# Patient Record
Sex: MALE | Race: BLACK OR AFRICAN AMERICAN | NOT HISPANIC OR LATINO | Employment: OTHER | URBAN - METROPOLITAN AREA
[De-identification: names, ages, dates, MRNs, and addresses within clinical notes are randomized per-mention and may not be internally consistent; named-entity substitution may affect disease eponyms.]

---

## 2021-08-09 ENCOUNTER — TELEPHONE (OUTPATIENT)
Dept: TRANSPLANT | Facility: CLINIC | Age: 36
End: 2021-08-09

## 2021-08-10 DIAGNOSIS — Z76.82 ORGAN TRANSPLANT CANDIDATE: Primary | ICD-10-CM

## 2021-08-16 ENCOUNTER — TELEPHONE (OUTPATIENT)
Dept: TRANSPLANT | Facility: CLINIC | Age: 36
End: 2021-08-16

## 2021-09-13 ENCOUNTER — TELEPHONE (OUTPATIENT)
Dept: TRANSPLANT | Facility: CLINIC | Age: 36
End: 2021-09-13

## 2021-09-15 ENCOUNTER — TELEPHONE (OUTPATIENT)
Dept: TRANSPLANT | Facility: CLINIC | Age: 36
End: 2021-09-15

## 2021-09-23 ENCOUNTER — TELEPHONE (OUTPATIENT)
Dept: TRANSPLANT | Facility: CLINIC | Age: 36
End: 2021-09-23

## 2021-10-08 NOTE — PROGRESS NOTES
Spoke to patient to complete his history for his upcoming appointment his mother will come with him to is appointment he is aware that he have to bring a small breakfast/snack to eat after blood is drawn he will not need a

## 2021-10-13 ENCOUNTER — HOSPITAL ENCOUNTER (OUTPATIENT)
Dept: RADIOLOGY | Facility: HOSPITAL | Age: 36
Discharge: HOME | End: 2021-10-13
Attending: NURSE PRACTITIONER
Payer: MEDICARE

## 2021-10-13 ENCOUNTER — TELEPHONE (OUTPATIENT)
Dept: TRANSPLANT | Facility: CLINIC | Age: 36
End: 2021-10-13

## 2021-10-13 ENCOUNTER — OFFICE VISIT (OUTPATIENT)
Dept: TRANSPLANT | Facility: CLINIC | Age: 36
End: 2021-10-13
Payer: MEDICARE

## 2021-10-13 ENCOUNTER — HOSPITAL ENCOUNTER (OUTPATIENT)
Dept: RADIOLOGY | Facility: HOSPITAL | Age: 36
Discharge: HOME | End: 2021-10-13
Attending: NURSE PRACTITIONER
Payer: COMMERCIAL

## 2021-10-13 VITALS
OXYGEN SATURATION: 100 % | SYSTOLIC BLOOD PRESSURE: 134 MMHG | DIASTOLIC BLOOD PRESSURE: 96 MMHG | HEIGHT: 72 IN | BODY MASS INDEX: 21.17 KG/M2 | WEIGHT: 156.31 LBS | TEMPERATURE: 97 F | HEART RATE: 59 BPM | RESPIRATION RATE: 16 BRPM

## 2021-10-13 DIAGNOSIS — Z76.82 ORGAN TRANSPLANT CANDIDATE: ICD-10-CM

## 2021-10-13 DIAGNOSIS — Z99.2 ESRD ON HEMODIALYSIS: ICD-10-CM

## 2021-10-13 DIAGNOSIS — E11.22 CONTROLLED TYPE 2 DIABETES MELLITUS WITH CHRONIC KIDNEY DISEASE ON CHRONIC DIALYSIS, WITHOUT LONG-TERM CURRENT USE OF INSULIN: ICD-10-CM

## 2021-10-13 DIAGNOSIS — N18.6 ANEMIA IN ESRD (END-STAGE RENAL DISEASE): ICD-10-CM

## 2021-10-13 DIAGNOSIS — I15.0 RENOVASCULAR HYPERTENSION: ICD-10-CM

## 2021-10-13 DIAGNOSIS — N18.6 ESRD ON HEMODIALYSIS: ICD-10-CM

## 2021-10-13 DIAGNOSIS — Z99.2 CONTROLLED TYPE 2 DIABETES MELLITUS WITH CHRONIC KIDNEY DISEASE ON CHRONIC DIALYSIS, WITHOUT LONG-TERM CURRENT USE OF INSULIN: ICD-10-CM

## 2021-10-13 DIAGNOSIS — Z01.818 PRE-TRANSPLANT EVALUATION FOR KIDNEY TRANSPLANT: Primary | ICD-10-CM

## 2021-10-13 DIAGNOSIS — N18.6 CONTROLLED TYPE 2 DIABETES MELLITUS WITH CHRONIC KIDNEY DISEASE ON CHRONIC DIALYSIS, WITHOUT LONG-TERM CURRENT USE OF INSULIN: ICD-10-CM

## 2021-10-13 DIAGNOSIS — D63.1 ANEMIA IN ESRD (END-STAGE RENAL DISEASE): ICD-10-CM

## 2021-10-13 PROBLEM — E78.5 HYPERLIPIDEMIA: Status: ACTIVE | Noted: 2021-10-13

## 2021-10-13 LAB
CREAT UR-MCNC: 248 MG/DL (ref 23–375)
PROT UR-MCNC: 336 MG/DL (ref 0–15)
PROT/CREAT UR: 1.35 MG/G{CREAT} (ref 0–0.2)

## 2021-10-13 PROCEDURE — 93978 VASCULAR STUDY: CPT | Mod: 26,TXP,, | Performed by: RADIOLOGY

## 2021-10-13 PROCEDURE — 93978 US DOPP ILIACS BILATERAL: ICD-10-PCS | Mod: 26,TXP,, | Performed by: RADIOLOGY

## 2021-10-13 PROCEDURE — 99205 PR OFFICE/OUTPT VISIT, NEW, LEVL V, 60-74 MIN: ICD-10-PCS | Mod: S$GLB,TXP,, | Performed by: NURSE PRACTITIONER

## 2021-10-13 PROCEDURE — 72170 X-RAY EXAM OF PELVIS: CPT | Mod: TC,TXP

## 2021-10-13 PROCEDURE — 99205 OFFICE O/P NEW HI 60 MIN: CPT | Mod: S$GLB,TXP,, | Performed by: NURSE PRACTITIONER

## 2021-10-13 PROCEDURE — 71046 X-RAY EXAM CHEST 2 VIEWS: CPT | Mod: TC,TXP

## 2021-10-13 PROCEDURE — 71046 X-RAY EXAM CHEST 2 VIEWS: CPT | Mod: 26,TXP,, | Performed by: RADIOLOGY

## 2021-10-13 PROCEDURE — 71046 XR CHEST PA AND LATERAL: ICD-10-PCS | Mod: 26,TXP,, | Performed by: RADIOLOGY

## 2021-10-13 PROCEDURE — 76770 US EXAM ABDO BACK WALL COMP: CPT | Mod: TC,TXP

## 2021-10-13 PROCEDURE — 76770 US EXAM ABDO BACK WALL COMP: CPT | Mod: 26,59,TXP, | Performed by: RADIOLOGY

## 2021-10-13 PROCEDURE — 99203 PR OFFICE/OUTPT VISIT, NEW, LEVL III, 30-44 MIN: ICD-10-PCS | Mod: S$GLB,TXP,, | Performed by: TRANSPLANT SURGERY

## 2021-10-13 PROCEDURE — 99203 OFFICE O/P NEW LOW 30 MIN: CPT | Mod: S$GLB,TXP,, | Performed by: TRANSPLANT SURGERY

## 2021-10-13 PROCEDURE — 99999 PR PBB SHADOW E&M-EST. PATIENT-LVL V: ICD-10-PCS | Mod: PBBFAC,TXP,, | Performed by: NURSE PRACTITIONER

## 2021-10-13 PROCEDURE — 76770 US RETROPERITONEAL COMPLETE: ICD-10-PCS | Mod: 26,59,TXP, | Performed by: RADIOLOGY

## 2021-10-13 PROCEDURE — 72170 X-RAY EXAM OF PELVIS: CPT | Mod: 26,TXP,, | Performed by: RADIOLOGY

## 2021-10-13 PROCEDURE — 99999 PR PBB SHADOW E&M-EST. PATIENT-LVL V: CPT | Mod: PBBFAC,TXP,, | Performed by: NURSE PRACTITIONER

## 2021-10-13 PROCEDURE — 93978 VASCULAR STUDY: CPT | Mod: TC,TXP

## 2021-10-13 PROCEDURE — 82570 ASSAY OF URINE CREATININE: CPT | Mod: TXP | Performed by: NURSE PRACTITIONER

## 2021-10-13 PROCEDURE — 72170 XR PELVIS ROUTINE AP: ICD-10-PCS | Mod: 26,TXP,, | Performed by: RADIOLOGY

## 2021-10-13 RX ORDER — SUCROFERRIC OXYHYDROXIDE 500 MG/1
1 TABLET, CHEWABLE ORAL 2 TIMES DAILY
Status: ON HOLD | COMMUNITY
Start: 2021-08-09 | End: 2023-04-11 | Stop reason: HOSPADM

## 2021-10-13 RX ORDER — HYDRALAZINE HYDROCHLORIDE 50 MG/1
50 TABLET, FILM COATED ORAL 3 TIMES DAILY
Status: ON HOLD | COMMUNITY
Start: 2021-10-01 | End: 2023-04-11 | Stop reason: HOSPADM

## 2021-10-13 RX ORDER — MINOXIDIL 2.5 MG/1
2.5 TABLET ORAL 2 TIMES DAILY
Status: ON HOLD | COMMUNITY
Start: 2021-09-22 | End: 2023-04-12 | Stop reason: HOSPADM

## 2021-10-13 RX ORDER — ATORVASTATIN CALCIUM 10 MG/1
10 TABLET, FILM COATED ORAL NIGHTLY
Status: ON HOLD | COMMUNITY
Start: 2021-10-01 | End: 2023-04-11 | Stop reason: SDUPTHER

## 2021-10-13 RX ORDER — AMLODIPINE BESYLATE 5 MG/1
10 TABLET ORAL DAILY
Status: ON HOLD | COMMUNITY
Start: 2021-09-22 | End: 2023-04-11 | Stop reason: HOSPADM

## 2021-10-20 DIAGNOSIS — Z76.82 ORGAN TRANSPLANT CANDIDATE: Primary | ICD-10-CM

## 2021-10-27 PROBLEM — R82.5 POSITIVE URINE DRUG SCREEN: Status: ACTIVE | Noted: 2021-10-27

## 2021-10-27 PROBLEM — F32.A DEPRESSION: Status: ACTIVE | Noted: 2021-10-27

## 2021-12-15 ENCOUNTER — TELEPHONE (OUTPATIENT)
Dept: TRANSPLANT | Facility: CLINIC | Age: 36
End: 2021-12-15
Payer: COMMERCIAL

## 2022-02-15 ENCOUNTER — TELEPHONE (OUTPATIENT)
Dept: TRANSPLANT | Facility: CLINIC | Age: 37
End: 2022-02-15
Payer: COMMERCIAL

## 2022-02-15 NOTE — TELEPHONE ENCOUNTER
Call returned, KAROLINA Govea stepped out. Will give her the message  ----- Message from Cindy Mitchell sent at 2/15/2022  9:38 AM CST -----  Regarding: FW: Patient care    ----- Message -----  From: Ainsley Galindo  Sent: 2/15/2022   9:32 AM CST  To: [facility] Pre-Kidney Transplant Non-Clinical  Subject: Patient care                                     Xuan @ [facility] calling to speak to KAROLINA or MA concerning patient care and schedule appts.    Call: [phone]

## 2022-02-23 ENCOUNTER — TELEPHONE (OUTPATIENT)
Dept: TRANSPLANT | Facility: CLINIC | Age: 37
End: 2022-02-23
Payer: COMMERCIAL

## 2022-02-23 NOTE — TELEPHONE ENCOUNTER
Patient saw Dr. Mayra Hankins [facility] Pain [facility]. Patient stated that he's being seen for arthritis.      ----- Message from Nilda Doshi sent at 2/23/2022 12:54 PM CST -----  Regarding: FW: miss call    ----- Message -----  From: Daryl Camarillo  Sent: 2/23/2022  12:36 PM CST  To: [facility] Pre-Kidney Transplant Clinical  Subject: miss call                                        Pt returning miss call    Call

## 2022-02-24 ENCOUNTER — TELEPHONE (OUTPATIENT)
Dept: TRANSPLANT | Facility: CLINIC | Age: 37
End: 2022-02-24
Payer: COMMERCIAL

## 2022-02-24 NOTE — TELEPHONE ENCOUNTER
KAROLINA Govea will get patient set up with a therapist at home for depression. Will get our SW to discuss plan with her.   ----- Message from Daryl Camarillo sent at 2/24/2022 12:30 PM CST -----  Regarding: call back  Xuan @ [facility] calling to speak with Tiffani  concerning patient care and schedule appts.     Call: [phone]

## 2022-02-25 ENCOUNTER — COMMITTEE REVIEW (OUTPATIENT)
Dept: TRANSPLANT | Facility: CLINIC | Age: 37
End: 2022-02-25
Payer: COMMERCIAL

## 2022-02-25 NOTE — LETTER
February 25, 2022    Andrey Reddy  [address] [place] [address]    Dear Andrey Reddy:  MRN: [id number]    Your transplant evaluation was reviewed at the [facility] Selection Committee meeting on 2/25/2022.  It is with regret I inform you that your workup is incomplete and we are unable to determine your transplant candidacy at this time due to needing clearance, clarification and recommendations regarding your mental health issues and marijuana use from an established healthcare professional.  You will be re-presented to the selection committee once you have established care with a healthcare professional and receive clearance.  You will be notified of the committees decision once we review the new results.    The [facility] Selection Committee carefully considers each patients transplant candidacy using established selection criteria to determine if it is safe to proceed with transplantation for each and every person evaluated.  Although the selection committee believes your workup is incomplete and we are unable to determine your transplant candidacy, you have the right to be evaluated at other transplant centers.  You may request your [facility] records be sent to any center of your choice by contacting our Medical Records Department at [phone].    Attached is a letter from the United Network for Organ Sharing (UNOS).  It describes the services and information offered to patients by UNOS and the Organ Procurement and Transplant Network.    Sincerely,      Monica Mcgrath MD  Medical Director, Kidney & Kidney/Pancreas Transplantation    CC:  Dr. Pipe Martinez          [facility]    Encl: OS Letter             The Organ Procurement and Transplantation Network   Toll-free patient services line: Your resource for organ transplant information     If you have a question regarding your own medical care, you always should call your transplant hospital  first. However, for general organ transplant-related information, you can call the Organ Procurement and Transplantation Network (OPTN) toll-free patient services line at [phone].     Anyone, including potential transplant candidates, candidates, recipients, family members, friends, living donors, and donor family members, can call this number to:     · Talk about organ donation, living donation, the transplant process, the donation process, and transplant policies.   · Get a free patient information kit with helpful booklets, waiting list and transplant information, and a list of all transplant hospitals.   · Ask questions about the OPTN website (https://optn.transplant.hrsa.gov/), the United Network for Organ Sharings (UNOS) website (https://unos.org/), or the UNOS website for living donors and transplant recipients. (https://www.transplantliving.org/).   · Learn how the OPTN can help you.   · Talk about any concerns that you may have with a transplant hospital.     The nations transplant system, the OPTN, is managed under federal contract by the United Network for Organ Sharing (UNOS), which is a non-profit charitable organization. The OPTN helps create and define organ sharing policies that make the best use of donated organs. This process continuously evaluating new advances and discoveries so policies can be adapted to best serve patients waiting for transplants. To do so, the OPTN works closely with transplant professionals, transplant patients, transplant candidates, donor families, living donors, and the public. All transplant programs and organ procurement organizations throughout the country are OPTN members and are obligated to follow the policies the OPTN creates for allocating organs.     The OPTN also is responsible for:   · Providing educational material for patients, the public, and professionals.   · Raising awareness of the need for donated organs and tissue.   · Coordinating organ  procurement, matching, and placement.   · Collecting information about every organ transplant and donation that occurs in the [place].     Remember, you should contact your transplant hospital directly if you have questions or concerns about your own medical care including medical records, work-up progress, and test results.     We are not your transplant hospital, and our staff will not be able to answer questions about your case, so please keep your transplant hospitals phone number handy.   However, while you research your transplant needs and learn as much as you can about transplantation and donation, we welcome your call to our toll-free patient services line at [phone].

## 2022-02-25 NOTE — COMMITTEE REVIEW
Native Organ Dx: Diabetes Mellitus - Type II      Not approved for LRD/CAD transplant due to needing clearance, clarification and recommendations regarding mental health issues and marijuana use from an established healthcare professional (psych/therapist)  Patient is cleared medically.     Attempted to notify the patient of the committee's decision. VM not set up to leave message. In the meantime, the SW is to f/u with the patient about establishing care and clearance from a mental health professional.    Note written by SMITH Coughlin, RN    ===============================================    I was present at the meeting and attest to the decision of the committee

## 2022-02-28 ENCOUNTER — TELEPHONE (OUTPATIENT)
Dept: TRANSPLANT | Facility: CLINIC | Age: 37
End: 2022-02-28
Payer: COMMERCIAL

## 2022-02-28 NOTE — TELEPHONE ENCOUNTER
----- Message from Nilda Doshi sent at 2/25/2022  1:48 PM CST -----  Regarding: FW: return call  This was the patient from  selection. Did you guys call him?   ----- Message -----  From: Gracie Ortiz  Sent: 2/25/2022   1:05 PM CST  To: [facility] Pre-Kidney Transplant Clinical  Subject: FW: return call                                    ----- Message -----  From: Ainsley Galindo  Sent: 2/25/2022  12:58 PM CST  To: [facility] Pre-Liver Transplant Clinical  Subject: return call                                      Patient returning call to staff. Patient was in Dialysis and missed the call. Requesting a call back.    Call: [phone] (Mobile

## 2022-03-18 ENCOUNTER — COMMITTEE REVIEW (OUTPATIENT)
Dept: TRANSPLANT | Facility: CLINIC | Age: 37
End: 2022-03-18
Payer: COMMERCIAL

## 2022-03-18 NOTE — COMMITTEE REVIEW
Correction to determination of committee note 2/25/2022.    Unable to determine for LRD/CAD transplant due to needing clearance, clarification and recommendations regarding mental health issues and marijuana use from an established healthcare professional (psych/therapist)  Patient is cleared medically.

## 2022-03-25 ENCOUNTER — TELEPHONE (OUTPATIENT)
Dept: TRANSPLANT | Facility: CLINIC | Age: 37
End: 2022-03-25
Payer: COMMERCIAL

## 2022-03-25 NOTE — TELEPHONE ENCOUNTER
Spoke w/pt regarding 30 day letter. Pt states that he has his last Psych appt on 4/4. He thinks the SW name is Martin Menezes, he's not sure. I informed pt to fac clearance once his appt is complete. Pt verbalized understanding.  Reviewed fax number with pt.

## 2022-03-25 NOTE — TELEPHONE ENCOUNTER
----- Message from Salomon Bellamy sent at 3/25/2022  8:12 AM CDT -----  Pt calling to speak to staff regarding letter received.  Pt states he is enrolled in a behavior clinic and he has contact information if needed    Call # [phone]

## 2022-04-19 ENCOUNTER — TELEPHONE (OUTPATIENT)
Dept: TRANSPLANT | Facility: CLINIC | Age: 37
End: 2022-04-19
Payer: COMMERCIAL

## 2022-04-19 NOTE — TELEPHONE ENCOUNTER
Patient was seen at [facility] [phone] and cleared. Patient will be represented to committee on 4/29/22     ----- Message from Salomon Bellamy sent at 4/19/2022 10:06 AM CDT -----  Regarding: Coordinator  Pt's dialysis KAROLINA Govea states they are having difficulty getting psych evaluation records released and would like assistance in this matter    Call : [phone]

## 2022-04-29 ENCOUNTER — COMMITTEE REVIEW (OUTPATIENT)
Dept: TRANSPLANT | Facility: CLINIC | Age: 37
End: 2022-04-29
Payer: COMMERCIAL

## 2022-04-29 NOTE — LETTER
April 30, 2022    Pipe Martinez  [address] [address]     Dear Dr. Martinez:    Patient: Andrey Reddy   MR Number: [id number]   YOB: 1985     Your patient, Andrey Reddy, was recently discussed at the [facility] Selection Committee meeting on 2/25/2022. I am happy to inform you that Andrey has been approved for transplantation.  He has met selection criteria for a kidney transplant related to ESRD secondary to primary diagnosis of Diabetes Mellitus - Type II. Your patient will be placed on the cadaveric wait list pending final financial approval from insurance company.     We appreciate your confidence in allowing us to participate in your patients care.  If you have any questions or concerns, please do not hesitate to contact me.    Sincerely,    Monica Mcgrath MD  Medical Director, Kidney & Kidney/Pancreas Transplantation    Tj/Enclosed    Cc:[facility]  [facility]

## 2022-04-29 NOTE — COMMITTEE REVIEW
Native Organ Dx: Diabetes Mellitus - Type II      SELECTION COMMITTEE NOTE    Andrey Reddy was presented at selection committee on 4/29/2022.  Patient met selection criteria for kidney transplant related to ESRD due to   Diabetes Mellitus - Type II.  No absolute contraindications to transplant at this time.  Patient will be placed on the cadaveric wait list pending final financial approval from insurance company.  Patient will return to clinic for routine appointment in 1 year(s). Patient does not meet criteria for High KDPI kidney offer. Patient meets HCV+ kidney offer. Patient does not meet criteria for dual/enbloc, due to age.          Note written by Tiffani Hernández RN    ===============================================    I was present at the meeting and attest to the decision of the committee

## 2022-06-07 ENCOUNTER — TELEPHONE (OUTPATIENT)
Dept: TRANSPLANT | Facility: CLINIC | Age: 37
End: 2022-06-07
Payer: COMMERCIAL

## 2022-06-07 DIAGNOSIS — Z76.82 ORGAN TRANSPLANT CANDIDATE: Primary | ICD-10-CM

## 2022-06-08 ENCOUNTER — TELEPHONE (OUTPATIENT)
Dept: TRANSPLANT | Facility: CLINIC | Age: 37
End: 2022-06-08
Payer: COMMERCIAL

## 2022-06-08 DIAGNOSIS — Z76.82 ORGAN TRANSPLANT CANDIDATE: Primary | ICD-10-CM

## 2022-06-08 NOTE — LETTER
2022  [facility]  [address] [facility] [address]    Dear Andrey Reddy:  MRN: [id number]    Congratulations! On 2022, you were placed on  the waiting list for a  donor transplant.    Your candidacy for kidney transplant is based on the following criteria: ESRD due to Diabetes Mellitus - Type II.    Your transplant coordinator while on the waiting list is Britany Camacho RN. They can be reached at [phone] or [phone] with any questions.      What to do now?    ? Ask your living donors to call and begin testing   Give your donors our phone number, [phone]   Make sure your donors have your name and date of birth when they call   You will get transplanted much faster if you have a living donor    ? Have your blood sent to our Transplant Lab every month   If you are on dialysis - our Transplant Lab will work with your dialysis unit to send your blood every month   If you are not on dialysis   o If you live near an [facility] lab, we will schedule you to have blood drawn every month  o If you do not live near an [facility] lab, you will be sent blood kits in the mail. You will need to take a kit to your local lab or doctor to have your blood drawn every month and mail to the Transplant Lab.     ? Call us with ANY CHANGES   Phone numbers - we must be able to reach you anytime of the day or night when a kidney is available   Address   Insurance coverage   Dialysis unit or kidney doctor   Tim: if you have surgery, stay in the hospital, have to get blood, or have an infection    ? Review your Kidney/Kidney-Pancreas Transplant Guide    This will give you detailed information about what happens when  o you are on the waiting list   o you are called when a kidney is available    The [facility] has a transplant surgeon and physician available 365 days a year, 24 hours a day to coordinate organ acceptance, procurement, surgical placement and  to address urgent patient care issues.  You will be notified in writing of any changes to our Transplant Centers staffing plan that would impact your ability to receive a transplant.    Attached is a letter from the United Network for Organ Sharing (UNOS). It describes the services and information offered to patients by UNOS and the Organ Procurement and Transplant Network. We look forward to working with you while on the waiting list.     Congratulations,    Your Transplant Partner  [facility]   [address] [place] [address]  [phone]    Tj/Valarie  Cc: SALENA BULLARD                                                                                                                The Organ Procurement and Transplantation Network   Toll-free patient services line: Your resource for organ transplant information     If you have a question regarding your own medical care, you always should call your transplant hospital first. However, for general organ transplant-related information, you can call the Organ Procurement and Transplantation Network (OPTN) toll-free patient services line at [phone].     Anyone, including potential transplant candidates, candidates, recipients, family members, friends, living donors, and donor family members, can call this number to:     · Talk about organ donation, living donation, the transplant process, the donation process, and transplant policies.   · Get a free patient information kit with helpful booklets, waiting list and transplant information, and a list of all transplant hospitals.   · Ask questions about the OPTN website (https://optn.transplant.hrsa.gov/), the United Network for Organ Sharings (UNOS) website (https://unos.org/), or the UNOS website for living donors and transplant recipients. (https://www.transplantliving.org/).   · Learn how the OPTN can help you.   · Talk about any concerns that you may have  with a transplant hospital.     The nations transplant system, the OPTN, is managed under federal contract by the United Network for Organ Sharing (UNOS), which is a non-profit charitable organization. The OPTN helps create and define organ sharing policies that make the best use of donated organs. This process continuously evaluating new advances and discoveries so policies can be adapted to best serve patients waiting for transplants. To do so, the OPTN works closely with transplant professionals, transplant patients, transplant candidates, donor families, living donors, and the public. All transplant programs and organ procurement organizations throughout the country are OPTN members and are obligated to follow the policies the OPTN creates for allocating organs.     The OPTN also is responsible for:   · Providing educational material for patients, the public, and professionals.   · Raising awareness of the need for donated organs and tissue.   · Coordinating organ procurement, matching, and placement.   · Collecting information about every organ transplant and donation that occurs in the [place].     Remember, you should contact your transplant hospital directly if you have questions or concerns about your own medical care including medical records, work-up progress, and test results.     We are not your transplant hospital, and our staff will not be able to answer questions about your case, so please keep your transplant hospitals phone number handy.   However, while you research your transplant needs and learn as much as you can about transplantation and donation, we welcome your call to our toll-free patient services line at [phone].

## 2022-06-08 NOTE — TELEPHONE ENCOUNTER
KIDNEY WAIT LISTING NOTE    Date of Financial clearance to list: 22    SSN/[facility]:     Organ: Kidney    Last Name: Barry  First Name: Andrey    : 1985       Gender: male        MRN#: [id number]                                   State of Permanent Residence: [address] [place] [address]    Ethnicity: Not  or /a   Race:      Black or     CLINICAL INFORMATION   Candidate Medical Urgency Status: Active (1)  Number of Previous Kidney Transplants: 0  Number of Previous Solid Organ Transplants: 0  Did you enter number of previous kidney or other solid organ transplants? yes  Is this Candidate a Prior Living Donor: no  (If yes, please generate letter to UNOS with patient's date of donation, recipient SSN, signed by Surgical Director after patient is listed in order to receive priority points).      ABO  ABO Blood Group:   AB POS     ABO Confirmation: (THESE DATES MUST BE PRIOR TO THE LIST DATE AND SUPPORTED BY SEPARATE LAB REPORTS)    Internal Results    Lab Results   Component Value Date    GROUPTRH AB POS 2022    GROUPTRH AB POS 10/13/2021     No results found for: ABO    External Results    ABO Date 1:    ABO Date 2  Are either of these ABO results based on External Labs? no  (If Yes, STOP and go to source document in Media Tab for verification).    VITALS  Height:  6'  Weight:  156 lbs  (Use height from Transplant clinic visits only).  Did you enter height/weight? Yes    HLA    Class I:  Lab Results   Component Value Date    EHOR9VE 2 10/13/2021    QSAZ2NP 23 10/13/2021    HQYY2IN 7 10/13/2021    MNAL3GP 72 10/13/2021    XPTTZ4WT 6 10/13/2021    FKFAM6KM XX 10/13/2021    CJOYB6UE 2 10/13/2021    EPKEZ1QI 7 10/13/2021       Class II:  Lab Results   Component Value Date    DELTRF29ME 17 10/13/2021    URBJQA94RS 11 10/13/2021    XSWMGN337YT 52 10/13/2021    NDSGVZ0246 XX 10/13/2021    OZCST1DN 2 10/13/2021    BFLKU9HC 7 10/13/2021       Tested for HLA  "Antibodies: Yes, no antibodies detected     If result is "Positive" antibodies are detected     If result is "Negative or questionable" no antibodies detected    Lab Results   Component Value Date    CIPRAS Positive 10/13/2021    CIIPRAS Negative 10/13/2021       DIALYSIS INFORMATION  Is patient Pre-Dialysis: No     GFR Information  Report GFR being used as the criteria for placement on the kidney list. If not, leave blank  GFR < or = 20 ml/min? n/a  If Yes, Specify value  ___   ml/min     Initial date GFR became 20 or less:   Is GFR obtained from an Outside lab Result? n/a  (If YES verify with source document scanned into media)    If patient on Dialysis:    Is candidate currently on dialysis for ESRD? Yes  If Yes,  Date Chronic Dialysis Started:   5/10/2021  (verify with source document in Media Tab)   Dialysis Unit Name: [facility]  [address] [facility] [address]                        Physician Name:  Dr. Monica Troy  [facility]#: [id number]    DIABETES INFORMATION  Primary Native Kidney Diagnosis: Diabetes Mellitus - Type II  C-Peptide Value - No results found for: CPEPTIDE  Current Diabetes Status: Type II    FOR NON-KIDNEY DEPARTMENT USE ONLY:  Additional Organs Registered? none    Maximum Acceptable Number of HLA Mismatches  ABDR:     6      (0-6)               AB:               (0-4)  ADR:   _____  (0-4)              BDR: _____ (0-4)  A:        _____  (0-2)              B:      _____ (0-2)          DR: ______ (0-2)    Will Recipient Accept?   Accept HBcAB Positive Organ:            Yes  Accept HBV ELIAZAR Positive Organ:        no  Accept HCV Antibody Positive Organ: yes   Accept HCV ELIAZAR Positive Organ: yes    Dual Kidney and En Bloc Opt In : No  Dual  Local:   No  Dual Import:   No  En Bloc Local:   No  En Bloc Import: No     Accept KDPI > 85: Single: No     Local: No     Import: No  Accept KDPI > 85: Dual: No     Local: No     Import: No      ### NURSE TO VERIFY CONSENT AND MAKE ANY " NECESSARY CHANGES NEEDED IN UNET AT THE TIME OF VERIFICATION ###    Unacceptible Antigens  If yes, list     Lab Results   Component Value Date    PR8JGFA Negative 10/13/2021       ### DO NOT LIST IF ANTIGEN VALUE WEAK ###    ABO, 5183 and all serologies were verified by Roma and myself.

## 2022-06-22 ENCOUNTER — EPISODE CHANGES (OUTPATIENT)
Dept: TRANSPLANT | Facility: CLINIC | Age: 37
End: 2022-06-22

## 2022-06-22 DIAGNOSIS — Z76.82 ORGAN TRANSPLANT CANDIDATE: Primary | ICD-10-CM

## 2022-08-05 ENCOUNTER — TELEPHONE (OUTPATIENT)
Dept: TRANSPLANT | Facility: CLINIC | Age: 37
End: 2022-08-05
Payer: COMMERCIAL

## 2022-08-18 DIAGNOSIS — Z76.82 ORGAN TRANSPLANT CANDIDATE: Primary | ICD-10-CM

## 2022-08-23 ENCOUNTER — TELEPHONE (OUTPATIENT)
Dept: TRANSPLANT | Facility: CLINIC | Age: 37
End: 2022-08-23
Payer: COMMERCIAL

## 2022-09-21 ENCOUNTER — TELEPHONE (OUTPATIENT)
Dept: TRANSPLANT | Facility: CLINIC | Age: 37
End: 2022-09-21
Payer: COMMERCIAL

## 2022-10-10 PROCEDURE — 86832 HLA CLASS I HIGH DEFIN QUAL: CPT | Mod: PO,TXP | Performed by: NURSE PRACTITIONER

## 2022-10-10 PROCEDURE — 86833 HLA CLASS II HIGH DEFIN QUAL: CPT | Mod: PO,TXP | Performed by: NURSE PRACTITIONER

## 2022-10-19 ENCOUNTER — TELEPHONE (OUTPATIENT)
Dept: TRANSPLANT | Facility: CLINIC | Age: 37
End: 2022-10-19
Payer: COMMERCIAL

## 2022-10-19 NOTE — LETTER
Date: 10/19/2022          Referral Process      To: Dialysis Unit  and Charge RN From: [facility] Social Workers and      Kidney Transplant Nurse Coordinators    RE: Andrey Reddy, 1985, [id number]     At [facility], we conduct adherence checks as an important part of transplant care. Initial and listed patient assessments are not complete without adherence information.        Please complete the following information:                 Data from the last 3 months:  (data from last 3 months preferred):    Number of AMAs with dates, time, and reasons: ____________________________________________________    ______________________________________________________________________________________________    ______________________________________________________________________________________________    Number of No-Shows with dates and reasons: ______________________________________________________      ______________________________________________________________________________________________      Any concerns with Caregivers:  YES / NO    If yes, please explain:  ___________________________________________________________________________    ______________________________________________________________________________________________     Any concerns with Transportation:  YES / NO    If yes, please explain:  ___________________________________________________________________________    ______________________________________________________________________________________________    Any Psychiatric and/or Psychosocial concerns:  YES / NO     If yes, please explain: ___________________________________________________________________________    ______________________________________________________________________________________________      PLEASE RETURN TO: FAX: [phone]     Thank you for collaborating with us in the care of this patient.            [address]  ?  [place] [address]  ?  phone [phone]  ?  fax [phone]  ?  [email or web address]  Confidentiality notice: The accompanying facsimile is intended solely for the use of the recipient designated above. Document(s) transmitted herewith may contain information that is confidential and privileged. Delivery, distribution or dissemination of this communication other than to the intended recipient is strictly prohibited. If you have received this facsimile in error, please notify us immediately by telephone.

## 2022-10-21 ENCOUNTER — LAB VISIT (OUTPATIENT)
Dept: LAB | Facility: HOSPITAL | Age: 37
End: 2022-10-21
Payer: COMMERCIAL

## 2022-10-21 DIAGNOSIS — Z76.82 ORGAN TRANSPLANT CANDIDATE: ICD-10-CM

## 2022-10-26 LAB — HPRA INTERPRETATION: NORMAL

## 2022-10-28 ENCOUNTER — HOSPITAL ENCOUNTER (OUTPATIENT)
Dept: RADIOLOGY | Facility: HOSPITAL | Age: 37
Discharge: HOME | End: 2022-10-28
Attending: NURSE PRACTITIONER
Payer: COMMERCIAL

## 2022-10-28 ENCOUNTER — TELEPHONE (OUTPATIENT)
Dept: ADMINISTRATIVE | Facility: HOSPITAL | Age: 37
End: 2022-10-28
Payer: COMMERCIAL

## 2022-10-28 ENCOUNTER — HOSPITAL ENCOUNTER (OUTPATIENT)
Dept: CARDIOLOGY | Facility: HOSPITAL | Age: 37
Discharge: HOME | End: 2022-10-28
Attending: NURSE PRACTITIONER
Payer: MEDICARE

## 2022-10-28 ENCOUNTER — OFFICE VISIT (OUTPATIENT)
Dept: TRANSPLANT | Facility: CLINIC | Age: 37
End: 2022-10-28
Payer: MEDICARE

## 2022-10-28 VITALS
BODY MASS INDEX: 20.99 KG/M2 | SYSTOLIC BLOOD PRESSURE: 149 MMHG | HEART RATE: 79 BPM | HEIGHT: 72 IN | DIASTOLIC BLOOD PRESSURE: 79 MMHG | WEIGHT: 155 LBS | OXYGEN SATURATION: 98 % | RESPIRATION RATE: 18 BRPM | TEMPERATURE: 97 F

## 2022-10-28 VITALS
HEART RATE: 76 BPM | DIASTOLIC BLOOD PRESSURE: 80 MMHG | HEIGHT: 73 IN | SYSTOLIC BLOOD PRESSURE: 130 MMHG | BODY MASS INDEX: 20.54 KG/M2 | WEIGHT: 155 LBS

## 2022-10-28 DIAGNOSIS — Z76.82 PATIENT ON WAITING LIST FOR KIDNEY TRANSPLANT: Primary | ICD-10-CM

## 2022-10-28 DIAGNOSIS — E11.22 CONTROLLED TYPE 2 DIABETES MELLITUS WITH CHRONIC KIDNEY DISEASE ON CHRONIC DIALYSIS, WITHOUT LONG-TERM CURRENT USE OF INSULIN: ICD-10-CM

## 2022-10-28 DIAGNOSIS — N18.6 ESRD ON HEMODIALYSIS: ICD-10-CM

## 2022-10-28 DIAGNOSIS — Z76.82 ORGAN TRANSPLANT CANDIDATE: ICD-10-CM

## 2022-10-28 DIAGNOSIS — Z99.2 CONTROLLED TYPE 2 DIABETES MELLITUS WITH CHRONIC KIDNEY DISEASE ON CHRONIC DIALYSIS, WITHOUT LONG-TERM CURRENT USE OF INSULIN: ICD-10-CM

## 2022-10-28 DIAGNOSIS — I15.0 RENOVASCULAR HYPERTENSION: ICD-10-CM

## 2022-10-28 DIAGNOSIS — Z99.2 ESRD ON HEMODIALYSIS: ICD-10-CM

## 2022-10-28 DIAGNOSIS — N25.81 SECONDARY HYPERPARATHYROIDISM: ICD-10-CM

## 2022-10-28 DIAGNOSIS — N18.6 CONTROLLED TYPE 2 DIABETES MELLITUS WITH CHRONIC KIDNEY DISEASE ON CHRONIC DIALYSIS, WITHOUT LONG-TERM CURRENT USE OF INSULIN: ICD-10-CM

## 2022-10-28 LAB
ASCENDING AORTA: 2.42 CM
AV INDEX (PROSTH): 0.8
AV MEAN GRADIENT: 2 MMHG
AV PEAK GRADIENT: 5 MMHG
AV VALVE AREA: 3.22 CM2
AV VELOCITY RATIO: 0.73
BSA FOR ECHO PROCEDURE: 1.9 M2
CV ECHO LV RWT: 0.28 CM
DOP CALC AO PEAK VEL: 1.17 M/S
DOP CALC AO VTI: 20.84 CM
DOP CALC LVOT AREA: 4 CM2
DOP CALC LVOT DIAMETER: 2.27 CM
DOP CALC LVOT PEAK VEL: 0.85 M/S
DOP CALC LVOT STROKE VOLUME: 67.11 CM3
DOP CALCLVOT PEAK VEL VTI: 16.59 CM
E WAVE DECELERATION TIME: 213.53 MSEC
E/A RATIO: 1.33
E/E' RATIO: 7.73 M/S
ECHO LV POSTERIOR WALL: 0.76 CM (ref 0.6–1.1)
EJECTION FRACTION: 65 %
FRACTIONAL SHORTENING: 26 % (ref 28–44)
INTERVENTRICULAR SEPTUM: 0.8 CM (ref 0.6–1.1)
LA MAJOR: 4.47 CM
LA MINOR: 4.24 CM
LA WIDTH: 4.29 CM
LEFT ATRIUM SIZE: 3.9 CM
LEFT ATRIUM VOLUME INDEX MOD: 24.7 ML/M2
LEFT ATRIUM VOLUME INDEX: 32.1 ML/M2
LEFT ATRIUM VOLUME MOD: 47.73 CM3
LEFT ATRIUM VOLUME: 61.89 CM3
LEFT INTERNAL DIMENSION IN SYSTOLE: 4.05 CM (ref 2.1–4)
LEFT VENTRICLE DIASTOLIC VOLUME INDEX: 82.29 ML/M2
LEFT VENTRICLE DIASTOLIC VOLUME: 158.82 ML
LEFT VENTRICLE MASS INDEX: 80 G/M2
LEFT VENTRICLE SYSTOLIC VOLUME INDEX: 37.3 ML/M2
LEFT VENTRICLE SYSTOLIC VOLUME: 71.97 ML
LEFT VENTRICULAR INTERNAL DIMENSION IN DIASTOLE: 5.5 CM (ref 3.5–6)
LEFT VENTRICULAR MASS: 154.95 G
LV LATERAL E/E' RATIO: 6.54 M/S
LV SEPTAL E/E' RATIO: 9.44 M/S
MV A" WAVE DURATION": 10.28 MSEC
MV PEAK A VEL: 0.64 M/S
MV PEAK E VEL: 0.85 M/S
MV STENOSIS PRESSURE HALF TIME: 61.92 MS
MV VALVE AREA P 1/2 METHOD: 3.55 CM2
PISA TR MAX VEL: 2.4 M/S
PULM VEIN S/D RATIO: 0.85
PV PEAK D VEL: 0.48 M/S
PV PEAK S VEL: 0.41 M/S
RA MAJOR: 4.17 CM
RA PRESSURE: 3 MMHG
RA WIDTH: 4.27 CM
RIGHT VENTRICULAR END-DIASTOLIC DIMENSION: 3.58 CM
RV TISSUE DOPPLER FREE WALL SYSTOLIC VELOCITY 1 (APICAL 4 CHAMBER VIEW): 8.83 CM/S
SINUS: 2.42 CM
STJ: 2.26 CM
TDI LATERAL: 0.13 M/S
TDI SEPTAL: 0.09 M/S
TDI: 0.11 M/S
TR MAX PG: 23 MMHG
TRICUSPID ANNULAR PLANE SYSTOLIC EXCURSION: 2.01 CM
TV REST PULMONARY ARTERY PRESSURE: 26 MMHG

## 2022-10-28 PROCEDURE — 99999 PR PBB SHADOW E&M-EST. PATIENT-LVL IV: ICD-10-PCS | Mod: PBBFAC,TXP,, | Performed by: NURSE PRACTITIONER

## 2022-10-28 PROCEDURE — 71046 XR CHEST PA AND LATERAL: ICD-10-PCS | Mod: 26,TXP,, | Performed by: RADIOLOGY

## 2022-10-28 PROCEDURE — 93306 TTE W/DOPPLER COMPLETE: CPT | Mod: TXP

## 2022-10-28 PROCEDURE — 99999 PR PBB SHADOW E&M-EST. PATIENT-LVL IV: CPT | Mod: PBBFAC,TXP,, | Performed by: NURSE PRACTITIONER

## 2022-10-28 PROCEDURE — 99215 OFFICE O/P EST HI 40 MIN: CPT | Mod: S$PBB,TXP,, | Performed by: NURSE PRACTITIONER

## 2022-10-28 PROCEDURE — 93306 ECHO (CUPID ONLY): ICD-10-PCS | Mod: 26,TXP,, | Performed by: INTERNAL MEDICINE

## 2022-10-28 PROCEDURE — 71046 X-RAY EXAM CHEST 2 VIEWS: CPT | Mod: TC,TXP

## 2022-10-28 PROCEDURE — 93306 TTE W/DOPPLER COMPLETE: CPT | Mod: 26,TXP,, | Performed by: INTERNAL MEDICINE

## 2022-10-28 PROCEDURE — 71046 X-RAY EXAM CHEST 2 VIEWS: CPT | Mod: 26,TXP,, | Performed by: RADIOLOGY

## 2022-10-28 PROCEDURE — 99214 OFFICE O/P EST MOD 30 MIN: CPT | Mod: PBBFAC,25,TXP | Performed by: NURSE PRACTITIONER

## 2022-10-28 PROCEDURE — 99215 PR OFFICE/OUTPT VISIT, EST, LEVL V, 40-54 MIN: ICD-10-PCS | Mod: S$PBB,TXP,, | Performed by: NURSE PRACTITIONER

## 2022-10-28 RX ORDER — GABAPENTIN 300 MG/1
300 CAPSULE ORAL 3 TIMES DAILY
Status: ON HOLD | COMMUNITY
Start: 2022-05-12 | End: 2023-04-13 | Stop reason: HOSPADM

## 2022-10-28 NOTE — PROGRESS NOTES
Kidney Transplant Recipient Reevalulation    Referring Physician: Pipe Martinez  Current Nephrologist: Pipe Martinez  Waitlist Status: active  Dialysis Start Date: 5/10/2021    Subjective:     CC:  Annual reassessment of kidney transplant candidacy.    HPI:  Mr. Reddy is a 37 y.o. year old Black or  male with ESRD secondary to diabetic nephropathy and HTN.  He has been on the wait list for a kidney transplant at [facility] since 5/10/2021. Patient is currently on hemodialysis started on 5/10/2021. Patient is dialyzing on TTS schedule.  Patient reports that he is tolerating dialysis well.. Patient denies any recent hospitalizations or ED visits.    Has been having leg cramps at dialysis. Reports that he was diagnosed with fibromyalgia by local rheumatologist. Was subsequently sent to see pain management who did epidural steroid injections. Remains active, not frail.    CXR 10/28/2022: No acute process seen.  PXR 10/13/2021: favorable for transplant.    Renal US 10/13/2021: Medical renal disease  Iliacs 10/13/2021: favorable for transplant.    Echo 10/28/2022: EF 65% PA 26   Stress 11/15/2021: negative for ischemia.    Current Outpatient Medications   Medication Sig Dispense Refill    amLODIPine (NORVASC) 5 MG tablet Take 10 mg by mouth once daily.      atorvastatin (LIPITOR) 10 MG tablet Take 10 mg by mouth every evening.      gabapentin (NEURONTIN) 300 MG capsule Take 300 mg by mouth 3 (three) times daily.      hydrALAZINE (APRESOLINE) 50 MG tablet Take 50 mg by mouth 3 (three) times daily.      minoxidiL (LONITEN) 2.5 MG tablet Take 2.5 mg by mouth 2 (two) times a day.      VELPHORO 500 mg Chew Take 1 tablet by mouth 2 (two) times daily.      vit B complx C/folic acid/zinc (RENAPLEX ORAL) Take 1 tablet by mouth once daily.       No current facility-administered medications for this visit.       Past Medical History:   Diagnosis Date    Allergy     Anemia     Diabetes mellitus     Disorder of  "kidney and ureter     Hyperlipidemia     Hypertension     Secondary hyperparathyroidism      Review of Systems   Constitutional:  Negative for activity change, appetite change and fever.   HENT:  Negative for congestion, mouth sores and sore throat.    Eyes:  Negative for visual disturbance.   Respiratory:  Negative for cough, chest tightness and shortness of breath.    Cardiovascular:  Negative for chest pain, palpitations and leg swelling.   Gastrointestinal:  Negative for abdominal distention, abdominal pain, constipation, diarrhea and nausea.   Genitourinary:  Negative for difficulty urinating, frequency and hematuria.   Musculoskeletal:  Negative for arthralgias and gait problem.   Skin:  Negative for wound.   Allergic/Immunologic: Negative for environmental allergies, food allergies and immunocompromised state.   Neurological:  Negative for dizziness, weakness and numbness.   Psychiatric/Behavioral:  Negative for sleep disturbance. The patient is not nervous/anxious.      Objective:   body mass index is 21.11 kg/m².  BP (!) 149/79 (BP Location: Left arm, Patient Position: Sitting, BP Method: Medium (Automatic))   Pulse 79   Temp 97.2 °F (36.2 °C) (Temporal)   Resp 18   Ht 5' 11.85" (1.825 m)   Wt 70.3 kg (154 lb 15.7 oz)   SpO2 98%   BMI 21.11 kg/m²     Physical Exam  Vitals and nursing note reviewed.   Constitutional:       Appearance: Normal appearance.   HENT:      Head: Normocephalic.   Cardiovascular:      Rate and Rhythm: Normal rate and regular rhythm.      Heart sounds: Normal heart sounds.   Pulmonary:      Effort: Pulmonary effort is normal.      Breath sounds: Normal breath sounds.   Abdominal:      General: Bowel sounds are normal. There is no distension.      Palpations: Abdomen is soft.      Tenderness: There is no abdominal tenderness.   Musculoskeletal:         General: Normal range of motion.   Skin:     General: Skin is warm and dry.   Neurological:      General: No focal deficit " present.      Mental Status: He is alert.   Psychiatric:         Behavior: Behavior normal.       Labs:  Lab Results   Component Value Date    WBC 4.75 10/13/2021    HGB 12.7 (L) 10/13/2021    HCT 37.6 (L) 10/13/2021     10/13/2021    K 4.6 10/13/2021     10/13/2021    CO2 21 (L) 10/13/2021    BUN 30 (H) 10/13/2021    CREATININE 4.9 (H) 10/13/2021    EGFRNONAA 14.0 (A) 10/13/2021    CALCIUM 10.0 10/13/2021    PHOS 4.0 10/13/2021    ALBUMIN 4.5 10/13/2021    AST 15 10/13/2021    ALT 18 10/13/2021    UTPCR 1.35 (H) 10/13/2021    .3 (H) 10/13/2021     Lab Results   Component Value Date    CPRA 50 08/04/2022    KP0RLDY Negative 08/04/2022    CIIAB DR53 08/04/2022    ABCMT WEAK---DP5, 08/04/2022       Labs were reviewed with the patient.    Pre-transplant Workup:   Reviewed with the patient.    Assessment:     1. Patient on waiting list for kidney transplant    2. ESRD on hemodialysis    3. Renovascular hypertension    4. Controlled type 2 diabetes mellitus with chronic kidney disease on chronic dialysis, without long-term current use of insulin    5. Secondary hyperparathyroidism        Plan:     Transplant Candidacy:   Mr. Reddy is a suitable kidney transplant candidate.  Meets center eligibility for accepting HCV+ donor offer - yes.  Patient educated on HCV+ donors. Andrey is willing  to accept HCV+ donor offer -  yes   Patient is a candidate for KDPI > 85 kidney donor offer - no (age)  He remains in overall stable health, and will remain active on the transplant list.    Patient advised that it is recommended that all transplant candidates, and their close contacts and household members receive Covid vaccination.    Vidhi Joe NP       Follow-up:   In addition to the tests noted in the plan, Mr. Reddy will continue to have reevaluation as per the standing pre-kidney transplant protocol:  Monthly blood for PRA  Annual return to clinic, except HIV positive, > 65 years of age, or pancreas  transplant candidates who will be scheduled to see transplant every 6 months while in pre-transplant phase  Annual re-testing: CXR, EKG, yearly mammograms for women over 40 and PSA for males over 40, cardiology follow-up as recommended by initial cardiology pre-transplant evaluation  Renal ultrasound every 2 years  Baseline colonoscopy after age 50 and repeated as recommended    UNOS Patient Status  Functional Status: 60% - Requires occasional assistance but is able to care for needs  Physical Capacity: No Limitations

## 2022-10-28 NOTE — LETTER
October 31, 2022        Pipe Martinez  [address] [address]  Phone: [phone]  Fax: [phone]             [facility]- Transplant 1st Fl  [address]  [facility] [address]  Phone: [phone]   Patient: Andrey Reddy   MR Number: [id number]   YOB: 1985   Date of Visit: 10/28/2022       Dear Dr. Pipe Martinez    Thank you for referring Andrey Reddy to me for evaluation. Attached you will find relevant portions of my assessment and plan of care.    If you have questions, please do not hesitate to call me. I look forward to following Andrey Reddy along with you.    Sincerely,    Vidhi Joe, CHERELLE    Enclosure    If you would like to receive this communication electronically, please contact [email or web address] or [phone] to request [employer] Link access.    [employer] Link is a tool which provides read-only access to select patient information with whom you have a relationship. Its easy to use and provides real time access to review your patients record including encounter summaries, notes, results, and demographic information.    If you feel you have received this communication in error or would no longer like to receive these types of communications, please e-mail [email or web address]

## 2022-10-31 NOTE — PROGRESS NOTES
"Transplant Psychosocial Evaluation Update:  Last psychosocial evaluation for transplant was completed on 10/13/2021 KASI Albarran    Pt presents today in company of pt's mother, Rene Reddy. Pt and pt's mother present as alert, oriented to person, place, time, purpose of visit. Pt and pt's mother deny concerns about going through with transplant and state motivation and sense of preparedness for transplantation, caregiver role, psychosocial risk factors, medical risk factors, financial aspects, and lifetime commitments. All concerns and education points as per transplant psychosocial evaluation process addressed (also refer to psychosocial dated 10/13/2021). Pt actively participated in today's interview, with pt's mother, Rene Reddy, participating as caregiver. Pt asking questions appropriately and denies changes to previous psychosocial evaluation for transplantation which we reviewed line by line with pt and, per pt choice, with pts caregiver today.    Primary Caregivers and Transportation for Transplant:  1. Rene Reddy, mother, DAVID Hebert; DOES drive; ([phone])  2. Tobiyoko Llanes, 69 yo father, DAVID Hebert; DOES drive; ([phone])      Both pt and caregiver/s plan to stay locally at [facility] - information reviewed in depth. Caregivers plan to stay at hospital as appropriate for transplant and post-transplant process.    Pts Vocation: Pt not working and reports being on disability. Pt previously worked at [employer] as . Pt reports disability began 05/2021 due to ESRD.     DME: Yes, BPC and glucometer.     ADLS:  Pt reports significant cramping after dialysis and not feeling well "most days". Pt reports pt's mother assist with preparing meals, bathing, shopping, and housekeeping. Pt reports he is able to perform all ADLs independently on his "good days". Pt does drive and reports driving himself to dialysis treatments.     Household and Dependents: pt " "resides alone and has no dependents at this time.    Income: Pt states ability to afford all monthly expenses and costs including for medications now and if transplanted based on income and on savings and assets.    Dialysis Adherence: Ayanna contacted pt's DU and spoke with ANT , Xuan, who stated pt is "very compliant" with dialysis treatments and has missed 1 day in September and made it up same week. ANT  reports no concerns with caregivers, transportation, or mental health.     Pt and pt's mother deny any additional concerns, stating preparedness and motivation to proceed with organ transplant.     Payer/Plan Subscr  Sex Relation Sub. Ins. ID Effective Group Num   1. BLUE CROSS BL* [place] 1985 Male Self [id number] 12/1/15 [id number]                                   [address] [address]   2. MEDICARE - [place]* [place] 1985 Male Self [id number]                                    [address]       Suitability for Transplant:   Pt remains low risk for transplant at this time based on psychosocial risk factors and strengths.    Previous SW recommened pt see psychiatry and follow any recommendations. Pt met with William Douglass MD at [facility] on 2022. William Douglass MD reports pt "good candidate for transplant from a mental health standpoint...NO mental health treatment warranted."    Pt reports not currently seeing mental health professional. Pt reports having a difficult time adjusting to dialysis initially and now accepting of medical situation. Pt reports not needing mental health treatment currently and having strong supports in parents. SW encouraged pt to follow-up with [facility] or  transplant team if mental health status changes and pt agrees. Pt denies suicidal or homicidal ideation at this time.     Pt josefina well overall with health needs and life in general, has stable supports, adequate " insurance, financial stability, transportation and caregiver plans in place, and is using no substances unless prescribed.     Rebekah Meyer LMSW

## 2022-11-03 PROCEDURE — 99001 SPECIMEN HANDLING PT-LAB: CPT | Mod: PO,TXP | Performed by: NURSE PRACTITIONER

## 2022-11-07 ENCOUNTER — LAB VISIT (OUTPATIENT)
Dept: LAB | Facility: HOSPITAL | Age: 37
End: 2022-11-07
Payer: COMMERCIAL

## 2022-11-07 DIAGNOSIS — Z76.82 ORGAN TRANSPLANT CANDIDATE: ICD-10-CM

## 2022-11-08 NOTE — PROGRESS NOTES
Patient's chart reviewed today. Patient due for follow-up in October 2023. Appointments to be scheduled per protocol. HLA sample current, in lab, and being received on a regular basis.

## 2022-11-11 LAB
HLA FREEZE AND HOLD INTERPRETATION: NORMAL
HLAFH COLLECTION DATE: NORMAL
HPRA INTERPRETATION: NORMAL

## 2022-12-01 LAB
CLASS I ANTIBODIES - LUMINEX: NEGATIVE
CLASS II ANTIBODIES - LUMINEX: NORMAL
CLASS II ANTIBODY COMMENTS - LUMINEX: NORMAL
CPRA %: 50
SERUM COLLECTION DT - LUMINEX CLASS I: NORMAL
SERUM COLLECTION DT - LUMINEX CLASS II: NORMAL
SPCL1 TESTING DATE: NORMAL
SPCL2 TESTING DATE: NORMAL
SPCLU TESTING DATE: NORMAL

## 2022-12-08 PROCEDURE — 86833 HLA CLASS II HIGH DEFIN QUAL: CPT | Mod: PO,TXP | Performed by: NURSE PRACTITIONER

## 2022-12-08 PROCEDURE — 86832 HLA CLASS I HIGH DEFIN QUAL: CPT | Mod: PO,TXP | Performed by: NURSE PRACTITIONER

## 2022-12-21 ENCOUNTER — LAB VISIT (OUTPATIENT)
Dept: LAB | Facility: HOSPITAL | Age: 37
End: 2022-12-21
Payer: COMMERCIAL

## 2022-12-21 DIAGNOSIS — Z76.82 ORGAN TRANSPLANT CANDIDATE: ICD-10-CM

## 2023-01-05 PROCEDURE — 99001 SPECIMEN HANDLING PT-LAB: CPT | Mod: PO,TXP | Performed by: NURSE PRACTITIONER

## 2023-01-10 ENCOUNTER — LAB VISIT (OUTPATIENT)
Dept: LAB | Facility: HOSPITAL | Age: 38
End: 2023-01-10
Payer: COMMERCIAL

## 2023-01-10 DIAGNOSIS — Z76.82 ORGAN TRANSPLANT CANDIDATE: ICD-10-CM

## 2023-01-17 LAB — HPRA INTERPRETATION: NORMAL

## 2023-02-01 PROCEDURE — 86832 HLA CLASS I HIGH DEFIN QUAL: CPT | Mod: PO,TXP | Performed by: NURSE PRACTITIONER

## 2023-02-01 PROCEDURE — 86833 HLA CLASS II HIGH DEFIN QUAL: CPT | Mod: PO,TXP | Performed by: NURSE PRACTITIONER

## 2023-02-06 ENCOUNTER — LAB VISIT (OUTPATIENT)
Dept: LAB | Facility: HOSPITAL | Age: 38
End: 2023-02-06
Payer: COMMERCIAL

## 2023-02-06 DIAGNOSIS — Z76.82 ORGAN TRANSPLANT CANDIDATE: ICD-10-CM

## 2023-03-02 PROCEDURE — 99001 SPECIMEN HANDLING PT-LAB: CPT | Mod: PO,TXP | Performed by: NURSE PRACTITIONER

## 2023-03-06 ENCOUNTER — LAB VISIT (OUTPATIENT)
Dept: LAB | Facility: HOSPITAL | Age: 38
End: 2023-03-06
Payer: COMMERCIAL

## 2023-03-06 DIAGNOSIS — Z76.82 ORGAN TRANSPLANT CANDIDATE: ICD-10-CM

## 2023-03-18 LAB
CLASS I ANTIBODIES - LUMINEX: NEGATIVE
CLASS II ANTIBODIES - LUMINEX: NORMAL
CLASS II ANTIBODY COMMENTS - LUMINEX: NORMAL
CPRA %: 50
HPRA INTERPRETATION: NORMAL
SERUM COLLECTION DT - LUMINEX CLASS I: NORMAL
SERUM COLLECTION DT - LUMINEX CLASS II: NORMAL
SPCL1 TESTING DATE: NORMAL
SPCL2 TESTING DATE: NORMAL
SPCLU TESTING DATE: NORMAL

## 2023-03-30 DIAGNOSIS — Z76.82 ORGAN TRANSPLANT CANDIDATE: Primary | ICD-10-CM

## 2023-04-07 ENCOUNTER — DOCUMENTATION ONLY (OUTPATIENT)
Dept: TRANSPLANT | Facility: CLINIC | Age: 38
End: 2023-04-07
Payer: MEDICARE

## 2023-04-07 ENCOUNTER — TELEPHONE (OUTPATIENT)
Dept: TRANSPLANT | Facility: HOSPITAL | Age: 38
End: 2023-04-07
Payer: MEDICARE

## 2023-04-08 ENCOUNTER — TELEPHONE (OUTPATIENT)
Dept: TRANSPLANT | Facility: CLINIC | Age: 38
End: 2023-04-08
Payer: MEDICARE

## 2023-04-08 DIAGNOSIS — Z76.82 AWAITING ORGAN TRANSPLANT STATUS: Primary | ICD-10-CM

## 2023-04-08 NOTE — TELEPHONE ENCOUNTER
ON-CALL NOTE  Delayed entry, time of event 2055  UNOS# [id number]    Per patient request, called at 2055. Patient reports eta [phone]. Planned recipient OR time 0730, updated house supervisor, ROSSANA, OR desk, TSU charge nurse. Patient instructed to be NPO after 2200. Chart reviewed, case completed. Notified WAI Mccarty of completed case.   ------    ON-CALL NOTE    UNOS# [id number]    Per SHELTON Dove, patient to be admitted for kidney transplant on 4/7/23 time tba. Surgeon Dr. Saleh, patient to receive left kidney. Spoke with TSU charge Farnaz, tiffani Capone, CSN [id number]. Spoke with house supervisor WAI Locke, OR ROSSANA Browning. Spoke with patient, he is aware he will need to be admitted around midnight tonight (drive time 4 hours). Will follow up with patient at 2045 per patient request.   ____    ON-CALL NOTE    UNOS# [id number]    Received report from Yasmin Newman RN. Patient is at home on standby in [place] for backup organ offer.

## 2023-04-08 NOTE — PROGRESS NOTES
ON-CALL NOTE    OS# [id number]    Notified by Haja Leslie, , that Andrey Reddy is eligible for kidney offer.  Spoke with patient and identified no acute medical issues with telephone assessment. Protocol script read to patient regarding N/A, standard donor offer. Patient verbalized understanding, all questions answered, patient accepts organ offer. Notified by Haja Leslie that virtual crossmatch is negative.  Current sample of blood is available from date 2/1/23 for crossmatch.  Patient reports no sensitizing event since last blood sample for PRA received. Will notify Bibiana in HLA Lab to perform a retrospective  crossmatch per guideline if admitted.    Patient was asked if they have had a positive COVID-19 test or if they have any signs or symptoms. Informed patient that they will be tested for COVID-19 upon arrival to the hospital, unless have a previous positive result. If tested and result is positive, the transplant will not be able to occur, they will be inactivated on the wait list for 28 days per protocol and required to quarantine.     Patient notified of plan to go to HD tomorrow and continue all other normal activities and states understanding.  Dialysis unit will be notified if admitted.

## 2023-04-08 NOTE — TELEPHONE ENCOUNTER
Txp SW On-Call Note      Txp SW On-Call reviewed patient's chart for psychosocial barriers or concerns as patient may have an organ offer. Pt was deemed suitable and low risk at last clinic visit from a psychosocial standpoint. No concerns/needs identified or indicated at this time. Transplant SW remains available.  If patient is transplanted, transplant  to follow. Transplant social work team remains available.

## 2023-04-09 ENCOUNTER — ANESTHESIA EVENT (OUTPATIENT)
Dept: SURGERY | Facility: HOSPITAL | Age: 38
DRG: 652 | End: 2023-04-09
Payer: COMMERCIAL

## 2023-04-09 ENCOUNTER — ANESTHESIA (OUTPATIENT)
Dept: SURGERY | Facility: HOSPITAL | Age: 38
DRG: 652 | End: 2023-04-09
Payer: COMMERCIAL

## 2023-04-09 ENCOUNTER — HOSPITAL ENCOUNTER (INPATIENT)
Facility: HOSPITAL | Age: 38
LOS: 4 days | Discharge: HOME HEALTH | DRG: 652 | End: 2023-04-13
Attending: TRANSPLANT SURGERY | Admitting: TRANSPLANT SURGERY
Payer: COMMERCIAL

## 2023-04-09 DIAGNOSIS — Z94.0 S/P KIDNEY TRANSPLANT: Primary | ICD-10-CM

## 2023-04-09 DIAGNOSIS — I15.0 RENOVASCULAR HYPERTENSION: ICD-10-CM

## 2023-04-09 DIAGNOSIS — N18.6 CONTROLLED TYPE 2 DIABETES MELLITUS WITH CHRONIC KIDNEY DISEASE ON CHRONIC DIALYSIS, WITHOUT LONG-TERM CURRENT USE OF INSULIN: ICD-10-CM

## 2023-04-09 DIAGNOSIS — Z29.89 PROPHYLACTIC IMMUNOTHERAPY: ICD-10-CM

## 2023-04-09 DIAGNOSIS — E11.22 CONTROLLED TYPE 2 DIABETES MELLITUS WITH CHRONIC KIDNEY DISEASE ON CHRONIC DIALYSIS, WITHOUT LONG-TERM CURRENT USE OF INSULIN: ICD-10-CM

## 2023-04-09 DIAGNOSIS — Z99.2 CONTROLLED TYPE 2 DIABETES MELLITUS WITH CHRONIC KIDNEY DISEASE ON CHRONIC DIALYSIS, WITHOUT LONG-TERM CURRENT USE OF INSULIN: ICD-10-CM

## 2023-04-09 DIAGNOSIS — Z76.82 KIDNEY TRANSPLANT CANDIDATE: ICD-10-CM

## 2023-04-09 DIAGNOSIS — R07.9 CHEST PAIN: ICD-10-CM

## 2023-04-09 DIAGNOSIS — Z91.89 AT RISK FOR OPPORTUNISTIC INFECTIONS: ICD-10-CM

## 2023-04-09 DIAGNOSIS — Z79.60 LONG-TERM USE OF IMMUNOSUPPRESSANT MEDICATION: ICD-10-CM

## 2023-04-09 DIAGNOSIS — R07.89 OTHER CHEST PAIN: ICD-10-CM

## 2023-04-09 DIAGNOSIS — D63.8 ANEMIA OF CHRONIC DISEASE: ICD-10-CM

## 2023-04-09 LAB
25(OH)D3+25(OH)D2 SERPL-MCNC: 36 NG/ML (ref 30–96)
ABO + RH BLD: NORMAL
ALBUMIN SERPL BCP-MCNC: 3.5 G/DL (ref 3.5–5.2)
ALBUMIN SERPL BCP-MCNC: 3.6 G/DL (ref 3.5–5.2)
ALBUMIN SERPL BCP-MCNC: 3.9 G/DL (ref 3.5–5.2)
ALP SERPL-CCNC: 60 U/L (ref 55–135)
ALT SERPL W/O P-5'-P-CCNC: 13 U/L (ref 10–44)
ANION GAP SERPL CALC-SCNC: 10 MMOL/L (ref 8–16)
ANION GAP SERPL CALC-SCNC: 12 MMOL/L (ref 8–16)
ANION GAP SERPL CALC-SCNC: 13 MMOL/L (ref 8–16)
APTT PPP: 27.2 SEC (ref 21–32)
AST SERPL-CCNC: 12 U/L (ref 10–40)
BASOPHILS # BLD AUTO: 0.01 K/UL (ref 0–0.2)
BASOPHILS # BLD AUTO: 0.03 K/UL (ref 0–0.2)
BASOPHILS NFR BLD: 0.1 % (ref 0–1.9)
BASOPHILS NFR BLD: 0.5 % (ref 0–1.9)
BILIRUB SERPL-MCNC: 0.7 MG/DL (ref 0.1–1)
BLD GP AB SCN CELLS X3 SERPL QL: NORMAL
BUN SERPL-MCNC: 25 MG/DL (ref 6–20)
BUN SERPL-MCNC: 27 MG/DL (ref 6–20)
BUN SERPL-MCNC: 30 MG/DL (ref 6–20)
CALCIUM SERPL-MCNC: 8.4 MG/DL (ref 8.7–10.5)
CALCIUM SERPL-MCNC: 8.6 MG/DL (ref 8.7–10.5)
CALCIUM SERPL-MCNC: 9.2 MG/DL (ref 8.7–10.5)
CHLORIDE SERPL-SCNC: 101 MMOL/L (ref 95–110)
CHLORIDE SERPL-SCNC: 101 MMOL/L (ref 95–110)
CHLORIDE SERPL-SCNC: 103 MMOL/L (ref 95–110)
CHOLEST SERPL-MCNC: 169 MG/DL (ref 120–199)
CHOLEST/HDLC SERPL: 2.9 {RATIO} (ref 2–5)
CO2 SERPL-SCNC: 22 MMOL/L (ref 23–29)
CO2 SERPL-SCNC: 23 MMOL/L (ref 23–29)
CO2 SERPL-SCNC: 25 MMOL/L (ref 23–29)
CREAT SERPL-MCNC: 3.7 MG/DL (ref 0.5–1.4)
CREAT SERPL-MCNC: 3.7 MG/DL (ref 0.5–1.4)
CREAT SERPL-MCNC: 3.8 MG/DL (ref 0.5–1.4)
DIFFERENTIAL METHOD: ABNORMAL
DIFFERENTIAL METHOD: ABNORMAL
EOSINOPHIL # BLD AUTO: 0 K/UL (ref 0–0.5)
EOSINOPHIL # BLD AUTO: 0.2 K/UL (ref 0–0.5)
EOSINOPHIL NFR BLD: 0 % (ref 0–8)
EOSINOPHIL NFR BLD: 3.7 % (ref 0–8)
ERYTHROCYTE [DISTWIDTH] IN BLOOD BY AUTOMATED COUNT: 11.4 % (ref 11.5–14.5)
ERYTHROCYTE [DISTWIDTH] IN BLOOD BY AUTOMATED COUNT: 11.5 % (ref 11.5–14.5)
EST. GFR  (NO RACE VARIABLE): 19.9 ML/MIN/1.73 M^2
EST. GFR  (NO RACE VARIABLE): 20.6 ML/MIN/1.73 M^2
EST. GFR  (NO RACE VARIABLE): 20.6 ML/MIN/1.73 M^2
ESTIMATED AVG GLUCOSE: 82 MG/DL (ref 68–131)
GLUCOSE SERPL-MCNC: 109 MG/DL (ref 70–110)
GLUCOSE SERPL-MCNC: 148 MG/DL (ref 70–110)
GLUCOSE SERPL-MCNC: 197 MG/DL (ref 70–110)
GLUCOSE SERPL-MCNC: 84 MG/DL (ref 70–110)
HBA1C MFR BLD: 4.5 % (ref 4–5.6)
HBV CORE AB SERPL QL IA: NORMAL
HBV CORE IGM SERPL QL IA: NORMAL
HBV SURFACE AG SERPL QL IA: NORMAL
HCO3 UR-SCNC: 25.8 MMOL/L (ref 24–28)
HCT VFR BLD AUTO: 29.8 % (ref 40–54)
HCT VFR BLD AUTO: 31 % (ref 40–54)
HCT VFR BLD AUTO: 33.5 % (ref 40–54)
HCT VFR BLD CALC: 31 %PCV (ref 36–54)
HCV AB SERPL QL IA: NORMAL
HDLC SERPL-MCNC: 58 MG/DL (ref 40–75)
HDLC SERPL: 34.3 % (ref 20–50)
HGB BLD-MCNC: 11.4 G/DL (ref 14–18)
HGB BLD-MCNC: 9.8 G/DL (ref 14–18)
HIV 1+2 AB+HIV1 P24 AG SERPL QL IA: NORMAL
IMM GRANULOCYTES # BLD AUTO: 0.02 K/UL (ref 0–0.04)
IMM GRANULOCYTES # BLD AUTO: 0.07 K/UL (ref 0–0.04)
IMM GRANULOCYTES NFR BLD AUTO: 0.3 % (ref 0–0.5)
IMM GRANULOCYTES NFR BLD AUTO: 0.4 % (ref 0–0.5)
INR PPP: 1.1 (ref 0.8–1.2)
LDH SERPL L TO P-CCNC: 147 U/L (ref 110–260)
LDLC SERPL CALC-MCNC: 104.4 MG/DL (ref 63–159)
LYMPHOCYTES # BLD AUTO: 0.1 K/UL (ref 1–4.8)
LYMPHOCYTES # BLD AUTO: 2 K/UL (ref 1–4.8)
LYMPHOCYTES NFR BLD: 0.5 % (ref 18–48)
LYMPHOCYTES NFR BLD: 34.1 % (ref 18–48)
MCH RBC QN AUTO: 31 PG (ref 27–31)
MCH RBC QN AUTO: 32 PG (ref 27–31)
MCHC RBC AUTO-ENTMCNC: 32.9 G/DL (ref 32–36)
MCHC RBC AUTO-ENTMCNC: 34 G/DL (ref 32–36)
MCV RBC AUTO: 94 FL (ref 82–98)
MCV RBC AUTO: 94 FL (ref 82–98)
MONOCYTES # BLD AUTO: 0.9 K/UL (ref 0.3–1)
MONOCYTES # BLD AUTO: 1 K/UL (ref 0.3–1)
MONOCYTES NFR BLD: 15.7 % (ref 4–15)
MONOCYTES NFR BLD: 5.9 % (ref 4–15)
NEUTROPHILS # BLD AUTO: 15.7 K/UL (ref 1.8–7.7)
NEUTROPHILS # BLD AUTO: 2.6 K/UL (ref 1.8–7.7)
NEUTROPHILS NFR BLD: 45.7 % (ref 38–73)
NEUTROPHILS NFR BLD: 93.1 % (ref 38–73)
NONHDLC SERPL-MCNC: 111 MG/DL
NRBC BLD-RTO: 0 /100 WBC
NRBC BLD-RTO: 0 /100 WBC
PCO2 BLDA: 36.4 MMHG (ref 35–45)
PH SMN: 7.46 [PH] (ref 7.35–7.45)
PHOSPHATE SERPL-MCNC: 2.9 MG/DL (ref 2.7–4.5)
PHOSPHATE SERPL-MCNC: 3.7 MG/DL (ref 2.7–4.5)
PHOSPHATE SERPL-MCNC: 4 MG/DL (ref 2.7–4.5)
PLATELET # BLD AUTO: 174 K/UL (ref 150–450)
PLATELET # BLD AUTO: 179 K/UL (ref 150–450)
PMV BLD AUTO: 10 FL (ref 9.2–12.9)
PMV BLD AUTO: 9.8 FL (ref 9.2–12.9)
PO2 BLDA: 206 MMHG (ref 80–100)
POC BE: 2 MMOL/L
POC IONIZED CALCIUM: 1.11 MMOL/L (ref 1.06–1.42)
POC SATURATED O2: 100 % (ref 95–100)
POC TCO2: 27 MMOL/L (ref 23–27)
POCT GLUCOSE: 148 MG/DL (ref 70–110)
POCT GLUCOSE: 172 MG/DL (ref 70–110)
POCT GLUCOSE: 206 MG/DL (ref 70–110)
POCT GLUCOSE: 77 MG/DL (ref 70–110)
POCT GLUCOSE: 88 MG/DL (ref 70–110)
POTASSIUM BLD-SCNC: 4 MMOL/L (ref 3.5–5.1)
POTASSIUM SERPL-SCNC: 3.9 MMOL/L (ref 3.5–5.1)
POTASSIUM SERPL-SCNC: 4 MMOL/L (ref 3.5–5.1)
POTASSIUM SERPL-SCNC: 4.1 MMOL/L (ref 3.5–5.1)
PROT SERPL-MCNC: 6.8 G/DL (ref 6–8.4)
PROTHROMBIN TIME: 11 SEC (ref 9–12.5)
PTH-INTACT SERPL-MCNC: 354.1 PG/ML (ref 9–77)
RBC # BLD AUTO: 3.16 M/UL (ref 4.6–6.2)
RBC # BLD AUTO: 3.56 M/UL (ref 4.6–6.2)
SAMPLE: ABNORMAL
SARS-COV-2 RDRP RESP QL NAA+PROBE: NEGATIVE
SODIUM BLD-SCNC: 137 MMOL/L (ref 136–145)
SODIUM SERPL-SCNC: 134 MMOL/L (ref 136–145)
SODIUM SERPL-SCNC: 136 MMOL/L (ref 136–145)
SODIUM SERPL-SCNC: 140 MMOL/L (ref 136–145)
SPECIMEN OUTDATE: NORMAL
TRIGL SERPL-MCNC: 33 MG/DL (ref 30–150)
URATE SERPL-MCNC: 3 MG/DL (ref 3.4–7)
WBC # BLD AUTO: 16.89 K/UL (ref 3.9–12.7)
WBC # BLD AUTO: 5.72 K/UL (ref 3.9–12.7)

## 2023-04-09 PROCEDURE — 25000003 PHARM REV CODE 250: Mod: NTX | Performed by: NURSE PRACTITIONER

## 2023-04-09 PROCEDURE — 36620 ARTERIAL: ICD-10-PCS | Mod: 59,,, | Performed by: ANESTHESIOLOGY

## 2023-04-09 PROCEDURE — 36000931 HC OR TIME LEV VII EA ADD 15 MIN: Performed by: TRANSPLANT SURGERY

## 2023-04-09 PROCEDURE — 25000003 PHARM REV CODE 250: Performed by: STUDENT IN AN ORGANIZED HEALTH CARE EDUCATION/TRAINING PROGRAM

## 2023-04-09 PROCEDURE — 86704 HEP B CORE ANTIBODY TOTAL: CPT | Performed by: NURSE PRACTITIONER

## 2023-04-09 PROCEDURE — 63600175 PHARM REV CODE 636 W HCPCS: Mod: NTX | Performed by: NURSE PRACTITIONER

## 2023-04-09 PROCEDURE — 25000003 PHARM REV CODE 250: Performed by: TRANSPLANT SURGERY

## 2023-04-09 PROCEDURE — 50360 PR TRANSPLANTATION OF KIDNEY: ICD-10-PCS | Mod: LT,,, | Performed by: TRANSPLANT SURGERY

## 2023-04-09 PROCEDURE — 25000003 PHARM REV CODE 250: Performed by: PHYSICIAN ASSISTANT

## 2023-04-09 PROCEDURE — 93010 ELECTROCARDIOGRAM REPORT: CPT | Mod: NTX,,, | Performed by: INTERNAL MEDICINE

## 2023-04-09 PROCEDURE — 50605 INSERT URETERAL SUPPORT: CPT | Mod: 51,LT,, | Performed by: TRANSPLANT SURGERY

## 2023-04-09 PROCEDURE — 83615 LACTATE (LD) (LDH) ENZYME: CPT | Performed by: NURSE PRACTITIONER

## 2023-04-09 PROCEDURE — 50605 PR URETEROTOMY TO INSERT STENT: ICD-10-PCS | Mod: 51,LT,, | Performed by: TRANSPLANT SURGERY

## 2023-04-09 PROCEDURE — D9220A PRA ANESTHESIA: ICD-10-PCS | Mod: ANES,,, | Performed by: ANESTHESIOLOGY

## 2023-04-09 PROCEDURE — 37000008 HC ANESTHESIA 1ST 15 MINUTES: Performed by: TRANSPLANT SURGERY

## 2023-04-09 PROCEDURE — 81300002 HC KIDNEY TRANSPORT, GROUND 4-5 HOURS

## 2023-04-09 PROCEDURE — 99223 1ST HOSP IP/OBS HIGH 75: CPT | Mod: 57,NTX,, | Performed by: NURSE PRACTITIONER

## 2023-04-09 PROCEDURE — 63600175 PHARM REV CODE 636 W HCPCS: Performed by: TRANSPLANT SURGERY

## 2023-04-09 PROCEDURE — 63600175 PHARM REV CODE 636 W HCPCS: Performed by: STUDENT IN AN ORGANIZED HEALTH CARE EDUCATION/TRAINING PROGRAM

## 2023-04-09 PROCEDURE — 25000003 PHARM REV CODE 250: Performed by: NURSE PRACTITIONER

## 2023-04-09 PROCEDURE — D9220A PRA ANESTHESIA: Mod: CRNA,,, | Performed by: STUDENT IN AN ORGANIZED HEALTH CARE EDUCATION/TRAINING PROGRAM

## 2023-04-09 PROCEDURE — 71000033 HC RECOVERY, INTIAL HOUR: Performed by: TRANSPLANT SURGERY

## 2023-04-09 PROCEDURE — 86706 HEP B SURFACE ANTIBODY: CPT | Performed by: NURSE PRACTITIONER

## 2023-04-09 PROCEDURE — 50360 RNL ALTRNSPLJ W/O RCP NFRCT: CPT | Mod: LT,,, | Performed by: TRANSPLANT SURGERY

## 2023-04-09 PROCEDURE — D9220A PRA ANESTHESIA: ICD-10-PCS | Mod: CRNA,,, | Performed by: STUDENT IN AN ORGANIZED HEALTH CARE EDUCATION/TRAINING PROGRAM

## 2023-04-09 PROCEDURE — 87389 HIV-1 AG W/HIV-1&-2 AB AG IA: CPT | Performed by: NURSE PRACTITIONER

## 2023-04-09 PROCEDURE — 83036 HEMOGLOBIN GLYCOSYLATED A1C: CPT | Performed by: TRANSPLANT SURGERY

## 2023-04-09 PROCEDURE — 27201423 OPTIME MED/SURG SUP & DEVICES STERILE SUPPLY: Performed by: TRANSPLANT SURGERY

## 2023-04-09 PROCEDURE — A4216 STERILE WATER/SALINE, 10 ML: HCPCS | Mod: NTX | Performed by: NURSE PRACTITIONER

## 2023-04-09 PROCEDURE — C1729 CATH, DRAINAGE: HCPCS | Performed by: TRANSPLANT SURGERY

## 2023-04-09 PROCEDURE — 71000015 HC POSTOP RECOV 1ST HR: Performed by: TRANSPLANT SURGERY

## 2023-04-09 PROCEDURE — 37000009 HC ANESTHESIA EA ADD 15 MINS: Performed by: TRANSPLANT SURGERY

## 2023-04-09 PROCEDURE — 50323 PR TRANSPLANT,PREP CADAVER RENAL GRAFT: ICD-10-PCS | Mod: 51,,, | Performed by: TRANSPLANT SURGERY

## 2023-04-09 PROCEDURE — 83970 ASSAY OF PARATHORMONE: CPT | Performed by: NURSE PRACTITIONER

## 2023-04-09 PROCEDURE — D9220A PRA ANESTHESIA: Mod: ANES,,, | Performed by: ANESTHESIOLOGY

## 2023-04-09 PROCEDURE — 84550 ASSAY OF BLOOD/URIC ACID: CPT | Performed by: NURSE PRACTITIONER

## 2023-04-09 PROCEDURE — 86705 HEP B CORE ANTIBODY IGM: CPT | Performed by: NURSE PRACTITIONER

## 2023-04-09 PROCEDURE — 85025 COMPLETE CBC W/AUTO DIFF WBC: CPT | Performed by: NURSE PRACTITIONER

## 2023-04-09 PROCEDURE — 93010 EKG 12-LEAD: ICD-10-PCS | Mod: NTX,,, | Performed by: INTERNAL MEDICINE

## 2023-04-09 PROCEDURE — 87340 HEPATITIS B SURFACE AG IA: CPT | Performed by: NURSE PRACTITIONER

## 2023-04-09 PROCEDURE — 85610 PROTHROMBIN TIME: CPT | Performed by: NURSE PRACTITIONER

## 2023-04-09 PROCEDURE — 82962 GLUCOSE BLOOD TEST: CPT | Performed by: TRANSPLANT SURGERY

## 2023-04-09 PROCEDURE — 63600175 PHARM REV CODE 636 W HCPCS: Performed by: ANESTHESIOLOGY

## 2023-04-09 PROCEDURE — 87522 HEPATITIS C REVRS TRNSCRPJ: CPT | Performed by: NURSE PRACTITIONER

## 2023-04-09 PROCEDURE — 81200001 HC KIDNEY ACQUISITION - CADAVER

## 2023-04-09 PROCEDURE — 80053 COMPREHEN METABOLIC PANEL: CPT | Performed by: NURSE PRACTITIONER

## 2023-04-09 PROCEDURE — 85014 HEMATOCRIT: CPT | Performed by: TRANSPLANT SURGERY

## 2023-04-09 PROCEDURE — S5010 5% DEXTROSE AND 0.45% SALINE: HCPCS | Performed by: TRANSPLANT SURGERY

## 2023-04-09 PROCEDURE — U0002 COVID-19 LAB TEST NON-CDC: HCPCS | Mod: NTX | Performed by: TRANSPLANT SURGERY

## 2023-04-09 PROCEDURE — 93005 ELECTROCARDIOGRAM TRACING: CPT | Mod: NTX

## 2023-04-09 PROCEDURE — 71000016 HC POSTOP RECOV ADDL HR: Performed by: TRANSPLANT SURGERY

## 2023-04-09 PROCEDURE — C2617 STENT, NON-COR, TEM W/O DEL: HCPCS | Performed by: TRANSPLANT SURGERY

## 2023-04-09 PROCEDURE — 80061 LIPID PANEL: CPT | Performed by: NURSE PRACTITIONER

## 2023-04-09 PROCEDURE — 80069 RENAL FUNCTION PANEL: CPT | Performed by: TRANSPLANT SURGERY

## 2023-04-09 PROCEDURE — 85730 THROMBOPLASTIN TIME PARTIAL: CPT | Performed by: NURSE PRACTITIONER

## 2023-04-09 PROCEDURE — 80069 RENAL FUNCTION PANEL: CPT | Mod: 91 | Performed by: NURSE PRACTITIONER

## 2023-04-09 PROCEDURE — 20600001 HC STEP DOWN PRIVATE ROOM

## 2023-04-09 PROCEDURE — 36000930 HC OR TIME LEV VII 1ST 15 MIN: Performed by: TRANSPLANT SURGERY

## 2023-04-09 PROCEDURE — 82306 VITAMIN D 25 HYDROXY: CPT | Performed by: NURSE PRACTITIONER

## 2023-04-09 PROCEDURE — 25000003 PHARM REV CODE 250: Performed by: ANESTHESIOLOGY

## 2023-04-09 PROCEDURE — 86900 BLOOD TYPING SEROLOGIC ABO: CPT | Performed by: NURSE PRACTITIONER

## 2023-04-09 PROCEDURE — 84100 ASSAY OF PHOSPHORUS: CPT | Performed by: NURSE PRACTITIONER

## 2023-04-09 PROCEDURE — 36620 INSERTION CATHETER ARTERY: CPT | Mod: 59,,, | Performed by: ANESTHESIOLOGY

## 2023-04-09 PROCEDURE — 99223 PR INITIAL HOSPITAL CARE,LEVL III: ICD-10-PCS | Mod: 57,NTX,, | Performed by: NURSE PRACTITIONER

## 2023-04-09 PROCEDURE — 85025 COMPLETE CBC W/AUTO DIFF WBC: CPT | Mod: 91 | Performed by: PHYSICIAN ASSISTANT

## 2023-04-09 PROCEDURE — 86803 HEPATITIS C AB TEST: CPT | Performed by: NURSE PRACTITIONER

## 2023-04-09 DEVICE — STENT DOUBLE J 7FRX12CM
Type: IMPLANTABLE DEVICE | Site: URETER | Status: FUNCTIONAL
Removed: 2023-05-01

## 2023-04-09 RX ORDER — HYDRALAZINE HYDROCHLORIDE 20 MG/ML
10 INJECTION INTRAMUSCULAR; INTRAVENOUS EVERY 8 HOURS PRN
Status: DISCONTINUED | OUTPATIENT
Start: 2023-04-09 | End: 2023-04-13 | Stop reason: HOSPADM

## 2023-04-09 RX ORDER — HYDRALAZINE HYDROCHLORIDE 50 MG/1
50 TABLET, FILM COATED ORAL EVERY 8 HOURS
Status: DISCONTINUED | OUTPATIENT
Start: 2023-04-09 | End: 2023-04-13 | Stop reason: HOSPADM

## 2023-04-09 RX ORDER — ACETAMINOPHEN 650 MG/20.3ML
650 LIQUID ORAL ONCE
Status: COMPLETED | OUTPATIENT
Start: 2023-04-09 | End: 2023-04-09

## 2023-04-09 RX ORDER — MUPIROCIN 20 MG/G
OINTMENT TOPICAL
Status: DISCONTINUED | OUTPATIENT
Start: 2023-04-09 | End: 2023-04-09

## 2023-04-09 RX ORDER — TACROLIMUS 1 MG/1
3 CAPSULE ORAL 2 TIMES DAILY
Status: DISCONTINUED | OUTPATIENT
Start: 2023-04-09 | End: 2023-04-11

## 2023-04-09 RX ORDER — ONDANSETRON 2 MG/ML
4 INJECTION INTRAMUSCULAR; INTRAVENOUS DAILY PRN
Status: DISCONTINUED | OUTPATIENT
Start: 2023-04-09 | End: 2023-04-09 | Stop reason: HOSPADM

## 2023-04-09 RX ORDER — VALGANCICLOVIR 450 MG/1
450 TABLET, FILM COATED ORAL EVERY MORNING
Status: DISCONTINUED | OUTPATIENT
Start: 2023-04-19 | End: 2023-04-13 | Stop reason: HOSPADM

## 2023-04-09 RX ORDER — FENTANYL CITRATE 50 UG/ML
25 INJECTION, SOLUTION INTRAMUSCULAR; INTRAVENOUS EVERY 5 MIN PRN
Status: COMPLETED | OUTPATIENT
Start: 2023-04-09 | End: 2023-04-09

## 2023-04-09 RX ORDER — SODIUM CHLORIDE 0.9 % (FLUSH) 0.9 %
10 SYRINGE (ML) INJECTION
Status: DISCONTINUED | OUTPATIENT
Start: 2023-04-09 | End: 2023-04-13 | Stop reason: HOSPADM

## 2023-04-09 RX ORDER — FAMOTIDINE 20 MG/1
20 TABLET, FILM COATED ORAL NIGHTLY
Status: DISCONTINUED | OUTPATIENT
Start: 2023-04-09 | End: 2023-04-13 | Stop reason: HOSPADM

## 2023-04-09 RX ORDER — LABETALOL HCL 20 MG/4 ML
10 SYRINGE (ML) INTRAVENOUS EVERY 4 HOURS PRN
Status: DISCONTINUED | OUTPATIENT
Start: 2023-04-09 | End: 2023-04-13 | Stop reason: HOSPADM

## 2023-04-09 RX ORDER — PREDNISONE 20 MG/1
20 TABLET ORAL DAILY
Status: DISCONTINUED | OUTPATIENT
Start: 2023-04-12 | End: 2023-04-13 | Stop reason: HOSPADM

## 2023-04-09 RX ORDER — ONDANSETRON 2 MG/ML
INJECTION INTRAMUSCULAR; INTRAVENOUS
Status: DISCONTINUED | OUTPATIENT
Start: 2023-04-09 | End: 2023-04-09

## 2023-04-09 RX ORDER — HEPARIN SODIUM 5000 [USP'U]/ML
5000 INJECTION, SOLUTION INTRAVENOUS; SUBCUTANEOUS EVERY 8 HOURS
Status: DISCONTINUED | OUTPATIENT
Start: 2023-04-09 | End: 2023-04-13 | Stop reason: HOSPADM

## 2023-04-09 RX ORDER — SODIUM CHLORIDE 9 MG/ML
INJECTION, SOLUTION INTRAVENOUS CONTINUOUS
Status: DISCONTINUED | OUTPATIENT
Start: 2023-04-09 | End: 2023-04-10

## 2023-04-09 RX ORDER — BISACODYL 5 MG
10 TABLET, DELAYED RELEASE (ENTERIC COATED) ORAL NIGHTLY
Status: DISCONTINUED | OUTPATIENT
Start: 2023-04-09 | End: 2023-04-13 | Stop reason: HOSPADM

## 2023-04-09 RX ORDER — OXYCODONE HYDROCHLORIDE 5 MG/1
5 TABLET ORAL EVERY 6 HOURS PRN
Status: DISCONTINUED | OUTPATIENT
Start: 2023-04-09 | End: 2023-04-13 | Stop reason: HOSPADM

## 2023-04-09 RX ORDER — BUPIVACAINE HYDROCHLORIDE 2.5 MG/ML
INJECTION, SOLUTION EPIDURAL; INFILTRATION; INTRACAUDAL
Status: DISCONTINUED | OUTPATIENT
Start: 2023-04-09 | End: 2023-04-09 | Stop reason: HOSPADM

## 2023-04-09 RX ORDER — HEPARIN SODIUM 5000 [USP'U]/ML
5000 INJECTION, SOLUTION INTRAVENOUS; SUBCUTANEOUS ONCE
Status: COMPLETED | OUTPATIENT
Start: 2023-04-09 | End: 2023-04-09

## 2023-04-09 RX ORDER — GLUCAGON 1 MG
1 KIT INJECTION CONTINUOUS PRN
Status: DISCONTINUED | OUTPATIENT
Start: 2023-04-09 | End: 2023-04-13 | Stop reason: HOSPADM

## 2023-04-09 RX ORDER — AMLODIPINE BESYLATE 5 MG/1
5 TABLET ORAL DAILY
Status: DISCONTINUED | OUTPATIENT
Start: 2023-04-10 | End: 2023-04-12

## 2023-04-09 RX ORDER — DEXMEDETOMIDINE HYDROCHLORIDE 100 UG/ML
INJECTION, SOLUTION INTRAVENOUS
Status: DISCONTINUED | OUTPATIENT
Start: 2023-04-09 | End: 2023-04-09

## 2023-04-09 RX ORDER — METHYLPREDNISOLONE SOD SUCC 125 MG
250 VIAL (EA) INJECTION ONCE
Status: COMPLETED | OUTPATIENT
Start: 2023-04-10 | End: 2023-04-10

## 2023-04-09 RX ORDER — SODIUM CHLORIDE 0.9 % (FLUSH) 0.9 %
10 SYRINGE (ML) INJECTION
Status: DISCONTINUED | OUTPATIENT
Start: 2023-04-09 | End: 2023-04-09

## 2023-04-09 RX ORDER — SULFAMETHOXAZOLE AND TRIMETHOPRIM 400; 80 MG/1; MG/1
1 TABLET ORAL EVERY MORNING
Status: DISCONTINUED | OUTPATIENT
Start: 2023-04-19 | End: 2023-04-13 | Stop reason: HOSPADM

## 2023-04-09 RX ORDER — MUPIROCIN 20 MG/G
1 OINTMENT TOPICAL 2 TIMES DAILY
Status: DISCONTINUED | OUTPATIENT
Start: 2023-04-09 | End: 2023-04-13 | Stop reason: HOSPADM

## 2023-04-09 RX ORDER — TRAMADOL HYDROCHLORIDE 50 MG/1
50 TABLET ORAL EVERY 6 HOURS PRN
Status: DISCONTINUED | OUTPATIENT
Start: 2023-04-09 | End: 2023-04-13 | Stop reason: HOSPADM

## 2023-04-09 RX ORDER — CEFAZOLIN SODIUM 1 G/3ML
INJECTION, POWDER, FOR SOLUTION INTRAMUSCULAR; INTRAVENOUS
Status: DISCONTINUED | OUTPATIENT
Start: 2023-04-09 | End: 2023-04-09

## 2023-04-09 RX ORDER — DIPHENHYDRAMINE HYDROCHLORIDE 50 MG/ML
50 INJECTION INTRAMUSCULAR; INTRAVENOUS ONCE
Status: DISCONTINUED | OUTPATIENT
Start: 2023-04-09 | End: 2023-04-09

## 2023-04-09 RX ORDER — PREGABALIN 75 MG/1
75 CAPSULE ORAL
Status: DISCONTINUED | OUTPATIENT
Start: 2023-04-09 | End: 2023-04-09

## 2023-04-09 RX ORDER — ACETAMINOPHEN 325 MG/1
650 TABLET ORAL
Status: COMPLETED | OUTPATIENT
Start: 2023-04-10 | End: 2023-04-11

## 2023-04-09 RX ORDER — FUROSEMIDE 10 MG/ML
INJECTION INTRAMUSCULAR; INTRAVENOUS
Status: DISCONTINUED | OUTPATIENT
Start: 2023-04-09 | End: 2023-04-09

## 2023-04-09 RX ORDER — MYCOPHENOLATE MOFETIL 250 MG/1
1000 CAPSULE ORAL 2 TIMES DAILY
Status: DISCONTINUED | OUTPATIENT
Start: 2023-04-09 | End: 2023-04-13 | Stop reason: HOSPADM

## 2023-04-09 RX ORDER — DIPHENHYDRAMINE HCL 25 MG
50 CAPSULE ORAL ONCE AS NEEDED
Status: DISCONTINUED | OUTPATIENT
Start: 2023-04-09 | End: 2023-04-13 | Stop reason: HOSPADM

## 2023-04-09 RX ORDER — DIPHENHYDRAMINE HCL 25 MG
25 CAPSULE ORAL
Status: COMPLETED | OUTPATIENT
Start: 2023-04-10 | End: 2023-04-11

## 2023-04-09 RX ORDER — METHYLENE BLUE 5 MG/ML
INJECTION INTRAVENOUS
Status: DISCONTINUED | OUTPATIENT
Start: 2023-04-09 | End: 2023-04-09 | Stop reason: HOSPADM

## 2023-04-09 RX ORDER — DIPHENHYDRAMINE HYDROCHLORIDE 50 MG/ML
INJECTION INTRAMUSCULAR; INTRAVENOUS
Status: DISCONTINUED | OUTPATIENT
Start: 2023-04-09 | End: 2023-04-09

## 2023-04-09 RX ORDER — MANNITOL 250 MG/ML
INJECTION, SOLUTION INTRAVENOUS
Status: DISCONTINUED | OUTPATIENT
Start: 2023-04-09 | End: 2023-04-09

## 2023-04-09 RX ORDER — DEXTROSE 40 %
30 GEL (GRAM) ORAL
Status: DISCONTINUED | OUTPATIENT
Start: 2023-04-09 | End: 2023-04-10

## 2023-04-09 RX ORDER — FENTANYL CITRATE 50 UG/ML
INJECTION, SOLUTION INTRAMUSCULAR; INTRAVENOUS
Status: DISCONTINUED | OUTPATIENT
Start: 2023-04-09 | End: 2023-04-09

## 2023-04-09 RX ORDER — EPINEPHRINE 1 MG/ML
1 INJECTION, SOLUTION, CONCENTRATE INTRAVENOUS ONCE AS NEEDED
Status: DISCONTINUED | OUTPATIENT
Start: 2023-04-09 | End: 2023-04-13 | Stop reason: HOSPADM

## 2023-04-09 RX ORDER — DEXTROSE 40 %
15 GEL (GRAM) ORAL
Status: DISCONTINUED | OUTPATIENT
Start: 2023-04-09 | End: 2023-04-10

## 2023-04-09 RX ORDER — ROCURONIUM BROMIDE 10 MG/ML
INJECTION, SOLUTION INTRAVENOUS
Status: DISCONTINUED | OUTPATIENT
Start: 2023-04-09 | End: 2023-04-09

## 2023-04-09 RX ORDER — ACETAMINOPHEN 325 MG/1
650 TABLET ORAL EVERY 8 HOURS
Status: DISPENSED | OUTPATIENT
Start: 2023-04-09 | End: 2023-04-11

## 2023-04-09 RX ORDER — LIDOCAINE HYDROCHLORIDE 20 MG/ML
INJECTION INTRAVENOUS
Status: DISCONTINUED | OUTPATIENT
Start: 2023-04-09 | End: 2023-04-09

## 2023-04-09 RX ORDER — HYDROMORPHONE HYDROCHLORIDE 2 MG/ML
INJECTION, SOLUTION INTRAMUSCULAR; INTRAVENOUS; SUBCUTANEOUS
Status: DISCONTINUED | OUTPATIENT
Start: 2023-04-09 | End: 2023-04-09

## 2023-04-09 RX ORDER — PROPOFOL 10 MG/ML
VIAL (ML) INTRAVENOUS
Status: DISCONTINUED | OUTPATIENT
Start: 2023-04-09 | End: 2023-04-09

## 2023-04-09 RX ORDER — KETAMINE HCL IN 0.9 % NACL 50 MG/5 ML
SYRINGE (ML) INTRAVENOUS
Status: DISCONTINUED | OUTPATIENT
Start: 2023-04-09 | End: 2023-04-09

## 2023-04-09 RX ORDER — CEFAZOLIN SODIUM 1 G/3ML
INJECTION, POWDER, FOR SOLUTION INTRAMUSCULAR; INTRAVENOUS
Status: DISCONTINUED | OUTPATIENT
Start: 2023-04-09 | End: 2023-04-09 | Stop reason: HOSPADM

## 2023-04-09 RX ORDER — PHENYLEPHRINE HYDROCHLORIDE 10 MG/ML
INJECTION INTRAVENOUS
Status: DISCONTINUED | OUTPATIENT
Start: 2023-04-09 | End: 2023-04-09

## 2023-04-09 RX ORDER — MIDAZOLAM HYDROCHLORIDE 1 MG/ML
INJECTION INTRAMUSCULAR; INTRAVENOUS
Status: DISCONTINUED | OUTPATIENT
Start: 2023-04-09 | End: 2023-04-09

## 2023-04-09 RX ORDER — HEPARIN SODIUM 10000 [USP'U]/ML
INJECTION, SOLUTION INTRAVENOUS; SUBCUTANEOUS
Status: DISCONTINUED | OUTPATIENT
Start: 2023-04-09 | End: 2023-04-09 | Stop reason: HOSPADM

## 2023-04-09 RX ORDER — DEXTROSE MONOHYDRATE AND SODIUM CHLORIDE 5; .45 G/100ML; G/100ML
INJECTION, SOLUTION INTRAVENOUS CONTINUOUS
Status: DISCONTINUED | OUTPATIENT
Start: 2023-04-09 | End: 2023-04-10

## 2023-04-09 RX ORDER — METHYLPREDNISOLONE SOD SUCC 125 MG
125 VIAL (EA) INJECTION ONCE
Status: COMPLETED | OUTPATIENT
Start: 2023-04-11 | End: 2023-04-11

## 2023-04-09 RX ORDER — ONDANSETRON 2 MG/ML
4 INJECTION INTRAMUSCULAR; INTRAVENOUS EVERY 6 HOURS PRN
Status: DISCONTINUED | OUTPATIENT
Start: 2023-04-09 | End: 2023-04-13 | Stop reason: HOSPADM

## 2023-04-09 RX ORDER — DOCUSATE SODIUM 100 MG/1
100 CAPSULE, LIQUID FILLED ORAL 3 TIMES DAILY
Status: DISCONTINUED | OUTPATIENT
Start: 2023-04-09 | End: 2023-04-13 | Stop reason: HOSPADM

## 2023-04-09 RX ADMIN — HEPARIN SODIUM 5000 UNITS: 5000 INJECTION INTRAVENOUS; SUBCUTANEOUS at 08:04

## 2023-04-09 RX ADMIN — HYDROMORPHONE HYDROCHLORIDE 0.5 MG: 2 INJECTION INTRAMUSCULAR; INTRAVENOUS; SUBCUTANEOUS at 11:04

## 2023-04-09 RX ADMIN — MYCOPHENOLATE MOFETIL 1000 MG: 250 CAPSULE ORAL at 09:04

## 2023-04-09 RX ADMIN — ROCURONIUM BROMIDE 10 MG: 10 INJECTION INTRAVENOUS at 10:04

## 2023-04-09 RX ADMIN — ROCURONIUM BROMIDE 10 MG: 10 INJECTION INTRAVENOUS at 09:04

## 2023-04-09 RX ADMIN — DEXMEDETOMIDINE HYDROCHLORIDE 8 MCG: 100 INJECTION, SOLUTION INTRAVENOUS at 11:04

## 2023-04-09 RX ADMIN — DEXMEDETOMIDINE HYDROCHLORIDE 8 MCG: 100 INJECTION, SOLUTION INTRAVENOUS at 08:04

## 2023-04-09 RX ADMIN — SODIUM CHLORIDE 10 ML: 9 INJECTION, SOLUTION INTRAMUSCULAR; INTRAVENOUS; SUBCUTANEOUS at 02:04

## 2023-04-09 RX ADMIN — METHYLPREDNISOLONE SODIUM SUCCINATE 500 MG: 500 INJECTION INTRAMUSCULAR; INTRAVENOUS at 08:04

## 2023-04-09 RX ADMIN — SODIUM CHLORIDE: 9 INJECTION, SOLUTION INTRAVENOUS at 09:04

## 2023-04-09 RX ADMIN — FUROSEMIDE 100 MG: 10 INJECTION, SOLUTION INTRAMUSCULAR; INTRAVENOUS at 09:04

## 2023-04-09 RX ADMIN — SODIUM CHLORIDE: 9 INJECTION, SOLUTION INTRAVENOUS at 12:04

## 2023-04-09 RX ADMIN — OXYCODONE HYDROCHLORIDE 5 MG: 5 TABLET ORAL at 06:04

## 2023-04-09 RX ADMIN — ANTI-THYMOCYTE GLOBULIN (RABBIT) 100 MG: 5 INJECTION, POWDER, LYOPHILIZED, FOR SOLUTION INTRAVENOUS at 09:04

## 2023-04-09 RX ADMIN — PROPOFOL 80 MG: 10 INJECTION, EMULSION INTRAVENOUS at 07:04

## 2023-04-09 RX ADMIN — ACETAMINOPHEN 650 MG: 160 SOLUTION ORAL at 07:04

## 2023-04-09 RX ADMIN — SODIUM CHLORIDE 0.4 MCG/KG/MIN: 9 INJECTION, SOLUTION INTRAVENOUS at 08:04

## 2023-04-09 RX ADMIN — DOCUSATE SODIUM 100 MG: 100 CAPSULE, LIQUID FILLED ORAL at 08:04

## 2023-04-09 RX ADMIN — BISACODYL 10 MG: 5 TABLET, COATED ORAL at 08:04

## 2023-04-09 RX ADMIN — LIDOCAINE HYDROCHLORIDE 100 MG: 20 INJECTION INTRAVENOUS at 07:04

## 2023-04-09 RX ADMIN — MANNITOL 25 G: 12.5 INJECTION, SOLUTION INTRAVENOUS at 09:04

## 2023-04-09 RX ADMIN — ONDANSETRON 4 MG: 2 INJECTION INTRAMUSCULAR; INTRAVENOUS at 11:04

## 2023-04-09 RX ADMIN — ROCURONIUM BROMIDE 10 MG: 10 INJECTION INTRAVENOUS at 08:04

## 2023-04-09 RX ADMIN — Medication 10 MG: at 09:04

## 2023-04-09 RX ADMIN — ROCURONIUM BROMIDE 50 MG: 10 INJECTION INTRAVENOUS at 07:04

## 2023-04-09 RX ADMIN — Medication 10 MG: at 10:04

## 2023-04-09 RX ADMIN — TRAMADOL HYDROCHLORIDE 50 MG: 50 TABLET, COATED ORAL at 03:04

## 2023-04-09 RX ADMIN — ACETAMINOPHEN 650 MG: 325 TABLET ORAL at 09:04

## 2023-04-09 RX ADMIN — Medication 20 MG: at 08:04

## 2023-04-09 RX ADMIN — FAMOTIDINE 20 MG: 20 TABLET ORAL at 08:04

## 2023-04-09 RX ADMIN — FENTANYL CITRATE 25 MCG: 50 INJECTION INTRAMUSCULAR; INTRAVENOUS at 12:04

## 2023-04-09 RX ADMIN — CEFAZOLIN 2 G: 2 INJECTION, POWDER, FOR SOLUTION INTRAMUSCULAR; INTRAVENOUS at 02:04

## 2023-04-09 RX ADMIN — OXYCODONE HYDROCHLORIDE 5 MG: 5 TABLET ORAL at 01:04

## 2023-04-09 RX ADMIN — PHENYLEPHRINE HYDROCHLORIDE 100 MCG: 10 INJECTION INTRAVENOUS at 08:04

## 2023-04-09 RX ADMIN — HEPARIN SODIUM 5000 UNITS: 5000 INJECTION INTRAVENOUS; SUBCUTANEOUS at 02:04

## 2023-04-09 RX ADMIN — DIPHENHYDRAMINE HYDROCHLORIDE 50 MG: 50 INJECTION, SOLUTION INTRAMUSCULAR; INTRAVENOUS at 08:04

## 2023-04-09 RX ADMIN — MIDAZOLAM HYDROCHLORIDE 2 MG: 1 INJECTION, SOLUTION INTRAMUSCULAR; INTRAVENOUS at 07:04

## 2023-04-09 RX ADMIN — CEFAZOLIN 2 G: 330 INJECTION, POWDER, FOR SOLUTION INTRAMUSCULAR; INTRAVENOUS at 08:04

## 2023-04-09 RX ADMIN — CEFAZOLIN 2 G: 2 INJECTION, POWDER, FOR SOLUTION INTRAMUSCULAR; INTRAVENOUS at 09:04

## 2023-04-09 RX ADMIN — ACETAMINOPHEN 650 MG: 325 TABLET ORAL at 02:04

## 2023-04-09 RX ADMIN — MUPIROCIN 1 G: 20 OINTMENT TOPICAL at 09:04

## 2023-04-09 RX ADMIN — MUPIROCIN 1 G: 20 OINTMENT TOPICAL at 02:04

## 2023-04-09 RX ADMIN — SODIUM CHLORIDE, SODIUM GLUCONATE, SODIUM ACETATE, POTASSIUM CHLORIDE, MAGNESIUM CHLORIDE, SODIUM PHOSPHATE, DIBASIC, AND POTASSIUM PHOSPHATE: .53; .5; .37; .037; .03; .012; .00082 INJECTION, SOLUTION INTRAVENOUS at 07:04

## 2023-04-09 RX ADMIN — TACROLIMUS 3 MG: 1 CAPSULE ORAL at 06:04

## 2023-04-09 RX ADMIN — FENTANYL CITRATE 100 MCG: 50 INJECTION, SOLUTION INTRAMUSCULAR; INTRAVENOUS at 07:04

## 2023-04-09 RX ADMIN — DEXTROSE AND SODIUM CHLORIDE: 5; 450 INJECTION, SOLUTION INTRAVENOUS at 12:04

## 2023-04-09 RX ADMIN — SODIUM CHLORIDE 1000 ML: 9 INJECTION, SOLUTION INTRAVENOUS at 06:04

## 2023-04-09 RX ADMIN — Medication 10 MG: at 11:04

## 2023-04-09 RX ADMIN — FENTANYL CITRATE 25 MCG: 50 INJECTION INTRAMUSCULAR; INTRAVENOUS at 01:04

## 2023-04-09 RX ADMIN — HYDRALAZINE HYDROCHLORIDE 50 MG: 50 TABLET ORAL at 01:04

## 2023-04-09 RX ADMIN — GLYCOPYRROLATE 0.2 MG: 0.2 INJECTION, SOLUTION INTRAMUSCULAR; INTRAVENOUS at 08:04

## 2023-04-09 RX ADMIN — DEXMEDETOMIDINE HYDROCHLORIDE 8 MCG: 100 INJECTION, SOLUTION INTRAVENOUS at 10:04

## 2023-04-09 RX ADMIN — SUGAMMADEX 200 MG: 100 INJECTION, SOLUTION INTRAVENOUS at 11:04

## 2023-04-09 NOTE — PLAN OF CARE
Pre-operative Discussion Note  Kidney Transplant Surgery    Andrey Reddy is a 37 y.o. male with ESRD, requiring chronic dialysis admitted for kidney transplant.  I discussed the planned procedure in detail, including expected hospital course and outcomes, benefits, risks, and potential complications.  Complications discussed included death, graft failure, bleeding, infection, vascular thrombosis, and rejection.  I discussed the risks of anesthesia, as well as the potential need for re-operation.  The possibility of other complications not specifically mentioned was also discussed.  Also, I discussed the need for lifelong immunosuppression and the possibility of serious complications from immunosuppressive drugs.    The discussion included the risks that the patient will incur if he elects to not have the proposed procedure.    Relevant donor-specific risk factors were disclosed and discussed with the patient, including:   DCD: I discussed the use of organs recovered by donation after cardiac death (DCD), including an increased chance of delayed graft function, with similar long-term outcomes to non-DCD organs.  The patient is willing to consider such grafts.    Specific PHS donor risk criteria for the organ donor include:  None    HCV: NA    The patient was SARS-CoV-2 /COVID-19 tested with negative results.    COVID-19: I discussed the possibility of COVID-19 transmission with the patient. Although ELIAZAR testing is available for the virus using a technique which in theory should be very accurate, there is no data yet regarding the likelihood of a  false-negative test leading to virus transmission. Based on accuracy of testing for other viruses, it is expected that this risk is extremely small.     I also discussed that transplant immunosuppression will increase susceptibility to COVID-19 and other viruses, and that although we use stringent precautions to protect patients from infection, it is possible for a  transplant recipient to contract this infection. If COVID-19 infection should occur, it would be a serious matter with a significant risk of death.    All questions were answered.  The patient and available family members voice understanding and agree to proceed with the transplant.    UNOS Patient Status  Note on scores:  ICU = 10 = total assistance  TSU = 20-30 = partial assistance  Outpatient admitted for transplant requiring medical care in last year = 40-50 = partial assistance  Scores 60 or higher indicate no assistance, meaning no need for medical care in last year. This would be very unusual for a transplant candidate.    UNOS Patient Status  Functional Status: 50% - Requires considerable assistance and frequent medical care  Physical Capacity: No Limitations

## 2023-04-09 NOTE — NURSING TRANSFER
Nursing Transfer Note      4/9/2023     Reason patient is being transferred: recovery care complete    Transfer To: [id number]    Transfer via bed    Transfer with iv pump , o2 2l nc    Transported by pt escort    Medicines sent: d5 1/2 ns @ 50ml/hr. Ns @ 300ml/hr    Any special needs or follow-up needed: routine    Chart send with patient: Yes    Notified: father    Patient reassessed at: 04/09/23 1337 (date, time)

## 2023-04-09 NOTE — ANESTHESIA PROCEDURE NOTES
Intubation    Date/Time: 4/9/2023 8:01 AM  Performed by: Landy Saenz CRNA  Authorized by: Andree Johnson MD     Intubation:     Induction:  Intravenous    Intubated:  Postinduction    Mask Ventilation:  Easy mask    Attempts:  1    Attempted By:  CRNA    Method of Intubation:  Direct    Blade:  [place] 2    Laryngeal View Grade: Grade IIA - cords partially seen      Difficult Airway Encountered?: No      Complications:  None    Airway Device:  Oral endotracheal tube    Airway Device Size:  7.5    Style/Cuff Inflation:  Cuffed (inflated to minimal occlusive pressure)    Tube secured:  23    Secured at:  The lips    Placement Verified By:  Capnometry    Complicating Factors:  None    Findings Post-Intubation:  BS equal bilateral and atraumatic/condition of teeth unchanged

## 2023-04-09 NOTE — ANESTHESIA PROCEDURE NOTES
Arterial    Diagnosis: ESRD    Patient location during procedure: done in OR    Staffing  Authorizing Provider: Andree Johnson MD  Performing Provider: Andree Johnson MD    Anesthesiologist was present at the time of the procedure.    Preanesthetic Checklist  Completed: patient identified, IV checked, site marked, risks and benefits discussed, surgical consent, monitors and equipment checked, pre-op evaluation, timeout performed and anesthesia consent givenArterial  Skin Prep: chlorhexidine gluconate  Local Infiltration: none  Orientation: left  Location: radial    Catheter Size: 20 G  Catheter placement by Anatomical landmarks. Heme positive aspiration all ports. Insertion Attempts: 2  Assessment  Dressing: secured with tape and tegaderm  Patient: Tolerated well

## 2023-04-09 NOTE — PROGRESS NOTES
Report given to OR nurse.  Patient TEDs and SCDs applied.  Consents checked.  Patient reports no dentures or prosthesis to be removed.  Patient off floor via bed with Rik from transport.  Family contact obtained and family to go to waiting area.

## 2023-04-09 NOTE — H&P
"[facility] - Transplant Stepdown  Kidney Transplant  H&P      Subjective:     Chief Complaint/Reason for Admission: Kidney Transplant Candidate     History of Present Illness:  Mr. Reddy is a 37-year-old male with ESRD secondary to diabetic nephropathy and HTN.  He has been on the wait list for a kidney transplant at [facility] since 5/10/2021. Patient is currently on hemodialysis started on 5/10/2021. Patient is dialyzing on TTS schedule, LAST HD completed on Saturday 4/8/23.  Patient reports that he is tolerating dialysis well. He reports in usual state of health- Patient denies any recent hospitalizations or ED visits. The primary surgeon will be Dr. Saleh, Thymo induction.  Patient has been NPO since 10pm.  All pre-op labs and imaging have been ordered and will be reviewed prior to surgery. Consents to be signed prior to transplant.     Dialysis History: Mr. Balderas with ESRD, requiring chronic dialysis who is on hemodialysis started on 5/21. Patient is dialyzing on TTS schedule.  Patient reports that he is tolerating dialysis well.  Date of Last Dialysis: 4/8/23    Native urine output per day:  " about cupful a day"    Previous Transplant: no    PTA Medications   Medication Sig    amLODIPine (NORVASC) 5 MG tablet Take 10 mg by mouth once daily.    atorvastatin (LIPITOR) 10 MG tablet Take 10 mg by mouth every evening.    gabapentin (NEURONTIN) 300 MG capsule Take 300 mg by mouth 3 (three) times daily.    hydrALAZINE (APRESOLINE) 50 MG tablet Take 50 mg by mouth 3 (three) times daily.    minoxidiL (LONITEN) 2.5 MG tablet Take 2.5 mg by mouth 2 (two) times a day.    VELPHORO 500 mg Chew Take 1 tablet by mouth 2 (two) times daily.    vit B complx C/folic acid/zinc (RENAPLEX ORAL) Take 1 tablet by mouth once daily.       Review of patient's allergies indicates:   Allergen Reactions    Shrimp        Past Medical History:   Diagnosis Date    Allergy     Anemia     Diabetes mellitus     Disorder of kidney and ureter     " "Hyperlipidemia     Hypertension     Secondary hyperparathyroidism      Past Surgical History:   Procedure Laterality Date    INSERTION OF DIALYSIS CATHETER       Family History       Problem Relation (Age of Onset)    Cancer Mother    Diabetes Mother, Father    Hypertension Mother    Stroke Brother          Tobacco Use    Smoking status: Never    Smokeless tobacco: Never   Substance and Sexual Activity    Alcohol use: Yes    Drug use: Yes     Types: Marijuana    Sexual activity: Yes        Review of Systems   Constitutional:  Negative for activity change and appetite change.   Eyes:  Negative for visual disturbance.   Respiratory:  Negative for cough and shortness of breath.    Cardiovascular:  Negative for chest pain, palpitations and leg swelling.   Gastrointestinal:  Negative for abdominal distention, abdominal pain, constipation, diarrhea, nausea and vomiting.   Genitourinary:  Negative for difficulty urinating.   Musculoskeletal:  Negative for arthralgias.   Skin:  Negative for wound.   Allergic/Immunologic: Negative for immunocompromised state.   Neurological:  Negative for dizziness.   Hematological:  Negative for adenopathy. Does not bruise/bleed easily.   Psychiatric/Behavioral:  Negative for agitation.    Objective:     Vital Signs (Most Recent):  Temp: 98.2 °F (36.8 °C) (04/09/23 0500)  Pulse: 62 (04/09/23 0500)  Resp: 16 (04/09/23 0500)  BP: 133/78 (04/09/23 0500)  SpO2: 100 % (04/09/23 0500)  Height: 5' 11" (180.3 cm)  Weight: 68.6 kg (151 lb 5.5 oz)  Body mass index is 21.11 kg/m².     Physical Exam  Vitals and nursing note reviewed.   Constitutional:       Appearance: He is well-developed.   HENT:      Head: Normocephalic.   Eyes:      Conjunctiva/sclera: Conjunctivae normal.   Cardiovascular:      Rate and Rhythm: Normal rate and regular rhythm.      Heart sounds: No murmur heard.  Pulmonary:      Effort: Pulmonary effort is normal.      Breath sounds: Normal breath sounds.   Abdominal:      General: " Bowel sounds are normal. There is no distension.      Palpations: Abdomen is soft.      Tenderness: There is no abdominal tenderness.   Musculoskeletal:         General: Normal range of motion.      Cervical back: Normal range of motion.   Skin:     General: Skin is warm and dry.      Capillary Refill: Capillary refill takes less than 2 seconds.          Neurological:      Mental Status: He is alert and oriented to person, place, and time.   Psychiatric:         Behavior: Behavior normal.         Thought Content: Thought content normal.         Judgment: Judgment normal.       Laboratory  CBC:   Recent Labs   Lab 04/09/23 0320   WBC 5.72   RBC 3.16*   HGB 9.8*   HCT 29.8*      MCV 94   MCH 31.0   MCHC 32.9     BMP:   Recent Labs   Lab 04/09/23 0320   GLU 84      K 3.9      CO2 25   BUN 25*   CREATININE 3.7*   CALCIUM 9.2     CMP:   Recent Labs   Lab 04/09/23 0320   GLU 84   CALCIUM 9.2   ALBUMIN 3.9   PROT 6.8      K 3.9   CO2 25      BUN 25*   CREATININE 3.7*   ALKPHOS 60   ALT 13   AST 12     Labs within the past 24 hours have been reviewed.    Diagnostic Results:  Chest X-Ray: No results found for this or any previous visit.    Patient was SARS-CoV-2 /COVID-19 tested with negative results.     Assessment/Plan:     Cardiac/Vascular  Renovascular hypertension          The patient presents for kidney transplant.  There are no apparent contraindications to proceeding with the planned transplant.  The patient understands that the transplant could potentially be cancelled pending detailed assessment of the donor organ.  He will receive Thymoglobulin induction.  A complete discussion of the transplant procedure, including risks, complications, and alternatives, as well as any donor-specific risk factors requiring specific disclosure, will be carried out by the responsible staff surgeon prior to the procedure.     Discharge Planning:  Not Candidate for discharge at this time     Sara  VIANCA Gomez  Kidney Transplant  [facility] - Transplant Stepdown

## 2023-04-09 NOTE — PROGRESS NOTES
Patient arrived to floor from PACU.  Patient AAOx4 and slightly drowsy.  Patient placed on bedside monitor.  Patient VSS.  Mother, friedn, and brother at bedside.  Patient assisted with position change onto L side with pillow support.  Patient reports pain 7/10 and Ultram given.  Oneal to gravity with clear yellow urine draining and CAUTI bundle being followed.  Call bell within reach and bed placed in low position.  Patient denies nausea.  CHERELLE Covington at bedside.  Will continue to monitor patient.

## 2023-04-09 NOTE — ANESTHESIA PREPROCEDURE EVALUATION
[facility]  Anesthesia Pre-Operative Evaluation         Patient Name: Andrey Reddy  YOB: 1985  MRN: [id number]    SUBJECTIVE:     Pre-operative evaluation for Procedure(s) (LRB):  TRANSPLANT, KIDNEY (N/A)     04/09/2023    Andrey Reddy is a 37 y.o. male w/ a significant PMHx of ESRD secondary to diabetic nephropathy and HTN.    Patient now presents for the above procedure(s).    TTE: 10/28/22   The left ventricle is normal in size with normal systolic function.   The estimated ejection fraction is 65%.   Normal left ventricular diastolic function.   Normal right ventricular size with normal right ventricular systolic function.   The estimated PA systolic pressure is 26 mmHg.   Normal central venous pressure (3 mmHg).    LDA:        Peripheral IV - Single Lumen 04/09/23 0155 22 G Left Forearm (Active)   Site Assessment Clean;Dry;Intact;No redness;No swelling 04/09/23 0300   Extremity Assessment Distal to IV No warmth;No swelling;No redness;No abnormal discoloration 04/09/23 0300   Line Status Flushed;Saline locked 04/09/23 0300   Dressing Status Clean;Dry;Intact 04/09/23 0300   Dressing Intervention Integrity maintained 04/09/23 0300   Dressing Change Due 04/13/23 04/09/23 0157   Site Change Due 04/13/23 04/09/23 0300   Reason Not Rotated Not due 04/09/23 0300   Number of days: 0            Hemodialysis AV Fistula Right upper arm (Active)   Site Assessment Clean;Dry;Intact;No redness;No swelling 04/09/23 0300   Patency Present;Thrill;Bruit 04/09/23 0300   Site Condition No complications 04/09/23 0300   Dressing Open to air (None) 04/09/23 0300   Number of days:        Prev airway: None documented.    Drips: None documented.      Patient Active Problem List   Diagnosis    Pre-transplant evaluation for kidney transplant    ESRD on hemodialysis    Controlled type 2 diabetes mellitus with chronic kidney disease on chronic dialysis, without long-term current use of insulin     Renovascular hypertension    Hyperlipidemia    Anemia in ESRD (end-stage renal disease)    Depression    Positive urine drug screen       Review of patient's allergies indicates:   Allergen Reactions    Shrimp        Current Inpatient Medications:   acetaminophen  650 mg Per NG tube Once    antithymocyte globulin (rabbit), hydrocortisone 20mg, with optional heparin 1000 units in NS 500ml (FOR PERIPHERAL LINE ADMINISTRATION ONLY)  1.5 mg/kg (Adjusted) Intravenous Once    diphenhydrAMINE  50 mg Intravenous Once    methylPREDNISolone sodium succinate injection  500 mg Intravenous Once       No current facility-administered medications on file prior to encounter.     Current Outpatient Medications on File Prior to Encounter   Medication Sig Dispense Refill    amLODIPine (NORVASC) 5 MG tablet Take 10 mg by mouth once daily.      atorvastatin (LIPITOR) 10 MG tablet Take 10 mg by mouth every evening.      gabapentin (NEURONTIN) 300 MG capsule Take 300 mg by mouth 3 (three) times daily.      hydrALAZINE (APRESOLINE) 50 MG tablet Take 50 mg by mouth 3 (three) times daily.      minoxidiL (LONITEN) 2.5 MG tablet Take 2.5 mg by mouth 2 (two) times a day.      VELPHORO 500 mg Chew Take 1 tablet by mouth 2 (two) times daily.      vit B complx C/folic acid/zinc (RENAPLEX ORAL) Take 1 tablet by mouth once daily.         Past Surgical History:   Procedure Laterality Date    INSERTION OF DIALYSIS CATHETER         OBJECTIVE:     Vital Signs Range (Last 24H):  Temp:  [36.9 °C (98.4 °F)]   Pulse:  [66]   Resp:  [18]   BP: (127)/(72)   SpO2:  [98 %]       Significant Labs:  Lab Results   Component Value Date    WBC 5.72 04/09/2023    HGB 9.8 (L) 04/09/2023    HCT 29.8 (L) 04/09/2023     04/09/2023    CHOL 169 04/09/2023    TRIG 33 04/09/2023    HDL 58 04/09/2023    ALT 13 04/09/2023    AST 12 04/09/2023     04/09/2023    K 3.9 04/09/2023     04/09/2023    CREATININE 3.7 (H) 04/09/2023    BUN 25 (H)  04/09/2023    CO2 25 04/09/2023    INR 1.1 04/09/2023    HGBA1C 4.5 04/09/2023       Diagnostic Studies: No relevant studies.    EKG:   Results for orders placed or performed in visit on 12/29/21   IN OFFICE EKG 12-LEAD (to [place])    Collection Time: 12/29/21  2:05 PM    Narrative    Test Reason : Z76.82,    Vent. Rate : 059 BPM     Atrial Rate : 059 BPM     P-R Int : 130 ms          QRS Dur : 092 ms      QT Int : 420 ms       P-R-T Axes : 058 052 058 degrees     QTc Int : 415 ms    Sinus bradycardia  Minimal voltage criteria for LVH, may be normal variant ( Sokolow-Deleon )  Borderline Abnormal ECG  When compared with ECG of 15-NOV-2021 11:34,  T wave inversion no longer evident in Inferior leads  Confirmed by Lina Felder MD, Yohana GA ([id number]) on 1/4/2022 8:11:31 AM    Referred By: AMELIA YAO           Confirmed By:Yohana Felder MD       ASSESSMENT/PLAN:                                                                                                                  04/09/2023  Andrey Reddy is a 37 y.o., male.      Pre-op Assessment    I have reviewed the Patient Summary Reports.     I have reviewed the Nursing Notes. I have reviewed the NPO Status.   I have reviewed the Medications.     Review of Systems  Anesthesia Hx:  No problems with previous Anesthesia  History of prior surgery of interest to airway management or planning: Denies Family Hx of Anesthesia complications.   Denies Personal Hx of Anesthesia complications.   Hematology/Oncology:     Oncology Normal     Cardiovascular:   Hypertension  Denies Angina. ECG has been reviewed.    Pulmonary:   Denies Shortness of breath.  Denies Recent URI.    Renal/:   Chronic Renal Disease, ESRD, Dialysis    Hepatic/GI:   Denies GERD. Denies Liver Disease.    Neurological:   Denies CVA. Denies Seizures.    Endocrine:   Diabetes    Psych:   depression          Physical Exam  General: Well nourished, Cooperative, Alert and Oriented    Airway:  Mallampati: III    Mouth Opening: Normal  TM Distance: Normal  Tongue: Normal  Neck ROM: Normal ROM    Dental:  Intact        Anesthesia Plan  Type of Anesthesia, risks & benefits discussed:    Anesthesia Type: Gen ETT  Intra-op Monitoring Plan: Standard ASA Monitors and Art Line  Post Op Pain Control Plan: multimodal analgesia and IV/PO Opioids PRN  Induction:  IV  Airway Plan: Direct, Post-Induction  Informed Consent: Informed consent signed with the Patient and all parties understand the risks and agree with anesthesia plan.  All questions answered. Patient consented to blood products? Yes  ASA Score: 3  Day of Surgery Review of History & Physical: H&P Update referred to the surgeon/provider.    Ready For Surgery From Anesthesia Perspective.     .

## 2023-04-09 NOTE — TRANSFER OF CARE
"Anesthesia Transfer of Care Note    Patient: Andrey Reddy    Procedure(s) Performed: Procedure(s) (LRB):  TRANSPLANT, KIDNEY (N/A)    Patient location: PACU    Anesthesia Type: general    Transport from OR: Transported from OR on 6-10 L/min O2 by face mask with adequate spontaneous ventilation    Post pain: adequate analgesia    Post assessment: no apparent anesthetic complications and tolerated procedure well    Post vital signs: stable    Level of consciousness: sedated    Nausea/Vomiting: no nausea/vomiting    Complications: none    Transfer of care protocol was followed      Last vitals:   Visit Vitals  /78 (BP Location: Left arm, Patient Position: Lying)   Pulse 62   Temp 36.8 °C (98.2 °F) (Oral)   Resp 16   Ht 5' 11" (1.803 m)   Wt 68.6 kg (151 lb 5.5 oz)   SpO2 100%   BMI 21.11 kg/m²     "

## 2023-04-09 NOTE — SUBJECTIVE & OBJECTIVE
"  Subjective:     Chief Complaint/Reason for Admission: Kidney Transplant Candidate     History of Present Illness:  Mr. Reddy is a 37-year-old male with ESRD secondary to diabetic nephropathy and HTN.  He has been on the wait list for a kidney transplant at [facility] since 5/10/2021. Patient is currently on hemodialysis started on 5/10/2021. Patient is dialyzing on TTS schedule, HD Saturday 4/7/23.  Patient reports that he is tolerating dialysis well. He reports in usual state of health- Patient denies any recent hospitalizations or ED visits. The primary surgeon will be Maximus Sanchez.  Patient has been NPO since 10pm.  All pre-op labs and imaging have been ordered and will be reviewed prior to surgery. Consents to be signed prior to transplant.     Dialysis History: Mr. Balderas with ESRD, requiring chronic dialysis who is on hemodialysis started on 5/21. Patient is dialyzing on TTS schedule.  Patient reports that he is tolerating dialysis well.  Date of Last Dialysis: 4/8/23    Native urine output per day:  " about cupful a day"    Previous Transplant: no    PTA Medications   Medication Sig    amLODIPine (NORVASC) 5 MG tablet Take 10 mg by mouth once daily.    atorvastatin (LIPITOR) 10 MG tablet Take 10 mg by mouth every evening.    gabapentin (NEURONTIN) 300 MG capsule Take 300 mg by mouth 3 (three) times daily.    hydrALAZINE (APRESOLINE) 50 MG tablet Take 50 mg by mouth 3 (three) times daily.    minoxidiL (LONITEN) 2.5 MG tablet Take 2.5 mg by mouth 2 (two) times a day.    VELPHORO 500 mg Chew Take 1 tablet by mouth 2 (two) times daily.    vit B complx C/folic acid/zinc (RENAPLEX ORAL) Take 1 tablet by mouth once daily.       Review of patient's allergies indicates:   Allergen Reactions    Shrimp        Past Medical History:   Diagnosis Date    Allergy     Anemia     Diabetes mellitus     Disorder of kidney and ureter     Hyperlipidemia     Hypertension     Secondary hyperparathyroidism      Past " "Surgical History:   Procedure Laterality Date    INSERTION OF DIALYSIS CATHETER       Family History       Problem Relation (Age of Onset)    Cancer Mother    Diabetes Mother, Father    Hypertension Mother    Stroke Brother          Tobacco Use    Smoking status: Never    Smokeless tobacco: Never   Substance and Sexual Activity    Alcohol use: Yes    Drug use: Yes     Types: Marijuana    Sexual activity: Yes        Review of Systems   Constitutional:  Negative for activity change and appetite change.   Eyes:  Negative for visual disturbance.   Respiratory:  Negative for cough and shortness of breath.    Cardiovascular:  Negative for chest pain, palpitations and leg swelling.   Gastrointestinal:  Negative for abdominal distention, abdominal pain, constipation, diarrhea, nausea and vomiting.   Genitourinary:  Negative for difficulty urinating.   Musculoskeletal:  Negative for arthralgias.   Skin:  Negative for wound.   Allergic/Immunologic: Negative for immunocompromised state.   Neurological:  Negative for dizziness.   Hematological:  Negative for adenopathy. Does not bruise/bleed easily.   Psychiatric/Behavioral:  Negative for agitation.    Objective:     Vital Signs (Most Recent):  Temp: 98.2 °F (36.8 °C) (04/09/23 0500)  Pulse: 62 (04/09/23 0500)  Resp: 16 (04/09/23 0500)  BP: 133/78 (04/09/23 0500)  SpO2: 100 % (04/09/23 0500)  Height: 5' 11" (180.3 cm)  Weight: 68.6 kg (151 lb 5.5 oz)  Body mass index is 21.11 kg/m².     Physical Exam  Vitals and nursing note reviewed.   Constitutional:       Appearance: He is well-developed.   HENT:      Head: Normocephalic.   Eyes:      Conjunctiva/sclera: Conjunctivae normal.   Cardiovascular:      Rate and Rhythm: Normal rate and regular rhythm.      Heart sounds: No murmur heard.  Pulmonary:      Effort: Pulmonary effort is normal.      Breath sounds: Normal breath sounds.   Abdominal:      General: Bowel sounds are normal. There is no distension.      Palpations: Abdomen " is soft.      Tenderness: There is no abdominal tenderness.   Musculoskeletal:         General: Normal range of motion.      Cervical back: Normal range of motion.   Skin:     General: Skin is warm and dry.      Capillary Refill: Capillary refill takes less than 2 seconds.          Neurological:      Mental Status: He is alert and oriented to person, place, and time.   Psychiatric:         Behavior: Behavior normal.         Thought Content: Thought content normal.         Judgment: Judgment normal.       Laboratory  CBC:   Recent Labs   Lab 04/09/23 0320   WBC 5.72   RBC 3.16*   HGB 9.8*   HCT 29.8*      MCV 94   MCH 31.0   MCHC 32.9     BMP:   Recent Labs   Lab 04/09/23  0320   GLU 84      K 3.9      CO2 25   BUN 25*   CREATININE 3.7*   CALCIUM 9.2     CMP:   Recent Labs   Lab 04/09/23 0320   GLU 84   CALCIUM 9.2   ALBUMIN 3.9   PROT 6.8      K 3.9   CO2 25      BUN 25*   CREATININE 3.7*   ALKPHOS 60   ALT 13   AST 12     Labs within the past 24 hours have been reviewed.    Diagnostic Results:  Chest X-Ray: No results found for this or any previous visit.    Patient was SARS-CoV-2 /COVID-19 tested with negative results.

## 2023-04-09 NOTE — PLAN OF CARE
Patient remaining free from injury and sleeping between care this pm.  Patient independently weight shifting in bed.  Using wedge and pillows to support patient at this time.  Patient reports pain 7/10 when moving in the bed and mildly relieved with pain medication.  BG < 150 today.  Oneal to gravity with clear yellow urine noted.  No s/s of infection noted.  O2 decreased to 1L and patient sats 100%.  IS to bedside.  Patient mother remains at bedside.  Patient denies nausea.  Dermabond to incision and CDI.  Repeating renal panel.  JOSE ELIAS Aceves RN set up blue card.  BP WNL at this time and patient on bedside monitor with tele NSR.

## 2023-04-09 NOTE — OP NOTE
Operative Report    Date of Procedure: 4/9/2023  Date of Transplant (UNOS): 4/9/23    Surgeons:  Surgeon(s) and Role:      * Julito Saleh MD - Primary     * Lucia Dias MD - Resident - Assisting    First Assistant Attestation:  The indicated resident served as first assistant for this procedure.    Pre-operative Diagnosis: ESRD, requiring chronic dialysis secondary to Diabetes Mellitus - Type II  Post-operative Diagnosis: Same    Procedure(s) Performed:   Back Table Preparation of Left Kidney    Donation after Circulatory Death  Kidney transplant    Anesthesia: General endotracheal    Preamble  Indications and Patient Counseling: The patient is a 37 y.o. year-old male with end-stage kidney disease secondary to Diabetes Mellitus - Type II who has been evaluated for a kidney transplant.  The procedure was thoroughly discussed with the patient, including potential risks, complications, and alternatives.  Specific complications mentioned included death, graft non-function, bleeding, infection, and rejection, as well as the possibility of other complications not specifically mentioned.    Donor Risk Factors: Prior to the operation, the patient was advised of any donor-specific risk factors requiring specific disclosure.  Factors in this case included donation after cardiac death.      Specific PHS donor risk criteria for the organ donor include:  None    All questions were answered, the patient voiced appropriate understanding, and he agreed to proceed with the planned procedure.    ABO Confirmation: Immediately following arrival of the donor organ and prior to implantation, a formal ABO confirmation was done according to hospital and UNOS policies.  I confirmed the UNOS ID number ([id number]) of the donor organ and the donor and recipient ABO types, directly verifying these data by comparison with the UNOS Match Run report ([id number]).  This confirmation was personally done by an attending surgeon and circulating  nurse, and is officially documented elsewhere.    Time-Out: A complete time out was carried out prior to incision, with confirmation of patient identity, correct procedure, correct operative site, appropriate antibiotic prophylaxis, review of any known allergies, and presence of all needed equipment.    Procedure in Detail  Prior to starting the operation, the left kidney  was prepared on the back table. Arterial anatomy was single. Venous anatomy was single. Ureteral anatomy was single. Back table vascular reconstruction was not required .  Unneeded fat was removed from the kidney, the vessels were cleaned of adherent tissue and tested for leaks, and the kidney was maintained at ice temperature in organ preservation solution until it was brought to the operative field.     The patient was brought into the operating room and placed in a supine position on the OR table.  After the induction of general endotracheal anesthesia, lines were placed by the anesthesiologist.  The urinary bladder was catheterized and irrigated with antibiotic solution.  There was no tension on the axillae and all pressure points were padded.  Sequential compression boots were used as were Berenice Huggers.  The abdomen was prepped and draped in the usual sterile fashion.  Skin was incised over the right with a knife and deepened with electrocautery.  The peritoneum and its contents were swept medially, exposing the right external iliac artery and the right external iliac vein.  The Bookwalter retractor was used to provide exposure.  Overlying lymphatics were ligated or cauterized and the vessels were dissected free for a length compatible with anastomosis.  The kidney was brought to the OR table at 4/9/2023  9:19 AM.  Venous control was obtained with a vascular clamp.  A venotomy was made, the vein irrigated, and an end renal to right external iliac vein anastomosis was created with 5-0 polypropylene.  Arterial control was obtained with a  vascular clamp.  Arteriotomy was made, the artery irrigated, and an aortic patch to right external iliac artery anastomosis was created with 6-0 polypropylene.  The kidney was unclamped and reperfused at 4/9/2023  9:45 AM.  Reperfusion quality was good. Intraoperative urine production was not observed.  After hemostasis was obtained, a Lich uretero-neocystostomy was created.  The bladder was filled and identified, opened, and the anastomosis created using 6-0 PDS.  The bladder muscle was closed over the distal ureter to create an antireflux tunnel.  A ureteral stent was used.  With the kidney well perfused and sitting appropriately without tension on the anastomoses, viscera were replaced in their usual position.  The wound was closed after a final check for hemostasis.  Overall, the graft quality was assessed to be good. At the end of the case the needle, sponge and instrument counts were all correct.  Sterile dressings were applied and the patient was brought to the recovery room/ICU in stable condition.    Estimated Blood Loss: 150 mL  Fluids Administered:    Drains: None  Specimens: none    Findings:    Organ Transplanted: Left Kidney    Arterial Anatomy: single  Number of Arteries: 1  Configuration of Multiple Arteries: not applicable  Venous Anatomy: single  Number of Veins: 1  Ureteral Anatomy: single  Number of Ureters: 1  Reperfusion Quality: good  Overall Graft Quality: good  Intraoperative Urine Production: no  Oneal: not to be removed before 2 days.  Ureteral Stent: Yes    Ischemic Times:   Anastomosis (warm ischemia) time: 26 minutes   Cold ischemia time: 1,264 minutes  Total ischemia time: 1290 minutes    Donor Data:  UNOS ID: [id number]   UNOS Match Run: [id number]   Donor Type: Donation after Circulatory Death    Donor CMV Status: Positive   Donor HBcAB: Negative   Donor HCV Status: Negative

## 2023-04-09 NOTE — ANESTHESIA POSTPROCEDURE EVALUATION
Anesthesia Post Evaluation    Patient: Andrey Reddy    Procedure(s) Performed: Procedure(s) (LRB):  TRANSPLANT, KIDNEY (N/A)    Final Anesthesia Type: general      Patient location during evaluation: PACU  Patient participation: Yes- Able to Participate  Level of consciousness: awake and alert  Post-procedure vital signs: reviewed and stable  Pain management: adequate  Airway patency: patent    PONV status at discharge: No PONV  Anesthetic complications: no      Cardiovascular status: hemodynamically stable  Respiratory status: spontaneous ventilation  Follow-up not needed.          Vitals Value Taken Time   /58 04/09/23 1136   Temp 98.0 04/09/23 1146   Pulse 69 04/09/23 1146   Resp 19 04/09/23 1146   SpO2 100 % 04/09/23 1146   Vitals shown include unvalidated device data.      No case tracking events are documented in the log.      Pain/Kendrick Score: No data recorded

## 2023-04-09 NOTE — PROGRESS NOTES
"TRANSPLANT NOTE:      ORGAN: LEFT KIDNEY    Disease Etiology: Diabetes Mellitus - Type II  Donor Type: Donation after Circulatory Death     CDC High Risk: No    Donor CMV Status: POS  Donor HBcAB: Negative    Donor HCV Status: Negative    Peak cPRA % (within 1 year of transplant): 50      Andrey Reddy is a 37 y.o. male s/p  Donation after Circulatory Death    kidney transplant on 4/9/2023 (Kidney) for Diabetes Mellitus - Type II.   This patient will receive 3 doses of Thymoglobulin for induction.  This patients maintenance immunosuppression will include a steroid taper per protocol to 5mg daily, Prograf, and Cellcept maintenance.  Opportunistic infection prophylaxis will include Valcyte for 6 months (CMV D + , R - ) and Bactrim for 6 months.  Patient is to begin self medications upon transfer to the [facility], and I plan to meet with this patient and his/her support person prior to discharge to review the medication section of the Kidney Transplant Education Manual.  I have reviewed the pre-op medications and have restarted those, as appropriate.     Rosie Chow ([place])  PGY2 Solid Organ Transplant Pharmacy Resident    "

## 2023-04-09 NOTE — HPI
Mr. Reddy is a 37-year-old male with ESRD secondary to diabetic nephropathy and HTN.  He has been on the wait list for a kidney transplant at [facility] since 5/10/2021. Patient is currently on hemodialysis started on 5/10/2021. Patient is dialyzing on TTS schedule, HD Saturday 4/7/23.  Patient reports that he is tolerating dialysis well. He reports in usual state of health- Patient denies any recent hospitalizations or ED visits. The primary surgeon will be Dr. Saleh, Thymo induction.  Patient has been NPO since 10pm.  All pre-op labs and imaging have been ordered and will be reviewed prior to surgery. Consents to be signed prior to transplant.

## 2023-04-10 PROBLEM — Z29.89 PROPHYLACTIC IMMUNOTHERAPY: Status: ACTIVE | Noted: 2023-04-10

## 2023-04-10 PROBLEM — Z76.82 KIDNEY TRANSPLANT CANDIDATE: Status: RESOLVED | Noted: 2023-04-09 | Resolved: 2023-04-10

## 2023-04-10 PROBLEM — D63.8 ANEMIA OF CHRONIC DISEASE: Status: ACTIVE | Noted: 2023-04-10

## 2023-04-10 PROBLEM — Z79.60 LONG-TERM USE OF IMMUNOSUPPRESSANT MEDICATION: Status: ACTIVE | Noted: 2023-04-10

## 2023-04-10 PROBLEM — Z91.89 AT RISK FOR OPPORTUNISTIC INFECTIONS: Status: ACTIVE | Noted: 2023-04-10

## 2023-04-10 LAB
ALBUMIN SERPL BCP-MCNC: 3.2 G/DL (ref 3.5–5.2)
ANION GAP SERPL CALC-SCNC: 10 MMOL/L (ref 8–16)
BASOPHILS # BLD AUTO: 0.02 K/UL (ref 0–0.2)
BASOPHILS NFR BLD: 0.1 % (ref 0–1.9)
BUN SERPL-MCNC: 36 MG/DL (ref 6–20)
CALCIUM SERPL-MCNC: 8.6 MG/DL (ref 8.7–10.5)
CHLORIDE SERPL-SCNC: 103 MMOL/L (ref 95–110)
CO2 SERPL-SCNC: 21 MMOL/L (ref 23–29)
CREAT SERPL-MCNC: 3.4 MG/DL (ref 0.5–1.4)
DIFFERENTIAL METHOD: ABNORMAL
EOSINOPHIL # BLD AUTO: 0 K/UL (ref 0–0.5)
EOSINOPHIL NFR BLD: 0.1 % (ref 0–8)
ERYTHROCYTE [DISTWIDTH] IN BLOOD BY AUTOMATED COUNT: 11.8 % (ref 11.5–14.5)
EST. GFR  (NO RACE VARIABLE): 22.7 ML/MIN/1.73 M^2
GLUCOSE SERPL-MCNC: 149 MG/DL (ref 70–110)
HCT VFR BLD AUTO: 30.4 % (ref 40–54)
HCV RNA SERPL QL NAA+PROBE: NOT DETECTED
HCV RNA SPEC NAA+PROBE-ACNC: <12 IU/ML
HGB BLD-MCNC: 10.3 G/DL (ref 14–18)
IMM GRANULOCYTES # BLD AUTO: 0.09 K/UL (ref 0–0.04)
IMM GRANULOCYTES NFR BLD AUTO: 0.5 % (ref 0–0.5)
LYMPHOCYTES # BLD AUTO: 0.1 K/UL (ref 1–4.8)
LYMPHOCYTES NFR BLD: 0.8 % (ref 18–48)
MAGNESIUM SERPL-MCNC: 1.8 MG/DL (ref 1.6–2.6)
MCH RBC QN AUTO: 32.1 PG (ref 27–31)
MCHC RBC AUTO-ENTMCNC: 33.9 G/DL (ref 32–36)
MCV RBC AUTO: 95 FL (ref 82–98)
MONOCYTES # BLD AUTO: 1.3 K/UL (ref 0.3–1)
MONOCYTES NFR BLD: 7.9 % (ref 4–15)
NEUTROPHILS # BLD AUTO: 15.4 K/UL (ref 1.8–7.7)
NEUTROPHILS NFR BLD: 90.6 % (ref 38–73)
NRBC BLD-RTO: 0 /100 WBC
PHOSPHATE SERPL-MCNC: 4.5 MG/DL (ref 2.7–4.5)
PHOSPHATE SERPL-MCNC: 4.5 MG/DL (ref 2.7–4.5)
PLATELET # BLD AUTO: 151 K/UL (ref 150–450)
PMV BLD AUTO: 10.7 FL (ref 9.2–12.9)
POCT GLUCOSE: 194 MG/DL (ref 70–110)
POCT GLUCOSE: 252 MG/DL (ref 70–110)
POTASSIUM SERPL-SCNC: 4.2 MMOL/L (ref 3.5–5.1)
RBC # BLD AUTO: 3.21 M/UL (ref 4.6–6.2)
SODIUM SERPL-SCNC: 134 MMOL/L (ref 136–145)
TACROLIMUS BLD-MCNC: <2 NG/ML (ref 5–15)
WBC # BLD AUTO: 16.99 K/UL (ref 3.9–12.7)

## 2023-04-10 PROCEDURE — 99233 SBSQ HOSP IP/OBS HIGH 50: CPT | Mod: 24,,, | Performed by: CLINICAL NURSE SPECIALIST

## 2023-04-10 PROCEDURE — 25000003 PHARM REV CODE 250: Performed by: TRANSPLANT SURGERY

## 2023-04-10 PROCEDURE — 83735 ASSAY OF MAGNESIUM: CPT | Performed by: TRANSPLANT SURGERY

## 2023-04-10 PROCEDURE — 99222 PR INITIAL HOSPITAL CARE,LEVL II: ICD-10-PCS | Mod: ,,, | Performed by: PHYSICIAN ASSISTANT

## 2023-04-10 PROCEDURE — 63600175 PHARM REV CODE 636 W HCPCS: Performed by: TRANSPLANT SURGERY

## 2023-04-10 PROCEDURE — 63600175 PHARM REV CODE 636 W HCPCS: Performed by: PHYSICIAN ASSISTANT

## 2023-04-10 PROCEDURE — 25000003 PHARM REV CODE 250: Performed by: NURSE PRACTITIONER

## 2023-04-10 PROCEDURE — 25000003 PHARM REV CODE 250: Performed by: CLINICAL NURSE SPECIALIST

## 2023-04-10 PROCEDURE — 99233 PR SUBSEQUENT HOSPITAL CARE,LEVL III: ICD-10-PCS | Mod: 24,,, | Performed by: CLINICAL NURSE SPECIALIST

## 2023-04-10 PROCEDURE — 85025 COMPLETE CBC W/AUTO DIFF WBC: CPT | Performed by: TRANSPLANT SURGERY

## 2023-04-10 PROCEDURE — 80069 RENAL FUNCTION PANEL: CPT | Performed by: TRANSPLANT SURGERY

## 2023-04-10 PROCEDURE — 99222 1ST HOSP IP/OBS MODERATE 55: CPT | Mod: ,,, | Performed by: PHYSICIAN ASSISTANT

## 2023-04-10 PROCEDURE — 80197 ASSAY OF TACROLIMUS: CPT | Performed by: TRANSPLANT SURGERY

## 2023-04-10 PROCEDURE — S5010 5% DEXTROSE AND 0.45% SALINE: HCPCS | Performed by: TRANSPLANT SURGERY

## 2023-04-10 PROCEDURE — 63600175 PHARM REV CODE 636 W HCPCS: Performed by: CLINICAL NURSE SPECIALIST

## 2023-04-10 PROCEDURE — 20600001 HC STEP DOWN PRIVATE ROOM

## 2023-04-10 PROCEDURE — 86644 CMV ANTIBODY: CPT | Performed by: NURSE PRACTITIONER

## 2023-04-10 PROCEDURE — 36415 COLL VENOUS BLD VENIPUNCTURE: CPT | Performed by: NURSE PRACTITIONER

## 2023-04-10 RX ORDER — TACROLIMUS 1 MG/1
6 CAPSULE ORAL EVERY 12 HOURS
Qty: 360 CAPSULE | Refills: 11 | Status: SHIPPED | OUTPATIENT
Start: 2023-04-10 | End: 2023-04-12 | Stop reason: SDUPTHER

## 2023-04-10 RX ORDER — PREDNISONE 5 MG/1
TABLET ORAL
Qty: 70 TABLET | Refills: 11 | Status: SHIPPED | OUTPATIENT
Start: 2023-04-10 | End: 2023-05-30

## 2023-04-10 RX ORDER — DEXTROSE 40 %
15 GEL (GRAM) ORAL
Status: DISCONTINUED | OUTPATIENT
Start: 2023-04-10 | End: 2023-04-13 | Stop reason: HOSPADM

## 2023-04-10 RX ORDER — SODIUM CHLORIDE, SODIUM LACTATE, POTASSIUM CHLORIDE, CALCIUM CHLORIDE 600; 310; 30; 20 MG/100ML; MG/100ML; MG/100ML; MG/100ML
INJECTION, SOLUTION INTRAVENOUS CONTINUOUS
Status: DISCONTINUED | OUTPATIENT
Start: 2023-04-10 | End: 2023-04-11

## 2023-04-10 RX ORDER — MYCOPHENOLATE MOFETIL 250 MG/1
1000 CAPSULE ORAL 2 TIMES DAILY
Qty: 240 CAPSULE | Refills: 11 | Status: SHIPPED | OUTPATIENT
Start: 2023-04-10 | End: 2023-05-15 | Stop reason: SDUPTHER

## 2023-04-10 RX ORDER — VALGANCICLOVIR 450 MG/1
900 TABLET, FILM COATED ORAL EVERY MORNING
Qty: 60 TABLET | Refills: 5 | Status: SHIPPED | OUTPATIENT
Start: 2023-04-09 | End: 2023-05-15 | Stop reason: SDUPTHER

## 2023-04-10 RX ORDER — FAMOTIDINE 20 MG/1
20 TABLET, FILM COATED ORAL NIGHTLY
Qty: 30 TABLET | Refills: 0 | Status: SHIPPED | OUTPATIENT
Start: 2023-04-10 | End: 2023-05-30 | Stop reason: ALTCHOICE

## 2023-04-10 RX ORDER — INSULIN ASPART 100 [IU]/ML
0-5 INJECTION, SOLUTION INTRAVENOUS; SUBCUTANEOUS
Status: DISCONTINUED | OUTPATIENT
Start: 2023-04-10 | End: 2023-04-13 | Stop reason: HOSPADM

## 2023-04-10 RX ORDER — SULFAMETHOXAZOLE AND TRIMETHOPRIM 400; 80 MG/1; MG/1
1 TABLET ORAL EVERY MORNING
Qty: 30 TABLET | Refills: 5 | Status: SHIPPED | OUTPATIENT
Start: 2023-04-09 | End: 2023-05-15 | Stop reason: SDUPTHER

## 2023-04-10 RX ORDER — DEXTROSE 40 %
30 GEL (GRAM) ORAL
Status: DISCONTINUED | OUTPATIENT
Start: 2023-04-10 | End: 2023-04-13 | Stop reason: HOSPADM

## 2023-04-10 RX ORDER — GLUCAGON 1 MG
1 KIT INJECTION
Status: DISCONTINUED | OUTPATIENT
Start: 2023-04-10 | End: 2023-04-13 | Stop reason: HOSPADM

## 2023-04-10 RX ADMIN — ONDANSETRON 4 MG: 2 INJECTION INTRAMUSCULAR; INTRAVENOUS at 01:04

## 2023-04-10 RX ADMIN — MUPIROCIN 1 G: 20 OINTMENT TOPICAL at 08:04

## 2023-04-10 RX ADMIN — OXYCODONE HYDROCHLORIDE 5 MG: 5 TABLET ORAL at 12:04

## 2023-04-10 RX ADMIN — ANTI-THYMOCYTE GLOBULIN (RABBIT) 100 MG: 5 INJECTION, POWDER, LYOPHILIZED, FOR SOLUTION INTRAVENOUS at 02:04

## 2023-04-10 RX ADMIN — MUPIROCIN 1 G: 20 OINTMENT TOPICAL at 09:04

## 2023-04-10 RX ADMIN — ACETAMINOPHEN 650 MG: 325 TABLET ORAL at 08:04

## 2023-04-10 RX ADMIN — DEXTROSE AND SODIUM CHLORIDE: 5; 450 INJECTION, SOLUTION INTRAVENOUS at 06:04

## 2023-04-10 RX ADMIN — AMLODIPINE BESYLATE 5 MG: 5 TABLET ORAL at 08:04

## 2023-04-10 RX ADMIN — MYCOPHENOLATE MOFETIL 1000 MG: 250 CAPSULE ORAL at 08:04

## 2023-04-10 RX ADMIN — FAMOTIDINE 20 MG: 20 TABLET ORAL at 08:04

## 2023-04-10 RX ADMIN — PROMETHAZINE HYDROCHLORIDE 12.5 MG: 25 INJECTION INTRAMUSCULAR; INTRAVENOUS at 10:04

## 2023-04-10 RX ADMIN — ONDANSETRON 4 MG: 2 INJECTION INTRAMUSCULAR; INTRAVENOUS at 08:04

## 2023-04-10 RX ADMIN — DOCUSATE SODIUM 100 MG: 100 CAPSULE, LIQUID FILLED ORAL at 08:04

## 2023-04-10 RX ADMIN — HEPARIN SODIUM 5000 UNITS: 5000 INJECTION INTRAVENOUS; SUBCUTANEOUS at 09:04

## 2023-04-10 RX ADMIN — METHYLPREDNISOLONE SODIUM SUCCINATE 250 MG: 125 INJECTION, POWDER, FOR SOLUTION INTRAMUSCULAR; INTRAVENOUS at 12:04

## 2023-04-10 RX ADMIN — SODIUM CHLORIDE, POTASSIUM CHLORIDE, SODIUM LACTATE AND CALCIUM CHLORIDE: 600; 310; 30; 20 INJECTION, SOLUTION INTRAVENOUS at 07:04

## 2023-04-10 RX ADMIN — HEPARIN SODIUM 5000 UNITS: 5000 INJECTION INTRAVENOUS; SUBCUTANEOUS at 06:04

## 2023-04-10 RX ADMIN — DIPHENHYDRAMINE HYDROCHLORIDE 25 MG: 25 CAPSULE ORAL at 01:04

## 2023-04-10 RX ADMIN — TACROLIMUS 3 MG: 1 CAPSULE ORAL at 08:04

## 2023-04-10 RX ADMIN — THERA TABS 1 TABLET: TAB at 08:04

## 2023-04-10 RX ADMIN — ACETAMINOPHEN 650 MG: 325 TABLET ORAL at 01:04

## 2023-04-10 RX ADMIN — TACROLIMUS 3 MG: 1 CAPSULE ORAL at 05:04

## 2023-04-10 RX ADMIN — HEPARIN SODIUM 5000 UNITS: 5000 INJECTION INTRAVENOUS; SUBCUTANEOUS at 01:04

## 2023-04-10 RX ADMIN — INSULIN ASPART 3 UNITS: 100 INJECTION, SOLUTION INTRAVENOUS; SUBCUTANEOUS at 05:04

## 2023-04-10 RX ADMIN — HYDRALAZINE HYDROCHLORIDE 50 MG: 50 TABLET ORAL at 01:04

## 2023-04-10 RX ADMIN — HYDRALAZINE HYDROCHLORIDE 50 MG: 50 TABLET ORAL at 09:04

## 2023-04-10 RX ADMIN — BISACODYL 10 MG: 5 TABLET, COATED ORAL at 08:04

## 2023-04-10 RX ADMIN — SODIUM CHLORIDE, POTASSIUM CHLORIDE, SODIUM LACTATE AND CALCIUM CHLORIDE: 600; 310; 30; 20 INJECTION, SOLUTION INTRAVENOUS at 10:04

## 2023-04-10 NOTE — PLAN OF CARE
Recommendations    1. Continue Renal diet     2. If PO intake <50%, add ONS Novasource renal BID      3. RD to monitor and follow    Goals: Meet % EEN, EPN by RD f/u  Nutrition Goal Status: new  Communication of RD Recs:  (POC)

## 2023-04-10 NOTE — PROGRESS NOTES
"[facility] - Transplant Stepdown  Adult Nutrition  Progress Note    SUMMARY       Recommendations    1. Continue Renal diet     2. If PO intake <50%, add ONS Novasource renal BID      3. RD to monitor and follow    Goals: Meet % EEN, EPN by RD f/u  Nutrition Goal Status: new  Communication of RD Recs:  (POC)    Assessment and Plan    Nutrition Problem  Increased nutrient needs (protein, energy)    Related to (etiology):   Increased physiological demands    Signs and Symptoms (as evidenced by):   S/p DCD Kidney transplant (4/9)     Interventions/Recommendations (treatment strategy):  Collaboration with other providers  Nutrition education    Nutrition Diagnosis Status:   New      Reason for Assessment    Reason For Assessment: consult  Diagnosis:  (s/p kidney transplant)  Relevant Medical History: T2DM, ESRD, HLD  Interdisciplinary Rounds: did not attend    General Information Comments:   S/p DCD Kidney transplant (4/9). Pt was asleep during RD visits. Spoke with family at bedside. Reports pt with good appetite PTA. Tolerated some juice since sx. Per wt hx,  lb. NFPE not warranted due to pt being asleep. Educated family on food safety and food-drug interaction. Family verbalized understanding.    Nutrition Discharge Planning: Post transplant nutrition education provided on 4/10. Food safety/drug interactions emphasized. General healthy/low salt diet recommended. Education material left at bedside. No other needs identified.    Nutrition Risk Screen    Nutrition Risk Screen: no indicators present    Nutrition/Diet History    Spiritual, Cultural Beliefs, Bahai Practices, Values that Affect Care: no    Anthropometrics    Temp: 99.2 °F (37.3 °C)  Height Method: Stated  Height: 5' 11" (180.3 cm)  Height (inches): 71 in  Weight Method: Standard Scale  Weight: 67.9 kg (149 lb 11.1 oz)  Weight (lb): 149.69 lb  Ideal Body Weight (IBW), Male: 172 lb  % Ideal Body Weight, Male (lb): 87.99 %  BMI (Calculated): " 20.9     Lab/Procedures/Meds    Pertinent Labs Reviewed: reviewed  Pertinent Labs Comments: glucose 149, Na 134, BUN 36, Cr 3.4, albumin 3.2, eGFR 22.7  Pertinent Medications Reviewed: reviewed  Pertinent Medications Comments: prednisone, methylprednisolone, famotidine, heparin, lactated ringers    Estimated/Assessed Needs    Weight Used For Calorie Calculations: 67.6 kg (149 lb)  Energy Calorie Requirements (kcal): 2022 kcal  Energy Need Method: [place]-St Jeor (PAL 1.25)  Protein Requirements: 67g (1g/kg)  Weight Used For Protein Calculations: 67.6 kg (149 lb)  Fluid Requirements (mL): 1ml/kcal or per MD  Estimated Fluid Requirement Method: RDA Method  RDA Method (mL): 2022     Nutrition Prescription Ordered    Current Diet Order: Renal    Evaluation of Received Nutrient/Fluid Intake    I/O: + 3.5 L since admit  Energy Calories Required: not meeting needs  Protein Required: not meeting needs  Comments: LBM 4/8    Nutrition Risk    Level of Risk/Frequency of Follow-up: high     Monitor and Evaluation    Food and Nutrient Intake: energy intake, food and beverage intake  Food and Nutrient Adminstration: diet order  Knowledge/Beliefs/Attitudes: food and nutrition knowledge/skill  Physical Activity and Function: nutrition-related ADLs and IADLs  Anthropometric Measurements: height/length, weight, weight change, body mass index  Biochemical Data, Medical Tests and Procedures: electrolyte and renal panel, gastrointestinal profile, glucose/endocrine profile, inflammatory profile, lipid profile  Nutrition-Focused Physical Findings: overall appearance     Nutrition Follow-Up    RD Follow-up?: Yes

## 2023-04-10 NOTE — CONSULTS
[facility] - Transplant Stepdown  Endocrinology  Diabetes Consult Note    Consult Requested by: Julito Saleh MD   Reason for admit: S/P kidney transplant    HISTORY OF PRESENT ILLNESS:  Reason for Consult: Management of T2DM, Hyperglycemia     Surgical Procedure and Date: S/p kidney transplant on 4/9/2023    Diabetes diagnosis year: over 5 years ago    Home Diabetes Medications:  Not on medications    How often checking glucose at home?  Not taking    Diabetes Complications include:     Diabetic nephropathy      Complicating diabetes co morbidities:   ESRD and Glucocorticoid use       HPI:   Patient is a 37 y.o. male with ESRD secondary to diabetic nephropathy and HTN who presented for Kidney transplant.  He has been on the wait list for a kidney transplant at [facility] since 5/10/2021. Patient is currently on hemodialysis started on 5/10/2021. Patient is dialyzing on TTS schedule, HD Saturday 4/7/23.  Patient reports that he is tolerating dialysis well. He reports in usual state of health- Patient denies any recent hospitalizations or ED visits. Pt s/p kidney transplant on 4/10/2023.        Interval HPI:   No acute events overnight. Patient in room 84610/09009 A. Blood glucose stable. BG at goal on current insulin regimen (SSI ). Steroid use- Methylprednisolone  .   1 Day Post-Op  Renal function- Abnormal -    Vasopressors-  None     Diet renal     Eating:   <25%  Nausea: No  Hypoglycemia and intervention: No  Fever: No  TPN and/or TF: No    PMH, PSH, FH, SH updated and reviewed     ROS:    Review of Systems   Constitutional:  Negative for unexpected weight change.   Eyes:  Negative for visual disturbance.   Respiratory:  Negative for cough.    Cardiovascular:  Negative for chest pain.   Gastrointestinal:  Positive for nausea. Negative for vomiting.   Endocrine: Negative for polydipsia and polyuria.   Musculoskeletal:  Negative for back pain.   Skin:  Negative for rash.   Allergic/Immunologic: Positive for  immunocompromised state.   Neurological:  Negative for syncope.   Psychiatric/Behavioral:  Negative for agitation and dysphoric mood.      Current Medications and/or Treatments Impacting Glycemic Control  Immunotherapy:    Immunosuppressants           Stop Route Frequency     antithymocyte globulin (rabbit) 100 mg, hydrocortisone sodium succinate (SOLU-CORTEF) 20 mg in sodium chloride 0.9% 500 mL (FOR PERIPHERAL LINE ADMINISTRATION ONLY)         04/12 0859 IV Daily     tacrolimus capsule 3 mg         -- Oral 2 times daily     mycophenolate capsule 1,000 mg         -- Oral 2 times daily          Steroids:   Hormones (From admission, onward)      Start     Stop Route Frequency Ordered    04/12/23 0900  predniSONE tablet 20 mg  (IP TXP KIDNEY POST-OP THYMO WITH PERIPHERAL PRECHECKED)         -- Oral Daily 04/09/23 1137    04/11/23 0800  methylPREDNISolone sodium succinate injection 125 mg  (IP TXP KIDNEY POST-OP THYMO WITH PERIPHERAL PRECHECKED)         -- IV Once 04/09/23 1137    04/10/23 0900  antithymocyte globulin (rabbit) 100 mg, hydrocortisone sodium succinate (SOLU-CORTEF) 20 mg in sodium chloride 0.9% 500 mL (FOR PERIPHERAL LINE ADMINISTRATION ONLY)  (IP TXP KIDNEY POST-OP THYMO WITH PERIPHERAL PRECHECKED)         04/12 0859 IV Daily 04/09/23 1137    04/09/23 1206  hydrocortisone sodium succinate injection 100 mg  (IP TXP KIDNEY POST-OP THYMO WITH PERIPHERAL PRECHECKED)         09/05 0306 IV Once as needed 04/09/23 1137          Pressors:    Autonomic Drugs (From admission, onward)      Start     Stop Route Frequency Ordered    04/09/23 1206  EPINEPHrine (PF) injection 1 mg  (IP TXP KIDNEY POST-OP THYMO WITH PERIPHERAL PRECHECKED)         09/05 0306 SubQ Once as needed 04/09/23 1137          Hyperglycemia/Diabetes Medications:   Antihyperglycemics (From admission, onward)      None             PHYSICAL EXAMINATION:  Vitals:    04/10/23 1612   BP:    Pulse:    Resp:    Temp: 99 °F (37.2 °C)     Body mass index  is 20.88 kg/m².    Physical Exam  Constitutional:       General: He is not in acute distress.     Appearance: Normal appearance. He is not ill-appearing.   HENT:      Head: Normocephalic and atraumatic.      Right Ear: External ear normal.      Left Ear: External ear normal.      Nose: Nose normal.   Cardiovascular:      Rate and Rhythm: Normal rate and regular rhythm.      Heart sounds: No murmur heard.  Pulmonary:      Effort: Pulmonary effort is normal. No respiratory distress.   Abdominal:      General: Bowel sounds are normal. There is no distension.      Tenderness: There is abdominal tenderness (surgical site).   Musculoskeletal:         General: No swelling.      Right lower leg: No edema.      Left lower leg: No edema.   Skin:     Findings: No erythema.   Neurological:      General: No focal deficit present.      Mental Status: He is alert and oriented to person, place, and time.   Psychiatric:         Mood and Affect: Mood normal.         Behavior: Behavior normal.         Labs Reviewed and Include   Recent Labs   Lab 04/10/23  0730   *   CALCIUM 8.6*   ALBUMIN 3.2*   *   K 4.2   CO2 21*      BUN 36*   CREATININE 3.4*     Lab Results   Component Value Date    WBC 16.99 (H) 04/10/2023    HGB 10.3 (L) 04/10/2023    HCT 30.4 (L) 04/10/2023    MCV 95 04/10/2023     04/10/2023     No results for input(s): TSH, FREET4 in the last 168 hours.  Lab Results   Component Value Date    HGBA1C 4.5 04/09/2023       Nutritional status:   Body mass index is 20.88 kg/m².  Lab Results   Component Value Date    ALBUMIN 3.2 (L) 04/10/2023    ALBUMIN 3.6 04/09/2023    ALBUMIN 3.5 04/09/2023     No results found for: PREALBUMIN    Estimated Creatinine Clearance: 28.3 mL/min (A) (based on SCr of 3.4 mg/dL (H)).    Accu-Checks  Recent Labs     04/09/23  0205 04/09/23  0509 04/09/23  1128 04/09/23  1852 04/09/23  2217   POCTGLUCOSE 77 88 148* 206* 172*        ASSESSMENT and PLAN    Renal/  * S/P kidney  transplant    Managed by primary team  Optimize bg control    Endocrine  Controlled type 2 diabetes mellitus with chronic kidney disease on chronic dialysis, without long-term current use of insulin  BG goal: 140-180   T2DM well controlled not on meds.  S/p kidney transplant.  On steroids.  Bg mostly at goal for now.  Will continue to monitor on sliding scale.    - -Start Novolog correction dose with ISF 50 starting at 200    - POCT Glucose before meals and at bedtime  - Hypoglycemia protocol in place      ** Please notify Endocrine for any change and/or advance in diet**  ** Please call Endocrine for any BG related issues **     Discharge Planning:   TBD. Please notify endocrinology prior to discharge.        Palliative Care  Long-term use of immunosuppressant medication  On immunosuppressive therapy per transplant team; may elevate BG readings            Plan discussed with patient, family, and RN at bedside.     Tushar Singh PA-C  Endocrinology  [facility] - Transplant Stepdown

## 2023-04-10 NOTE — SUBJECTIVE & OBJECTIVE
Subjective:   History of Present Illness:  Mr. Reddy is a 37-year-old male with ESRD secondary to diabetic nephropathy and HTN.  He has been on the wait list for a kidney transplant at [facility] since 5/10/2021. Patient is currently on hemodialysis started on 5/10/2021. Patient is dialyzing on TTS schedule, HD Saturday 4/7/23.  Patient reports that he is tolerating dialysis well. He reports in usual state of health- Patient denies any recent hospitalizations or ED visits. The primary surgeon will be Dr. Saleh, Thymo induction.  Patient has been NPO since 10pm.  All pre-op labs and imaging have been ordered and will be reviewed prior to surgery. Consents to be signed prior to transplant.       Hospital Course:  Mr. Reddy is now s/p DCD Ktxp d/t DM on 4/9/23 (Thymo, KDPI 40%, CIT 21 hrs). 2 day Oneal, no drain. PACU labs stable.     Interval note: POD#1 Ktxp. Cr slightly improved this AM. 1L Bolus given overnight for decreasing UOP, improvement in UOP noted after bolus. Pt with one episode emesis this morning. Will advance on pathway but continue maintenance IVF d/t poor PO intake. Continue antiemetics and bowel regimen. KUB without ileus. Encourage ambulation. Oneal to be removed in AM. VSS. Continue to monitor.           Past Medical, Surgical, Family, and Social History:   Unchanged from H&P.    Scheduled Meds:   acetaminophen  650 mg Oral Q8H    acetaminophen  650 mg Oral Q24H    amLODIPine  5 mg Oral Daily    antithymocyte globulin (rabbit), hydrocortisone 20mg, with optional heparin 1000 units in NS 500ml (FOR PERIPHERAL LINE ADMINISTRATION ONLY)  1.5 mg/kg (Adjusted) Intravenous Daily    bisacodyL  10 mg Oral QHS    diphenhydrAMINE  25 mg Oral Q24H    docusate sodium  100 mg Oral TID    famotidine  20 mg Oral QHS    heparin (porcine)  5,000 Units Subcutaneous Q8H    hydrALAZINE  50 mg Oral Q8H    [START ON 4/11/2023] methylPREDNISolone sodium succinate injection  125 mg Intravenous Once    multivitamin  1  tablet Oral Daily    mupirocin  1 g Nasal BID    mycophenolate  1,000 mg Oral BID    [START ON 4/12/2023] predniSONE  20 mg Oral Daily    [START ON 4/19/2023] sulfamethoxazole-trimethoprim 400-80mg  1 tablet Oral Daily AM    tacrolimus  3 mg Oral BID    [START ON 4/19/2023] valGANciclovir  450 mg Oral Daily AM     Continuous Infusions:   glucagon (human recombinant)      lactated ringers 100 mL/hr at 04/10/23 1230     PRN Meds:dextrose 10%, dextrose 10%, dextrose, dextrose, diphenhydrAMINE, EPINEPHrine (PF), glucagon (human recombinant), hydrALAZINE, hydrocortisone sodium succinate, labetalol, ondansetron, oxyCODONE, sodium chloride 0.9%, traMADoL    Intake/Output - Last 3 Shifts         04/08 0700  04/09 0659 04/09 0700  04/10 0659 04/10 0700  04/11 0659    P.O. 0 780     I.V. (mL/kg)  2032.2 (29.9) 474.3 (7)    IV Piggyback  2814.4 49.8    Total Intake(mL/kg) 0 (0) 5626.6 (82.9) 524.1 (7.7)    Urine (mL/kg/hr) 0 2165 (1.3) 695 (1.4)    Emesis/NG output   1    Stool 0 0 0    Total Output 0 2165 696    Net 0 +3461.6 -171.9           Urine Occurrence 0 x      Stool Occurrence 0 x 0 x 0 x             Review of Systems   Constitutional:  Positive for appetite change (decreased). Negative for activity change.   Eyes:  Negative for visual disturbance.   Respiratory:  Negative for cough and shortness of breath.    Cardiovascular:  Negative for chest pain, palpitations and leg swelling.   Gastrointestinal:  Positive for abdominal pain (incisional), nausea and vomiting. Negative for abdominal distention, constipation and diarrhea.   Genitourinary:  Negative for difficulty urinating.   Musculoskeletal:  Negative for arthralgias.   Skin:  Negative for wound.   Allergic/Immunologic: Positive for immunocompromised state.   Neurological:  Negative for dizziness.   Hematological:  Negative for adenopathy. Does not bruise/bleed easily.   Psychiatric/Behavioral:  Negative for agitation.     Objective:     Vital Signs (Most  "Recent):  Temp: 99.2 °F (37.3 °C) (04/10/23 0816)  Pulse: 80 (04/10/23 1417)  Resp: 18 (04/10/23 1207)  BP: 128/82 (04/10/23 1417)  SpO2: 95 % (04/10/23 1207) Vital Signs (24h Range):  Temp:  [98 °F (36.7 °C)-99.2 °F (37.3 °C)] 99.2 °F (37.3 °C)  Pulse:  [67-82] 80  Resp:  [16-24] 18  SpO2:  [95 %-100 %] 95 %  BP: (110-147)/(58-87) 128/82     Weight: 67.9 kg (149 lb 11.1 oz)  Height: 5' 11" (180.3 cm)  Body mass index is 20.88 kg/m².    Physical Exam  Vitals and nursing note reviewed.   Constitutional:       Appearance: He is well-developed.   HENT:      Head: Normocephalic.   Eyes:      Conjunctiva/sclera: Conjunctivae normal.   Cardiovascular:      Rate and Rhythm: Normal rate and regular rhythm.      Heart sounds: No murmur heard.  Pulmonary:      Effort: Pulmonary effort is normal.      Breath sounds: Normal breath sounds.   Abdominal:      General: Bowel sounds are decreased. There is no distension.      Palpations: Abdomen is soft.      Tenderness: There is no abdominal tenderness.      Comments: RLQ Ktxp incision, LARS with dermabond. C/D/I   Musculoskeletal:         General: Normal range of motion.      Cervical back: Normal range of motion.   Skin:     General: Skin is warm and dry.      Capillary Refill: Capillary refill takes less than 2 seconds.          Neurological:      Mental Status: He is alert and oriented to person, place, and time.   Psychiatric:         Behavior: Behavior normal.         Thought Content: Thought content normal.         Judgment: Judgment normal.       Laboratory:  CBC:   Recent Labs   Lab 04/09/23  0320 04/09/23  0857 04/09/23  1203 04/09/23  1853 04/10/23  0730   WBC 5.72  --   --  16.89* 16.99*   RBC 3.16*  --   --  3.56* 3.21*   HGB 9.8*  --   --  11.4* 10.3*   HCT 29.8*   < > 31.0* 33.5* 30.4*     --   --  174 151   MCV 94  --   --  94 95   MCH 31.0  --   --  32.0* 32.1*   MCHC 32.9  --   --  34.0 33.9    < > = values in this interval not displayed.     CMP:   Recent " Labs   Lab 04/09/23  0320 04/09/23  1203 04/09/23  1853 04/10/23  0730   GLU 84 148* 197* 149*   CALCIUM 9.2 8.4* 8.6* 8.6*   ALBUMIN 3.9 3.5 3.6 3.2*   PROT 6.8  --   --   --     134* 136 134*   K 3.9 4.0 4.1 4.2   CO2 25 23 22* 21*    101 101 103   BUN 25* 27* 30* 36*   CREATININE 3.7* 3.7* 3.8* 3.4*   ALKPHOS 60  --   --   --    ALT 13  --   --   --    AST 12  --   --   --        Diagnostic Results:  None

## 2023-04-10 NOTE — PROGRESS NOTES
[facility] - Transplant Stepdown  Kidney Transplant  Progress Note      Reason for Follow-up: Reassessment of Kidney Transplant - 4/9/2023  (#1) recipient and management of immunosuppression.    ORGAN: LEFT KIDNEY    Donor Type: Donation after Circulatory Death     Donor CMV Status: Positive  Donor HBcAB:Negative  Donor HCV Status:Negative  Donor HBV ELIAZAR: Negative  Donor HCV ELIAZAR: Negative      Subjective:   History of Present Illness:  Mr. Reddy is a 37-year-old male with ESRD secondary to diabetic nephropathy and HTN.  He has been on the wait list for a kidney transplant at [facility] since 5/10/2021. Patient is currently on hemodialysis started on 5/10/2021. Patient is dialyzing on TTS schedule, HD Saturday 4/7/23.  Patient reports that he is tolerating dialysis well. He reports in usual state of health- Patient denies any recent hospitalizations or ED visits. The primary surgeon will be Dr. Saleh, Thymo induction.  Patient has been NPO since 10pm.  All pre-op labs and imaging have been ordered and will be reviewed prior to surgery. Consents to be signed prior to transplant.       Hospital Course:  Mr. Reddy is now s/p DCD Ktxp d/t DM on 4/9/23 (Thymo, KDPI 40%, CIT 21 hrs). 2 day Oneal, no drain. PACU labs stable.     Interval note: POD#1 Ktxp. Cr slightly improved this AM. 1L Bolus given overnight for decreasing UOP, improvement in UOP noted after bolus. Pt with one episode emesis this morning. Will advance on pathway but continue maintenance IVF d/t poor PO intake. Continue antiemetics and bowel regimen. KUB without ileus. Encourage ambulation. Oneal to be removed in AM. VSS. Continue to monitor.           Past Medical, Surgical, Family, and Social History:   Unchanged from H&P.    Scheduled Meds:   acetaminophen  650 mg Oral Q8H    acetaminophen  650 mg Oral Q24H    amLODIPine  5 mg Oral Daily    antithymocyte globulin (rabbit), hydrocortisone 20mg, with optional heparin 1000 units in NS 500ml (FOR PERIPHERAL  LINE ADMINISTRATION ONLY)  1.5 mg/kg (Adjusted) Intravenous Daily    bisacodyL  10 mg Oral QHS    diphenhydrAMINE  25 mg Oral Q24H    docusate sodium  100 mg Oral TID    famotidine  20 mg Oral QHS    heparin (porcine)  5,000 Units Subcutaneous Q8H    hydrALAZINE  50 mg Oral Q8H    [START ON 4/11/2023] methylPREDNISolone sodium succinate injection  125 mg Intravenous Once    multivitamin  1 tablet Oral Daily    mupirocin  1 g Nasal BID    mycophenolate  1,000 mg Oral BID    [START ON 4/12/2023] predniSONE  20 mg Oral Daily    [START ON 4/19/2023] sulfamethoxazole-trimethoprim 400-80mg  1 tablet Oral Daily AM    tacrolimus  3 mg Oral BID    [START ON 4/19/2023] valGANciclovir  450 mg Oral Daily AM     Continuous Infusions:   glucagon (human recombinant)      lactated ringers 100 mL/hr at 04/10/23 1230     PRN Meds:dextrose 10%, dextrose 10%, dextrose, dextrose, diphenhydrAMINE, EPINEPHrine (PF), glucagon (human recombinant), hydrALAZINE, hydrocortisone sodium succinate, labetalol, ondansetron, oxyCODONE, sodium chloride 0.9%, traMADoL    Intake/Output - Last 3 Shifts         04/08 0700  04/09 0659 04/09 0700  04/10 0659 04/10 0700  04/11 0659    P.O. 0 780     I.V. (mL/kg)  2032.2 (29.9) 474.3 (7)    IV Piggyback  2814.4 49.8    Total Intake(mL/kg) 0 (0) 5626.6 (82.9) 524.1 (7.7)    Urine (mL/kg/hr) 0 2165 (1.3) 695 (1.4)    Emesis/NG output   1    Stool 0 0 0    Total Output 0 2165 696    Net 0 +3461.6 -171.9           Urine Occurrence 0 x      Stool Occurrence 0 x 0 x 0 x             Review of Systems   Constitutional:  Positive for appetite change (decreased). Negative for activity change.   Eyes:  Negative for visual disturbance.   Respiratory:  Negative for cough and shortness of breath.    Cardiovascular:  Negative for chest pain, palpitations and leg swelling.   Gastrointestinal:  Positive for abdominal pain (incisional), nausea and vomiting. Negative for abdominal distention, constipation and  "diarrhea.   Genitourinary:  Negative for difficulty urinating.   Musculoskeletal:  Negative for arthralgias.   Skin:  Negative for wound.   Allergic/Immunologic: Positive for immunocompromised state.   Neurological:  Negative for dizziness.   Hematological:  Negative for adenopathy. Does not bruise/bleed easily.   Psychiatric/Behavioral:  Negative for agitation.     Objective:     Vital Signs (Most Recent):  Temp: 99.2 °F (37.3 °C) (04/10/23 0816)  Pulse: 80 (04/10/23 1417)  Resp: 18 (04/10/23 1207)  BP: 128/82 (04/10/23 1417)  SpO2: 95 % (04/10/23 1207) Vital Signs (24h Range):  Temp:  [98 °F (36.7 °C)-99.2 °F (37.3 °C)] 99.2 °F (37.3 °C)  Pulse:  [67-82] 80  Resp:  [16-24] 18  SpO2:  [95 %-100 %] 95 %  BP: (110-147)/(58-87) 128/82     Weight: 67.9 kg (149 lb 11.1 oz)  Height: 5' 11" (180.3 cm)  Body mass index is 20.88 kg/m².    Physical Exam  Vitals and nursing note reviewed.   Constitutional:       Appearance: He is well-developed.   HENT:      Head: Normocephalic.   Eyes:      Conjunctiva/sclera: Conjunctivae normal.   Cardiovascular:      Rate and Rhythm: Normal rate and regular rhythm.      Heart sounds: No murmur heard.  Pulmonary:      Effort: Pulmonary effort is normal.      Breath sounds: Normal breath sounds.   Abdominal:      General: Bowel sounds are decreased. There is no distension.      Palpations: Abdomen is soft.      Tenderness: There is no abdominal tenderness.      Comments: RLQ Ktxp incision, LARS with dermabond. C/D/I   Musculoskeletal:         General: Normal range of motion.      Cervical back: Normal range of motion.   Skin:     General: Skin is warm and dry.      Capillary Refill: Capillary refill takes less than 2 seconds.          Neurological:      Mental Status: He is alert and oriented to person, place, and time.   Psychiatric:         Behavior: Behavior normal.         Thought Content: Thought content normal.         Judgment: Judgment normal.       Laboratory:  CBC:   Recent Labs " "  Lab 04/09/23  0320 04/09/23  0857 04/09/23  1203 04/09/23  1853 04/10/23  0730   WBC 5.72  --   --  16.89* 16.99*   RBC 3.16*  --   --  3.56* 3.21*   HGB 9.8*  --   --  11.4* 10.3*   HCT 29.8*   < > 31.0* 33.5* 30.4*     --   --  174 151   MCV 94  --   --  94 95   MCH 31.0  --   --  32.0* 32.1*   MCHC 32.9  --   --  34.0 33.9    < > = values in this interval not displayed.     CMP:   Recent Labs   Lab 04/09/23 0320 04/09/23  1203 04/09/23  1853 04/10/23  0730   GLU 84 148* 197* 149*   CALCIUM 9.2 8.4* 8.6* 8.6*   ALBUMIN 3.9 3.5 3.6 3.2*   PROT 6.8  --   --   --     134* 136 134*   K 3.9 4.0 4.1 4.2   CO2 25 23 22* 21*    101 101 103   BUN 25* 27* 30* 36*   CREATININE 3.7* 3.7* 3.8* 3.4*   ALKPHOS 60  --   --   --    ALT 13  --   --   --    AST 12  --   --   --        Diagnostic Results:  None    Assessment/Plan:     * S/P kidney transplant  - S/p DCD Ktxp 4/9/23 (Thymo, CIT 21 hrs, KDPI 40%)  - 2 day Oneal, no drain      At risk for opportunistic infections  - continue OI prophylaxis per transplant protocol      Prophylactic immunotherapy  - Continue prograf, cellcept, and steroid taper  - Check prograf level daily and adjust for therapeutic dosage. Monitor for toxic side effects      Long-term use of immunosuppressant medication  - see "prophylactic immunotherapy"      Renovascular hypertension  - Continue Norvasc      Controlled type 2 diabetes mellitus with chronic kidney disease on chronic dialysis, without long-term current use of insulin  - endocrine consulted, appreciate assistance          Discharge Planning:  Not suitable for discharge at this time    Rajan Winters, NP  Kidney Transplant  [facility] - Transplant Stepdown  "

## 2023-04-10 NOTE — ASSESSMENT & PLAN NOTE
BG goal: 140-180   T2DM well controlled not on meds.  S/p kidney transplant.  On steroids.  Bg mostly at goal for now.  Will continue to monitor on sliding scale.    - -Start Novolog correction dose with ISF 50 starting at 200    - POCT Glucose before meals and at bedtime  - Hypoglycemia protocol in place      ** Please notify Endocrine for any change and/or advance in diet**  ** Please call Endocrine for any BG related issues **     Discharge Planning:   TBD. Please notify endocrinology prior to discharge.

## 2023-04-10 NOTE — HOSPITAL COURSE
Mr. Reddy is now s/p DCD Ktxp d/t DM on 4/9/23 (Thymo, KDPI 40%, CIT 21 hrs). 2 day Oneal, no drain. PACU labs stable. Given 1L NS bolus POD0 for decreasing UOP, UOP responded well to bolus. Advanced on pathway POD1, maintenance LR infusion at 100 cc/hr kept on POD#1 d/t nausea/vomiting and poor PO intake. KUB POD1 without ileus.     Interval note: POD#3 DCD Ktxp. Cr continues to clear well with good UOP. +BM, +flatus. Ambulated halls yesterday. Pain controlled. Pt remains very withdrawn, not participating in self meds, education etc. Discussed with patient and his caretaker at bedside, stressed importance of patient engaging and participating in post-op education, pulling self meds etc. Pt reported chest pain this afternoon, mid-sternal area. Vitals stable. EKG, troponin, CXR ordered, will f/u. Continue to monitor and educate pt and caretaker.

## 2023-04-10 NOTE — PROGRESS NOTES
Notified EDUARDO Ho of patient's steady decline on urine output over the last 4 from 250cc, to 110cc, to 50cc, to 30cc.  Dr Saleh notified and order received for 1 L NS bolus and to flush cath.  30cc flush performed and all 30cc came back.  No clots noted and urine has been clear yellow since return from surgery.  CBC and renal panel drawn and sent.  Bedside report given to Bertrand Gilmore RN.  Will continue to monitor patient.

## 2023-04-10 NOTE — HPI
Reason for Consult: Management of T2DM, Hyperglycemia     Surgical Procedure and Date: S/p kidney transplant on 4/9/2023    Diabetes diagnosis year: over 5 years ago    Home Diabetes Medications:  Not on medications    How often checking glucose at home?  Not taking    Diabetes Complications include:     Diabetic nephropathy      Complicating diabetes co morbidities:   ESRD and Glucocorticoid use       HPI:   Patient is a 37 y.o. male with ESRD secondary to diabetic nephropathy and HTN who presented for Kidney transplant.  He has been on the wait list for a kidney transplant at [facility] since 5/10/2021. Patient is currently on hemodialysis started on 5/10/2021. Patient is dialyzing on TTS schedule, HD Saturday 4/7/23.  Patient reports that he is tolerating dialysis well. He reports in usual state of health- Patient denies any recent hospitalizations or ED visits. Pt s/p kidney transplant on 4/10/2023.

## 2023-04-10 NOTE — NURSING
Pt AAOx4, VSS, afebrile. Emesis this AM, Zofran & phenergan administered, per order. Resolved after administration. KUB negative. Incision CDI with dermabond. No c/o pain. Cr 3.4 from 3.8. Endo following. LR infusing @ 100 mL/hr. Poor PO intake. Thymo #2 infusing. Oneal to be removed tomorrow 0600. Pt very withdrawn, gets aggravated when this RN wakes him to take meds. U/O 1 L so far. Pt sleeping all day, post phenergan, ok per MRich Anneer to hold off on self med teaching today. Family at bedside, bed in low/locked position, call light/personal belongings w/in reach, non-slip socks/KASSANDRA/SCD's in place, pt up with assist to restroom, pt remains free from falls, WCTM.

## 2023-04-10 NOTE — SUBJECTIVE & OBJECTIVE
Interval HPI:   No acute events overnight. Patient in room 40656/68869 A. Blood glucose stable. BG at goal on current insulin regimen (SSI ). Steroid use- Methylprednisolone  .   1 Day Post-Op  Renal function- Abnormal -    Vasopressors-  None     Diet renal     Eating:   <25%  Nausea: No  Hypoglycemia and intervention: No  Fever: No  TPN and/or TF: No    PMH, PSH, FH, SH updated and reviewed     ROS:    Review of Systems   Constitutional:  Negative for unexpected weight change.   Eyes:  Negative for visual disturbance.   Respiratory:  Negative for cough.    Cardiovascular:  Negative for chest pain.   Gastrointestinal:  Positive for nausea. Negative for vomiting.   Endocrine: Negative for polydipsia and polyuria.   Musculoskeletal:  Negative for back pain.   Skin:  Negative for rash.   Allergic/Immunologic: Positive for immunocompromised state.   Neurological:  Negative for syncope.   Psychiatric/Behavioral:  Negative for agitation and dysphoric mood.      Current Medications and/or Treatments Impacting Glycemic Control  Immunotherapy:    Immunosuppressants           Stop Route Frequency     antithymocyte globulin (rabbit) 100 mg, hydrocortisone sodium succinate (SOLU-CORTEF) 20 mg in sodium chloride 0.9% 500 mL (FOR PERIPHERAL LINE ADMINISTRATION ONLY)         04/12 0859 IV Daily     tacrolimus capsule 3 mg         -- Oral 2 times daily     mycophenolate capsule 1,000 mg         -- Oral 2 times daily          Steroids:   Hormones (From admission, onward)      Start     Stop Route Frequency Ordered    04/12/23 0900  predniSONE tablet 20 mg  (IP TXP KIDNEY POST-OP THYMO WITH PERIPHERAL PRECHECKED)         -- Oral Daily 04/09/23 1137    04/11/23 0800  methylPREDNISolone sodium succinate injection 125 mg  (IP TXP KIDNEY POST-OP THYMO WITH PERIPHERAL PRECHECKED)         -- IV Once 04/09/23 1137    04/10/23 0900  antithymocyte globulin (rabbit) 100 mg, hydrocortisone sodium succinate (SOLU-CORTEF) 20 mg in sodium  chloride 0.9% 500 mL (FOR PERIPHERAL LINE ADMINISTRATION ONLY)  (IP TXP KIDNEY POST-OP THYMO WITH PERIPHERAL PRECHECKED)         04/12 0859 IV Daily 04/09/23 1137    04/09/23 1206  hydrocortisone sodium succinate injection 100 mg  (IP TXP KIDNEY POST-OP THYMO WITH PERIPHERAL PRECHECKED)         09/05 0306 IV Once as needed 04/09/23 1137          Pressors:    Autonomic Drugs (From admission, onward)      Start     Stop Route Frequency Ordered    04/09/23 1206  EPINEPHrine (PF) injection 1 mg  (IP TXP KIDNEY POST-OP THYMO WITH PERIPHERAL PRECHECKED)         09/05 0306 SubQ Once as needed 04/09/23 1137          Hyperglycemia/Diabetes Medications:   Antihyperglycemics (From admission, onward)      None             PHYSICAL EXAMINATION:  Vitals:    04/10/23 1612   BP:    Pulse:    Resp:    Temp: 99 °F (37.2 °C)     Body mass index is 20.88 kg/m².    Physical Exam  Constitutional:       General: He is not in acute distress.     Appearance: Normal appearance. He is not ill-appearing.   HENT:      Head: Normocephalic and atraumatic.      Right Ear: External ear normal.      Left Ear: External ear normal.      Nose: Nose normal.   Cardiovascular:      Rate and Rhythm: Normal rate and regular rhythm.      Heart sounds: No murmur heard.  Pulmonary:      Effort: Pulmonary effort is normal. No respiratory distress.   Abdominal:      General: Bowel sounds are normal. There is no distension.      Tenderness: There is abdominal tenderness (surgical site).   Musculoskeletal:         General: No swelling.      Right lower leg: No edema.      Left lower leg: No edema.   Skin:     Findings: No erythema.   Neurological:      General: No focal deficit present.      Mental Status: He is alert and oriented to person, place, and time.   Psychiatric:         Mood and Affect: Mood normal.         Behavior: Behavior normal.

## 2023-04-11 DIAGNOSIS — Z94.0 KIDNEY REPLACED BY TRANSPLANT: Primary | ICD-10-CM

## 2023-04-11 LAB
ALBUMIN SERPL BCP-MCNC: 3.2 G/DL (ref 3.5–5.2)
ALBUMIN SERPL BCP-MCNC: 3.2 G/DL (ref 3.5–5.2)
ANION GAP SERPL CALC-SCNC: 10 MMOL/L (ref 8–16)
ANION GAP SERPL CALC-SCNC: 10 MMOL/L (ref 8–16)
BASOPHILS # BLD AUTO: 0.01 K/UL (ref 0–0.2)
BASOPHILS # BLD AUTO: 0.01 K/UL (ref 0–0.2)
BASOPHILS NFR BLD: 0.1 % (ref 0–1.9)
BASOPHILS NFR BLD: 0.1 % (ref 0–1.9)
BUN SERPL-MCNC: 36 MG/DL (ref 6–20)
BUN SERPL-MCNC: 36 MG/DL (ref 6–20)
CALCIUM SERPL-MCNC: 9.3 MG/DL (ref 8.7–10.5)
CALCIUM SERPL-MCNC: 9.3 MG/DL (ref 8.7–10.5)
CHLORIDE SERPL-SCNC: 106 MMOL/L (ref 95–110)
CHLORIDE SERPL-SCNC: 106 MMOL/L (ref 95–110)
CMV IGG SERPL QL IA: NORMAL
CO2 SERPL-SCNC: 19 MMOL/L (ref 23–29)
CO2 SERPL-SCNC: 19 MMOL/L (ref 23–29)
CREAT SERPL-MCNC: 2.4 MG/DL (ref 0.5–1.4)
CREAT SERPL-MCNC: 2.4 MG/DL (ref 0.5–1.4)
DIFFERENTIAL METHOD: ABNORMAL
DIFFERENTIAL METHOD: ABNORMAL
EOSINOPHIL # BLD AUTO: 0 K/UL (ref 0–0.5)
EOSINOPHIL # BLD AUTO: 0 K/UL (ref 0–0.5)
EOSINOPHIL NFR BLD: 0 % (ref 0–8)
EOSINOPHIL NFR BLD: 0 % (ref 0–8)
ERYTHROCYTE [DISTWIDTH] IN BLOOD BY AUTOMATED COUNT: 11.9 % (ref 11.5–14.5)
ERYTHROCYTE [DISTWIDTH] IN BLOOD BY AUTOMATED COUNT: 11.9 % (ref 11.5–14.5)
EST. GFR  (NO RACE VARIABLE): 34.6 ML/MIN/1.73 M^2
EST. GFR  (NO RACE VARIABLE): 34.6 ML/MIN/1.73 M^2
GLUCOSE SERPL-MCNC: 142 MG/DL (ref 70–110)
GLUCOSE SERPL-MCNC: 142 MG/DL (ref 70–110)
HCT VFR BLD AUTO: 32.5 % (ref 40–54)
HCT VFR BLD AUTO: 32.5 % (ref 40–54)
HGB BLD-MCNC: 10.4 G/DL (ref 14–18)
HGB BLD-MCNC: 10.4 G/DL (ref 14–18)
IMM GRANULOCYTES # BLD AUTO: 0.07 K/UL (ref 0–0.04)
IMM GRANULOCYTES # BLD AUTO: 0.07 K/UL (ref 0–0.04)
IMM GRANULOCYTES NFR BLD AUTO: 0.6 % (ref 0–0.5)
IMM GRANULOCYTES NFR BLD AUTO: 0.6 % (ref 0–0.5)
LYMPHOCYTES # BLD AUTO: 0.3 K/UL (ref 1–4.8)
LYMPHOCYTES # BLD AUTO: 0.3 K/UL (ref 1–4.8)
LYMPHOCYTES NFR BLD: 2.1 % (ref 18–48)
LYMPHOCYTES NFR BLD: 2.1 % (ref 18–48)
MAGNESIUM SERPL-MCNC: 2 MG/DL (ref 1.6–2.6)
MAGNESIUM SERPL-MCNC: 2 MG/DL (ref 1.6–2.6)
MCH RBC QN AUTO: 30.6 PG (ref 27–31)
MCH RBC QN AUTO: 30.6 PG (ref 27–31)
MCHC RBC AUTO-ENTMCNC: 32 G/DL (ref 32–36)
MCHC RBC AUTO-ENTMCNC: 32 G/DL (ref 32–36)
MCV RBC AUTO: 96 FL (ref 82–98)
MCV RBC AUTO: 96 FL (ref 82–98)
MONOCYTES # BLD AUTO: 1.1 K/UL (ref 0.3–1)
MONOCYTES # BLD AUTO: 1.1 K/UL (ref 0.3–1)
MONOCYTES NFR BLD: 8.3 % (ref 4–15)
MONOCYTES NFR BLD: 8.3 % (ref 4–15)
NEUTROPHILS # BLD AUTO: 11.2 K/UL (ref 1.8–7.7)
NEUTROPHILS # BLD AUTO: 11.2 K/UL (ref 1.8–7.7)
NEUTROPHILS NFR BLD: 88.9 % (ref 38–73)
NEUTROPHILS NFR BLD: 88.9 % (ref 38–73)
NRBC BLD-RTO: 0 /100 WBC
NRBC BLD-RTO: 0 /100 WBC
PHOSPHATE SERPL-MCNC: 4 MG/DL (ref 2.7–4.5)
PLATELET # BLD AUTO: 155 K/UL (ref 150–450)
PLATELET # BLD AUTO: 155 K/UL (ref 150–450)
PMV BLD AUTO: 10.8 FL (ref 9.2–12.9)
PMV BLD AUTO: 10.8 FL (ref 9.2–12.9)
POCT GLUCOSE: 110 MG/DL (ref 70–110)
POCT GLUCOSE: 204 MG/DL (ref 70–110)
POTASSIUM SERPL-SCNC: 4.1 MMOL/L (ref 3.5–5.1)
POTASSIUM SERPL-SCNC: 4.1 MMOL/L (ref 3.5–5.1)
RBC # BLD AUTO: 3.4 M/UL (ref 4.6–6.2)
RBC # BLD AUTO: 3.4 M/UL (ref 4.6–6.2)
SODIUM SERPL-SCNC: 135 MMOL/L (ref 136–145)
SODIUM SERPL-SCNC: 135 MMOL/L (ref 136–145)
TACROLIMUS BLD-MCNC: 3 NG/ML (ref 5–15)
TACROLIMUS BLD-MCNC: 3 NG/ML (ref 5–15)
WBC # BLD AUTO: 12.6 K/UL (ref 3.9–12.7)
WBC # BLD AUTO: 12.6 K/UL (ref 3.9–12.7)

## 2023-04-11 PROCEDURE — 20600001 HC STEP DOWN PRIVATE ROOM

## 2023-04-11 PROCEDURE — 63600175 PHARM REV CODE 636 W HCPCS: Performed by: INTERNAL MEDICINE

## 2023-04-11 PROCEDURE — 83735 ASSAY OF MAGNESIUM: CPT | Performed by: TRANSPLANT SURGERY

## 2023-04-11 PROCEDURE — 63600175 PHARM REV CODE 636 W HCPCS: Performed by: PHYSICIAN ASSISTANT

## 2023-04-11 PROCEDURE — 36415 COLL VENOUS BLD VENIPUNCTURE: CPT | Performed by: TRANSPLANT SURGERY

## 2023-04-11 PROCEDURE — 99233 PR SUBSEQUENT HOSPITAL CARE,LEVL III: ICD-10-PCS | Mod: 24,,, | Performed by: CLINICAL NURSE SPECIALIST

## 2023-04-11 PROCEDURE — 25000003 PHARM REV CODE 250: Performed by: TRANSPLANT SURGERY

## 2023-04-11 PROCEDURE — 85025 COMPLETE CBC W/AUTO DIFF WBC: CPT | Performed by: TRANSPLANT SURGERY

## 2023-04-11 PROCEDURE — 80197 ASSAY OF TACROLIMUS: CPT | Performed by: TRANSPLANT SURGERY

## 2023-04-11 PROCEDURE — 63600175 PHARM REV CODE 636 W HCPCS: Performed by: CLINICAL NURSE SPECIALIST

## 2023-04-11 PROCEDURE — 99233 SBSQ HOSP IP/OBS HIGH 50: CPT | Mod: 24,,, | Performed by: CLINICAL NURSE SPECIALIST

## 2023-04-11 PROCEDURE — 80069 RENAL FUNCTION PANEL: CPT | Performed by: TRANSPLANT SURGERY

## 2023-04-11 PROCEDURE — 25000003 PHARM REV CODE 250: Performed by: NURSE PRACTITIONER

## 2023-04-11 PROCEDURE — 25000003 PHARM REV CODE 250: Performed by: CLINICAL NURSE SPECIALIST

## 2023-04-11 PROCEDURE — 63600175 PHARM REV CODE 636 W HCPCS: Performed by: TRANSPLANT SURGERY

## 2023-04-11 RX ORDER — LANCETS
1 EACH MISCELLANEOUS 3 TIMES DAILY
Qty: 100 EACH | Refills: 4 | Status: ON HOLD | OUTPATIENT
Start: 2023-04-11 | End: 2023-11-17 | Stop reason: HOSPADM

## 2023-04-11 RX ORDER — TACROLIMUS 1 MG/1
5 CAPSULE ORAL 2 TIMES DAILY
Status: DISCONTINUED | OUTPATIENT
Start: 2023-04-11 | End: 2023-04-12

## 2023-04-11 RX ORDER — DEXTROSE 4 G
TABLET,CHEWABLE ORAL
Qty: 1 EACH | Refills: 0 | Status: ON HOLD | OUTPATIENT
Start: 2023-04-11 | End: 2023-11-17 | Stop reason: HOSPADM

## 2023-04-11 RX ORDER — ATORVASTATIN CALCIUM 10 MG/1
10 TABLET, FILM COATED ORAL NIGHTLY
Qty: 30 TABLET | Refills: 5 | Status: SHIPPED | OUTPATIENT
Start: 2023-04-11 | End: 2023-05-15 | Stop reason: SDUPTHER

## 2023-04-11 RX ORDER — SIMETHICONE 80 MG
1 TABLET,CHEWABLE ORAL 3 TIMES DAILY PRN
Status: DISCONTINUED | OUTPATIENT
Start: 2023-04-11 | End: 2023-04-13 | Stop reason: HOSPADM

## 2023-04-11 RX ORDER — HYDRALAZINE HYDROCHLORIDE 50 MG/1
50 TABLET, FILM COATED ORAL EVERY 8 HOURS
Qty: 90 TABLET | Refills: 5 | Status: SHIPPED | OUTPATIENT
Start: 2023-04-11 | End: 2023-05-09 | Stop reason: SDUPTHER

## 2023-04-11 RX ORDER — AMLODIPINE BESYLATE 5 MG/1
5 TABLET ORAL DAILY
Qty: 30 TABLET | Refills: 5 | Status: SHIPPED | OUTPATIENT
Start: 2023-04-12 | End: 2023-04-12 | Stop reason: HOSPADM

## 2023-04-11 RX ORDER — TACROLIMUS 1 MG/1
2 CAPSULE ORAL ONCE
Status: COMPLETED | OUTPATIENT
Start: 2023-04-11 | End: 2023-04-11

## 2023-04-11 RX ORDER — BISACODYL 5 MG
10 TABLET, DELAYED RELEASE (ENTERIC COATED) ORAL DAILY PRN
Refills: 0 | COMMUNITY
Start: 2023-04-11 | End: 2023-05-30

## 2023-04-11 RX ORDER — DOCUSATE SODIUM 100 MG/1
100 CAPSULE, LIQUID FILLED ORAL 3 TIMES DAILY PRN
Refills: 0 | COMMUNITY
Start: 2023-04-11 | End: 2023-05-30

## 2023-04-11 RX ADMIN — SODIUM CHLORIDE, POTASSIUM CHLORIDE, SODIUM LACTATE AND CALCIUM CHLORIDE: 600; 310; 30; 20 INJECTION, SOLUTION INTRAVENOUS at 05:04

## 2023-04-11 RX ADMIN — HEPARIN SODIUM 5000 UNITS: 5000 INJECTION INTRAVENOUS; SUBCUTANEOUS at 05:04

## 2023-04-11 RX ADMIN — ANTI-THYMOCYTE GLOBULIN (RABBIT) 100 MG: 5 INJECTION, POWDER, LYOPHILIZED, FOR SOLUTION INTRAVENOUS at 12:04

## 2023-04-11 RX ADMIN — DIPHENHYDRAMINE HYDROCHLORIDE 25 MG: 25 CAPSULE ORAL at 11:04

## 2023-04-11 RX ADMIN — FAMOTIDINE 20 MG: 20 TABLET ORAL at 08:04

## 2023-04-11 RX ADMIN — MUPIROCIN 1 G: 20 OINTMENT TOPICAL at 08:04

## 2023-04-11 RX ADMIN — HYDRALAZINE HYDROCHLORIDE 50 MG: 50 TABLET ORAL at 05:04

## 2023-04-11 RX ADMIN — DOCUSATE SODIUM 100 MG: 100 CAPSULE, LIQUID FILLED ORAL at 08:04

## 2023-04-11 RX ADMIN — MYCOPHENOLATE MOFETIL 1000 MG: 250 CAPSULE ORAL at 08:04

## 2023-04-11 RX ADMIN — ACETAMINOPHEN 650 MG: 325 TABLET ORAL at 05:04

## 2023-04-11 RX ADMIN — OXYCODONE HYDROCHLORIDE 5 MG: 5 TABLET ORAL at 10:04

## 2023-04-11 RX ADMIN — THERA TABS 1 TABLET: TAB at 08:04

## 2023-04-11 RX ADMIN — ACETAMINOPHEN 650 MG: 325 TABLET ORAL at 11:04

## 2023-04-11 RX ADMIN — TACROLIMUS 5 MG: 1 CAPSULE ORAL at 05:04

## 2023-04-11 RX ADMIN — HEPARIN SODIUM 5000 UNITS: 5000 INJECTION INTRAVENOUS; SUBCUTANEOUS at 02:04

## 2023-04-11 RX ADMIN — DOCUSATE SODIUM 100 MG: 100 CAPSULE, LIQUID FILLED ORAL at 02:04

## 2023-04-11 RX ADMIN — TACROLIMUS 3 MG: 1 CAPSULE ORAL at 08:04

## 2023-04-11 RX ADMIN — HYDRALAZINE HYDROCHLORIDE 50 MG: 50 TABLET ORAL at 10:04

## 2023-04-11 RX ADMIN — INSULIN ASPART 1 UNITS: 100 INJECTION, SOLUTION INTRAVENOUS; SUBCUTANEOUS at 10:04

## 2023-04-11 RX ADMIN — TACROLIMUS 2 MG: 1 CAPSULE ORAL at 11:04

## 2023-04-11 RX ADMIN — AMLODIPINE BESYLATE 5 MG: 5 TABLET ORAL at 08:04

## 2023-04-11 RX ADMIN — SIMETHICONE 80 MG: 80 TABLET, CHEWABLE ORAL at 04:04

## 2023-04-11 RX ADMIN — HYDRALAZINE HYDROCHLORIDE 50 MG: 50 TABLET ORAL at 02:04

## 2023-04-11 RX ADMIN — OXYCODONE HYDROCHLORIDE 5 MG: 5 TABLET ORAL at 08:04

## 2023-04-11 RX ADMIN — BISACODYL 10 MG: 5 TABLET, COATED ORAL at 08:04

## 2023-04-11 RX ADMIN — METHYLPREDNISOLONE SODIUM SUCCINATE 125 MG: 125 INJECTION, POWDER, FOR SOLUTION INTRAMUSCULAR; INTRAVENOUS at 10:04

## 2023-04-11 NOTE — PROGRESS NOTES
Pt nielsen catheter d/c at 0600, tip intact. Removed 10cc to deflate balloon. Removed the adhesive stat lock. Tolerated well. Provided a urinal and educated pt to measure and notify RN of first void. Verbalized understanding.

## 2023-04-11 NOTE — PLAN OF CARE
VSS  NO signs of evident distress  Pain medication admin as per MAR  Ambulated in mazariegos with standby assist.  Non slip socks worn when OOB  Passing flatus.  No nausea report  RLQ incision with dermabond.  Edges approximated.  Dermabond intact. Left FA 20g  Left FA 20g PIV dressing CDI  Left brachial 18 PIV dressing CDI  Right upper arm fistula +/+  Accuchecks ACHS.  CBG WDL  Tele showing NSR  Family at bedside  Taught self meds.  Thymo #3 admin as per MAR.  Bed in lowest locked position.  Call light within reach.  Instructed to call for assistance.  Pt. Expressed understanding.    Educated on plan of care.

## 2023-04-11 NOTE — PROGRESS NOTES
[facility] - Transplant Stepdown  Kidney Transplant  Progress Note      Reason for Follow-up: Reassessment of Kidney Transplant - 4/9/2023  (#1) recipient and management of immunosuppression.    ORGAN: LEFT KIDNEY    Donor Type: Donation after Circulatory Death     Donor CMV Status: Positive  Donor HBcAB:Negative  Donor HCV Status:Negative  Donor HBV ELIAZAR: Negative  Donor HCV ELIAZAR: Negative      Subjective:   History of Present Illness:  Mr. Reddy is a 37-year-old male with ESRD secondary to diabetic nephropathy and HTN.  He has been on the wait list for a kidney transplant at [facility] since 5/10/2021. Patient is currently on hemodialysis started on 5/10/2021. Patient is dialyzing on TTS schedule, HD Saturday 4/7/23.  Patient reports that he is tolerating dialysis well. He reports in usual state of health- Patient denies any recent hospitalizations or ED visits. The primary surgeon will be Dr. Saleh, Thymo induction.  Patient has been NPO since 10pm.  All pre-op labs and imaging have been ordered and will be reviewed prior to surgery. Consents to be signed prior to transplant.       Hospital Course:  Mr. Reddy is now s/p DCD Ktxp d/t DM on 4/9/23 (Thymo, KDPI 40%, CIT 21 hrs). 2 day Oneal, no drain. PACU labs stable. Given 1L NS bolus POD0 for decreasing UOP, UOP responded well to bolus. Advanced on pathway POD1, maintenance LR infusion at 100 cc/hr kept on POD#1 d/t nausea/vomiting and poor PO intake. KUB POD1 without ileus.     Interval note: POD#2 DCD Ktxp. Cr continues to clear well. Oneal removed this AM, voiding without issue. Nausea resolved today, reports passing gas. Encouraging PO intake. Pt in bed all day yesterday, encouraged OOBTC and ambulating today. VSS. Continue to monitor with plans for D/c tomorrow.             Past Medical, Surgical, Family, and Social History:   Unchanged from H&P.    Scheduled Meds:   acetaminophen  650 mg Oral Q8H    amLODIPine  5 mg Oral Daily    antithymocyte globulin  (rabbit), hydrocortisone 20mg, with optional heparin 1000 units in NS 500ml (FOR PERIPHERAL LINE ADMINISTRATION ONLY)  1.5 mg/kg (Adjusted) Intravenous Daily    bisacodyL  10 mg Oral QHS    docusate sodium  100 mg Oral TID    famotidine  20 mg Oral QHS    heparin (porcine)  5,000 Units Subcutaneous Q8H    hydrALAZINE  50 mg Oral Q8H    multivitamin  1 tablet Oral Daily    mupirocin  1 g Nasal BID    mycophenolate  1,000 mg Oral BID    [START ON 4/12/2023] predniSONE  20 mg Oral Daily    [START ON 4/19/2023] sulfamethoxazole-trimethoprim 400-80mg  1 tablet Oral Daily AM    tacrolimus  5 mg Oral BID    [START ON 4/19/2023] valGANciclovir  450 mg Oral Daily AM     Continuous Infusions:   glucagon (human recombinant)       PRN Meds:dextrose 10%, dextrose 10%, dextrose, dextrose, diphenhydrAMINE, EPINEPHrine (PF), glucagon (human recombinant), glucagon (human recombinant), hydrALAZINE, hydrocortisone sodium succinate, insulin aspart U-100, labetalol, ondansetron, oxyCODONE, sodium chloride, sodium chloride 0.9%, traMADoL    Intake/Output - Last 3 Shifts         04/09 0700  04/10 0659 04/10 0700 04/11 0659 04/11 0700  04/12 0659    P.O. 780 620     I.V. (mL/kg) 2032.2 (29.9) 2239.9 (29.7)     IV Piggyback 2814.4 653.5     Total Intake(mL/kg) 5626.6 (82.9) 3513.4 (46.7)     Urine (mL/kg/hr) 2165 (1.3) 2195 (1.2) 300 (0.8)    Emesis/NG output  1     Stool 0 0     Total Output 2165 2196 300    Net +3461.6 +1317.4 -300           Urine Occurrence  0 x     Stool Occurrence 0 x 0 x     Emesis Occurrence  0 x              Review of Systems   Constitutional:  Positive for appetite change. Negative for activity change and fever.   Eyes:  Negative for visual disturbance.   Respiratory:  Negative for cough and shortness of breath.    Cardiovascular:  Negative for chest pain, palpitations and leg swelling.   Gastrointestinal:  Positive for abdominal pain (incisional). Negative for abdominal distention, constipation,  "diarrhea, nausea and vomiting.   Genitourinary:  Negative for difficulty urinating.   Musculoskeletal:  Negative for arthralgias.   Skin:  Negative for wound.   Allergic/Immunologic: Positive for immunocompromised state.   Neurological:  Negative for dizziness and headaches.   Hematological:  Negative for adenopathy. Does not bruise/bleed easily.   Psychiatric/Behavioral:  Negative for agitation.     Objective:     Vital Signs (Most Recent):  Temp: 98.9 °F (37.2 °C) (04/11/23 1100)  Pulse: 86 (04/11/23 1100)  Resp: 18 (04/11/23 1100)  BP: (!) 133/90 (04/11/23 1100)  SpO2: 96 % (04/11/23 1100)   Vital Signs (24h Range):  Temp:  [98.6 °F (37 °C)-99 °F (37.2 °C)] 98.9 °F (37.2 °C)  Pulse:  [75-90] 86  Resp:  [16-21] 18  SpO2:  [95 %-98 %] 96 %  BP: (126-150)/(69-92) 133/90     Weight: 75.3 kg (166 lb 0.1 oz)  Height: 5' 11" (180.3 cm)  Body mass index is 23.15 kg/m².    Physical Exam  Vitals and nursing note reviewed.   Constitutional:       Appearance: He is well-developed.   HENT:      Head: Normocephalic.   Eyes:      Conjunctiva/sclera: Conjunctivae normal.   Cardiovascular:      Rate and Rhythm: Normal rate and regular rhythm.      Heart sounds: No murmur heard.  Pulmonary:      Effort: Pulmonary effort is normal.      Breath sounds: Normal breath sounds.   Abdominal:      General: Bowel sounds are normal. There is no distension.      Palpations: Abdomen is soft.      Tenderness: There is no abdominal tenderness.      Comments: RLQ Ktxp incision, LARS with dermabond. C/D/I   Musculoskeletal:         General: Normal range of motion.      Cervical back: Normal range of motion.   Skin:     General: Skin is warm and dry.      Capillary Refill: Capillary refill takes less than 2 seconds.          Neurological:      Mental Status: He is alert and oriented to person, place, and time.   Psychiatric:         Attention and Perception: Attention normal.         Mood and Affect: Affect is flat.         Behavior: Behavior " "normal.         Thought Content: Thought content normal.         Judgment: Judgment normal.       Laboratory:  CBC:   Recent Labs   Lab 04/09/23  1853 04/10/23  0730 04/11/23  0701   WBC 16.89* 16.99* 12.60  12.60   RBC 3.56* 3.21* 3.40*  3.40*   HGB 11.4* 10.3* 10.4*  10.4*   HCT 33.5* 30.4* 32.5*  32.5*    151 155  155   MCV 94 95 96  96   MCH 32.0* 32.1* 30.6  30.6   MCHC 34.0 33.9 32.0  32.0     CMP:   Recent Labs   Lab 04/09/23  0320 04/09/23  1203 04/09/23  1853 04/10/23  0730 04/11/23  0701   GLU 84   < > 197* 149* 142*  142*   CALCIUM 9.2   < > 8.6* 8.6* 9.3  9.3   ALBUMIN 3.9   < > 3.6 3.2* 3.2*  3.2*   PROT 6.8  --   --   --   --       < > 136 134* 135*  135*   K 3.9   < > 4.1 4.2 4.1  4.1   CO2 25   < > 22* 21* 19*  19*      < > 101 103 106  106   BUN 25*   < > 30* 36* 36*  36*   CREATININE 3.7*   < > 3.8* 3.4* 2.4*  2.4*   ALKPHOS 60  --   --   --   --    ALT 13  --   --   --   --    AST 12  --   --   --   --     < > = values in this interval not displayed.       Diagnostic Results:  None    Assessment/Plan:     * S/P kidney transplant  - S/p DCD Ktxp 4/9/23 (Thymo, CIT 21 hrs, KDPI 40%)  - 2 day Oneal, no drain  - Oneal out POD#2, voiding without issue  - Creatinine clearing  - Bowel and pain regimen ordered        Anemia of chronic disease  - daily CBC  - stable      At risk for opportunistic infections  - continue OI prophylaxis per transplant protocol      Prophylactic immunotherapy  - Continue prograf, cellcept, and steroid taper  - Check prograf level daily and adjust for therapeutic dosage. Monitor for toxic side effects      Long-term use of immunosuppressant medication  - see "prophylactic immunotherapy"      Renovascular hypertension  - Continue Norvasc      Controlled type 2 diabetes mellitus with chronic kidney disease on chronic dialysis, without long-term current use of insulin  - endocrine consulted, appreciate assistance          Discharge " Planning:  Plan for discharge tomorrow if continues to progress well    Rajan Winters, CHERELLE  Kidney Transplant  [facility] - Transplant Stepdown

## 2023-04-11 NOTE — CARE UPDATE
Care Update:     No acute events overnight. Patient on the TSU in room 36611/17121 A. Blood glucose stable. BG at and above goal on current insulin regimen (SSI ). Steroid use- Methylprednisolone  125 mg. 2 Days Post-Op  Renal function- Abnormal - Creatinine 2.4   Vasopressors-  None       Diet renal     POCT Glucose   Date Value Ref Range Status   04/10/2023 194 (H) 70 - 110 mg/dL Final   04/10/2023 252 (H) 70 - 110 mg/dL Final   04/09/2023 172 (H) 70 - 110 mg/dL Final   04/09/2023 206 (H) 70 - 110 mg/dL Final   04/09/2023 148 (H) 70 - 110 mg/dL Final   04/09/2023 88 70 - 110 mg/dL Final   04/09/2023 77 70 - 110 mg/dL Final     Lab Results   Component Value Date    HGBA1C 4.5 04/09/2023       Endocrinology consulted for BG management.   BG goal 140-180    Hospital Medications    BG checks AC/HS           dextrose 40 % gel 15,000 mg 15,000 mg, Oral, As needed (PRN)    dextrose 40 % gel 30,000 mg 30,000 mg, Oral, As needed (PRN)    glucagon (human recombinant) injection 1 mg 1 mg, Intramuscular, Continuous PRN, Turn patient on their side, give IM, and NOTIFY MD IMMEDIATELY.<BR><BR>Feed the patient as soon as patient awakens and is able to swallow.    glucagon (human recombinant) injection 1 mg 1 mg, Intramuscular, As needed (PRN), Turn patient on their side, give IM, and NOTIFY MD IMMEDIATELY.<BR><BR>Feed the patient as soon as patient awakens and is able to swallow.    insulin aspart U-100 pen 0-5 Units 0-5 Units, Subcutaneous, Before meals &amp; nightly PRN, **LOW CORRECTION DOSE**<BR>Blood Glucose<BR>mg/dL                  Pre-meal                2200<BR>151-200                0 unit                      0 unit<BR>201-250                2 units                    1 unit<BR>251-300                3 units                    1 unit<BR>301-350                4 units                    2 units<BR>&gt;350                     5 units                    3 units<BR>Administer subcutaneously if needed at times designated by  monitoring schedule. <BR>DO NOT HOLD correction dose insulin for patients who are  NPO.<BR>&quot;HIGH ALERT MEDICATION&quot; - Administer with meals or TF/TPN.              ** Please notify Endocrine for any change and/or advance in diet**  ** Please call Endocrine for any BG related issues **    Discharge Planning:   TBD. Please notify endocrinology prior to discharge.      Abel Klein DNP, FNP-C  Department of Endocrinology  Inpatient Glycemic Management

## 2023-04-11 NOTE — PROGRESS NOTES
Admit Note     Met with mother and friend Rafiq Hernadez  to assess patients needs due to pt being asleep post-transplant surgery.  Patient is a 37 y.o. single male, admitted for kidney transplant.     Patient admitted from home on 4/9/2023 .  At this time, patient presents as  asleep .  At this time, patients caregiver presents as alert and oriented x 4, well groomed, communicative, cooperative, and asking and answering questions appropriately.  Pt's mother presented with repetitive and disorganized speech pattern making it difficult at times for this SW to understand what mother was saying.  Pt's friend Rafiq presents as a&ox4 with appropriate responses to SW's questions.      Household/Family Systems (as reported by patients caregiver)     Patient resides alone at [address] [place] [address].  Support system includes pt's mother Rene Reddy, father Willie Llanes ([phone]) and pt's friend Rafiq Hernadez ([phone]).  Pt's mother Rene and friend Rafiq DO NOT DRIVE, and will require the assistance of pt's father to travel to [place] from [place] to provide transportation support.  Rene made phone call to Willie during SW's visit, and SW confirmed Willie's ability to present to provide needed caregiver / transportation support to pt starting on Wednesday, 4/12/2023.  Rene reports being unaware that patient and caregivers would be required to stay locally near hospital upon discharge prior to being medically cleared to return home to [place].  Patient does not have dependents that are need of being cared for.     Patients primary caregiver is Keri Reddy, elena mother, phone number [phone].  Confirmed patients contact information is [phone] (home);   Telephone Information:   Mobile [phone]   .    During admission, patient's caregiver plans to stay in patient's room.  Confirmed patient and patients caregivers do have access to reliable  "transportation.    Cognitive Status/Learning     Patients caregiver reports patients reading ability as 12th grade and states patient does not have difficulty with N/A.  Patients caregiver reports patient learns best by multisensory information.   Needed: No.   Highest education level: High School (9-12) or GED    Vocation/Disability (as reported by patients caregiver)    Working for Income: No  If no, reason not working: Disability  Patient is disabled due to ESRD since May 2021.  Prior to disability, patient  was employed as  at [employer].    Adherence     Patients caregiver reports patient has a high level of adherence to patients health care regimen.  Adherence counseling and education provided.  Patient's caregiver verbalizes understanding.    Substance Use    Patients caregiver reports patients substance usage as the following:    Tobacco: none, patient denies any use.  Mother / caregiver unaware.  Pt previously reported quitting all use in 2018.  Alcohol: none, patient denies any use.  Mother / caregiver unaware.  Pt previously reported quitting  use in May 2021 when starting dialysis.  Prior to quitting, pt described his ETOH use by saying "sometimes there was not a limit."  Illicit Drugs/Non-prescribed Medications:  Mother / caregiver unaware .  Pt previously reported regular marijuana use consisting of 1 cigar "blunt" 1-3 times per week.  Pt previously reported smoking marijuana for pain relief, appetite stimulation, and help with anxiety.    Patients caregiver states clear understanding of the potential impact of substance use.  Substance abstinence/cessation counseling, education and resources provided and reviewed.     Services Utilizing/ADLS (as reported by patients caregiver)    Infusion Service: Prior to admission, patient utilizing? no  Home Health: Prior to admission, patient utilizing? no  DME: Prior to admission, yes - BP cuff & " glucometer  Pulmonary/Cardiac Rehab: Prior to admission, no  Dialysis:  Prior to admission, yes - HD in-center  Transplant Specialty Pharmacy:  Prior to admission, no; patient's caregiver provides permission to release necessary information to specialty pharmacy for medications post-transplant. Resources provided and patient is choosing [facility] pharmacy at this time.    Prior to admission, patients caregiver reports patient was independent with ADLS and was driving.  Patients caregiver reports patient is not able to care for self at this time due to compromised medical condition (as documented in medical record) and physical weakness..  Patients caregiver reports patient indicates a willingness to care for self once medically cleared to do so.    Insurance/Medications    Insured by   Payer/Plan Subscr  Sex Relation Sub. Ins. ID Effective Group Num   1. BLUE CROSS BL* [place] 1985 Male Self [id number] [birth year] [id number]                                   [address] [address]   2. HUMANA MANAGE* [place] 1985 Male Self [id number] 22 [id number]                                   [address] [address]      Primary Insurance (for UNOS reporting): Private Insurance - BCBS  Secondary Insurance (for UNOS reporting): Public Insurance - Medicare & Choice - Hum[employer] Medicare    Patients caregiver reports patient is able to obtain and afford medications at this time and at time of discharge.    Living Will/Healthcare Power of     Patients caregiver reports patient does not have a LW and/or HCPA.   provided education regarding LW and HCPA and the completion of forms.    Coping/Mental Health (as reported by patients caregiver)    Patient coping unable to be deteremined at this time.  Patients caregiver is coping adequately with the aid of  family members and friends.     Discharge Planning (as reported by patients caregiver)    At time of discharge, patient plans to discharge to [facility]  apartments under the care of pt's parents / friend.  Patients father will transport patient upon father's arrival on Wednesday, 4/12/2023.  Per rounds today, expected discharge date has not been medically determined at this time. Patients caretaker verbalizes understanding and is involved in treatment planning and discharge process.    SW reviewed [email or web address] apartment check in process and provided pt/caregiver with financial profile to be completed and returned to  to determine daily apt fee prior to check in.    Additional Concerns    Patient's caretaker denies additional needs and/or concerns at this time. Patient is being followed for needs, education, resources, information, emotional support, supportive counseling, and for supportive and skilled discharge plan of care.  providing ongoing psychosocial support, education, resources and d/c planning as needed.  SW remains available.  remains available. Patient's caregiver verbalizes understanding and agreement with information reviewed,  availability and how to access available resources as needed.

## 2023-04-11 NOTE — PROGRESS NOTES
[facility] - Transplant Stepdown  Adult Nutrition  Progress Note    SUMMARY       Recommendations    1. Continue Renal diet      2. If PO intake <50%, add ONS Novasource renal BID       3. RD to monitor and follow    Nutrition Goal Status: progressing towards goal  Communication of RD Recs:  (POC)    Assessment and Plan    Nutrition Problem  Increased nutrient needs (protein, energy)     Related to (etiology):   Increased physiological demands     Signs and Symptoms (as evidenced by):   S/p DCD Kidney transplant (4/9)             Interventions/Recommendations (treatment strategy):  Collaboration with other providers  Nutrition education     Nutrition Diagnosis Status:   New      Reason for Assessment    Reason For Assessment: RD follow-up  Diagnosis:  (s/p kidney transplant)  Relevant Medical History: T2DM, ESRD, HLD  Interdisciplinary Rounds: did not attend    General Information Comments:   4/11: Pt appears lethargic during RD visits. Reports appetite remains low. Denies N/V, chewing/swallowing difficulties. No BM, passing flatus. Reviewed post tx nutrition therapy with pt and family. All questions has been answered.   4/10: S/p DCD Kidney transplant (4/9). Pt was asleep during RD visits. Spoke with family at bedside. Reports pt with good appetite PTA. Tolerated some juice since sx. Per wt hx,  lb. NFPE not warranted due to pt being asleep. Educated family on food safety and food-drug interaction. Family verbalized understanding.    Nutrition Discharge Planning: Post transplant nutrition education provided on 4/10. Food safety/drug interactions emphasized. General healthy/low salt diet recommended. Education material left at bedside. No other needs identified.    Nutrition Risk Screen    Nutrition Risk Screen: no indicators present    Nutrition/Diet History    Spiritual, Cultural Beliefs, Latter-day Practices, Values that Affect Care: no    Anthropometrics    Temp: 98.9 °F (37.2 °C)  Height Method:  "Stated  Height: 5' 11" (180.3 cm)  Height (inches): 71 in  Weight Method: Bed Scale  Weight: 75.3 kg (166 lb 0.1 oz)  Weight (lb): 166.01 lb  Ideal Body Weight (IBW), Male: 172 lb  % Ideal Body Weight, Male (lb): 87.99 %  BMI (Calculated): 23.2     Lab/Procedures/Meds    Pertinent Labs Reviewed: reviewed  Pertinent Labs Comments: glucose 142, Na 135, BUN 36, Cr 2.4, albumin 3.2, eGFR 34.6  Pertinent Medications Reviewed: reviewed  Pertinent Medications Comments: prednisone, tacrolimus, atorvastatin, famotidine, mycophenolate, heparin, bisacodyl, docusate, MV    Estimated/Assessed Needs    Weight Used For Calorie Calculations: 67.6 kg (149 lb)  Energy Calorie Requirements (kcal): 2022 kcal  Energy Need Method: [place]-St Jeor (PAL 1.25)  Protein Requirements: 67g (1g/kg)  Weight Used For Protein Calculations: 67.6 kg (149 lb)  Fluid Requirements (mL): 1ml/kcal or per MD  Estimated Fluid Requirement Method: RDA Method  RDA Method (mL): 2022     Nutrition Prescription Ordered    Current Diet Order: Renal    Evaluation of Received Nutrient/Fluid Intake    I/O: - 3.7 L since 3/28  Energy Calories Required: not meeting needs  Protein Required: not meeting needs  Comments: LBM 4/11  Tolerance: tolerating    Nutrition Risk    Level of Risk/Frequency of Follow-up:  (1x/week)     Monitor and Evaluation    Food and Nutrient Intake: energy intake, food and beverage intake  Food and Nutrient Adminstration: diet order  Knowledge/Beliefs/Attitudes: food and nutrition knowledge/skill  Physical Activity and Function: nutrition-related ADLs and IADLs  Anthropometric Measurements: height/length, weight, weight change, body mass index  Biochemical Data, Medical Tests and Procedures: electrolyte and renal panel, gastrointestinal profile, glucose/endocrine profile, inflammatory profile, lipid profile  Nutrition-Focused Physical Findings: overall appearance     Nutrition Follow-Up    RD Follow-up?: Yes      "

## 2023-04-11 NOTE — SUBJECTIVE & OBJECTIVE
Subjective:   History of Present Illness:  Mr. Reddy is a 37-year-old male with ESRD secondary to diabetic nephropathy and HTN.  He has been on the wait list for a kidney transplant at [facility] since 5/10/2021. Patient is currently on hemodialysis started on 5/10/2021. Patient is dialyzing on TTS schedule, HD Saturday 4/7/23.  Patient reports that he is tolerating dialysis well. He reports in usual state of health- Patient denies any recent hospitalizations or ED visits. The primary surgeon will be Dr. Saleh, Thymo induction.  Patient has been NPO since 10pm.  All pre-op labs and imaging have been ordered and will be reviewed prior to surgery. Consents to be signed prior to transplant.       Hospital Course:  Mr. Reddy is now s/p DCD Ktxp d/t DM on 4/9/23 (Thymo, KDPI 40%, CIT 21 hrs). 2 day Oneal, no drain. PACU labs stable. Given 1L NS bolus POD0 for decreasing UOP, UOP responded well to bolus. Advanced on pathway POD1, maintenance LR infusion at 100 cc/hr kept on POD#1 d/t nausea/vomiting and poor PO intake. KUB POD1 without ileus.     Interval note: POD#2 DCD Ktxp. Cr continues to clear well. Oneal removed this AM, voiding without issue. Nausea resolved today, reports passing gas. Encouraging PO intake. Pt in bed all day yesterday, encouraged OOBTC and ambulating today. VSS. Continue to monitor with plans for D/c tomorrow.             Past Medical, Surgical, Family, and Social History:   Unchanged from H&P.    Scheduled Meds:   acetaminophen  650 mg Oral Q8H    amLODIPine  5 mg Oral Daily    antithymocyte globulin (rabbit), hydrocortisone 20mg, with optional heparin 1000 units in NS 500ml (FOR PERIPHERAL LINE ADMINISTRATION ONLY)  1.5 mg/kg (Adjusted) Intravenous Daily    bisacodyL  10 mg Oral QHS    docusate sodium  100 mg Oral TID    famotidine  20 mg Oral QHS    heparin (porcine)  5,000 Units Subcutaneous Q8H    hydrALAZINE  50 mg Oral Q8H    multivitamin  1 tablet Oral Daily    mupirocin  1 g Nasal BID     mycophenolate  1,000 mg Oral BID    [START ON 4/12/2023] predniSONE  20 mg Oral Daily    [START ON 4/19/2023] sulfamethoxazole-trimethoprim 400-80mg  1 tablet Oral Daily AM    tacrolimus  5 mg Oral BID    [START ON 4/19/2023] valGANciclovir  450 mg Oral Daily AM     Continuous Infusions:   glucagon (human recombinant)       PRN Meds:dextrose 10%, dextrose 10%, dextrose, dextrose, diphenhydrAMINE, EPINEPHrine (PF), glucagon (human recombinant), glucagon (human recombinant), hydrALAZINE, hydrocortisone sodium succinate, insulin aspart U-100, labetalol, ondansetron, oxyCODONE, sodium chloride, sodium chloride 0.9%, traMADoL    Intake/Output - Last 3 Shifts         04/09 0700  04/10 0659 04/10 0700 04/11 0659 04/11 0700 04/12 0659    P.O. 780 620     I.V. (mL/kg) 2032.2 (29.9) 2239.9 (29.7)     IV Piggyback 2814.4 653.5     Total Intake(mL/kg) 5626.6 (82.9) 3513.4 (46.7)     Urine (mL/kg/hr) 2165 (1.3) 2195 (1.2) 300 (0.8)    Emesis/NG output  1     Stool 0 0     Total Output 2165 2196 300    Net +3461.6 +1317.4 -300           Urine Occurrence  0 x     Stool Occurrence 0 x 0 x     Emesis Occurrence  0 x              Review of Systems   Constitutional:  Positive for appetite change. Negative for activity change and fever.   Eyes:  Negative for visual disturbance.   Respiratory:  Negative for cough and shortness of breath.    Cardiovascular:  Negative for chest pain, palpitations and leg swelling.   Gastrointestinal:  Positive for abdominal pain (incisional). Negative for abdominal distention, constipation, diarrhea, nausea and vomiting.   Genitourinary:  Negative for difficulty urinating.   Musculoskeletal:  Negative for arthralgias.   Skin:  Negative for wound.   Allergic/Immunologic: Positive for immunocompromised state.   Neurological:  Negative for dizziness and headaches.   Hematological:  Negative for adenopathy. Does not bruise/bleed easily.   Psychiatric/Behavioral:  Negative for agitation.     Objective:  "    Vital Signs (Most Recent):  Temp: 98.9 °F (37.2 °C) (04/11/23 1100)  Pulse: 86 (04/11/23 1100)  Resp: 18 (04/11/23 1100)  BP: (!) 133/90 (04/11/23 1100)  SpO2: 96 % (04/11/23 1100)   Vital Signs (24h Range):  Temp:  [98.6 °F (37 °C)-99 °F (37.2 °C)] 98.9 °F (37.2 °C)  Pulse:  [75-90] 86  Resp:  [16-21] 18  SpO2:  [95 %-98 %] 96 %  BP: (126-150)/(69-92) 133/90     Weight: 75.3 kg (166 lb 0.1 oz)  Height: 5' 11" (180.3 cm)  Body mass index is 23.15 kg/m².    Physical Exam  Vitals and nursing note reviewed.   Constitutional:       Appearance: He is well-developed.   HENT:      Head: Normocephalic.   Eyes:      Conjunctiva/sclera: Conjunctivae normal.   Cardiovascular:      Rate and Rhythm: Normal rate and regular rhythm.      Heart sounds: No murmur heard.  Pulmonary:      Effort: Pulmonary effort is normal.      Breath sounds: Normal breath sounds.   Abdominal:      General: Bowel sounds are normal. There is no distension.      Palpations: Abdomen is soft.      Tenderness: There is no abdominal tenderness.      Comments: RLQ Ktxp incision, LARS with dermabond. C/D/I   Musculoskeletal:         General: Normal range of motion.      Cervical back: Normal range of motion.   Skin:     General: Skin is warm and dry.      Capillary Refill: Capillary refill takes less than 2 seconds.          Neurological:      Mental Status: He is alert and oriented to person, place, and time.   Psychiatric:         Attention and Perception: Attention normal.         Mood and Affect: Affect is flat.         Behavior: Behavior normal.         Thought Content: Thought content normal.         Judgment: Judgment normal.       Laboratory:  CBC:   Recent Labs   Lab 04/09/23  1853 04/10/23  0730 04/11/23  0701   WBC 16.89* 16.99* 12.60  12.60   RBC 3.56* 3.21* 3.40*  3.40*   HGB 11.4* 10.3* 10.4*  10.4*   HCT 33.5* 30.4* 32.5*  32.5*    151 155  155   MCV 94 95 96  96   MCH 32.0* 32.1* 30.6  30.6   MCHC 34.0 33.9 32.0  32.0 "     CMP:   Recent Labs   Lab 04/09/23  0320 04/09/23  1203 04/09/23  1853 04/10/23  0730 04/11/23  0701   GLU 84   < > 197* 149* 142*  142*   CALCIUM 9.2   < > 8.6* 8.6* 9.3  9.3   ALBUMIN 3.9   < > 3.6 3.2* 3.2*  3.2*   PROT 6.8  --   --   --   --       < > 136 134* 135*  135*   K 3.9   < > 4.1 4.2 4.1  4.1   CO2 25   < > 22* 21* 19*  19*      < > 101 103 106  106   BUN 25*   < > 30* 36* 36*  36*   CREATININE 3.7*   < > 3.8* 3.4* 2.4*  2.4*   ALKPHOS 60  --   --   --   --    ALT 13  --   --   --   --    AST 12  --   --   --   --     < > = values in this interval not displayed.       Diagnostic Results:  None

## 2023-04-11 NOTE — ASSESSMENT & PLAN NOTE
- S/p DCD Ktxp 4/9/23 (Thymo, CIT 21 hrs, KDPI 40%)  - 2 day Oneal, no drain  - Oneal out POD#2, voiding without issue  - Creatinine clearing  - Bowel and pain regimen ordered

## 2023-04-11 NOTE — PLAN OF CARE
Pt remains AAO x 4 with VSS throughout shift  Chief complaint of pain - relieved with scheduled tylenol, pt states nausea improved from Monday afternoon, no complaints since  Pt has LA 20g - LR infusing at 100mL/hr, L brachial 18g, STEVE fistula +/+  Last HD 4/8  Pt received thymo x2  RLQ incision dermabonded  WBC elevated, Cr trending down 3.4, H/H stable,   Tele monitor in place - NSR  CBG ACHS - WDL  Pt has nielsen - to be d/c at 0600  LBM 4/8  Renal diet - poor appetite  Self meds - stocked, ready for education  Pt remains free from falls and injuries, call light in reach, bed in lowest position, nonskid socks on when OOB, side rails up x2  Will continue to monitor

## 2023-04-11 NOTE — PROGRESS NOTES
Patient admitted for Kidney transplant.Transplant coordinator met with the patient on rounds to introduce self and explain the coordinator role. The post-transplant teaching book was given. Transplant Coordinator explained that she will follow the patient while in the hospital and assist with discharge.     ESRD 2/2 DMII  DCD, KDPI 38%, CIT>21hrs  Thymo induction  CMV D+,R-

## 2023-04-12 PROBLEM — R07.89 OTHER CHEST PAIN: Status: ACTIVE | Noted: 2023-04-12

## 2023-04-12 LAB
ALBUMIN SERPL BCP-MCNC: 3 G/DL (ref 3.5–5.2)
ANION GAP SERPL CALC-SCNC: 9 MMOL/L (ref 8–16)
BASOPHILS # BLD AUTO: 0.01 K/UL (ref 0–0.2)
BASOPHILS NFR BLD: 0.2 % (ref 0–1.9)
BUN SERPL-MCNC: 38 MG/DL (ref 6–20)
CALCIUM SERPL-MCNC: 9 MG/DL (ref 8.7–10.5)
CHLORIDE SERPL-SCNC: 107 MMOL/L (ref 95–110)
CO2 SERPL-SCNC: 21 MMOL/L (ref 23–29)
CREAT SERPL-MCNC: 1.9 MG/DL (ref 0.5–1.4)
DIFFERENTIAL METHOD: ABNORMAL
EOSINOPHIL # BLD AUTO: 0 K/UL (ref 0–0.5)
EOSINOPHIL NFR BLD: 0 % (ref 0–8)
ERYTHROCYTE [DISTWIDTH] IN BLOOD BY AUTOMATED COUNT: 11.9 % (ref 11.5–14.5)
EST. GFR  (NO RACE VARIABLE): 45.7 ML/MIN/1.73 M^2
GLUCOSE SERPL-MCNC: 92 MG/DL (ref 70–110)
HBV SURFACE AB SER QL IA: POSITIVE
HBV SURFACE AB SERPL IA-ACNC: 235 MIU/ML
HCT VFR BLD AUTO: 30.4 % (ref 40–54)
HGB BLD-MCNC: 10.1 G/DL (ref 14–18)
IMM GRANULOCYTES # BLD AUTO: 0.02 K/UL (ref 0–0.04)
IMM GRANULOCYTES NFR BLD AUTO: 0.3 % (ref 0–0.5)
LYMPHOCYTES # BLD AUTO: 0.4 K/UL (ref 1–4.8)
LYMPHOCYTES NFR BLD: 6.7 % (ref 18–48)
MAGNESIUM SERPL-MCNC: 2 MG/DL (ref 1.6–2.6)
MCH RBC QN AUTO: 31 PG (ref 27–31)
MCHC RBC AUTO-ENTMCNC: 33.2 G/DL (ref 32–36)
MCV RBC AUTO: 93 FL (ref 82–98)
MONOCYTES # BLD AUTO: 0.7 K/UL (ref 0.3–1)
MONOCYTES NFR BLD: 12.4 % (ref 4–15)
NEUTROPHILS # BLD AUTO: 4.7 K/UL (ref 1.8–7.7)
NEUTROPHILS NFR BLD: 80.4 % (ref 38–73)
NRBC BLD-RTO: 0 /100 WBC
PHOSPHATE SERPL-MCNC: 2.9 MG/DL (ref 2.7–4.5)
PLATELET # BLD AUTO: 131 K/UL (ref 150–450)
PMV BLD AUTO: 10.8 FL (ref 9.2–12.9)
POCT GLUCOSE: 103 MG/DL (ref 70–110)
POCT GLUCOSE: 165 MG/DL (ref 70–110)
POTASSIUM SERPL-SCNC: 4 MMOL/L (ref 3.5–5.1)
RBC # BLD AUTO: 3.26 M/UL (ref 4.6–6.2)
SODIUM SERPL-SCNC: 137 MMOL/L (ref 136–145)
TACROLIMUS BLD-MCNC: 4.5 NG/ML (ref 5–15)
TROPONIN I SERPL DL<=0.01 NG/ML-MCNC: <0.006 NG/ML (ref 0–0.03)
WBC # BLD AUTO: 5.79 K/UL (ref 3.9–12.7)

## 2023-04-12 PROCEDURE — 36415 COLL VENOUS BLD VENIPUNCTURE: CPT | Performed by: TRANSPLANT SURGERY

## 2023-04-12 PROCEDURE — 99232 SBSQ HOSP IP/OBS MODERATE 35: CPT | Mod: ,,, | Performed by: NURSE PRACTITIONER

## 2023-04-12 PROCEDURE — 25000003 PHARM REV CODE 250: Performed by: NURSE PRACTITIONER

## 2023-04-12 PROCEDURE — 84484 ASSAY OF TROPONIN QUANT: CPT | Performed by: CLINICAL NURSE SPECIALIST

## 2023-04-12 PROCEDURE — 86832 HLA CLASS I HIGH DEFIN QUAL: CPT | Mod: PO | Performed by: NURSE PRACTITIONER

## 2023-04-12 PROCEDURE — 80069 RENAL FUNCTION PANEL: CPT | Performed by: TRANSPLANT SURGERY

## 2023-04-12 PROCEDURE — 25000003 PHARM REV CODE 250: Performed by: CLINICAL NURSE SPECIALIST

## 2023-04-12 PROCEDURE — 99233 PR SUBSEQUENT HOSPITAL CARE,LEVL III: ICD-10-PCS | Mod: 24,,, | Performed by: CLINICAL NURSE SPECIALIST

## 2023-04-12 PROCEDURE — 86833 HLA CLASS II HIGH DEFIN QUAL: CPT | Mod: PO | Performed by: NURSE PRACTITIONER

## 2023-04-12 PROCEDURE — 63600175 PHARM REV CODE 636 W HCPCS: Performed by: INTERNAL MEDICINE

## 2023-04-12 PROCEDURE — 63600175 PHARM REV CODE 636 W HCPCS: Performed by: TRANSPLANT SURGERY

## 2023-04-12 PROCEDURE — 83735 ASSAY OF MAGNESIUM: CPT | Performed by: TRANSPLANT SURGERY

## 2023-04-12 PROCEDURE — 25000003 PHARM REV CODE 250: Performed by: TRANSPLANT SURGERY

## 2023-04-12 PROCEDURE — 99233 SBSQ HOSP IP/OBS HIGH 50: CPT | Mod: 24,,, | Performed by: CLINICAL NURSE SPECIALIST

## 2023-04-12 PROCEDURE — 36415 COLL VENOUS BLD VENIPUNCTURE: CPT | Performed by: CLINICAL NURSE SPECIALIST

## 2023-04-12 PROCEDURE — 86977 RBC SERUM PRETX INCUBJ/INHIB: CPT | Mod: PO | Performed by: NURSE PRACTITIONER

## 2023-04-12 PROCEDURE — 93005 ELECTROCARDIOGRAM TRACING: CPT

## 2023-04-12 PROCEDURE — 85025 COMPLETE CBC W/AUTO DIFF WBC: CPT | Performed by: TRANSPLANT SURGERY

## 2023-04-12 PROCEDURE — 93010 ELECTROCARDIOGRAM REPORT: CPT | Mod: ,,, | Performed by: INTERNAL MEDICINE

## 2023-04-12 PROCEDURE — 80197 ASSAY OF TACROLIMUS: CPT | Performed by: TRANSPLANT SURGERY

## 2023-04-12 PROCEDURE — 93010 EKG 12-LEAD: ICD-10-PCS | Mod: ,,, | Performed by: INTERNAL MEDICINE

## 2023-04-12 PROCEDURE — 20600001 HC STEP DOWN PRIVATE ROOM

## 2023-04-12 PROCEDURE — 99232 PR SUBSEQUENT HOSPITAL CARE,LEVL II: ICD-10-PCS | Mod: ,,, | Performed by: NURSE PRACTITIONER

## 2023-04-12 RX ORDER — PEN NEEDLE, DIABETIC 30 GX3/16"
1 NEEDLE, DISPOSABLE MISCELLANEOUS 3 TIMES DAILY
Qty: 100 EACH | Refills: 4 | Status: SHIPPED | OUTPATIENT
Start: 2023-04-12 | End: 2023-08-31

## 2023-04-12 RX ORDER — TACROLIMUS 1 MG/1
7 CAPSULE ORAL 2 TIMES DAILY
Status: DISCONTINUED | OUTPATIENT
Start: 2023-04-12 | End: 2023-04-13

## 2023-04-12 RX ORDER — PEN NEEDLE, DIABETIC 30 GX3/16"
1 NEEDLE, DISPOSABLE MISCELLANEOUS 4 TIMES DAILY
Qty: 100 EACH | Refills: 4 | Status: SHIPPED | OUTPATIENT
Start: 2023-04-12 | End: 2023-04-12 | Stop reason: SDUPTHER

## 2023-04-12 RX ORDER — OXYCODONE HYDROCHLORIDE 5 MG/1
5 TABLET ORAL EVERY 6 HOURS PRN
Qty: 28 TABLET | Refills: 0 | Status: CANCELLED | OUTPATIENT
Start: 2023-04-12

## 2023-04-12 RX ORDER — AMLODIPINE BESYLATE 10 MG/1
10 TABLET ORAL DAILY
Qty: 30 TABLET | Refills: 11 | Status: SHIPPED | OUTPATIENT
Start: 2023-04-13 | End: 2023-05-15 | Stop reason: SDUPTHER

## 2023-04-12 RX ORDER — INSULIN ASPART 100 [IU]/ML
0-5 INJECTION, SOLUTION INTRAVENOUS; SUBCUTANEOUS
Qty: 15 ML | Refills: 0 | Status: SHIPPED | OUTPATIENT
Start: 2023-04-12 | End: 2023-04-14 | Stop reason: ALTCHOICE

## 2023-04-12 RX ORDER — SIMETHICONE 80 MG
80 TABLET,CHEWABLE ORAL 3 TIMES DAILY PRN
Qty: 28 TABLET | Refills: 0 | Status: SHIPPED | OUTPATIENT
Start: 2023-04-12 | End: 2023-05-30

## 2023-04-12 RX ORDER — TACROLIMUS 1 MG/1
7 CAPSULE ORAL EVERY 12 HOURS
Qty: 360 CAPSULE | Refills: 11 | Status: SHIPPED | OUTPATIENT
Start: 2023-04-12 | End: 2023-04-13 | Stop reason: SDUPTHER

## 2023-04-12 RX ORDER — TACROLIMUS 1 MG/1
2 CAPSULE ORAL ONCE
Status: COMPLETED | OUTPATIENT
Start: 2023-04-12 | End: 2023-04-12

## 2023-04-12 RX ORDER — AMLODIPINE BESYLATE 10 MG/1
10 TABLET ORAL DAILY
Status: DISCONTINUED | OUTPATIENT
Start: 2023-04-12 | End: 2023-04-13 | Stop reason: HOSPADM

## 2023-04-12 RX ORDER — OXYCODONE HYDROCHLORIDE 5 MG/1
5 TABLET ORAL EVERY 8 HOURS PRN
Qty: 15 TABLET | Refills: 0 | Status: SHIPPED | OUTPATIENT
Start: 2023-04-12 | End: 2023-05-30 | Stop reason: ALTCHOICE

## 2023-04-12 RX ADMIN — TACROLIMUS 5 MG: 1 CAPSULE ORAL at 08:04

## 2023-04-12 RX ADMIN — MUPIROCIN 1 G: 20 OINTMENT TOPICAL at 09:04

## 2023-04-12 RX ADMIN — TACROLIMUS 7 MG: 1 CAPSULE ORAL at 05:04

## 2023-04-12 RX ADMIN — DOCUSATE SODIUM 100 MG: 100 CAPSULE, LIQUID FILLED ORAL at 09:04

## 2023-04-12 RX ADMIN — OXYCODONE HYDROCHLORIDE 5 MG: 5 TABLET ORAL at 05:04

## 2023-04-12 RX ADMIN — HYDRALAZINE HYDROCHLORIDE 50 MG: 50 TABLET ORAL at 09:04

## 2023-04-12 RX ADMIN — MYCOPHENOLATE MOFETIL 1000 MG: 250 CAPSULE ORAL at 09:04

## 2023-04-12 RX ADMIN — AMLODIPINE BESYLATE 10 MG: 10 TABLET ORAL at 08:04

## 2023-04-12 RX ADMIN — HYDRALAZINE HYDROCHLORIDE 50 MG: 50 TABLET ORAL at 02:04

## 2023-04-12 RX ADMIN — DOCUSATE SODIUM 100 MG: 100 CAPSULE, LIQUID FILLED ORAL at 08:04

## 2023-04-12 RX ADMIN — TACROLIMUS 2 MG: 1 CAPSULE ORAL at 02:04

## 2023-04-12 RX ADMIN — PREDNISONE 20 MG: 20 TABLET ORAL at 08:04

## 2023-04-12 RX ADMIN — DOCUSATE SODIUM 100 MG: 100 CAPSULE, LIQUID FILLED ORAL at 02:04

## 2023-04-12 RX ADMIN — HYDRALAZINE HYDROCHLORIDE 50 MG: 50 TABLET ORAL at 06:04

## 2023-04-12 RX ADMIN — THERA TABS 1 TABLET: TAB at 08:04

## 2023-04-12 RX ADMIN — MYCOPHENOLATE MOFETIL 1000 MG: 250 CAPSULE ORAL at 08:04

## 2023-04-12 RX ADMIN — BISACODYL 10 MG: 5 TABLET, COATED ORAL at 09:04

## 2023-04-12 RX ADMIN — FAMOTIDINE 20 MG: 20 TABLET ORAL at 09:04

## 2023-04-12 NOTE — PROGRESS NOTES
"EDUCATION NOTE:    Met with Andrey Reddy and his caregivers to provide teaching re: immunosuppressant medications.  Reviewed medication section of the Kidney Transplant Education book that was provided.  Emphasized the importance of compliance, role of the blue medication card, concerns for drug interactions, and process of obtaining refills.  Counseled regarding Prograf, Cellcept , prednisone, including directions for use, monitoring, how to handle missed doses, and side effects. Patient did not verbalize to questions asked; responses were head-nodding and eyes-blinking. Caregiver (mom) verbalized understanding and had the opportunity to ask questions.     Rosie Chow ([place])  PGY2 Solid Organ Transplant Pharmacy Resident    "

## 2023-04-12 NOTE — PROGRESS NOTES
[facility] - Transplant Stepdown  Kidney Transplant  Progress Note      Reason for Follow-up: Reassessment of Kidney Transplant - 4/9/2023  (#1) recipient and management of immunosuppression.    ORGAN: LEFT KIDNEY    Donor Type: Donation after Circulatory Death     Donor CMV Status: Positive  Donor HBcAB:Negative  Donor HCV Status:Negative  Donor HBV ELIAZAR: Negative  Donor HCV ELIAZAR: Negative      Subjective:   History of Present Illness:  Mr. Reddy is a 37-year-old male with ESRD secondary to diabetic nephropathy and HTN.  He has been on the wait list for a kidney transplant at [facility] since 5/10/2021. Patient is currently on hemodialysis started on 5/10/2021. Patient is dialyzing on TTS schedule, HD Saturday 4/7/23.  Patient reports that he is tolerating dialysis well. He reports in usual state of health- Patient denies any recent hospitalizations or ED visits. The primary surgeon will be Dr. Saleh, Thymo induction.  Patient has been NPO since 10pm.  All pre-op labs and imaging have been ordered and will be reviewed prior to surgery. Consents to be signed prior to transplant.       Hospital Course:  Mr. Reddy is now s/p DCD Ktxp d/t DM on 4/9/23 (Thymo, KDPI 40%, CIT 21 hrs). 2 day Oneal, no drain. PACU labs stable. Given 1L NS bolus POD0 for decreasing UOP, UOP responded well to bolus. Advanced on pathway POD1, maintenance LR infusion at 100 cc/hr kept on POD#1 d/t nausea/vomiting and poor PO intake. KUB POD1 without ileus.     Interval note: POD#3 DCD Ktxp. Cr continues to clear well with good UOP. +BM, +flatus. Ambulated halls yesterday. Pain controlled. Pt remains very withdrawn, not participating in self meds, education etc. Discussed with patient and his caretaker at bedside, stressed importance of patient engaging and participating in post-op education, pulling self meds etc. Pt reported chest pain this afternoon, mid-sternal area. Vitals stable. EKG, troponin, CXR ordered, will f/u. Continue to monitor and  educate pt and caretaker.           Past Medical, Surgical, Family, and Social History:   Unchanged from H&P.    Scheduled Meds:   amLODIPine  10 mg Oral Daily    bisacodyL  10 mg Oral QHS    docusate sodium  100 mg Oral TID    famotidine  20 mg Oral QHS    heparin (porcine)  5,000 Units Subcutaneous Q8H    hydrALAZINE  50 mg Oral Q8H    multivitamin  1 tablet Oral Daily    mupirocin  1 g Nasal BID    mycophenolate  1,000 mg Oral BID    predniSONE  20 mg Oral Daily    [START ON 4/19/2023] sulfamethoxazole-trimethoprim 400-80mg  1 tablet Oral Daily AM    tacrolimus  2 mg Oral Once    tacrolimus  7 mg Oral BID    [START ON 4/19/2023] valGANciclovir  450 mg Oral Daily AM     Continuous Infusions:   glucagon (human recombinant)       PRN Meds:dextrose 10%, dextrose 10%, dextrose, dextrose, diphenhydrAMINE, EPINEPHrine (PF), glucagon (human recombinant), glucagon (human recombinant), hydrALAZINE, hydrocortisone sodium succinate, insulin aspart U-100, labetalol, ondansetron, oxyCODONE, simethicone, sodium chloride, sodium chloride 0.9%, traMADoL    Intake/Output - Last 3 Shifts         04/10 0700  04/11 0659 04/11 0700  04/12 0659 04/12 0700  04/13 0659    P.O. 620 1080     I.V. (mL/kg) 2239.9 (29.7)      IV Piggyback 653.5 396.4     Total Intake(mL/kg) 3513.4 (46.7) 1476.4 (21)     Urine (mL/kg/hr) 2195 (1.2) 700 (0.4)     Emesis/NG output 1 0     Stool 0 0     Total Output 2196 700     Net +1317.4 +776.4            Urine Occurrence 0 x 3 x     Stool Occurrence 0 x 3 x     Emesis Occurrence 0 x               Review of Systems   Constitutional:  Positive for appetite change (decreased). Negative for activity change and fever.   Eyes:  Negative for visual disturbance.   Respiratory:  Negative for cough and shortness of breath.    Cardiovascular:  Positive for chest pain. Negative for palpitations and leg swelling.   Gastrointestinal:  Positive for abdominal pain (incisional). Negative for abdominal  "distention, constipation, diarrhea, nausea and vomiting.   Genitourinary:  Negative for difficulty urinating.   Musculoskeletal:  Negative for arthralgias.   Skin:  Negative for wound.   Allergic/Immunologic: Positive for immunocompromised state.   Neurological:  Negative for dizziness and headaches.   Hematological:  Negative for adenopathy. Does not bruise/bleed easily.   Psychiatric/Behavioral:  Negative for agitation and confusion.     Objective:     Vital Signs (Most Recent):  Temp: 98.8 °F (37.1 °C) (04/12/23 1123)  Pulse: 85 (04/12/23 1123)  Resp: 16 (04/12/23 1123)  BP: 138/82 (04/12/23 1123)  SpO2: (!) 94 % (04/12/23 1123)   Vital Signs (24h Range):  Temp:  [97.6 °F (36.4 °C)-98.8 °F (37.1 °C)] 98.8 °F (37.1 °C)  Pulse:  [77-92] 85  Resp:  [16-17] 16  SpO2:  [94 %-99 %] 94 %  BP: (138-152)/() 138/82     Weight: 70.3 kg (154 lb 15.7 oz)  Height: 5' 11" (180.3 cm)  Body mass index is 21.62 kg/m².    Physical Exam  Vitals and nursing note reviewed.   Constitutional:       Appearance: He is well-developed.   HENT:      Head: Normocephalic.   Eyes:      Conjunctiva/sclera: Conjunctivae normal.   Cardiovascular:      Rate and Rhythm: Normal rate and regular rhythm.      Heart sounds: No murmur heard.  Pulmonary:      Effort: Pulmonary effort is normal.      Breath sounds: Normal breath sounds.   Abdominal:      General: Bowel sounds are normal. There is no distension.      Palpations: Abdomen is soft.      Tenderness: There is no abdominal tenderness.      Comments: RLQ Ktxp incision, LARS with dermabond. C/D/I   Musculoskeletal:         General: Normal range of motion.      Cervical back: Normal range of motion.   Skin:     General: Skin is warm and dry.      Capillary Refill: Capillary refill takes less than 2 seconds.          Neurological:      Mental Status: He is alert and oriented to person, place, and time.   Psychiatric:         Attention and Perception: Attention normal.         Mood and Affect: " "Affect is flat.         Behavior: Behavior is withdrawn.         Thought Content: Thought content normal.         Judgment: Judgment normal.       Laboratory:  CBC:   Recent Labs   Lab 04/10/23  0730 04/11/23  0701 04/12/23  0637   WBC 16.99* 12.60  12.60 5.79   RBC 3.21* 3.40*  3.40* 3.26*   HGB 10.3* 10.4*  10.4* 10.1*   HCT 30.4* 32.5*  32.5* 30.4*    155  155 131*   MCV 95 96  96 93   MCH 32.1* 30.6  30.6 31.0   MCHC 33.9 32.0  32.0 33.2     CMP:   Recent Labs   Lab 04/09/23  0320 04/09/23  1203 04/10/23  0730 04/11/23  0701 04/12/23  0637   GLU 84   < > 149* 142*  142* 92   CALCIUM 9.2   < > 8.6* 9.3  9.3 9.0   ALBUMIN 3.9   < > 3.2* 3.2*  3.2* 3.0*   PROT 6.8  --   --   --   --       < > 134* 135*  135* 137   K 3.9   < > 4.2 4.1  4.1 4.0   CO2 25   < > 21* 19*  19* 21*      < > 103 106  106 107   BUN 25*   < > 36* 36*  36* 38*   CREATININE 3.7*   < > 3.4* 2.4*  2.4* 1.9*   ALKPHOS 60  --   --   --   --    ALT 13  --   --   --   --    AST 12  --   --   --   --     < > = values in this interval not displayed.       Diagnostic Results:  None    Assessment/Plan:     * S/P kidney transplant  - S/p DCD Ktxp 4/9/23 (Thymo, CIT 21 hrs, KDPI 40%)  - 2 day Oneal, no drain  - Oneal out POD#2, voiding without issue  - Creatinine clearing  - Bowel and pain regimen ordered  - Pt withdrawn, not currently participating in pulling self meds, post-op education. Discussed importance of education with pt and caretaker at length        Anemia of chronic disease  - daily CBC  - stable      At risk for opportunistic infections  - continue OI prophylaxis per transplant protocol      Prophylactic immunotherapy  - Continue prograf, cellcept, and steroid taper  - Check prograf level daily and adjust for therapeutic dosage. Monitor for toxic side effects      Long-term use of immunosuppressant medication  - see "prophylactic immunotherapy"      Renovascular hypertension  - Continue " Norvasc      Controlled type 2 diabetes mellitus with chronic kidney disease on chronic dialysis, without long-term current use of insulin  - endocrine consulted, appreciate assistance          Discharge Planning:  Not suitable for discharge at this time. Continue to promote post-transplant education and prep for discharge tomorrow     Rajan Wniters, CHERELLE  Kidney Transplant  [facility] - Transplant Stepdown

## 2023-04-12 NOTE — ASSESSMENT & PLAN NOTE
Endocrinology consulted for BG management.   BG goal 140-180    - Novolog (Insulin Aspart) prn for BG excursions [facility] SSI (150/50)  - BG checks AC/HS  - Hypoglycemia protocol in place  - If blood glucose greater than 300, please ask patient not to eat food or drink anything other than water until correctional insulin has brought it back below 250    ** Please notify Endocrine for any change and/or advance in diet**  ** Please call Endocrine for any BG related issues **    Discharge Planning:   - Novolog SSI for BG excursions:  150 - 200 + 1 unit  201 - 250 + 2 units  251 - 300 + 3 units  301 - 350 + 4 units   > 350   + 5 units  - Insurance approved diabetes testing supplies to check blood sugar 4 times a day (Before meals and at bedtime) and as needed.  - BD pen needles   - Send BG logs in 4 days    Education:  Discharge Teaching:    Reviewed topics related to DM including: the need for insulin, how insulin works, what makes it a high risk medication, the importance of immediate follow up with either PCP or endocrine provider, importance of and how to check BG, how to record BG on logs, how to administer insulin, appropriate insulin administration sites, importance of rotating injection sites, hyper/hypoglycemia, how and when to treat hypoglycemia, when to hold insulin, how the correction scale works, importance of storing unused insulin in the refrigerator, and when to seek medical attention.  Patient verbalized understanding, answered all questions to patient's satisfaction. Blood sugar logs given to patient.     Hypoglycemia (Low Blood Sugar)  Too little glucose (sugar) in your blood is called hypoglycemia or low blood sugar. Diabetes itself doesnt cause low blood sugar. But some of the treatments for diabetes, such as pills or insulin, may increase your risk for it. Low blood sugar may cause you to lose consciousness or have a seizure. So always treat low blood sugar right away.    Special note: Always carry a  source of fast-acting sugar and a snack in case of hypoglycemia     What You May Notice  If you have low blood sugar, you may have these symptoms:   Shakiness or dizziness   Cold, clammy skin or sweating   Feelings of hunger   Headache   Nervousness   A hard, fast heartbeat   Weakness   Confusion or irritability   Blurred vision  What You Should Do   First, check your blood sugar. If it is too low (out of your target range), eat or drink 15 to 20 grams of fast-acting sugar. This may be 3 to 4 glucose tablets, 4 oz (half a cup) fruit juice or regular (non-diet) soda, 8 oz (one cup ) fat-free milk, or 1 tablespoon of honey. Dont take more than this, or your blood sugar may go too high.   Wait 15 minutes. Then recheck your blood sugar if you can.   If your blood sugar is still too low, repeat the steps above and check your blood sugar again. If your blood sugar still has not returned to your target range, contact your healthcare provider or seek emergency care.   Once your blood sugar returns to target range, eat a snack or meal.  Preventing Low Blood Sugar   Eat your meals and snacks at the same times each day. Dont skip meals!   Ask your healthcare provider if it is safe for you to drink alcohol. Never drink on an empty stomach.   Take your medication at the prescribed times.   Always carry a source of fast-acting sugar and a snack when youre away from home.  Other Things to Do   Carry a medical ID card or wear a medical alert bracelet or necklace. It should say that you have diabetes. It should also say what to do if you pass out or have a seizure.   Make sure your family, friends, and coworkers know the signs of low blood sugar. Tell them what to do if your blood sugar falls very low and you cant treat yourself.   Keep a glucagon emergency kit handy. Be sure your family, friends, and coworkers know how and when to use it. Check it regularly and replace the glucagon before it expires.   Talk  to your healthcare team about other things you can do to prevent low blood sugar.     If you experience hypoglycemia several times, call your doctor.   © 6484-7152 [employer], [address] [place] [address], [place] [address]. All rights reserved. This information is not intended as a substitute for professional medical care. Always follow your healthcare professional's instructions.

## 2023-04-12 NOTE — PLAN OF CARE
VSS  No signs of evident distress  Pt. Complaining of pain but refusing pain medication  Ambulating in room with standby assist.  Non slip socks worn when OOB  Tele showing NSR  Left FA 22g PIV dressing CDI  RLQ incision with dermabond.  Edges approximated.  Dermabond intact.  Mom educated on self meds and asked to pull meds.  Mistakes made in type of medication pulled, amount of pills and time to be administered.  This RN went over all medications again.  Mom expressed understanding.  Will continue to educated and reinforce.  Pt. With very flat affect.  Very little response to questions.  Educated on the importance of learning medications and taking them correctly.  Pt. Exhibits no interested in learning self meds at this time.   Bed in lowest locket position.  Call light within reach.  Instructed to call for assistance.  Pt. Expressed understanding.  Educated on plan of care  Plan for possible DC tomorrow.

## 2023-04-12 NOTE — PLAN OF CARE
Pt remains AAO x 4 with VSS throughout shift  Chief complaint of pain - relieved with PRN oxy  Pt has LA 20g, L brachial 18g, STEVE fistula +/+  Last HD 4/8  Pt received thymo x3  RLQ incision dermabonded  WBC elevated, Cr trending down 2.4, H/H stable   Tele monitor in place - NSR  CBG ACHS - 2200 glucose 204, 1 unit novolog given  Pt nielsen d/c yesterday AM - pt voiding without difficulty, urine clear yellow  LBM 4/11  Renal diet - poor appetite, pt PO intake inadequate, encouraging to drink water  Self meds - stocked, pt educated yesterday and told to pull night meds. Pt and family did not pull any meds.   Prograf found on bedside table in medicine cup. Educated on the importance of this med and encouraged to take. Pt took without difficulty.   Pt affect is very flat, does not actively participate in care. Requires much encouragement to take meds and drink water. Education given.   Pt remains free from falls and injuries, call light in reach, bed in lowest position, nonskid socks on when OOB, side rails up x2  Will continue to monitor

## 2023-04-12 NOTE — PROGRESS NOTES
Transplant Teaching Book given to patient, Andrey Reddy, on 4/10/2023.  During the course of the hospital stay the patient received information regarding kidney transplant. Teaching and instruction were completed.  Areas that were discussed included: how to contact the Transplant Team, the importance of measuring intake of fluids and urine output, and monitoring vital signs such as blood pressure, temperature, and daily weights.  Parameters for which to report abnormal findings were given.  Appointment were provided along with the rational for the importance of lab work and clinic visits.  A written medication list was provided.  The importance of immunosuppressive medications, their common side effects, and treatment to prevent or minimize side effects has been reviewed.  Signs and symptoms of rejection and infection along with various treatments were reviewed.  The need to avoid infection was discussed.  Wound care and special consideration regarding activities of daily living were explained.  Written and verbal teaching of the above information was given.     Discussed with the patient and caregiver the importance of maintaining COVID-19 precautions; wearing a mask, good handwashing, and social distancing.  Also, to report any signs or symptoms (fever, difficulty breathing, loss of taste/smell, etc.), suspected exposure, or COVID testing, immediately to the transplant program.     Education was provided to the patient , his mother and long-time friend. The patient had a very flat affect and did not speak during the teaching. When asked specifics questions the patient would whisper and coordinator had to ask him multiple times to repeat himself. The patients mother was very active in the teaching and asked appropriate questions.The patient was asked questions to assess his understating of the material, but he refused to answer completely and mostly only nodded his head and blinked his eyes.

## 2023-04-12 NOTE — PROGRESS NOTES
"[facility] - Transplant Stepdown  Endocrinology  Progress Note    Admit Date: 2023     Reason for Consult: Management of T2DM, Hyperglycemia     Surgical Procedure and Date: S/p kidney transplant on 2023    Diabetes diagnosis year: over 5 years ago    Home Diabetes Medications:  Not on medications    How often checking glucose at home?  Not taking    Diabetes Complications include:     Diabetic nephropathy      Complicating diabetes co morbidities:   ESRD and Glucocorticoid use       HPI:   Patient is a 37 y.o. male with ESRD secondary to diabetic nephropathy and HTN who presented for Kidney transplant.  He has been on the wait list for a kidney transplant at [facility] since 5/10/2021. Patient is currently on hemodialysis started on 5/10/2021. Patient is dialyzing on TTS schedule, HD 23.  Patient reports that he is tolerating dialysis well. He reports in usual state of health- Patient denies any recent hospitalizations or ED visits. Pt s/p kidney transplant on 4/10/2023.        Interval HPI:   Overnight events: No acute events overnight. Patient in room 81632/64860 A. Blood glucose stable. BG at and above goal on current insulin regimen (SSI ). Steroid use- Prednisone 20 mg. 3 Days Post-Op  Renal function- Abnormal - Creatinine 1.9   Vasopressors-  None       Endocrine will continue to follow and manage insulin orders inpatient.         Diet renal     Eatin%  Nausea: No  Hypoglycemia and intervention: No  Fever: No  TPN and/or TF: No      BP (!) 144/88 (BP Location: Left arm, Patient Position: Lying)   Pulse 89   Temp 98.5 °F (36.9 °C) (Oral)   Resp 16   Ht 5' 11" (1.803 m)   Wt 70.3 kg (154 lb 15.7 oz)   SpO2 99%   BMI 21.62 kg/m²     Labs Reviewed and Include    Recent Labs   Lab 23  0637   GLU 92   CALCIUM 9.0   ALBUMIN 3.0*      K 4.0   CO2 21*      BUN 38*   CREATININE 1.9*     Lab Results   Component Value Date    WBC 5.79 2023    HGB 10.1 (L) 2023    " HCT 30.4 (L) 04/12/2023    MCV 93 04/12/2023     (L) 04/12/2023     No results for input(s): TSH, FREET4 in the last 168 hours.  Lab Results   Component Value Date    HGBA1C 4.5 04/09/2023       Nutritional status:   Body mass index is 21.62 kg/m².  Lab Results   Component Value Date    ALBUMIN 3.0 (L) 04/12/2023    ALBUMIN 3.2 (L) 04/11/2023    ALBUMIN 3.2 (L) 04/11/2023     No results found for: PREALBUMIN    Estimated Creatinine Clearance: 52.4 mL/min (A) (based on SCr of 1.9 mg/dL (H)).    Accu-Checks  Recent Labs     04/09/23  1128 04/09/23  1852 04/09/23  2217 04/10/23  1730 04/10/23  2135 04/11/23  1138 04/11/23  2216   POCTGLUCOSE 148* 206* 172* 252* 194* 110 204*       Current Medications and/or Treatments Impacting Glycemic Control  Immunotherapy:    Immunosuppressants           Stop Route Frequency     tacrolimus capsule 5 mg         -- Oral 2 times daily     mycophenolate capsule 1,000 mg         -- Oral 2 times daily          Steroids:   Hormones (From admission, onward)      Start     Stop Route Frequency Ordered    04/12/23 0900  predniSONE tablet 20 mg  (IP TXP KIDNEY POST-OP THYMO WITH PERIPHERAL PRECHECKED)         -- Oral Daily 04/09/23 1137    04/09/23 1206  hydrocortisone sodium succinate injection 100 mg  (IP TXP KIDNEY POST-OP THYMO WITH PERIPHERAL PRECHECKED)         09/05 0306 IV Once as needed 04/09/23 1137          Pressors:    Autonomic Drugs (From admission, onward)      Start     Stop Route Frequency Ordered    04/09/23 1206  EPINEPHrine (PF) injection 1 mg  (IP TXP KIDNEY POST-OP THYMO WITH PERIPHERAL PRECHECKED)         09/05 0306 SubQ Once as needed 04/09/23 1137          Hyperglycemia/Diabetes Medications:   Antihyperglycemics (From admission, onward)      Start     Stop Route Frequency Ordered    04/10/23 1722  insulin aspart U-100 pen 0-5 Units         -- SubQ Before meals & nightly PRN 04/10/23 1622            ASSESSMENT and PLAN    Cardiac/Vascular  Renovascular  hypertension        Renal/  * S/P kidney transplant    Managed by primary team  Optimize bg control    Endocrine  Controlled type 2 diabetes mellitus with chronic kidney disease on chronic dialysis, without long-term current use of insulin  Endocrinology consulted for BG management.   BG goal 140-180    - Novolog (Insulin Aspart) prn for BG excursions [facility] SSI (150/50)  - BG checks AC/HS  - Hypoglycemia protocol in place  - If blood glucose greater than 300, please ask patient not to eat food or drink anything other than water until correctional insulin has brought it back below 250    ** Please notify Endocrine for any change and/or advance in diet**  ** Please call Endocrine for any BG related issues **    Discharge Planning:   - Novolog SSI for BG excursions:  150 - 200 + 1 unit  201 - 250 + 2 units  251 - 300 + 3 units  301 - 350 + 4 units   > 350   + 5 units  - Insurance approved diabetes testing supplies to check blood sugar 4 times a day (Before meals and at bedtime) and as needed.  - BD pen needles   - Send BG logs in 4 days    Education:  Discharge Teaching:    Reviewed topics related to DM including: the need for insulin, how insulin works, what makes it a high risk medication, the importance of immediate follow up with either PCP or endocrine provider, importance of and how to check BG, how to record BG on logs, how to administer insulin, appropriate insulin administration sites, importance of rotating injection sites, hyper/hypoglycemia, how and when to treat hypoglycemia, when to hold insulin, how the correction scale works, importance of storing unused insulin in the refrigerator, and when to seek medical attention.  Patient verbalized understanding, answered all questions to patient's satisfaction. Blood sugar logs given to patient.     Hypoglycemia (Low Blood Sugar)  Too little glucose (sugar) in your blood is called hypoglycemia or low blood sugar. Diabetes itself doesnt cause low blood sugar. But  some of the treatments for diabetes, such as pills or insulin, may increase your risk for it. Low blood sugar may cause you to lose consciousness or have a seizure. So always treat low blood sugar right away.    Special note: Always carry a source of fast-acting sugar and a snack in case of hypoglycemia     What You May Notice  If you have low blood sugar, you may have these symptoms:   Shakiness or dizziness   Cold, clammy skin or sweating   Feelings of hunger   Headache   Nervousness   A hard, fast heartbeat   Weakness   Confusion or irritability   Blurred vision  What You Should Do   First, check your blood sugar. If it is too low (out of your target range), eat or drink 15 to 20 grams of fast-acting sugar. This may be 3 to 4 glucose tablets, 4 oz (half a cup) fruit juice or regular (non-diet) soda, 8 oz (one cup ) fat-free milk, or 1 tablespoon of honey. Dont take more than this, or your blood sugar may go too high.   Wait 15 minutes. Then recheck your blood sugar if you can.   If your blood sugar is still too low, repeat the steps above and check your blood sugar again. If your blood sugar still has not returned to your target range, contact your healthcare provider or seek emergency care.   Once your blood sugar returns to target range, eat a snack or meal.  Preventing Low Blood Sugar   Eat your meals and snacks at the same times each day. Dont skip meals!   Ask your healthcare provider if it is safe for you to drink alcohol. Never drink on an empty stomach.   Take your medication at the prescribed times.   Always carry a source of fast-acting sugar and a snack when youre away from home.  Other Things to Do   Carry a medical ID card or wear a medical alert bracelet or necklace. It should say that you have diabetes. It should also say what to do if you pass out or have a seizure.   Make sure your family, friends, and coworkers know the signs of low blood sugar. Tell them what to do if your  blood sugar falls very low and you cant treat yourself.   Keep a glucagon emergency kit handy. Be sure your family, friends, and coworkers know how and when to use it. Check it regularly and replace the glucagon before it expires.   Talk to your healthcare team about other things you can do to prevent low blood sugar.     If you experience hypoglycemia several times, call your doctor.   © 0295-8354 [employer], [address] [place] [address], [place] [address]. All rights reserved. This information is not intended as a substitute for professional medical care. Always follow your healthcare professional's instructions.           Palliative Care  Long-term use of immunosuppressant medication  On immunosuppressive therapy per transplant team; may elevate BG readings             Abel Klein DNP, FNP  Endocrinology  [facility] - Transplant Stepdown

## 2023-04-12 NOTE — DISCHARGE SUMMARY
[facility] - Transplant Stepdown  Kidney Transplant  Discharge Summary    Patient Name: Andrey Reddy  MRN: [id number]  Admission Date: 4/9/2023  Hospital Length of Stay: 3 days  Discharge Date and Time:  04/13/2023 1:18 PM  Attending Physician: Juan Markham MD  Discharging Provider: Alisha Stokes PA-C  Primary Care Provider: Letty Reddy NP    HPI:   Mr. Reddy is a 37-year-old male with ESRD secondary to diabetic nephropathy and HTN.  He has been on the wait list for a kidney transplant at [facility] since 5/10/2021. Patient is currently on hemodialysis started on 5/10/2021. Patient is dialyzing on TTS schedule, HD Saturday 4/7/23.  Patient reports that he is tolerating dialysis well. He reports in usual state of health- Patient denies any recent hospitalizations or ED visits. The primary surgeon will be Dr. Saleh, Thymo induction.  Patient has been NPO since 10pm.  All pre-op labs and imaging have been ordered and will be reviewed prior to surgery. Consents to be signed prior to transplant.       Procedure(s) (LRB):  TRANSPLANT, KIDNEY (N/A)     Hospital Course:      Mr. Reddy is now s/p DCD Ktxp d/t DM on 4/9/23 (Thymo, KDPI 40%, CIT 21 hrs). 2 day Oneal, no drain. PACU labs stable. Given 1L NS bolus POD0 for decreasing UOP, UOP responded well to bolus. Advanced on pathway POD1, maintenance LR infusion at 100 cc/hr kept on POD#1 d/t nausea/vomiting and poor PO intake. KUB POD1 without ileus. Nausea resolved and IVF stopped POD2. Oneal removed POD2, pt voiding without difficulty. Creatinine trending down well. Pt ambulated halls POD2, +BM +flatus. Pain well controlled on oral pain regimen. Pt with episode chest pain POD3, EKG, CXR, troponin all WNL. Chest pain resolved. Patient is now stable for discharge. Discharge instructions and education have been discussed with the patient at bedside. Some difficulty teaching family medication administration.  skilled nursing ordered at discharge to assist in checking  med box, and patient to be seen in clinic by a pharmacist tomorrow. All questions have been answered. Transplant coordinator has met with patient and discussed discharge planning. The patient will follow-up with labs and transplant clinic appointment tomorrow, 4/14.            Goals of Care Treatment Preferences:  Code Status: Full Code      Final Active Diagnoses:    Diagnosis Date Noted POA    PRINCIPAL PROBLEM:  S/P kidney transplant [Z94.0] 04/09/2023 Not Applicable    Other chest pain [R07.89] 04/12/2023 Unknown    Long-term use of immunosuppressant medication [Z79.60] 04/10/2023 Not Applicable    Prophylactic immunotherapy [Z29.8] 04/10/2023 Not Applicable    At risk for opportunistic infections [Z91.89] 04/10/2023 No    Anemia of chronic disease [D63.8] 04/10/2023 Yes    Renovascular hypertension [I15.0] 10/13/2021 Yes    Controlled type 2 diabetes mellitus with chronic kidney disease on chronic dialysis, without long-term current use of insulin [E11.22, N18.6, Z99.2] 10/13/2021 Not Applicable      Problems Resolved During this Admission:    Diagnosis Date Noted Date Resolved POA    Kidney transplant candidate [Z76.82] 04/09/2023 04/10/2023 Not Applicable       Treatments: See above.    Consults (From admission, onward)          Status Ordering Provider     Inpatient consult to Endocrinology  Once        Provider:  (Not yet assigned)    Completed [facility]     Inpatient consult to Registered Dietitian/Nutritionist  Once        Provider:  (Not yet assigned)    Completed JOYCE SORENSON     Inpatient consult to Transplant Nephrology (KTM)  Once        Provider:  (Not yet assigned)    Acknowledged JOYCE SORENSON            Pending Diagnostic Studies:       None          Significant Diagnostic Studies: Labs: CMP   Recent Labs   Lab 04/12/23  0637 04/13/23  0654    139   K 4.0 3.8    107   CO2 21* 23   GLU 92 110   BUN 38* 31*   CREATININE 1.9* 1.6*   CALCIUM 9.0 9.2   ALBUMIN 3.0* 3.3*  "  ANIONGAP 9 9   , CBC   Recent Labs   Lab 04/12/23  0637 04/13/23  0654   WBC 5.79 6.10   HGB 10.1* 10.4*   HCT 30.4* 32.1*   * 125*   , and INR   Lab Results   Component Value Date    INR 1.1 04/09/2023    INR 0.9 10/13/2021       Discharged Condition: stable    Disposition: [place] locally    Follow Up: See above.    Patient Instructions:      Type And Screen Preop   Standing Status: Future Standing Exp. Date: 06/07/24     Medications:  Reconciled Home Medications:      Medication List        START taking these medications      ACCU-CHEK GUIDE ME GLUCOSE MTR Misc  Generic drug: blood-glucose meter  Use as instructed to test blood sugar daily     ACCU-CHEK GUIDE TEST STRIPS Strp  Generic drug: blood sugar diagnostic  Test blood sugar 3 (three) times daily.     ACCU-CHEK SOFTCLIX LANCETS Misc  Generic drug: lancets  Test blood sugar 3 (three) times daily.     bisacodyL 5 mg EC tablet  Commonly known as: DULCOLAX  Take 2 tablets (10 mg total) by mouth daily as needed for Constipation.     docusate sodium 100 MG capsule  Commonly known as: COLACE  Take 1 capsule (100 mg total) by mouth 3 (three) times daily as needed for Constipation.     famotidine 20 MG tablet  Commonly known as: PEPCID  Take 1 tablet (20 mg total) by mouth every evening.     insulin aspart U-100 100 unit/mL (3 mL) Inpn pen  Commonly known as: NovoLOG  Inject 0-5 Units into the skin 3 (three) times daily with meals. SSI     k phos di & mono-sod phos mono 250 mg Tab  Commonly known as: K-PHOS-NEUTRAL  Take 1 tablet by mouth 2 (two) times daily.     mycophenolate 250 mg Cap  Commonly known as: CELLCEPT  Take 4 capsules (1,000 mg total) by mouth 2 (two) times daily.     oxyCODONE 5 MG immediate release tablet  Commonly known as: ROXICODONE  Take 1 tablet (5 mg total) by mouth every 8 (eight) hours as needed for Pain.     pen needle, diabetic 32 gauge x 5/32" Ndle  1 each by Misc.(Non-Drug; Combo Route) route 3 (three) times daily.     predniSONE 5 " MG tablet  Commonly known as: DELTASONE  Take 20mg by mouth daily 4/11-4/17;   Take 15mg daily 4/18-4/24;   Take 10mg daily 4/25-5/1;   Take 5mg daily thereafter 5/2/23     simethicone 80 MG chewable tablet  Commonly known as: MYLICON  Take 1 tablet (80 mg total) by mouth 3 (three) times daily as needed for Flatulence.     sulfamethoxazole-trimethoprim 400-80mg 400-80 mg per tablet  Commonly known as: BACTRIM,SEPTRA  Take 1 tablet by mouth every morning. Stop: 10/6/23     tacrolimus 1 MG Cap  Commonly known as: PROGRAF  Take 8 capsules (8 mg total) by mouth every 12 (twelve) hours.     valGANciclovir 450 mg Tab  Commonly known as: VALCYTE  Take 2 tablets (900 mg total) by mouth every morning. Stop: 10/6/23            CHANGE how you take these medications      amLODIPine 10 MG tablet  Commonly known as: NORVASC  Take 1 tablet (10 mg total) by mouth once daily.  What changed: medication strength     hydrALAZINE 50 MG tablet  Commonly known as: APRESOLINE  Take 1 tablet (50 mg total) by mouth every 8 (eight) hours.  What changed: when to take this            CONTINUE taking these medications      atorvastatin 10 MG tablet  Commonly known as: LIPITOR  Take 1 tablet (10 mg total) by mouth every evening.     pregabalin 75 MG capsule  Commonly known as: LYRICA  Take 75 mg by mouth 3 (three) times daily.            STOP taking these medications      gabapentin 300 MG capsule  Commonly known as: NEURONTIN     minoxidiL 2.5 MG tablet  Commonly known as: LONITEN     RENAPLEX ORAL     VELPHORO 500 mg Chew  Generic drug: sucroferric oxyhydroxide            Time spent caring for patient (Greater than 1/2 spent in direct face-to-face contact): > 30 minutes    Alisha Stokes PA-C  Kidney Transplant  [facility] - Transplant Stepdown

## 2023-04-12 NOTE — ASSESSMENT & PLAN NOTE
- S/p DCD Ktxp 4/9/23 (Thymo, CIT 21 hrs, KDPI 40%)  - 2 day Oneal, no drain  - Oneal out POD#2, voiding without issue  - Creatinine clearing  - Bowel and pain regimen ordered  - Pt withdrawn, not currently participating in pulling self meds, post-op education. Discussed importance of education with pt and caretaker at length

## 2023-04-12 NOTE — SUBJECTIVE & OBJECTIVE
Subjective:   History of Present Illness:  Mr. Reddy is a 37-year-old male with ESRD secondary to diabetic nephropathy and HTN.  He has been on the wait list for a kidney transplant at [facility] since 5/10/2021. Patient is currently on hemodialysis started on 5/10/2021. Patient is dialyzing on TTS schedule, HD Saturday 4/7/23.  Patient reports that he is tolerating dialysis well. He reports in usual state of health- Patient denies any recent hospitalizations or ED visits. The primary surgeon will be Dr. Saleh, Thymo induction.  Patient has been NPO since 10pm.  All pre-op labs and imaging have been ordered and will be reviewed prior to surgery. Consents to be signed prior to transplant.       Hospital Course:  Mr. Reddy is now s/p DCD Ktxp d/t DM on 4/9/23 (Thymo, KDPI 40%, CIT 21 hrs). 2 day Oneal, no drain. PACU labs stable. Given 1L NS bolus POD0 for decreasing UOP, UOP responded well to bolus. Advanced on pathway POD1, maintenance LR infusion at 100 cc/hr kept on POD#1 d/t nausea/vomiting and poor PO intake. KUB POD1 without ileus.     Interval note: POD#3 DCD Ktxp. Cr continues to clear well with good UOP. +BM, +flatus. Ambulated halls yesterday. Pain controlled. Pt remains very withdrawn, not participating in self meds, education etc. Discussed with patient and his caretaker at bedside, stressed importance of patient engaging and participating in post-op education, pulling self meds etc. Pt reported chest pain this afternoon, mid-sternal area. Vitals stable. EKG, troponin, CXR ordered, will f/u. Continue to monitor and educate pt and caretaker.           Past Medical, Surgical, Family, and Social History:   Unchanged from H&P.    Scheduled Meds:   amLODIPine  10 mg Oral Daily    bisacodyL  10 mg Oral QHS    docusate sodium  100 mg Oral TID    famotidine  20 mg Oral QHS    heparin (porcine)  5,000 Units Subcutaneous Q8H    hydrALAZINE  50 mg Oral Q8H    multivitamin  1 tablet Oral Daily    mupirocin  1 g  Nasal BID    mycophenolate  1,000 mg Oral BID    predniSONE  20 mg Oral Daily    [START ON 4/19/2023] sulfamethoxazole-trimethoprim 400-80mg  1 tablet Oral Daily AM    tacrolimus  2 mg Oral Once    tacrolimus  7 mg Oral BID    [START ON 4/19/2023] valGANciclovir  450 mg Oral Daily AM     Continuous Infusions:   glucagon (human recombinant)       PRN Meds:dextrose 10%, dextrose 10%, dextrose, dextrose, diphenhydrAMINE, EPINEPHrine (PF), glucagon (human recombinant), glucagon (human recombinant), hydrALAZINE, hydrocortisone sodium succinate, insulin aspart U-100, labetalol, ondansetron, oxyCODONE, simethicone, sodium chloride, sodium chloride 0.9%, traMADoL    Intake/Output - Last 3 Shifts         04/10 0700  04/11 0659 04/11 0700 04/12 0659 04/12 0700 04/13 0659    P.O. 620 1080     I.V. (mL/kg) 2239.9 (29.7)      IV Piggyback 653.5 396.4     Total Intake(mL/kg) 3513.4 (46.7) 1476.4 (21)     Urine (mL/kg/hr) 2195 (1.2) 700 (0.4)     Emesis/NG output 1 0     Stool 0 0     Total Output 2196 700     Net +1317.4 +776.4            Urine Occurrence 0 x 3 x     Stool Occurrence 0 x 3 x     Emesis Occurrence 0 x               Review of Systems   Constitutional:  Positive for appetite change (decreased). Negative for activity change and fever.   Eyes:  Negative for visual disturbance.   Respiratory:  Negative for cough and shortness of breath.    Cardiovascular:  Positive for chest pain. Negative for palpitations and leg swelling.   Gastrointestinal:  Positive for abdominal pain (incisional). Negative for abdominal distention, constipation, diarrhea, nausea and vomiting.   Genitourinary:  Negative for difficulty urinating.   Musculoskeletal:  Negative for arthralgias.   Skin:  Negative for wound.   Allergic/Immunologic: Positive for immunocompromised state.   Neurological:  Negative for dizziness and headaches.   Hematological:  Negative for adenopathy. Does not bruise/bleed easily.   Psychiatric/Behavioral:  Negative for  "agitation and confusion.     Objective:     Vital Signs (Most Recent):  Temp: 98.8 °F (37.1 °C) (04/12/23 1123)  Pulse: 85 (04/12/23 1123)  Resp: 16 (04/12/23 1123)  BP: 138/82 (04/12/23 1123)  SpO2: (!) 94 % (04/12/23 1123)   Vital Signs (24h Range):  Temp:  [97.6 °F (36.4 °C)-98.8 °F (37.1 °C)] 98.8 °F (37.1 °C)  Pulse:  [77-92] 85  Resp:  [16-17] 16  SpO2:  [94 %-99 %] 94 %  BP: (138-152)/() 138/82     Weight: 70.3 kg (154 lb 15.7 oz)  Height: 5' 11" (180.3 cm)  Body mass index is 21.62 kg/m².    Physical Exam  Vitals and nursing note reviewed.   Constitutional:       Appearance: He is well-developed.   HENT:      Head: Normocephalic.   Eyes:      Conjunctiva/sclera: Conjunctivae normal.   Cardiovascular:      Rate and Rhythm: Normal rate and regular rhythm.      Heart sounds: No murmur heard.  Pulmonary:      Effort: Pulmonary effort is normal.      Breath sounds: Normal breath sounds.   Abdominal:      General: Bowel sounds are normal. There is no distension.      Palpations: Abdomen is soft.      Tenderness: There is no abdominal tenderness.      Comments: RLQ Ktxp incision, LARS with dermabond. C/D/I   Musculoskeletal:         General: Normal range of motion.      Cervical back: Normal range of motion.   Skin:     General: Skin is warm and dry.      Capillary Refill: Capillary refill takes less than 2 seconds.          Neurological:      Mental Status: He is alert and oriented to person, place, and time.   Psychiatric:         Attention and Perception: Attention normal.         Mood and Affect: Affect is flat.         Behavior: Behavior is withdrawn.         Thought Content: Thought content normal.         Judgment: Judgment normal.       Laboratory:  CBC:   Recent Labs   Lab 04/10/23  0730 04/11/23  0701 04/12/23  0637   WBC 16.99* 12.60  12.60 5.79   RBC 3.21* 3.40*  3.40* 3.26*   HGB 10.3* 10.4*  10.4* 10.1*   HCT 30.4* 32.5*  32.5* 30.4*    155  155 131*   MCV 95 96  96 93   MCH 32.1* " 30.6  30.6 31.0   MCHC 33.9 32.0  32.0 33.2     CMP:   Recent Labs   Lab 04/09/23  0320 04/09/23  1203 04/10/23  0730 04/11/23  0701 04/12/23  0637   GLU 84   < > 149* 142*  142* 92   CALCIUM 9.2   < > 8.6* 9.3  9.3 9.0   ALBUMIN 3.9   < > 3.2* 3.2*  3.2* 3.0*   PROT 6.8  --   --   --   --       < > 134* 135*  135* 137   K 3.9   < > 4.2 4.1  4.1 4.0   CO2 25   < > 21* 19*  19* 21*      < > 103 106  106 107   BUN 25*   < > 36* 36*  36* 38*   CREATININE 3.7*   < > 3.4* 2.4*  2.4* 1.9*   ALKPHOS 60  --   --   --   --    ALT 13  --   --   --   --    AST 12  --   --   --   --     < > = values in this interval not displayed.       Diagnostic Results:  None

## 2023-04-12 NOTE — SUBJECTIVE & OBJECTIVE
"Interval HPI:   Overnight events: No acute events overnight. Patient in room 53953/39449 A. Blood glucose stable. BG at and above goal on current insulin regimen (SSI ). Steroid use- Prednisone 20 mg. 3 Days Post-Op  Renal function- Abnormal - Creatinine 1.9   Vasopressors-  None       Endocrine will continue to follow and manage insulin orders inpatient.         Diet renal     Eatin%  Nausea: No  Hypoglycemia and intervention: No  Fever: No  TPN and/or TF: No      BP (!) 144/88 (BP Location: Left arm, Patient Position: Lying)   Pulse 89   Temp 98.5 °F (36.9 °C) (Oral)   Resp 16   Ht 5' 11" (1.803 m)   Wt 70.3 kg (154 lb 15.7 oz)   SpO2 99%   BMI 21.62 kg/m²     Labs Reviewed and Include    Recent Labs   Lab 23  0637   GLU 92   CALCIUM 9.0   ALBUMIN 3.0*      K 4.0   CO2 21*      BUN 38*   CREATININE 1.9*     Lab Results   Component Value Date    WBC 5.79 2023    HGB 10.1 (L) 2023    HCT 30.4 (L) 2023    MCV 93 2023     (L) 2023     No results for input(s): TSH, FREET4 in the last 168 hours.  Lab Results   Component Value Date    HGBA1C 4.5 2023       Nutritional status:   Body mass index is 21.62 kg/m².  Lab Results   Component Value Date    ALBUMIN 3.0 (L) 2023    ALBUMIN 3.2 (L) 2023    ALBUMIN 3.2 (L) 2023     No results found for: PREALBUMIN    Estimated Creatinine Clearance: 52.4 mL/min (A) (based on SCr of 1.9 mg/dL (H)).    Accu-Checks  Recent Labs     23  1128 23  1852 23  2217 04/10/23  1730 04/10/23  2135 23  1138 23   POCTGLUCOSE 148* 206* 172* 252* 194* 110 204*       Current Medications and/or Treatments Impacting Glycemic Control  Immunotherapy:    Immunosuppressants           Stop Route Frequency     tacrolimus capsule 5 mg         -- Oral 2 times daily     mycophenolate capsule 1,000 mg         -- Oral 2 times daily          Steroids:   Hormones (From admission, onward) "      Start     Stop Route Frequency Ordered    04/12/23 0900  predniSONE tablet 20 mg  (IP TXP KIDNEY POST-OP THYMO WITH PERIPHERAL PRECHECKED)         -- Oral Daily 04/09/23 1137    04/09/23 1206  hydrocortisone sodium succinate injection 100 mg  (IP TXP KIDNEY POST-OP THYMO WITH PERIPHERAL PRECHECKED)         09/05 0306 IV Once as needed 04/09/23 1137          Pressors:    Autonomic Drugs (From admission, onward)      Start     Stop Route Frequency Ordered    04/09/23 1206  EPINEPHrine (PF) injection 1 mg  (IP TXP KIDNEY POST-OP THYMO WITH PERIPHERAL PRECHECKED)         09/05 0306 SubQ Once as needed 04/09/23 1137          Hyperglycemia/Diabetes Medications:   Antihyperglycemics (From admission, onward)      Start     Stop Route Frequency Ordered    04/10/23 1722  insulin aspart U-100 pen 0-5 Units         -- SubQ Before meals & nightly PRN 04/10/23 1622

## 2023-04-13 VITALS
BODY MASS INDEX: 21.24 KG/M2 | HEIGHT: 71 IN | OXYGEN SATURATION: 97 % | SYSTOLIC BLOOD PRESSURE: 145 MMHG | RESPIRATION RATE: 16 BRPM | DIASTOLIC BLOOD PRESSURE: 79 MMHG | TEMPERATURE: 98 F | WEIGHT: 151.69 LBS | HEART RATE: 89 BPM

## 2023-04-13 PROBLEM — R07.89 OTHER CHEST PAIN: Status: RESOLVED | Noted: 2023-04-12 | Resolved: 2023-04-13

## 2023-04-13 LAB
ABO + RH BLD: NORMAL
ALBUMIN SERPL BCP-MCNC: 3.3 G/DL (ref 3.5–5.2)
ANION GAP SERPL CALC-SCNC: 9 MMOL/L (ref 8–16)
BASOPHILS # BLD AUTO: 0.01 K/UL (ref 0–0.2)
BASOPHILS NFR BLD: 0.2 % (ref 0–1.9)
BLD GP AB SCN CELLS X3 SERPL QL: NORMAL
BUN SERPL-MCNC: 31 MG/DL (ref 6–20)
CALCIUM SERPL-MCNC: 9.2 MG/DL (ref 8.7–10.5)
CHLORIDE SERPL-SCNC: 107 MMOL/L (ref 95–110)
CO2 SERPL-SCNC: 23 MMOL/L (ref 23–29)
CREAT SERPL-MCNC: 1.6 MG/DL (ref 0.5–1.4)
DIFFERENTIAL METHOD: ABNORMAL
EOSINOPHIL # BLD AUTO: 0.1 K/UL (ref 0–0.5)
EOSINOPHIL NFR BLD: 0.8 % (ref 0–8)
ERYTHROCYTE [DISTWIDTH] IN BLOOD BY AUTOMATED COUNT: 11.9 % (ref 11.5–14.5)
EST. GFR  (NO RACE VARIABLE): 56.2 ML/MIN/1.73 M^2
GLUCOSE SERPL-MCNC: 110 MG/DL (ref 70–110)
HCT VFR BLD AUTO: 32.1 % (ref 40–54)
HGB BLD-MCNC: 10.4 G/DL (ref 14–18)
IMM GRANULOCYTES # BLD AUTO: 0.01 K/UL (ref 0–0.04)
IMM GRANULOCYTES NFR BLD AUTO: 0.2 % (ref 0–0.5)
LYMPHOCYTES # BLD AUTO: 0.3 K/UL (ref 1–4.8)
LYMPHOCYTES NFR BLD: 4.9 % (ref 18–48)
MAGNESIUM SERPL-MCNC: 2 MG/DL (ref 1.6–2.6)
MCH RBC QN AUTO: 30.9 PG (ref 27–31)
MCHC RBC AUTO-ENTMCNC: 32.4 G/DL (ref 32–36)
MCV RBC AUTO: 95 FL (ref 82–98)
MONOCYTES # BLD AUTO: 0.8 K/UL (ref 0.3–1)
MONOCYTES NFR BLD: 13.3 % (ref 4–15)
NEUTROPHILS # BLD AUTO: 4.9 K/UL (ref 1.8–7.7)
NEUTROPHILS NFR BLD: 80.6 % (ref 38–73)
NRBC BLD-RTO: 0 /100 WBC
PHOSPHATE SERPL-MCNC: 2.1 MG/DL (ref 2.7–4.5)
PLATELET # BLD AUTO: 125 K/UL (ref 150–450)
PMV BLD AUTO: 10.2 FL (ref 9.2–12.9)
POCT GLUCOSE: 210 MG/DL (ref 70–110)
POTASSIUM SERPL-SCNC: 3.8 MMOL/L (ref 3.5–5.1)
RBC # BLD AUTO: 3.37 M/UL (ref 4.6–6.2)
SODIUM SERPL-SCNC: 139 MMOL/L (ref 136–145)
SPECIMEN OUTDATE: NORMAL
TACROLIMUS BLD-MCNC: 5.3 NG/ML (ref 5–15)
WBC # BLD AUTO: 6.1 K/UL (ref 3.9–12.7)

## 2023-04-13 PROCEDURE — 85025 COMPLETE CBC W/AUTO DIFF WBC: CPT | Performed by: TRANSPLANT SURGERY

## 2023-04-13 PROCEDURE — 80197 ASSAY OF TACROLIMUS: CPT | Performed by: TRANSPLANT SURGERY

## 2023-04-13 PROCEDURE — 25000003 PHARM REV CODE 250: Performed by: PHYSICIAN ASSISTANT

## 2023-04-13 PROCEDURE — 94761 N-INVAS EAR/PLS OXIMETRY MLT: CPT

## 2023-04-13 PROCEDURE — 63600175 PHARM REV CODE 636 W HCPCS: Performed by: INTERNAL MEDICINE

## 2023-04-13 PROCEDURE — 25000003 PHARM REV CODE 250: Performed by: CLINICAL NURSE SPECIALIST

## 2023-04-13 PROCEDURE — 63600175 PHARM REV CODE 636 W HCPCS: Performed by: TRANSPLANT SURGERY

## 2023-04-13 PROCEDURE — 99239 PR HOSPITAL DISCHARGE DAY,>30 MIN: ICD-10-PCS | Mod: 24,,, | Performed by: PHYSICIAN ASSISTANT

## 2023-04-13 PROCEDURE — 80069 RENAL FUNCTION PANEL: CPT | Performed by: TRANSPLANT SURGERY

## 2023-04-13 PROCEDURE — 99239 HOSP IP/OBS DSCHRG MGMT >30: CPT | Mod: 24,,, | Performed by: PHYSICIAN ASSISTANT

## 2023-04-13 PROCEDURE — 25000003 PHARM REV CODE 250: Performed by: NURSE PRACTITIONER

## 2023-04-13 PROCEDURE — 25000003 PHARM REV CODE 250: Performed by: TRANSPLANT SURGERY

## 2023-04-13 PROCEDURE — 36415 COLL VENOUS BLD VENIPUNCTURE: CPT | Performed by: TRANSPLANT SURGERY

## 2023-04-13 PROCEDURE — 83735 ASSAY OF MAGNESIUM: CPT | Performed by: TRANSPLANT SURGERY

## 2023-04-13 PROCEDURE — 86900 BLOOD TYPING SEROLOGIC ABO: CPT | Performed by: TRANSPLANT SURGERY

## 2023-04-13 RX ORDER — TACROLIMUS 1 MG/1
8 CAPSULE ORAL EVERY 12 HOURS
Qty: 360 CAPSULE | Refills: 11 | Status: SHIPPED | OUTPATIENT
Start: 2023-04-13 | End: 2023-04-13 | Stop reason: SDUPTHER

## 2023-04-13 RX ORDER — TACROLIMUS 1 MG/1
8 CAPSULE ORAL 2 TIMES DAILY
Status: DISCONTINUED | OUTPATIENT
Start: 2023-04-13 | End: 2023-04-13 | Stop reason: HOSPADM

## 2023-04-13 RX ORDER — TACROLIMUS 1 MG/1
1 CAPSULE ORAL ONCE
Status: COMPLETED | OUTPATIENT
Start: 2023-04-13 | End: 2023-04-13

## 2023-04-13 RX ORDER — TACROLIMUS 1 MG/1
8 CAPSULE ORAL EVERY 12 HOURS
Qty: 480 CAPSULE | Refills: 11 | Status: SHIPPED | OUTPATIENT
Start: 2023-04-13 | End: 2023-04-20 | Stop reason: DRUGHIGH

## 2023-04-13 RX ORDER — PREGABALIN 75 MG/1
75 CAPSULE ORAL 3 TIMES DAILY
COMMUNITY
End: 2023-05-15 | Stop reason: SDUPTHER

## 2023-04-13 RX ADMIN — HEPARIN SODIUM 5000 UNITS: 5000 INJECTION INTRAVENOUS; SUBCUTANEOUS at 05:04

## 2023-04-13 RX ADMIN — OXYCODONE HYDROCHLORIDE 5 MG: 5 TABLET ORAL at 02:04

## 2023-04-13 RX ADMIN — PREDNISONE 20 MG: 20 TABLET ORAL at 08:04

## 2023-04-13 RX ADMIN — TACROLIMUS 1 MG: 1 CAPSULE ORAL at 12:04

## 2023-04-13 RX ADMIN — AMLODIPINE BESYLATE 10 MG: 10 TABLET ORAL at 08:04

## 2023-04-13 RX ADMIN — HYDRALAZINE HYDROCHLORIDE 50 MG: 50 TABLET ORAL at 01:04

## 2023-04-13 RX ADMIN — MYCOPHENOLATE MOFETIL 1000 MG: 250 CAPSULE ORAL at 08:04

## 2023-04-13 RX ADMIN — TACROLIMUS 7 MG: 1 CAPSULE ORAL at 08:04

## 2023-04-13 RX ADMIN — DIBASIC SODIUM PHOSPHATE, MONOBASIC POTASSIUM PHOSPHATE AND MONOBASIC SODIUM PHOSPHATE 1 TABLET: 852; 155; 130 TABLET ORAL at 08:04

## 2023-04-13 RX ADMIN — MUPIROCIN 1 G: 20 OINTMENT TOPICAL at 08:04

## 2023-04-13 RX ADMIN — DOCUSATE SODIUM 100 MG: 100 CAPSULE, LIQUID FILLED ORAL at 01:04

## 2023-04-13 RX ADMIN — THERA TABS 1 TABLET: TAB at 08:04

## 2023-04-13 RX ADMIN — DOCUSATE SODIUM 100 MG: 100 CAPSULE, LIQUID FILLED ORAL at 08:04

## 2023-04-13 RX ADMIN — HYDRALAZINE HYDROCHLORIDE 50 MG: 50 TABLET ORAL at 05:04

## 2023-04-13 NOTE — CARE UPDATE
Care Update:     No acute events overnight. Patient on the TSU in room 83565/67597 A. Blood glucose stable. BG at goal on current insulin regimen (SSI ). Steroid use- Prednisone 20 mg. 4 Days Post-Op  Renal function- Abnormal - Creatinine 1.6   Vasopressors-  None       Diet Adult Regular (IDDSI Level 7)     POCT Glucose   Date Value Ref Range Status   04/12/2023 165 (H) 70 - 110 mg/dL Final   04/12/2023 103 70 - 110 mg/dL Final   04/11/2023 204 (H) 70 - 110 mg/dL Final   04/11/2023 110 70 - 110 mg/dL Final   04/10/2023 194 (H) 70 - 110 mg/dL Final   04/10/2023 252 (H) 70 - 110 mg/dL Final     Lab Results   Component Value Date    HGBA1C 4.5 04/09/2023       Endocrinology consulted for BG management.   BG goal 140-180    Diabetes Medications               insulin aspart U-100 (NOVOLOG) 100 unit/mL (3 mL) InPn pen Inject 0-5 Units into the skin 3 (three) times daily with meals. SSI    linaGLIPtin (TRADJENTA) 5 mg Tab tablet Take 1 tablet (5 mg total) by mouth once daily.          Hospital Medications    BG checks AC/HS           dextrose 40 % gel 15,000 mg 15,000 mg, Oral, As needed (PRN)    dextrose 40 % gel 30,000 mg 30,000 mg, Oral, As needed (PRN)    glucagon (human recombinant) injection 1 mg 1 mg, Intramuscular, Continuous PRN, Turn patient on their side, give IM, and NOTIFY MD IMMEDIATELY.<BR><BR>Feed the patient as soon as patient awakens and is able to swallow.    glucagon (human recombinant) injection 1 mg 1 mg, Intramuscular, As needed (PRN), Turn patient on their side, give IM, and NOTIFY MD IMMEDIATELY.<BR><BR>Feed the patient as soon as patient awakens and is able to swallow.    insulin aspart U-100 pen 0-5 Units 0-5 Units, Subcutaneous, Before meals &amp; nightly PRN, **LOW CORRECTION DOSE**<BR>Blood Glucose<BR>mg/dL                  Pre-meal                2200<BR>151-200                0 unit                      0 unit<BR>201-250                2 units                    1 unit<BR>251-300                 3 units                    1 unit<BR>301-350                4 units                    2 units<BR>&gt;350                     5 units                    3 units<BR>Administer subcutaneously if needed at times designated by monitoring schedule. <BR>DO NOT HOLD correction dose insulin for patients who are  NPO.<BR>&quot;HIGH ALERT MEDICATION&quot; - Administer with meals or TF/TPN.              ** Please notify Endocrine for any change and/or advance in diet**  ** Please call Endocrine for any BG related issues **    Discharge Planning:   - Likely will not require insulin at discharge.       Abel Klein DNP, FNP-C  Department of Endocrinology  Inpatient Glycemic Management

## 2023-04-13 NOTE — NURSING
Pt met with transplant pharmacist who gave him an updated med card. Meds delivered to bedside. Iv d/edie with cath tip intact. 2 urinals given to pt to record urine. AVS reviewed with patient. He verbalized understanding. Pt to leave when transport arrives.

## 2023-04-13 NOTE — PHYSICIAN QUERY
PT Name: Andrey Reddy  MR #: [id number]     DOCUMENTATION CLARIFICATION     CDS/: Abby Schwartz RN            Contact information: [email or web address]  This form is a permanent document in the medical record.     Query Date: April 13, 2023    By submitting this query, we are merely seeking further clarification of documentation to reflect the severity of illness of your patient. Please utilize your independent clinical judgment when addressing the question(s) below.    The Medical Record contains the following:   Indicators   Supporting Clinical Findings Location in Medical Record   x Hypertension or hypertensive documented Mr. Reddy is a 37-year-old male with ESRD secondary to diabetic nephropathy and HTN    Renovascular hypertension  - Continue Norvasc PN 4/12       Transplant Kidney    PN 4/12       Transplant Kidney     x Acute/Chronic condition(s) Mr. Reddy is a 37-year-old male with ESRD secondary to diabetic nephropathy and HTN.  He has been on the wait list for a kidney transplant at [facility] since 5/10/2021. Patient is currently on hemodialysis started on 5/10/2021. Patient is dialyzing on TTS schedule, HD Saturday 4/7/23.    Mr. Reddy is now s/p DCD Ktxp d/t DM on 4/9/23 (Thymo, KDPI 40%, CIT 21 hrs).    PN 4/12       Transplant Kidney          PN 4/12       Transplant Kidney   x Vital signs Vital Signs (24h Range):  Temp:  [97.6 °F (36.4 °C)-98.8 °F (37.1 °C)] 98.8 °F (37.1 °C)  Pulse:  [77-92] 85  Resp:  [16-17] 16  SpO2:  [94 %-99 %] 94 %  BP: (138-152)/() 138/82 PN 4/12       Transplant Kidney    Lab findings      Radiology findings     x Treatment/Medication - Continue Norvasc PN 4/12       Transplant Kidney    Other       The clinical guidelines noted are only a system guideline. It does not replace the provider's clinical judgment.  Provider, please specify the Hypertension diagnosis that correspond(s) to the above indicators:    [   ] Renovascular Hypertension     [X   ]  Hypertensive (essential Htn) Chronic Kidney Disease with ESRD     [   ] Other  (please specify): __________           Please document in your progress notes daily for the duration of treatment until resolved, and include in your discharge summary.    References:    ELHAM Carl MD, PhD, & ROBERT Sim MD, FACP, FCCP, FCCM, FRSM. (2020, June 25). Evaluation and treatment of hypertensive emergencies in adults (NI Piper MD, ELHAM Doshi MD, & ROBERT Landeros MD, MSc, Eds.). Retrieved October 22, 2020, from https://www.[employer]/contents/evaluation-and-treatment-bx-thoxakjwxmec-siqtttanlbr-in-adults?search=hypertensive%20emergency&source=search_result&selectedTitle=1~150&usage_type=default&display_rank=1    ROBERT Sim MD, FACP, FCCP, FCCM, FR, & ELHAM Carl MD, PhD. (2020, October 14). Management of severe asymptomatic hypertension (hypertensive urgencies) in adults (NI Piper MD, ELHAM Doshi MD, & ROBERT Landeros MD, MSc, Eds.). Retrieved October 22, 2020, from https://www.[employer]/contents/management-of-severe-asymptomatic-hypertension-hypertensive-urgencies-in-adults?search=hypertensive%20urgency&source=search_result&selectedTitle=1~41&usage_type=default&display_rank=1    Form No. 70544

## 2023-04-13 NOTE — PLAN OF CARE
Pt remains AAO x 4 with VSS throughout shift  Pt has LA 20g, L brachial 18g, STEVE fistula +/+  RLQ incision dermabonded  Cr trending down 1.9, H/H stable        Tele monitor in place - NSR  CBG ACHS - WDL  Pt voiding independently - clear yellow urine  LBM 4/12  Regular diet - poor appetite, pt PO intake inadequate, encouraging to drink water  Self meds - pulled meds, minimal mistakes, pt and family education reinforced on self med pull. Pt took all meds without difficulty.   Pt affect is still very flat, does not actively participate in care. Requires much encouragement to take meds and drink water. Education given.   Pt remains free from falls and injuries, call light in reach, bed in lowest position, nonskid socks on when OOB, side rails up x2  Will continue to monitor

## 2023-04-13 NOTE — PROGRESS NOTES
"DISCHARGE NOTE:    Andrey Reddy is a 37 y.o. male s/p LEFT KIDNEY   Donation after Circulatory Death    transplant on 4/9/2023 (Kidney) for ESKD secondary to Diabetes Mellitus - Type II.      Past Medical History:   Diagnosis Date    Allergy     Anemia     Diabetes mellitus     Disorder of kidney and ureter     Hyperlipidemia     Hypertension     Secondary hyperparathyroidism        Hospital Course: uncomplicated post-op course. Needed post-txp care education reinforcement.     Allergies:   Review of patient's allergies indicates:   Allergen Reactions    Shrimp        [facility]    Discharge Medications:     Medication List        START taking these medications      ACCU-CHEK GUIDE ME GLUCOSE MTR Misc  Generic drug: blood-glucose meter  Use as instructed to test blood sugar daily     ACCU-CHEK GUIDE TEST STRIPS Strp  Generic drug: blood sugar diagnostic  Test blood sugar 3 (three) times daily.     ACCU-CHEK SOFTCLIX LANCETS Misc  Generic drug: lancets  Test blood sugar 3 (three) times daily.     bisacodyL 5 mg EC tablet  Commonly known as: DULCOLAX  Take 2 tablets (10 mg total) by mouth daily as needed for Constipation.     docusate sodium 100 MG capsule  Commonly known as: COLACE  Take 1 capsule (100 mg total) by mouth 3 (three) times daily as needed for Constipation.     famotidine 20 MG tablet  Commonly known as: PEPCID  Take 1 tablet (20 mg total) by mouth every evening.     insulin aspart U-100 100 unit/mL (3 mL) Inpn pen  Commonly known as: NovoLOG  Inject 0-5 Units into the skin 3 (three) times daily with meals. SSI     mycophenolate 250 mg Cap  Commonly known as: CELLCEPT  Take 4 capsules (1,000 mg total) by mouth 2 (two) times daily.     oxyCODONE 5 MG immediate release tablet  Commonly known as: ROXICODONE  Take 1 tablet (5 mg total) by mouth every 8 (eight) hours as needed for Pain.     pen needle, diabetic 32 gauge x 5/32" Ndle  1 each by Misc.(Non-Drug; Combo Route) route 3 (three) " times daily.     PHOSPHA 250 NEUTRAL 250 mg Tab  Generic drug: k phos di & mono-sod phos mono  Take 1 tablet by mouth 2 (two) times daily.     predniSONE 5 MG tablet  Commonly known as: DELTASONE  Take 20mg by mouth daily 4/11-4/17;   Take 15mg daily 4/18-4/24;   Take 10mg daily 4/25-5/1;   Take 5mg daily thereafter 5/2/23     simethicone 80 MG chewable tablet  Commonly known as: MYLICON  Take 1 tablet (80 mg total) by mouth 3 (three) times daily as needed for Flatulence.     sulfamethoxazole-trimethoprim 400-80mg 400-80 mg per tablet  Commonly known as: BACTRIM,SEPTRA  Take 1 tablet by mouth every morning. Stop: 10/6/23     tacrolimus 1 MG Cap  Commonly known as: PROGRAF  Take 8 capsules (8 mg total) by mouth every 12 (twelve) hours.     valGANciclovir 450 mg Tab  Commonly known as: VALCYTE  Take 2 tablets (900 mg total) by mouth every morning. Stop: 10/6/23            CHANGE how you take these medications      amLODIPine 10 MG tablet  Commonly known as: NORVASC  Take 1 tablet (10 mg total) by mouth once daily.  What changed: medication strength     hydrALAZINE 50 MG tablet  Commonly known as: APRESOLINE  Take 1 tablet (50 mg total) by mouth every 8 (eight) hours.  What changed: when to take this            CONTINUE taking these medications      atorvastatin 10 MG tablet  Commonly known as: LIPITOR  Take 1 tablet (10 mg total) by mouth every evening.     pregabalin 75 MG capsule  Commonly known as: LYRICA            STOP taking these medications      gabapentin 300 MG capsule  Commonly known as: NEURONTIN     minoxidiL 2.5 MG tablet  Commonly known as: LONITEN     RENAPLEX ORAL     VELPHORO 500 mg Chew  Generic drug: sucroferric oxyhydroxide               Where to Get Your Medications        These medications were sent to [facility] [address] [address] [facility] [address]      Hours: Mon-Fri 7a-7p, Sat-Sun 10a-4p Phone: [phone]   ACCU-CHEK GUIDE ME GLUCOSE MTR Mis  ACCU-CHEK GUIDE TEST  "STRIPS Strp  ACCU-CHEK SOFTCLIX LANCETS Misc  amLODIPine 10 MG tablet  atorvastatin 10 MG tablet  famotidine 20 MG tablet  hydrALAZINE 50 MG tablet  insulin aspart U-100 100 unit/mL (3 mL) Inpn pen  mycophenolate 250 mg Cap  oxyCODONE 5 MG immediate release tablet  pen needle, diabetic 32 gauge x 5/32" Ndle  PHOSPHA 250 NEUTRAL 250 mg Tab  predniSONE 5 MG tablet  simethicone 80 MG chewable tablet  sulfamethoxazole-trimethoprim 400-80mg 400-80 mg per tablet  tacrolimus 1 MG Cap  valGANciclovir 450 mg Tab       You can get these medications from any pharmacy    You don't need a prescription for these medications  bisacodyL 5 mg EC tablet  docusate sodium 100 MG capsule          Pharmacy Interventions/Recommendations:  1) Transplant Immunosuppression: Induction with thymo, and maintenance with tacrolimus 8 mg BID, MMF 1g, pred 5 mg daily    2) Opportunistic Infection prophylaxis: Bactrim until 10/6/23, Valcyte until 10/6/23 (CMV+/-)    3) Osteoporosis Prevention measures (liver txp): n/a    4) Patient Counseling/Education: Demonstrated the use of the BP cuff, thermometer.    5) Follow-Up/Discharge Needs:  patient's mom is the caregiver. She need help filling the medications/understanding bluecard. Patient wasn't communicating verbally with pharmDs or students. Home health pillbox filling recommended.     6) Patient Assistance Information: n/a    7) The following medications have been placed on HOLD and should be restarted in the outpatient setting (when appropriate): n/a    Andrey and his caregiver verbalized their understanding and had the opportunity to ask questions.     Rosie "Donovan Chow  PGY2 Solid Organ Transplant Pharmacy Resident    "

## 2023-04-14 ENCOUNTER — LAB VISIT (OUTPATIENT)
Dept: LAB | Facility: HOSPITAL | Age: 38
End: 2023-04-14
Attending: INTERNAL MEDICINE
Payer: COMMERCIAL

## 2023-04-14 ENCOUNTER — CLINICAL SUPPORT (OUTPATIENT)
Dept: TRANSPLANT | Facility: CLINIC | Age: 38
End: 2023-04-14
Payer: COMMERCIAL

## 2023-04-14 VITALS
HEART RATE: 89 BPM | RESPIRATION RATE: 16 BRPM | OXYGEN SATURATION: 100 % | DIASTOLIC BLOOD PRESSURE: 79 MMHG | HEIGHT: 74 IN | SYSTOLIC BLOOD PRESSURE: 160 MMHG | HEIGHT: 74 IN | TEMPERATURE: 98 F | WEIGHT: 154.13 LBS | SYSTOLIC BLOOD PRESSURE: 160 MMHG | BODY MASS INDEX: 19.78 KG/M2 | WEIGHT: 154.13 LBS | OXYGEN SATURATION: 100 % | DIASTOLIC BLOOD PRESSURE: 79 MMHG | RESPIRATION RATE: 16 BRPM | TEMPERATURE: 98 F | HEART RATE: 89 BPM | BODY MASS INDEX: 19.78 KG/M2

## 2023-04-14 DIAGNOSIS — Z94.0 KIDNEY REPLACED BY TRANSPLANT: Primary | ICD-10-CM

## 2023-04-14 DIAGNOSIS — Z94.0 S/P KIDNEY TRANSPLANT: Primary | ICD-10-CM

## 2023-04-14 DIAGNOSIS — Z94.0 KIDNEY REPLACED BY TRANSPLANT: ICD-10-CM

## 2023-04-14 DIAGNOSIS — Z57.8 OCCUPATIONAL EXPOSURE TO OTHER RISK FACTORS: ICD-10-CM

## 2023-04-14 LAB
ALBUMIN SERPL BCP-MCNC: 3.3 G/DL (ref 3.5–5.2)
ANION GAP SERPL CALC-SCNC: 8 MMOL/L (ref 8–16)
BASOPHILS # BLD AUTO: 0.02 K/UL (ref 0–0.2)
BASOPHILS NFR BLD: 0.4 % (ref 0–1.9)
BUN SERPL-MCNC: 28 MG/DL (ref 6–20)
CALCIUM SERPL-MCNC: 9.4 MG/DL (ref 8.7–10.5)
CHLORIDE SERPL-SCNC: 109 MMOL/L (ref 95–110)
CLASS I ANTIBODIES - LUMINEX: NEGATIVE
CLASS II ANTIBODIES - LUMINEX: NORMAL
CLASS II ANTIBODY COMMENTS - LUMINEX: NORMAL
CO2 SERPL-SCNC: 24 MMOL/L (ref 23–29)
CPRA %: 50
CREAT SERPL-MCNC: 1.5 MG/DL (ref 0.5–1.4)
DIFFERENTIAL METHOD: ABNORMAL
EOSINOPHIL # BLD AUTO: 0.1 K/UL (ref 0–0.5)
EOSINOPHIL NFR BLD: 1.1 % (ref 0–8)
ERYTHROCYTE [DISTWIDTH] IN BLOOD BY AUTOMATED COUNT: 11.9 % (ref 11.5–14.5)
EST. GFR  (NO RACE VARIABLE): >60 ML/MIN/1.73 M^2
GLUCOSE SERPL-MCNC: 115 MG/DL (ref 70–110)
HCT VFR BLD AUTO: 31.2 % (ref 40–54)
HGB BLD-MCNC: 10.1 G/DL (ref 14–18)
IMM GRANULOCYTES # BLD AUTO: 0.02 K/UL (ref 0–0.04)
IMM GRANULOCYTES NFR BLD AUTO: 0.4 % (ref 0–0.5)
LYMPHOCYTES # BLD AUTO: 0.4 K/UL (ref 1–4.8)
LYMPHOCYTES NFR BLD: 6.4 % (ref 18–48)
MAGNESIUM SERPL-MCNC: 1.7 MG/DL (ref 1.6–2.6)
MCH RBC QN AUTO: 30.8 PG (ref 27–31)
MCHC RBC AUTO-ENTMCNC: 32.4 G/DL (ref 32–36)
MCV RBC AUTO: 95 FL (ref 82–98)
MONOCYTES # BLD AUTO: 1 K/UL (ref 0.3–1)
MONOCYTES NFR BLD: 17.9 % (ref 4–15)
NEUTROPHILS # BLD AUTO: 4 K/UL (ref 1.8–7.7)
NEUTROPHILS NFR BLD: 73.8 % (ref 38–73)
NRBC BLD-RTO: 0 /100 WBC
PHOSPHATE SERPL-MCNC: 2.6 MG/DL (ref 2.7–4.5)
PLATELET # BLD AUTO: 146 K/UL (ref 150–450)
PMV BLD AUTO: 10.1 FL (ref 9.2–12.9)
POTASSIUM SERPL-SCNC: 4.3 MMOL/L (ref 3.5–5.1)
RBC # BLD AUTO: 3.28 M/UL (ref 4.6–6.2)
SERUM COLLECTION DT - LUMINEX CLASS I: NORMAL
SERUM COLLECTION DT - LUMINEX CLASS II: NORMAL
SODIUM SERPL-SCNC: 141 MMOL/L (ref 136–145)
SPCL1 TESTING DATE: NORMAL
SPCL2 TESTING DATE: NORMAL
SPLUA TESTING DATE: NORMAL
TACROLIMUS BLD-MCNC: 6.5 NG/ML (ref 5–15)
WBC # BLD AUTO: 5.43 K/UL (ref 3.9–12.7)

## 2023-04-14 PROCEDURE — 99999 PR PBB SHADOW E&M-EST. PATIENT-LVL III: CPT | Mod: PBBFAC,,,

## 2023-04-14 PROCEDURE — 36415 COLL VENOUS BLD VENIPUNCTURE: CPT | Performed by: INTERNAL MEDICINE

## 2023-04-14 PROCEDURE — 99999 PR PBB SHADOW E&M-EST. PATIENT-LVL IV: CPT | Mod: PBBFAC,,,

## 2023-04-14 PROCEDURE — 80197 ASSAY OF TACROLIMUS: CPT | Performed by: INTERNAL MEDICINE

## 2023-04-14 PROCEDURE — 99999 PR PBB SHADOW E&M-EST. PATIENT-LVL IV: ICD-10-PCS | Mod: PBBFAC,,,

## 2023-04-14 PROCEDURE — 85025 COMPLETE CBC W/AUTO DIFF WBC: CPT | Performed by: INTERNAL MEDICINE

## 2023-04-14 PROCEDURE — 80069 RENAL FUNCTION PANEL: CPT | Performed by: INTERNAL MEDICINE

## 2023-04-14 PROCEDURE — 83735 ASSAY OF MAGNESIUM: CPT | Performed by: INTERNAL MEDICINE

## 2023-04-14 PROCEDURE — 99999 PR PBB SHADOW E&M-EST. PATIENT-LVL III: ICD-10-PCS | Mod: PBBFAC,,,

## 2023-04-14 RX ORDER — KETOCONAZOLE 200 MG/1
100 TABLET ORAL DAILY
Qty: 15 TABLET | Refills: 11 | Status: SHIPPED | OUTPATIENT
Start: 2023-04-14 | End: 2023-05-09 | Stop reason: SDUPTHER

## 2023-04-14 SDOH — SOCIAL DETERMINANTS OF HEALTH (SDOH): OCCUPATIONAL EXPOSURE TO OTHER RISK FACTORS: Z57.8

## 2023-04-14 NOTE — TELEPHONE ENCOUNTER
Spoke with patient regarding below. Keto 100mg daily. Patient will  from pharmacy this afternoon. Patient verbalized understanding.----- Message from Monica Handley MD sent at 4/14/2023  1:30 PM CDT -----  Start ketoconazole 100 mg daily - ask him to start today

## 2023-04-14 NOTE — PROGRESS NOTES
"Discharge Note:    Pt will discharge to [facility] ([address]) under the care of Rene Reddy, patient's mother, phone number [phone].  Pt remains significantly withdrawn and with very minimal responses to KTS team members' questions.  SW inquired with patient regarding his lack of verbal responses and mostly non-verbal communication (head shakes and nods) during attempts to communicate with pt.  Pt responded angrily "Y'all arent' feeding me right, and I just need to get the fuck up out of here."  SW informed pt that KTS & TSU staff would be happy to address any/all concerns pt has to the best of their ability if pt communicates his concerns when they arise.  Per reports from KTS team and TSU nursing staff, pt refuses to participate in self-medication pulls and remains minimally communicative with them as well.  Pt's mother has remained engaged with KTS staff and has practiced self-pulling medications for patient with several inaccuracies.  Plan was made for home health referral for nursing visits to assist with medication management and reinforce education.  KAROLINA completed referral with [facility] with plan for [place] SN visits to start on Saturday, 4/15/2023.  Pt's father Edward also present at time of discharge to assist with checking into [facility], providing transportation for discharge, and plans of having an employee of his / friend of patient stay at  apt with pt/mother to provide transportation as needed for all follow-up needs.  Pt and caregivers aware of and involved in this discharge plan.  Pt did not communicate any concerns with the discharge plan at this time. KAROLINA remains available at [phone].    "

## 2023-04-14 NOTE — PROGRESS NOTES
"1ST NURSING VISIT POST DISCHARGE NOTE    1st RN appointment with Andrey Reddy post discharge 4/13/23 s/p kidney transplant 4/9/23.  Patient's mother accompanied him.  Patient reports none.  Patient says that he that he is sleeping well.  Incision  intact with dermabond .  Patient has  no drains .  Patient that he is able to explain daily incision care and showering instructions.  Reviewed I&O monitoring, measuring, and recording, and the need for hydration (i.e., at least 2 liters of water daily with minimal caffeine and no grapefruit products).  Medication list and rationale were reviewed.  Patient did bring blue medication card and medication bottles for review.  Patient reports that he has stopped Dulcolax and has not continued Colace.  Patient has had a bowel movement.  Patient expressed understanding of daily care including BID VS, medications, and I&O documentation.  Patient made aware of today's creatinine level: 1.6  Patient aware that coordinator will review today's labs with a transplant physician and call the patient with any dose changes indicated.  Next lab appointment scheduled for 4/17/23.  First post-operative transplant team appointment with labs scheduled for 4/19/23.    Using the Kidney Transplant Patient Reference Manual, the patient submitted his open book "Self-assessment of Kidney Transplant Patient Knowledge" test, which was completed in the transplant clinic this morning before 1st nursing visit.  This test includes questions regarding critical dose medications commonly used after kidney transplant, medication dosing and side effects, importance of timed lab draws, important signs and symptoms to report 24/7 immediately post-transplant as well as how to contact the transplant team 24/7.    Test Score: 25/25    After completing the test, the patient was given a copy of the Self-assessment Answer Key to reinforce accurate learning of test content.  Patient expressed his understanding of " the value of the information included in the self-assessment test.

## 2023-04-14 NOTE — PROGRESS NOTES
Clinic Note: First Return to Clinic Post-  Kidney Transplant    Andrey Reddy  is a 37 y.o. male  S/p LEFT KIDNEY   transplant on 4/9/2023 (Kidney) for Diabetes Mellitus - Type II.      Discharge Course (Issues/Concerns): Mr Reddy is s/p kidney transplant from 4/9/23.  Post operative course relatively uncomplicated.  Patient's caregiver is his mother who is assisting with pillbox - pill bottles numbered and may require follow up education. Today pt presents to clinic without issues.     Objective:   There were no vitals filed for this visit.    Met with patient and his caregiver in the clinic to review current medication list.     Current Outpatient Medications   Medication Sig Dispense Refill    amLODIPine (NORVASC) 10 MG tablet Take 1 tablet (10 mg total) by mouth once daily. 30 tablet 11    atorvastatin (LIPITOR) 10 MG tablet Take 1 tablet (10 mg total) by mouth every evening. 30 tablet 5    bisacodyL (DULCOLAX) 5 mg EC tablet Take 2 tablets (10 mg total) by mouth daily as needed for Constipation.  0    blood sugar diagnostic Strp Test blood sugar 3 (three) times daily. 100 each 4    blood-glucose meter Misc Use as instructed to test blood sugar daily 1 each 0    docusate sodium (COLACE) 100 MG capsule Take 1 capsule (100 mg total) by mouth 3 (three) times daily as needed for Constipation.  0    famotidine (PEPCID) 20 MG tablet Take 1 tablet (20 mg total) by mouth every evening. 30 tablet 0    hydrALAZINE (APRESOLINE) 50 MG tablet Take 1 tablet (50 mg total) by mouth every 8 (eight) hours. 90 tablet 5    insulin aspart U-100 (NOVOLOG) 100 unit/mL (3 mL) InPn pen Inject 0-5 Units into the skin 3 (three) times daily with meals. SSI 15 mL 0    k phos di & mono-sod phos mono (K-PHOS-NEUTRAL) 250 mg Tab Take 1 tablet by mouth 2 (two) times daily. 60 tablet 1    lancets Misc Test blood sugar 3 (three) times daily. 100 each 4    mycophenolate (CELLCEPT) 250 mg Cap Take 4 capsules (1,000 mg total) by mouth 2 (two)  "times daily. 240 capsule 11    oxyCODONE (ROXICODONE) 5 MG immediate release tablet Take 1 tablet (5 mg total) by mouth every 8 (eight) hours as needed for Pain. 15 tablet 0    pen needle, diabetic 32 gauge x 5/32" Ndle 1 each by Misc.(Non-Drug; Combo Route) route 3 (three) times daily. 100 each 4    predniSONE (DELTASONE) 5 MG tablet Take 20mg by mouth daily 4/11-4/17;   Take 15mg daily 4/18-4/24;   Take 10mg daily 4/25-5/1;   Take 5mg daily thereafter 5/2/23 70 tablet 11    pregabalin (LYRICA) 75 MG capsule Take 75 mg by mouth 3 (three) times daily.      simethicone (MYLICON) 80 MG chewable tablet Take 1 tablet (80 mg total) by mouth 3 (three) times daily as needed for Flatulence. 28 tablet 0    sulfamethoxazole-trimethoprim 400-80mg (BACTRIM,SEPTRA) 400-80 mg per tablet Take 1 tablet by mouth every morning. Stop: 10/6/23 30 tablet 5    tacrolimus (PROGRAF) 1 MG Cap Take 8 capsules (8 mg total) by mouth every 12 (twelve) hours. 480 capsule 11    valGANciclovir (VALCYTE) 450 mg Tab Take 2 tablets (900 mg total) by mouth every morning. Stop: 10/6/23 60 tablet 5     No current facility-administered medications for this visit.       Pharmacy Interventions/Recommendations:     1) Graft Function & Immunosuppression Issues: SCr improving, thymo induction, on FK 8/8, mmf, and prednisone taper    2) Opportunistic Infection prophylaxis:   PCP ppx: bactrim STOP 10/6/23  CMV ppx: valcyte STOP 10/6/23  Fungal ppx: none    3) Donor Serologies & Monitoring:     Donor CMV Status: positive  Donor HCV Status: negative  Donor HBcAb: negative  Donor HBV ELIAZAR: negative  Donor HCV ELIAZAR: negative      4) Pain Management & Bowel Regimen: Pain ~6, has oxycodone and home lyrica to use if needed    5) Blood Pressure Management: Clinic BP above goal with SBP ~160s but home BP a bit better --> will continue home regimen with amlodipine and hydralazine for now    6) Blood Sugar Management & Follow-up: Pt checking glucose but not discharged with " anti-hyperglycemic - glucose today 112, pt given glucose log and advised to check before meals this weekend and if glucoses remain <150 may decrease frequency of glucose checks    7) Electrolyte Management: phos low yesterday, started on KPN today - today phos 2.6, pt started on supplement yesterday and aware to increase dietary intake    8) Osteoporosis Prevention Strategy (Liver Transplant): n/a    9) OTHER medication follow-up (patient assistance, held medications, etc):   - Filled evening pill box for tonight with pt's caregiver, happy to continue to assist as needed  - Pt's glucoses have improved - pt given glucose log - recommended pt continue to check TID before meals over the weekend and may decrease glucose checks after that if glucose stays at goal  - Pt does not have KPN with them - instructed pt to add to pill box when they get home    10) Reinforced medication education conducted in the hospital, including medication indications, dosing, administration, side effects, monitoring-- including timing of immunosuppressant levels.     Patient received their FIRST fill of medications from [facility].  Discussed the process for obtaining refills of medications, including verifying the dose and mailing address to have medications delivered.     Andrey and his caregivers verbalized understanding and had the opportunity to ask questions.

## 2023-04-17 ENCOUNTER — TELEPHONE (OUTPATIENT)
Dept: TRANSPLANT | Facility: CLINIC | Age: 38
End: 2023-04-17
Payer: MEDICARE

## 2023-04-17 ENCOUNTER — LAB VISIT (OUTPATIENT)
Dept: LAB | Facility: HOSPITAL | Age: 38
End: 2023-04-17
Attending: INTERNAL MEDICINE
Payer: MEDICARE

## 2023-04-17 DIAGNOSIS — Z94.0 S/P KIDNEY TRANSPLANT: ICD-10-CM

## 2023-04-17 LAB
ALBUMIN SERPL BCP-MCNC: 3.6 G/DL (ref 3.5–5.2)
ANION GAP SERPL CALC-SCNC: 9 MMOL/L (ref 8–16)
BASOPHILS # BLD AUTO: 0.04 K/UL (ref 0–0.2)
BASOPHILS NFR BLD: 0.5 % (ref 0–1.9)
BUN SERPL-MCNC: 26 MG/DL (ref 6–20)
CALCIUM SERPL-MCNC: 9 MG/DL (ref 8.7–10.5)
CHLORIDE SERPL-SCNC: 109 MMOL/L (ref 95–110)
CO2 SERPL-SCNC: 23 MMOL/L (ref 23–29)
CREAT SERPL-MCNC: 1.4 MG/DL (ref 0.5–1.4)
DIFFERENTIAL METHOD: ABNORMAL
EOSINOPHIL # BLD AUTO: 0.1 K/UL (ref 0–0.5)
EOSINOPHIL NFR BLD: 1.5 % (ref 0–8)
ERYTHROCYTE [DISTWIDTH] IN BLOOD BY AUTOMATED COUNT: 12.2 % (ref 11.5–14.5)
EST. GFR  (NO RACE VARIABLE): >60 ML/MIN/1.73 M^2
GLUCOSE SERPL-MCNC: 144 MG/DL (ref 70–110)
HCT VFR BLD AUTO: 32.2 % (ref 40–54)
HGB BLD-MCNC: 10.5 G/DL (ref 14–18)
IMM GRANULOCYTES # BLD AUTO: 0.14 K/UL (ref 0–0.04)
IMM GRANULOCYTES NFR BLD AUTO: 1.7 % (ref 0–0.5)
LYMPHOCYTES # BLD AUTO: 0.7 K/UL (ref 1–4.8)
LYMPHOCYTES NFR BLD: 8.7 % (ref 18–48)
MAGNESIUM SERPL-MCNC: 1.5 MG/DL (ref 1.6–2.6)
MCH RBC QN AUTO: 31.2 PG (ref 27–31)
MCHC RBC AUTO-ENTMCNC: 32.6 G/DL (ref 32–36)
MCV RBC AUTO: 96 FL (ref 82–98)
MONOCYTES # BLD AUTO: 1.1 K/UL (ref 0.3–1)
MONOCYTES NFR BLD: 14.2 % (ref 4–15)
NEUTROPHILS # BLD AUTO: 5.9 K/UL (ref 1.8–7.7)
NEUTROPHILS NFR BLD: 73.4 % (ref 38–73)
NRBC BLD-RTO: 0 /100 WBC
PHOSPHATE SERPL-MCNC: 2.8 MG/DL (ref 2.7–4.5)
PLATELET # BLD AUTO: 210 K/UL (ref 150–450)
PMV BLD AUTO: 9.7 FL (ref 9.2–12.9)
POTASSIUM SERPL-SCNC: 4.1 MMOL/L (ref 3.5–5.1)
RBC # BLD AUTO: 3.37 M/UL (ref 4.6–6.2)
SODIUM SERPL-SCNC: 141 MMOL/L (ref 136–145)
TACROLIMUS BLD-MCNC: 9.4 NG/ML (ref 5–15)
WBC # BLD AUTO: 8.02 K/UL (ref 3.9–12.7)

## 2023-04-17 PROCEDURE — 36415 COLL VENOUS BLD VENIPUNCTURE: CPT | Performed by: INTERNAL MEDICINE

## 2023-04-17 PROCEDURE — 80069 RENAL FUNCTION PANEL: CPT | Performed by: INTERNAL MEDICINE

## 2023-04-17 PROCEDURE — 85025 COMPLETE CBC W/AUTO DIFF WBC: CPT | Performed by: INTERNAL MEDICINE

## 2023-04-17 PROCEDURE — 83735 ASSAY OF MAGNESIUM: CPT | Performed by: INTERNAL MEDICINE

## 2023-04-17 PROCEDURE — 80197 ASSAY OF TACROLIMUS: CPT | Performed by: INTERNAL MEDICINE

## 2023-04-17 NOTE — TELEPHONE ENCOUNTER
Spoke with dk regarding below message. Dk report patient is taking Tacro, Lipitor and Oxycodone, which have interactions with Fluconazole. Will forward to pharmD.---- Message from Constance Almazan sent at 4/17/2023 10:09 AM CDT -----  Regarding: Speak to coordinator  Contact: Dk Thomas a nurse with [facility] calling to speak to coordinator regarding,  3 potential level 2 interactions with medication.  Please advise.        [phone] ( Dk)

## 2023-04-17 NOTE — TELEPHONE ENCOUNTER
Spoke with patient regarding the Fluconazole. Patient stated he was no longer taking this. He was taking when he was on dialysis. Patient instructed to take only medications on his blue card at this time. Patient verbalized understanding.----- Message from Kelly Samaniego sent at 4/17/2023  1:16 PM CDT -----  Regarding: Medication Advice  Contact: Pt  Pt is requesting a callback in regards to medication advice. Please adv pt      Confirmed contact below:   Contact Name:Andrey Reddy  Phone Number: [phone]

## 2023-04-17 NOTE — PROGRESS NOTES
Kidney Post-Transplant Assessment    Referring Physician: Pipe Martinez  Current Nephrologist: Pipe Martinez    ORGAN: LEFT KIDNEY  Donor Type: donation after circulatory death   PHS Increased Risk: no  Cold Ischemia: 1,264 mins  Induction Medications: thymoglobulin    Subjective:     CC:  Reassessment of renal allograft function and management of chronic immunosuppression.    HPI:  Mr. Reddy is a 37 y.o. year old Black or  male with history of ESRD secondary to diabetic nephropathy and HTN who received a donation after circulatory death  kidney transplant on 4/9/23 (Thymo induction, CIT 21 hrs, KDPI 40%). His most recent creatinine is 1.4. He takes mycophenolate mofetil, prednisone, and tacrolimus for maintenance immunosuppression. His post transplant course has been uncomplicated to date.    Forgot to bring log book to clinic. Feels well, no concerns. Drinking 40-60 oz water daily, no problems with urination. Home , no peripheral edema.    Tolerating IS, is having some hand tremors. No diarrhea or vomiting. Wound healing nicely with dermabond, no drainage. Has been using a cane while walking due to discomfort but it is improving. No CP or SOB. Appetite great, weight stable.    Current Outpatient Medications   Medication Sig Dispense Refill    amLODIPine (NORVASC) 10 MG tablet Take 1 tablet (10 mg total) by mouth once daily. 30 tablet 11    atorvastatin (LIPITOR) 10 MG tablet Take 1 tablet (10 mg total) by mouth every evening. 30 tablet 5    bisacodyL (DULCOLAX) 5 mg EC tablet Take 2 tablets (10 mg total) by mouth daily as needed for Constipation.  0    blood sugar diagnostic Strp Test blood sugar 3 (three) times daily. 100 each 4    blood-glucose meter Misc Use as instructed to test blood sugar daily 1 each 0    docusate sodium (COLACE) 100 MG capsule Take 1 capsule (100 mg total) by mouth 3 (three) times daily as needed for Constipation.  0    famotidine (PEPCID) 20 MG tablet  "Take 1 tablet (20 mg total) by mouth every evening. 30 tablet 0    hydrALAZINE (APRESOLINE) 50 MG tablet Take 1 tablet (50 mg total) by mouth every 8 (eight) hours. 90 tablet 5    k phos di & mono-sod phos mono (K-PHOS-NEUTRAL) 250 mg Tab Take 1 tablet by mouth 2 (two) times daily. 60 tablet 1    ketoconazole (NIZORAL) 200 mg Tab Take ½ tablet (100 mg total) by mouth once daily. 15 tablet 11    lancets Misc Test blood sugar 3 (three) times daily. 100 each 4    mycophenolate (CELLCEPT) 250 mg Cap Take 4 capsules (1,000 mg total) by mouth 2 (two) times daily. 240 capsule 11    oxyCODONE (ROXICODONE) 5 MG immediate release tablet Take 1 tablet (5 mg total) by mouth every 8 (eight) hours as needed for Pain. 15 tablet 0    pen needle, diabetic 32 gauge x 5/32" Ndle 1 each by Misc.(Non-Drug; Combo Route) route 3 (three) times daily. 100 each 4    predniSONE (DELTASONE) 5 MG tablet Take 20mg by mouth daily 4/11-4/17;   Take 15mg daily 4/18-4/24;   Take 10mg daily 4/25-5/1;   Take 5mg daily thereafter 5/2/23 70 tablet 11    pregabalin (LYRICA) 75 MG capsule Take 75 mg by mouth 3 (three) times daily.      simethicone (MYLICON) 80 MG chewable tablet Take 1 tablet (80 mg total) by mouth 3 (three) times daily as needed for Flatulence. 28 tablet 0    sulfamethoxazole-trimethoprim 400-80mg (BACTRIM,SEPTRA) 400-80 mg per tablet Take 1 tablet by mouth every morning. Stop: 10/6/23 30 tablet 5    tacrolimus (PROGRAF) 1 MG Cap Take 8 capsules (8 mg total) by mouth every 12 (twelve) hours. 480 capsule 11    valGANciclovir (VALCYTE) 450 mg Tab Take 2 tablets (900 mg total) by mouth every morning. Stop: 10/6/23 60 tablet 5     No current facility-administered medications for this visit.       Past Medical History:   Diagnosis Date    Allergy     Anemia     Diabetes mellitus     Disorder of kidney and ureter     Hyperlipidemia     Hypertension     Secondary hyperparathyroidism        Review of Systems   Constitutional:  Negative for " "activity change, appetite change and fever.   HENT:  Negative for congestion, mouth sores and sore throat.    Eyes:  Negative for visual disturbance.   Respiratory:  Negative for cough, chest tightness and shortness of breath.    Cardiovascular:  Negative for chest pain, palpitations and leg swelling.   Gastrointestinal:  Negative for abdominal distention, abdominal pain, constipation, diarrhea and nausea.   Genitourinary:  Negative for difficulty urinating, frequency and hematuria.   Musculoskeletal:  Negative for arthralgias and gait problem.   Skin:  Positive for wound.   Allergic/Immunologic: Positive for immunocompromised state. Negative for environmental allergies and food allergies.   Neurological:  Positive for tremors (hand). Negative for dizziness, weakness and numbness.   Psychiatric/Behavioral:  Negative for sleep disturbance. The patient is not nervous/anxious.      Objective:   Blood pressure 134/64, pulse 76, temperature 97.3 °F (36.3 °C), temperature source Temporal, resp. rate 18, height 6' 2" (1.88 m), weight 68 kg (149 lb 14.6 oz), SpO2 100 %.body mass index is 19.25 kg/m².    Physical Exam  Vitals and nursing note reviewed.   Constitutional:       Appearance: Normal appearance.   HENT:      Head: Normocephalic.   Cardiovascular:      Rate and Rhythm: Normal rate and regular rhythm.      Heart sounds: Normal heart sounds.   Pulmonary:      Effort: Pulmonary effort is normal.      Breath sounds: Normal breath sounds.   Abdominal:      General: Bowel sounds are normal. There is no distension.      Palpations: Abdomen is soft.      Tenderness: There is no abdominal tenderness.      Comments: Surgical incision well approximated with dermabond, no drainage or swelling   Musculoskeletal:         General: Normal range of motion.   Skin:     General: Skin is warm and dry.   Neurological:      General: No focal deficit present.      Mental Status: He is alert.   Psychiatric:         Behavior: Behavior " normal.       Labs:  Lab Results   Component Value Date    WBC 8.02 04/17/2023    HGB 10.5 (L) 04/17/2023    HCT 32.2 (L) 04/17/2023     04/17/2023    K 4.1 04/17/2023     04/17/2023    CO2 23 04/17/2023    BUN 26 (H) 04/17/2023    CREATININE 1.4 04/17/2023    EGFRNORACEVR >60.0 04/17/2023    CALCIUM 9.0 04/17/2023    PHOS 2.8 04/17/2023    MG 1.5 (L) 04/17/2023    ALBUMIN 3.6 04/17/2023    AST 12 04/09/2023    ALT 13 04/09/2023    UTPCR 1.35 (H) 10/13/2021    .1 (H) 04/09/2023    TACROLIMUS 9.4 04/17/2023     Labs were reviewed with the patient    Assessment:     1. S/P kidney transplant    2. CKD (chronic kidney disease), stage II    3. Long-term use of immunosuppressant medication    4. Renovascular hypertension    5. Controlled type 2 diabetes mellitus with chronic kidney disease on chronic dialysis, without long-term current use of insulin    6. At risk for opportunistic infections        Plan:   Ureteral stent removal scheduled 5/1      1. CKD stage: 2     2. Immunosuppression: Prograf trough 9.4, which is  therapeutic (target 8-10). Continue Prograf 8/8, Ketoconazole 100 mg QD to augment prograf levels, MMF 1000 mg BID, and Prednisone taper. Will continue to monitor for drug toxicities    3. Allograft Function: Stable. Continue good po hydration.   -history of ESRD secondary to diabetic nephropathy and HTN s/p donation after circulatory death  kidney transplant on 4/9/23 (Thymo induction, CIT 21 hrs, KDPI 40%).  Lab Results   Component Value Date    CREATININE 1.4 04/17/2023 4/17/2023  POD 8   eGFR >60 mL/min/1.73 m^2 >60.0       4. Hypertension management: advise low salt diet and home BP monitoring    Norvasc 10 mg QD, Hydralazine 50 mg TID    5. Metabolic Bone Disease/Secondary Hyperparathyroidism:stable   mg BID  Lab Results   Component Value Date    .1 (H) 04/09/2023    CALCIUM 9.0 04/17/2023    PHOS 2.8 04/17/2023 4/17/2023  POD 8   Magnesium 1.6 - 2.6 mg/dL  1.5 (A)       6. Electrolytes:  Will monitor /guidelines  Lab Results   Component Value Date     04/17/2023    K 4.1 04/17/2023     04/17/2023    CO2 23 04/17/2023       7. Anemia: stable. No need for intervention   Lab Results   Component Value Date    WBC 8.02 04/17/2023    HGB 10.5 (L) 04/17/2023    HCT 32.2 (L) 04/17/2023    MCV 96 04/17/2023     04/17/2023         8.  Cytopenias: no significant cytopenias will monitor as per our guidelines. Medicine list reviewed including potential causes of drug-induced cytopenias    9.Proteinuria: continue p/c ratio as per guidelines     10. BK virus infection screening:  will continue to monitor per guidelines    11. Weight education: provided during the clinic visit   Body mass index is 19.25 kg/m².     12.Patient safety education regarding immunosuppression including prophylaxis posttransplant for CMV, PCP : Education provided about vaccination and prevention of infections     PCP ppx: bactrim STOP 10/6/23  CMV ppx: valcyte STOP 10/6/23  Fungal ppx: none       Follow-up:   Clinic: return to transplant clinic weekly for the first month after transplant; every 2 weeks during months 2-3; then at 6-, 9-, 12-, 18-, 24-, and 36- months post-transplant to reassess for complications from immunosuppression toxicity and monitor for rejection.  Annually thereafter.    Labs: since patient remains at high risk for rejection and drug-related complications that warrant close monitoring, labs will be ordered as follows: continue twice weekly CBC, renal panel, and drug level for first month; then same labs once weekly through 3rd month post-transplant.  Urine for UA and protein/creatinine ratio monthly.  Serum BK - PCR at 1-, 3-, 6-, 9-, 12-, 18-, 24-, 36-, 48-, and 60 months post-transplant.  Hepatic panel at 1-, 2-, 3-, 6-, 9-, 12-, 18-, 24-, and 36- months post-transplant.    Vidhi Joe NP       Education:   Material provided to the patient.  Patient reminded  to call with any health changes since these can be early signs of significant complications.  Also, I advised the patient to be sure any new medications or changes of old medications are discussed with either a pharmacist or physician knowledgeable with transplant to avoid rejection/drug toxicity related to significant drug interactions.    Patient advised that it is recommended that all transplanted patients, and their close contacts and household members receive Covid vaccination.

## 2023-04-18 ENCOUNTER — PATIENT MESSAGE (OUTPATIENT)
Dept: TRANSPLANT | Facility: CLINIC | Age: 38
End: 2023-04-18
Payer: MEDICARE

## 2023-04-19 ENCOUNTER — OFFICE VISIT (OUTPATIENT)
Dept: TRANSPLANT | Facility: CLINIC | Age: 38
End: 2023-04-19
Payer: COMMERCIAL

## 2023-04-19 VITALS
HEIGHT: 74 IN | SYSTOLIC BLOOD PRESSURE: 134 MMHG | WEIGHT: 149.94 LBS | TEMPERATURE: 97 F | HEART RATE: 76 BPM | RESPIRATION RATE: 18 BRPM | DIASTOLIC BLOOD PRESSURE: 64 MMHG | BODY MASS INDEX: 19.24 KG/M2 | OXYGEN SATURATION: 100 %

## 2023-04-19 DIAGNOSIS — Z91.89 AT RISK FOR OPPORTUNISTIC INFECTIONS: ICD-10-CM

## 2023-04-19 DIAGNOSIS — Z79.60 LONG-TERM USE OF IMMUNOSUPPRESSANT MEDICATION: ICD-10-CM

## 2023-04-19 DIAGNOSIS — N18.2 CKD (CHRONIC KIDNEY DISEASE), STAGE II: ICD-10-CM

## 2023-04-19 DIAGNOSIS — Z94.0 S/P KIDNEY TRANSPLANT: Primary | ICD-10-CM

## 2023-04-19 DIAGNOSIS — E11.22 CONTROLLED TYPE 2 DIABETES MELLITUS WITH CHRONIC KIDNEY DISEASE ON CHRONIC DIALYSIS, WITHOUT LONG-TERM CURRENT USE OF INSULIN: ICD-10-CM

## 2023-04-19 DIAGNOSIS — N18.6 CONTROLLED TYPE 2 DIABETES MELLITUS WITH CHRONIC KIDNEY DISEASE ON CHRONIC DIALYSIS, WITHOUT LONG-TERM CURRENT USE OF INSULIN: ICD-10-CM

## 2023-04-19 DIAGNOSIS — Z99.2 CONTROLLED TYPE 2 DIABETES MELLITUS WITH CHRONIC KIDNEY DISEASE ON CHRONIC DIALYSIS, WITHOUT LONG-TERM CURRENT USE OF INSULIN: ICD-10-CM

## 2023-04-19 DIAGNOSIS — I15.0 RENOVASCULAR HYPERTENSION: ICD-10-CM

## 2023-04-19 PROCEDURE — 3066F NEPHROPATHY DOC TX: CPT | Mod: CPTII,S$GLB,, | Performed by: NURSE PRACTITIONER

## 2023-04-19 PROCEDURE — 1159F PR MEDICATION LIST DOCUMENTED IN MEDICAL RECORD: ICD-10-PCS | Mod: CPTII,S$GLB,, | Performed by: NURSE PRACTITIONER

## 2023-04-19 PROCEDURE — 99215 OFFICE O/P EST HI 40 MIN: CPT | Mod: S$GLB,,, | Performed by: NURSE PRACTITIONER

## 2023-04-19 PROCEDURE — 3044F HG A1C LEVEL LT 7.0%: CPT | Mod: CPTII,S$GLB,, | Performed by: NURSE PRACTITIONER

## 2023-04-19 PROCEDURE — 3075F SYST BP GE 130 - 139MM HG: CPT | Mod: CPTII,S$GLB,, | Performed by: NURSE PRACTITIONER

## 2023-04-19 PROCEDURE — 3075F PR MOST RECENT SYSTOLIC BLOOD PRESS GE 130-139MM HG: ICD-10-PCS | Mod: CPTII,S$GLB,, | Performed by: NURSE PRACTITIONER

## 2023-04-19 PROCEDURE — 3078F PR MOST RECENT DIASTOLIC BLOOD PRESSURE < 80 MM HG: ICD-10-PCS | Mod: CPTII,S$GLB,, | Performed by: NURSE PRACTITIONER

## 2023-04-19 PROCEDURE — 1160F RVW MEDS BY RX/DR IN RCRD: CPT | Mod: CPTII,S$GLB,, | Performed by: NURSE PRACTITIONER

## 2023-04-19 PROCEDURE — 3008F BODY MASS INDEX DOCD: CPT | Mod: CPTII,S$GLB,, | Performed by: NURSE PRACTITIONER

## 2023-04-19 PROCEDURE — 3066F PR DOCUMENTATION OF TREATMENT FOR NEPHROPATHY: ICD-10-PCS | Mod: CPTII,S$GLB,, | Performed by: NURSE PRACTITIONER

## 2023-04-19 PROCEDURE — 99999 PR PBB SHADOW E&M-EST. PATIENT-LVL V: CPT | Mod: PBBFAC,,, | Performed by: NURSE PRACTITIONER

## 2023-04-19 PROCEDURE — 1160F PR REVIEW ALL MEDS BY PRESCRIBER/CLIN PHARMACIST DOCUMENTED: ICD-10-PCS | Mod: CPTII,S$GLB,, | Performed by: NURSE PRACTITIONER

## 2023-04-19 PROCEDURE — 3078F DIAST BP <80 MM HG: CPT | Mod: CPTII,S$GLB,, | Performed by: NURSE PRACTITIONER

## 2023-04-19 PROCEDURE — 3044F PR MOST RECENT HEMOGLOBIN A1C LEVEL <7.0%: ICD-10-PCS | Mod: CPTII,S$GLB,, | Performed by: NURSE PRACTITIONER

## 2023-04-19 PROCEDURE — 3008F PR BODY MASS INDEX (BMI) DOCUMENTED: ICD-10-PCS | Mod: CPTII,S$GLB,, | Performed by: NURSE PRACTITIONER

## 2023-04-19 PROCEDURE — 99215 PR OFFICE/OUTPT VISIT, EST, LEVL V, 40-54 MIN: ICD-10-PCS | Mod: S$GLB,,, | Performed by: NURSE PRACTITIONER

## 2023-04-19 PROCEDURE — 1111F PR DISCHARGE MEDS RECONCILED W/ CURRENT OUTPATIENT MED LIST: ICD-10-PCS | Mod: CPTII,S$GLB,, | Performed by: NURSE PRACTITIONER

## 2023-04-19 PROCEDURE — 1111F DSCHRG MED/CURRENT MED MERGE: CPT | Mod: CPTII,S$GLB,, | Performed by: NURSE PRACTITIONER

## 2023-04-19 PROCEDURE — 1159F MED LIST DOCD IN RCRD: CPT | Mod: CPTII,S$GLB,, | Performed by: NURSE PRACTITIONER

## 2023-04-19 PROCEDURE — 99999 PR PBB SHADOW E&M-EST. PATIENT-LVL V: ICD-10-PCS | Mod: PBBFAC,,, | Performed by: NURSE PRACTITIONER

## 2023-04-19 NOTE — LETTER
April 19, 2023        [facility] Martinez  [address] [facility] [address]  Phone: [phone]  Fax: [phone]             [facility] 1st Fl  [address]  [facility] [address]  Phone: [phone]   Patient: Andrey Reddy   MR Number: [id number]   YOB: 1985   Date of Visit: 4/19/2023       Dear Dr. Pipe Martinez    Thank you for referring Andrey Reddy to me for evaluation. Attached you will find relevant portions of my assessment and plan of care.    If you have questions, please do not hesitate to call me. I look forward to following Andrey Reddy along with you.    Sincerely,    Vidhi Joe, CHERELLE    Enclosure    If you would like to receive this communication electronically, please contact [email or web address] or [phone] to request [employer] access.    [employer] is a tool which provides read-only access to select patient information with whom you have a relationship. Its easy to use and provides real time access to review your patients record including encounter summaries, notes, results, and demographic information.    If you feel you have received this communication in error or would no longer like to receive these types of communications, please e-mail [email or web address]

## 2023-04-20 ENCOUNTER — HOSPITAL ENCOUNTER (EMERGENCY)
Facility: HOSPITAL | Age: 38
Discharge: HOME | End: 2023-04-20
Attending: EMERGENCY MEDICINE
Payer: COMMERCIAL

## 2023-04-20 ENCOUNTER — LAB VISIT (OUTPATIENT)
Dept: LAB | Facility: HOSPITAL | Age: 38
End: 2023-04-20
Attending: INTERNAL MEDICINE
Payer: MEDICARE

## 2023-04-20 VITALS
HEART RATE: 86 BPM | SYSTOLIC BLOOD PRESSURE: 134 MMHG | DIASTOLIC BLOOD PRESSURE: 82 MMHG | RESPIRATION RATE: 20 BRPM | BODY MASS INDEX: 19.25 KG/M2 | WEIGHT: 150 LBS | TEMPERATURE: 99 F | HEIGHT: 74 IN | OXYGEN SATURATION: 100 %

## 2023-04-20 DIAGNOSIS — R73.9 HYPERGLYCEMIA: Primary | ICD-10-CM

## 2023-04-20 DIAGNOSIS — N18.6 CONTROLLED TYPE 2 DIABETES MELLITUS WITH CHRONIC KIDNEY DISEASE ON CHRONIC DIALYSIS, WITHOUT LONG-TERM CURRENT USE OF INSULIN: ICD-10-CM

## 2023-04-20 DIAGNOSIS — Z99.2 CONTROLLED TYPE 2 DIABETES MELLITUS WITH CHRONIC KIDNEY DISEASE ON CHRONIC DIALYSIS, WITHOUT LONG-TERM CURRENT USE OF INSULIN: ICD-10-CM

## 2023-04-20 DIAGNOSIS — E11.22 CONTROLLED TYPE 2 DIABETES MELLITUS WITH CHRONIC KIDNEY DISEASE ON CHRONIC DIALYSIS, WITHOUT LONG-TERM CURRENT USE OF INSULIN: ICD-10-CM

## 2023-04-20 DIAGNOSIS — Z94.0 S/P KIDNEY TRANSPLANT: Primary | ICD-10-CM

## 2023-04-20 DIAGNOSIS — Z94.0 S/P KIDNEY TRANSPLANT: ICD-10-CM

## 2023-04-20 LAB
ALBUMIN SERPL BCP-MCNC: 3.7 G/DL (ref 3.5–5.2)
ANION GAP SERPL CALC-SCNC: 9 MMOL/L (ref 8–16)
BASOPHILS # BLD AUTO: 0.04 K/UL (ref 0–0.2)
BASOPHILS NFR BLD: 0.4 % (ref 0–1.9)
BUN SERPL-MCNC: 21 MG/DL (ref 6–20)
CALCIUM SERPL-MCNC: 8.9 MG/DL (ref 8.7–10.5)
CHLORIDE SERPL-SCNC: 108 MMOL/L (ref 95–110)
CO2 SERPL-SCNC: 24 MMOL/L (ref 23–29)
CREAT SERPL-MCNC: 1.6 MG/DL (ref 0.5–1.4)
DIFFERENTIAL METHOD: ABNORMAL
EOSINOPHIL # BLD AUTO: 0.1 K/UL (ref 0–0.5)
EOSINOPHIL NFR BLD: 0.8 % (ref 0–8)
ERYTHROCYTE [DISTWIDTH] IN BLOOD BY AUTOMATED COUNT: 12.2 % (ref 11.5–14.5)
EST. GFR  (NO RACE VARIABLE): 56.2 ML/MIN/1.73 M^2
GLUCOSE SERPL-MCNC: 223 MG/DL (ref 70–110)
HCT VFR BLD AUTO: 31 % (ref 40–54)
HGB BLD-MCNC: 10.1 G/DL (ref 14–18)
IMM GRANULOCYTES # BLD AUTO: 0.13 K/UL (ref 0–0.04)
IMM GRANULOCYTES NFR BLD AUTO: 1.4 % (ref 0–0.5)
LYMPHOCYTES # BLD AUTO: 1.1 K/UL (ref 1–4.8)
LYMPHOCYTES NFR BLD: 11.8 % (ref 18–48)
MAGNESIUM SERPL-MCNC: 1.6 MG/DL (ref 1.6–2.6)
MCH RBC QN AUTO: 31.3 PG (ref 27–31)
MCHC RBC AUTO-ENTMCNC: 32.6 G/DL (ref 32–36)
MCV RBC AUTO: 96 FL (ref 82–98)
MONOCYTES # BLD AUTO: 0.9 K/UL (ref 0.3–1)
MONOCYTES NFR BLD: 9.8 % (ref 4–15)
NEUTROPHILS # BLD AUTO: 6.9 K/UL (ref 1.8–7.7)
NEUTROPHILS NFR BLD: 75.8 % (ref 38–73)
NRBC BLD-RTO: 0 /100 WBC
PHOSPHATE SERPL-MCNC: 2.9 MG/DL (ref 2.7–4.5)
PLATELET # BLD AUTO: 282 K/UL (ref 150–450)
PMV BLD AUTO: 10.3 FL (ref 9.2–12.9)
POTASSIUM SERPL-SCNC: 4.2 MMOL/L (ref 3.5–5.1)
RBC # BLD AUTO: 3.23 M/UL (ref 4.6–6.2)
SODIUM SERPL-SCNC: 141 MMOL/L (ref 136–145)
TACROLIMUS BLD-MCNC: 22.9 NG/ML (ref 5–15)
WBC # BLD AUTO: 9.05 K/UL (ref 3.9–12.7)

## 2023-04-20 PROCEDURE — 83735 ASSAY OF MAGNESIUM: CPT | Performed by: INTERNAL MEDICINE

## 2023-04-20 PROCEDURE — 82962 GLUCOSE BLOOD TEST: CPT

## 2023-04-20 PROCEDURE — 85025 COMPLETE CBC W/AUTO DIFF WBC: CPT | Performed by: INTERNAL MEDICINE

## 2023-04-20 PROCEDURE — 99284 EMERGENCY DEPT VISIT MOD MDM: CPT | Mod: ,,, | Performed by: EMERGENCY MEDICINE

## 2023-04-20 PROCEDURE — 99284 PR EMERGENCY DEPT VISIT,LEVEL IV: ICD-10-PCS | Mod: ,,, | Performed by: EMERGENCY MEDICINE

## 2023-04-20 PROCEDURE — 80069 RENAL FUNCTION PANEL: CPT | Performed by: INTERNAL MEDICINE

## 2023-04-20 PROCEDURE — 80197 ASSAY OF TACROLIMUS: CPT | Performed by: INTERNAL MEDICINE

## 2023-04-20 PROCEDURE — 36415 COLL VENOUS BLD VENIPUNCTURE: CPT | Performed by: INTERNAL MEDICINE

## 2023-04-20 PROCEDURE — 99282 EMERGENCY DEPT VISIT SF MDM: CPT

## 2023-04-20 RX ORDER — TACROLIMUS 1 MG/1
4 CAPSULE ORAL EVERY 12 HOURS
Qty: 240 CAPSULE | Refills: 11 | Status: SHIPPED | OUTPATIENT
Start: 2023-04-20 | End: 2023-04-27 | Stop reason: DRUGHIGH

## 2023-04-20 RX ORDER — LINAGLIPTIN 5 MG/1
5 TABLET, FILM COATED ORAL DAILY
Qty: 30 TABLET | Refills: 11 | Status: CANCELLED | OUTPATIENT
Start: 2023-04-20 | End: 2024-04-19

## 2023-04-20 NOTE — ED PROVIDER NOTES
Encounter Date: 4/20/2023    SCRIBE #1 NOTE: I, Diana Quevedo, am scribing for, and in the presence of,  Moses Sinclair MD. I have scribed the following portions of the note - Other sections scribed: HPI, ROS, PE.     History     Chief Complaint   Patient presents with    Hyperglycemia     Pt states he just checked his sugar and it was 320. Pt type 2 diabetic. Denies any weakness. Denies any pain or shortness of breath. No other complaints at this time.      Andrey Reddy is a 37 y.o. male with a PMHx of hypertension, hyperlipidemia, diabetes mellitus, who presents to the ED c/o hyperglycemia x 1 day. Patient states he checked his sugar levels yesterday night where they read at 200. States he went to have a snack and about 2 hours later checked it again where it was at 280. This morning patient states he sugar levels were at 200 again rising within 2 hours. Pt notes calling his PCP and was told about possibly switching medications and reducing Prograf. He currently has been prescribed januvia but has yet to start it. In addition, pt had a kidney transplant on 4/9. States his surgical incision is healing well and has had no other complications. Of note, he reports he was previously on dialysis.       The history is provided by the patient and medical records. No  was used.   Review of patient's allergies indicates:   Allergen Reactions    Shrimp      Past Medical History:   Diagnosis Date    Allergy     Anemia     Diabetes mellitus     Disorder of kidney and ureter     Hyperlipidemia     Hypertension     Secondary hyperparathyroidism      Past Surgical History:   Procedure Laterality Date    INSERTION OF DIALYSIS CATHETER      KIDNEY TRANSPLANT N/A 4/9/2023    Procedure: TRANSPLANT, KIDNEY;  Surgeon: Julito Saleh MD;  Location: [facility] OR [address] [place] [address];  Service: Transplant;  Laterality: N/A;  Off Ice - 0919  Reperfused - 0945     Family History   Problem Relation Age of Onset    Diabetes Mother      Cancer Mother     Hypertension Mother     Diabetes Father     Stroke Brother      Social History     Tobacco Use    Smoking status: Never    Smokeless tobacco: Never   Substance Use Topics    Alcohol use: Yes    Drug use: Yes     Types: Marijuana     Review of Systems   Constitutional:  Negative for fever.   HENT:  Negative for sore throat.    Respiratory:  Negative for shortness of breath.    Cardiovascular:  Negative for chest pain.   Gastrointestinal:  Negative for nausea.   Genitourinary:  Negative for dysuria.   Musculoskeletal:  Negative for back pain.   Skin:  Negative for rash.   Neurological:  Negative for weakness.   Hematological:  Does not bruise/bleed easily.     Physical Exam     Initial Vitals [04/20/23 1714]   BP Pulse Resp Temp SpO2   134/82 86 20 98.9 °F (37.2 °C) 100 %      MAP       --         Physical Exam    Nursing note and vitals reviewed.  Constitutional: He appears well-developed and well-nourished. No distress.   HENT:   Head: Normocephalic and atraumatic.   Mouth/Throat: Oropharynx is clear and moist.   Eyes: Conjunctivae and EOM are normal. Pupils are equal, round, and reactive to light. Right eye exhibits no discharge. Left eye exhibits no discharge. No scleral icterus.   Neck: Neck supple. No JVD present.   Normal range of motion.  Cardiovascular:  Normal rate, regular rhythm, normal heart sounds and intact distal pulses.     Exam reveals no friction rub.       No murmur heard.  Pulmonary/Chest: Breath sounds normal. No stridor. No respiratory distress. He has no wheezes. He has no rhonchi. He has no rales.   Abdominal: Abdomen is soft. Bowel sounds are normal. He exhibits no distension and no mass. There is no abdominal tenderness.   Surgical wound is healing well There is no rebound and no guarding.   Musculoskeletal:         General: No tenderness or edema. Normal range of motion.      Cervical back: Normal range of motion and neck supple.     Lymphadenopathy:     He has no cervical  adenopathy.   Neurological: He is alert. He has normal strength. No cranial nerve deficit or sensory deficit. GCS score is 15. GCS eye subscore is 4. GCS verbal subscore is 5. GCS motor subscore is 6.   Skin: Skin is warm. Capillary refill takes less than 2 seconds. No rash noted.   Psychiatric: He has a normal mood and affect.       ED Course   Procedures  Labs Reviewed - No data to display       Imaging Results    None          Medications - No data to display  Medical Decision Making:   History:   Old Medical Records: I decided to obtain old medical records.  Initial Assessment:   37-year-old with recent kidney transplant presents with elevated blood sugar.  His nephrologist called in Januvia for him.  His blood sugars been running in the 200s.  Will check basic labs and give IV fluids.  He does not appear to be in DKA.  No symptoms to suggest infection.  Clinical Tests:   Lab Tests: Ordered and Reviewed  ED Management:  I reviewed his medical record.  He had labs done this morning.  Do not believe labs are indicated in the ED.  It shows hyperglycemia.  His creatinine is stable at 1.6.  No sign of DKA.  I reviewed this with the on-call provider for transplant surgery on-call and they agreed with this plan  I called the pharmacy and they will deliver his Listikiuvia prescription.  He has a appointment tomorrow with the transplant team dietitian to discuss his new diagnosis.  Patient is comfortable with going home.        Scribe Attestation:   Scribe #1: I performed the above scribed service and the documentation accurately describes the services I performed. I attest to the accuracy of the note.                   Clinical Impression:   Final diagnoses:  [R73.9] Hyperglycemia (Primary)        ED Disposition Condition    Discharge Stable          ED Prescriptions    None       Follow-up Information       Follow up With Specialties Details Why Contact [facility] - Emergency Dept Emergency Medicine  If symptoms  worsen [address]  [facility] [id number]  [phone]             Moses Sinclair MD  04/20/23 1435

## 2023-04-20 NOTE — TELEPHONE ENCOUNTER
Spoke with patient regarding below. Patient confirmed trough. Will hold dose and restart tomorrow PM dose at 4/4. Patient checking BS and does not take any insulin. Patient to start on Tradjenta 5mg daily. Will have patient speak with RD for diet education.Will make endo referral.----- Message from Monica Handley MD sent at 4/20/2023  2:43 PM CDT -----  Verify this is 12 hr trough. If accurate: Hold tacro tonight and tomorrow morning. He should restart tacrolimus Friday 4/21 with night dose of 4 mg BID. Repeat tac Monday and Th next week.    GLUCOSE 223. Is he checking sugars QID at home? Does he have insulin? Needs to follow diabetic diet and SSI ASAP plus sugar clinic.

## 2023-04-21 ENCOUNTER — NUTRITION (OUTPATIENT)
Dept: TRANSPLANT | Facility: CLINIC | Age: 38
End: 2023-04-21
Payer: COMMERCIAL

## 2023-04-21 VITALS — WEIGHT: 150.56 LBS | HEIGHT: 73 IN | BODY MASS INDEX: 19.95 KG/M2

## 2023-04-21 DIAGNOSIS — E11.22 CONTROLLED TYPE 2 DIABETES MELLITUS WITH CHRONIC KIDNEY DISEASE ON CHRONIC DIALYSIS, WITHOUT LONG-TERM CURRENT USE OF INSULIN: Primary | ICD-10-CM

## 2023-04-21 DIAGNOSIS — Z99.2 CONTROLLED TYPE 2 DIABETES MELLITUS WITH CHRONIC KIDNEY DISEASE ON CHRONIC DIALYSIS, WITHOUT LONG-TERM CURRENT USE OF INSULIN: Primary | ICD-10-CM

## 2023-04-21 DIAGNOSIS — N18.6 CONTROLLED TYPE 2 DIABETES MELLITUS WITH CHRONIC KIDNEY DISEASE ON CHRONIC DIALYSIS, WITHOUT LONG-TERM CURRENT USE OF INSULIN: Primary | ICD-10-CM

## 2023-04-21 LAB — POCT GLUCOSE: 292 MG/DL (ref 70–110)

## 2023-04-21 PROCEDURE — 97802 PR MED NUTR THER, 1ST, INDIV, EA 15 MIN: ICD-10-PCS | Mod: S$GLB,,,

## 2023-04-21 PROCEDURE — 97802 MEDICAL NUTRITION INDIV IN: CPT | Mod: S$GLB,,,

## 2023-04-21 NOTE — PROGRESS NOTES
REASON FOR VISIT:   S/p transplant; diabetic diet education per MD     PAST MEDICAL HX:   S/p kidney transplant 4/9/23, HTN, HLD, DM     LABS:   Glucose 292 on 4/20    WT: 68.3 kg (150 lbs)   BMI: 19.87 kg/m2     NUTRITION HX:   Pt and spouse present. Pt reports no previous diabetic diet education, not on insulin at this time. Pt with good appetite, denies N/V/D/C.  lbs, stable. Exercise before transplant consisted of walking and light weight lifting. Pt typically consumes well balanced meals with protein, CHO serving and non starchy vegetables. Pt enjoys consuming fabián tea, juices, and sometimes dessert.       INTERVENTION/EDUCATION:  DM packet reviewed (basic DM guidelines, carb counting, sample ADA meal plan).  Emphasis on controlling carb intake throughout the day, no skipping meals, snacks when appropriate.    Patient voiced understanding of education & goals. Contact information was provided & will f/u as needed at clinic visits.     Consultation Time: 15 minutes

## 2023-04-24 ENCOUNTER — TELEPHONE (OUTPATIENT)
Dept: TRANSPLANT | Facility: CLINIC | Age: 38
End: 2023-04-24
Payer: MEDICARE

## 2023-04-24 ENCOUNTER — LAB VISIT (OUTPATIENT)
Dept: LAB | Facility: HOSPITAL | Age: 38
End: 2023-04-24
Attending: INTERNAL MEDICINE
Payer: COMMERCIAL

## 2023-04-24 DIAGNOSIS — Z94.0 S/P KIDNEY TRANSPLANT: ICD-10-CM

## 2023-04-24 LAB
ALBUMIN SERPL BCP-MCNC: 3.8 G/DL (ref 3.5–5.2)
ANION GAP SERPL CALC-SCNC: 8 MMOL/L (ref 8–16)
BASOPHILS # BLD AUTO: 0.08 K/UL (ref 0–0.2)
BASOPHILS NFR BLD: 0.8 % (ref 0–1.9)
BUN SERPL-MCNC: 17 MG/DL (ref 6–20)
CALCIUM SERPL-MCNC: 9.1 MG/DL (ref 8.7–10.5)
CHLORIDE SERPL-SCNC: 105 MMOL/L (ref 95–110)
CO2 SERPL-SCNC: 23 MMOL/L (ref 23–29)
CREAT SERPL-MCNC: 1.6 MG/DL (ref 0.5–1.4)
DIFFERENTIAL METHOD: ABNORMAL
EOSINOPHIL # BLD AUTO: 0.1 K/UL (ref 0–0.5)
EOSINOPHIL NFR BLD: 0.9 % (ref 0–8)
ERYTHROCYTE [DISTWIDTH] IN BLOOD BY AUTOMATED COUNT: 12.2 % (ref 11.5–14.5)
EST. GFR  (NO RACE VARIABLE): 56.2 ML/MIN/1.73 M^2
GLUCOSE SERPL-MCNC: 203 MG/DL (ref 70–110)
HCT VFR BLD AUTO: 31.4 % (ref 40–54)
HGB BLD-MCNC: 10.3 G/DL (ref 14–18)
IMM GRANULOCYTES # BLD AUTO: 0.11 K/UL (ref 0–0.04)
IMM GRANULOCYTES NFR BLD AUTO: 1.1 % (ref 0–0.5)
LYMPHOCYTES # BLD AUTO: 0.9 K/UL (ref 1–4.8)
LYMPHOCYTES NFR BLD: 9.4 % (ref 18–48)
MAGNESIUM SERPL-MCNC: 1.4 MG/DL (ref 1.6–2.6)
MCH RBC QN AUTO: 31.6 PG (ref 27–31)
MCHC RBC AUTO-ENTMCNC: 32.8 G/DL (ref 32–36)
MCV RBC AUTO: 96 FL (ref 82–98)
MONOCYTES # BLD AUTO: 0.6 K/UL (ref 0.3–1)
MONOCYTES NFR BLD: 5.8 % (ref 4–15)
NEUTROPHILS # BLD AUTO: 8.2 K/UL (ref 1.8–7.7)
NEUTROPHILS NFR BLD: 82 % (ref 38–73)
NRBC BLD-RTO: 0 /100 WBC
PHOSPHATE SERPL-MCNC: 3.2 MG/DL (ref 2.7–4.5)
PLATELET # BLD AUTO: 254 K/UL (ref 150–450)
PMV BLD AUTO: 10 FL (ref 9.2–12.9)
POTASSIUM SERPL-SCNC: 3.7 MMOL/L (ref 3.5–5.1)
RBC # BLD AUTO: 3.26 M/UL (ref 4.6–6.2)
SODIUM SERPL-SCNC: 136 MMOL/L (ref 136–145)
TACROLIMUS BLD-MCNC: 16.2 NG/ML (ref 5–15)
WBC # BLD AUTO: 10 K/UL (ref 3.9–12.7)

## 2023-04-24 PROCEDURE — 36415 COLL VENOUS BLD VENIPUNCTURE: CPT | Performed by: INTERNAL MEDICINE

## 2023-04-24 PROCEDURE — 85025 COMPLETE CBC W/AUTO DIFF WBC: CPT | Performed by: INTERNAL MEDICINE

## 2023-04-24 PROCEDURE — 83735 ASSAY OF MAGNESIUM: CPT | Performed by: INTERNAL MEDICINE

## 2023-04-24 PROCEDURE — 80197 ASSAY OF TACROLIMUS: CPT | Performed by: INTERNAL MEDICINE

## 2023-04-24 PROCEDURE — 80069 RENAL FUNCTION PANEL: CPT | Performed by: INTERNAL MEDICINE

## 2023-04-24 RX ORDER — FAMOTIDINE 20 MG/1
20 TABLET, FILM COATED ORAL NIGHTLY
Qty: 30 TABLET | Refills: 0 | Status: CANCELLED | OUTPATIENT
Start: 2023-04-24

## 2023-04-24 NOTE — PROGRESS NOTES
Transplant Coordinator  4/9-4/13/2023     Pt admitted for a cadaveric kidney transplant from a DCD donor. Thymo induction, KDPI 40%, CIT >21 hrs     Cr trending down, good UOP     RLQ incision LARS with staples  Fong removed prior to d/c     Labs 4/14 and RTC to meet with coordinator/pharmD

## 2023-04-24 NOTE — PROGRESS NOTES
Kidney Post-Transplant Assessment    Referring Physician: Pipe Martinez  Current Nephrologist: Pipe Martinez    ORGAN: LEFT KIDNEY  Donor Type: donation after circulatory death   PHS Increased Risk: no  Cold Ischemia: 1,264 mins  Induction Medications: thymoglobulin    Subjective:     CC:  Reassessment of renal allograft function and management of chronic immunosuppression.    HPI:  Mr. Reddy is a 37 y.o. year old Black or  male with history of ESRD secondary to diabetic nephropathy and HTN who received a donation after circulatory death  kidney transplant on 4/9/23 (Thymo induction, CIT 21 hrs, KDPI 40%). His most recent creatinine is 1.6.  He takes mycophenolate mofetil, prednisone, and tacrolimus for maintenance immunosuppression. His post transplant course has been uncomplicated to date.    Feels well without complaints. Drinking about 2 L water daily, no problems with urination. Home -140, no peripheral edema.    Surgical incision healing nicely with dermabond, no pain or swelling. Tolerating IS without issues, no diarrhea or vomiting.     Current Outpatient Medications   Medication Sig Dispense Refill    amLODIPine (NORVASC) 10 MG tablet Take 1 tablet (10 mg total) by mouth once daily. 30 tablet 11    atorvastatin (LIPITOR) 10 MG tablet Take 1 tablet (10 mg total) by mouth every evening. 30 tablet 5    bisacodyL (DULCOLAX) 5 mg EC tablet Take 2 tablets (10 mg total) by mouth daily as needed for Constipation.  0    blood sugar diagnostic Strp Test blood sugar 3 (three) times daily. 100 each 4    blood-glucose meter Misc Use as instructed to test blood sugar daily 1 each 0    docusate sodium (COLACE) 100 MG capsule Take 1 capsule (100 mg total) by mouth 3 (three) times daily as needed for Constipation.  0    famotidine (PEPCID) 20 MG tablet Take 1 tablet (20 mg total) by mouth every evening. 30 tablet 0    hydrALAZINE (APRESOLINE) 50 MG tablet Take 1 tablet (50 mg total) by  "mouth every 8 (eight) hours. 90 tablet 5    k phos di & mono-sod phos mono (K-PHOS-NEUTRAL) 250 mg Tab Take 1 tablet by mouth 2 (two) times daily. 60 tablet 1    ketoconazole (NIZORAL) 200 mg Tab Take ½ tablet (100 mg total) by mouth once daily. 15 tablet 11    lancets Misc Test blood sugar 3 (three) times daily. 100 each 4    mycophenolate (CELLCEPT) 250 mg Cap Take 4 capsules (1,000 mg total) by mouth 2 (two) times daily. 240 capsule 11    oxyCODONE (ROXICODONE) 5 MG immediate release tablet Take 1 tablet (5 mg total) by mouth every 8 (eight) hours as needed for Pain. 15 tablet 0    pen needle, diabetic 32 gauge x 5/32" Ndle 1 each by Misc.(Non-Drug; Combo Route) route 3 (three) times daily. 100 each 4    predniSONE (DELTASONE) 5 MG tablet Take 20mg by mouth daily 4/11-4/17;   Take 15mg daily 4/18-4/24;   Take 10mg daily 4/25-5/1;   Take 5mg daily thereafter 5/2/23 70 tablet 11    pregabalin (LYRICA) 75 MG capsule Take 75 mg by mouth 3 (three) times daily.      simethicone (MYLICON) 80 MG chewable tablet Take 1 tablet (80 mg total) by mouth 3 (three) times daily as needed for Flatulence. 28 tablet 0    SITagliptin phosphate (JANUVIA) 100 MG Tab Take 1 tablet (100 mg total) by mouth once daily. 31 tablet 3    sulfamethoxazole-trimethoprim 400-80mg (BACTRIM,SEPTRA) 400-80 mg per tablet Take 1 tablet by mouth every morning. Stop: 10/6/23 30 tablet 5    tacrolimus (PROGRAF) 1 MG Cap Take 4 capsules (4 mg total) by mouth every 12 (twelve) hours. 240 capsule 11    valGANciclovir (VALCYTE) 450 mg Tab Take 2 tablets (900 mg total) by mouth every morning. Stop: 10/6/23 60 tablet 5     No current facility-administered medications for this visit.       Past Medical History:   Diagnosis Date    Allergy     Anemia     Diabetes mellitus     Disorder of kidney and ureter     Hyperlipidemia     Hypertension     Secondary hyperparathyroidism        Review of Systems   Constitutional:  Negative for activity change, appetite change " "and fever.   HENT:  Negative for congestion, mouth sores and sore throat.    Eyes:  Negative for visual disturbance.   Respiratory:  Negative for cough, chest tightness and shortness of breath.    Cardiovascular:  Negative for chest pain, palpitations and leg swelling.   Gastrointestinal:  Negative for abdominal distention, abdominal pain, constipation, diarrhea and nausea.   Genitourinary:  Negative for difficulty urinating, frequency and hematuria.   Musculoskeletal:  Negative for arthralgias and gait problem.   Skin:  Positive for wound.   Allergic/Immunologic: Positive for immunocompromised state. Negative for environmental allergies and food allergies.   Neurological:  Positive for tremors (hand). Negative for dizziness, weakness and numbness.   Psychiatric/Behavioral:  Negative for sleep disturbance. The patient is not nervous/anxious.      Objective:   Blood pressure (!) 152/73, pulse 84, temperature 97.7 °F (36.5 °C), temperature source Tympanic, resp. rate 16, height 6' 2" (1.88 m), weight 68.9 kg (151 lb 14.4 oz), SpO2 99 %.body mass index is 19.5 kg/m².    Physical Exam  Vitals and nursing note reviewed.   Constitutional:       Appearance: Normal appearance.   HENT:      Head: Normocephalic.   Cardiovascular:      Rate and Rhythm: Normal rate and regular rhythm.      Heart sounds: Normal heart sounds.   Pulmonary:      Effort: Pulmonary effort is normal.      Breath sounds: Normal breath sounds.   Abdominal:      General: Bowel sounds are normal. There is no distension.      Palpations: Abdomen is soft.      Tenderness: There is no abdominal tenderness.      Comments: Surgical incision well approximated with dermabond, no drainage or swelling   Musculoskeletal:         General: Normal range of motion.   Skin:     General: Skin is warm and dry.   Neurological:      General: No focal deficit present.      Mental Status: He is alert.   Psychiatric:         Behavior: Behavior normal.       Labs:  Lab Results "   Component Value Date    WBC 10.00 04/24/2023    HGB 10.3 (L) 04/24/2023    HCT 31.4 (L) 04/24/2023     04/24/2023    K 3.7 04/24/2023     04/24/2023    CO2 23 04/24/2023    BUN 17 04/24/2023    CREATININE 1.6 (H) 04/24/2023    EGFRNORACEVR 56.2 (A) 04/24/2023    CALCIUM 9.1 04/24/2023    PHOS 3.2 04/24/2023    MG 1.4 (L) 04/24/2023    ALBUMIN 3.8 04/24/2023    AST 12 04/09/2023    ALT 13 04/09/2023    UTPCR 1.35 (H) 10/13/2021    .1 (H) 04/09/2023    TACROLIMUS 16.2 (H) 04/24/2023     Labs were reviewed with the patient    Assessment:     1. S/P kidney transplant    2. Renovascular hypertension    3. Long-term use of immunosuppressant medication    4. Anemia of chronic disease    5. At risk for opportunistic infections      Plan:   Ureteral stent removal scheduled 5/1  Repeating prograf level tomorrow    1. CKD stage: 3a    2. Immunosuppression: Prograf trough 16.2, repeating level tomorrow.   Continue Prograf 4/4, Ketoconazole 100 mg QD to augment prograf levels, MMF 1000 mg BID, and Prednisone taper. Will continue to monitor for drug toxicities    3. Allograft Function: Stable. Continue good po hydration.   -history of ESRD secondary to diabetic nephropathy and HTN s/p donation after circulatory death  kidney transplant on 4/9/23 (Thymo induction, CIT 21 hrs, KDPI 40%).  Lab Results   Component Value Date    CREATININE 1.6 (H) 04/24/2023 4/24/2023  POD 15   eGFR >60 mL/min/1.73 m^2 56.2 (A)       4. Hypertension management: advise low salt diet and home BP monitoring    Norvasc 10 mg QD, Hydralazine 50 mg TID    5. Metabolic Bone Disease/Secondary Hyperparathyroidism:stable   mg BID  Lab Results   Component Value Date    .1 (H) 04/09/2023    CALCIUM 9.1 04/24/2023    PHOS 3.2 04/24/2023 4/24/2023  POD 15   Magnesium 1.6 - 2.6 mg/dL 1.4 (A)       6. Electrolytes:  Will monitor /guidelines  Lab Results   Component Value Date     04/24/2023    K 3.7 04/24/2023      04/24/2023    CO2 23 04/24/2023       7. Anemia: stable. No need for intervention   Lab Results   Component Value Date    WBC 10.00 04/24/2023    HGB 10.3 (L) 04/24/2023    HCT 31.4 (L) 04/24/2023    MCV 96 04/24/2023     04/24/2023         8.  Cytopenias: no significant cytopenias will monitor as per our guidelines. Medicine list reviewed including potential causes of drug-induced cytopenias    9.Proteinuria: continue p/c ratio as per guidelines     10. BK virus infection screening:  will continue to monitor per guidelines    11. Weight education: provided during the clinic visit   Body mass index is 19.5 kg/m².     12.Patient safety education regarding immunosuppression including prophylaxis posttransplant for CMV, PCP : Education provided about vaccination and prevention of infections     PCP ppx: bactrim STOP 10/6/23  CMV ppx: valcyte STOP 10/6/23  Fungal ppx: none       Follow-up:   Clinic: return to transplant clinic weekly for the first month after transplant; every 2 weeks during months 2-3; then at 6-, 9-, 12-, 18-, 24-, and 36- months post-transplant to reassess for complications from immunosuppression toxicity and monitor for rejection.  Annually thereafter.    Labs: since patient remains at high risk for rejection and drug-related complications that warrant close monitoring, labs will be ordered as follows: continue twice weekly CBC, renal panel, and drug level for first month; then same labs once weekly through 3rd month post-transplant.  Urine for UA and protein/creatinine ratio monthly.  Serum BK - PCR at 1-, 3-, 6-, 9-, 12-, 18-, 24-, 36-, 48-, and 60 months post-transplant.  Hepatic panel at 1-, 2-, 3-, 6-, 9-, 12-, 18-, 24-, and 36- months post-transplant.    Vidhi Joe NP       Education:   Material provided to the patient.  Patient reminded to call with any health changes since these can be early signs of significant complications.  Also, I advised the patient to be sure any  new medications or changes of old medications are discussed with either a pharmacist or physician knowledgeable with transplant to avoid rejection/drug toxicity related to significant drug interactions.    Patient advised that it is recommended that all transplanted patients, and their close contacts and household members receive Covid vaccination.

## 2023-04-24 NOTE — TELEPHONE ENCOUNTER
Patient asked about refills on pills, stated he has enough to get him through the month, but that he is nervous he will run out. The pharm D had counted the pills today and stated he had enough to get through to his refill on the 1st.     Pt also asking if he can do labs on wed instead of thurs, explained to pt he has to do labs on thurs d/t it being closer to Monday when he does lab again. Pt verbalized understanding.   ----- Message from Joseph Rock sent at 4/24/2023  3:41 PM CDT -----  Regarding: Speak to Nurse  Patient called in requesting to speak with his nurse Ana to discuss your current medications. No other details provided. Requested a call back at earliest convenience.                       Contact: [phone]

## 2023-04-26 ENCOUNTER — OFFICE VISIT (OUTPATIENT)
Dept: TRANSPLANT | Facility: CLINIC | Age: 38
End: 2023-04-26
Payer: COMMERCIAL

## 2023-04-26 VITALS
BODY MASS INDEX: 19.49 KG/M2 | SYSTOLIC BLOOD PRESSURE: 152 MMHG | DIASTOLIC BLOOD PRESSURE: 73 MMHG | WEIGHT: 151.88 LBS | RESPIRATION RATE: 16 BRPM | OXYGEN SATURATION: 99 % | HEIGHT: 74 IN | TEMPERATURE: 98 F | HEART RATE: 84 BPM

## 2023-04-26 DIAGNOSIS — Z94.0 S/P KIDNEY TRANSPLANT: Primary | ICD-10-CM

## 2023-04-26 DIAGNOSIS — I15.0 RENOVASCULAR HYPERTENSION: ICD-10-CM

## 2023-04-26 DIAGNOSIS — Z79.60 LONG-TERM USE OF IMMUNOSUPPRESSANT MEDICATION: ICD-10-CM

## 2023-04-26 DIAGNOSIS — Z91.89 AT RISK FOR OPPORTUNISTIC INFECTIONS: ICD-10-CM

## 2023-04-26 DIAGNOSIS — D63.8 ANEMIA OF CHRONIC DISEASE: ICD-10-CM

## 2023-04-26 PROCEDURE — 3077F PR MOST RECENT SYSTOLIC BLOOD PRESSURE >= 140 MM HG: ICD-10-PCS | Mod: CPTII,S$GLB,, | Performed by: NURSE PRACTITIONER

## 2023-04-26 PROCEDURE — 3044F PR MOST RECENT HEMOGLOBIN A1C LEVEL <7.0%: ICD-10-PCS | Mod: CPTII,S$GLB,, | Performed by: NURSE PRACTITIONER

## 2023-04-26 PROCEDURE — 3077F SYST BP >= 140 MM HG: CPT | Mod: CPTII,S$GLB,, | Performed by: NURSE PRACTITIONER

## 2023-04-26 PROCEDURE — 3008F PR BODY MASS INDEX (BMI) DOCUMENTED: ICD-10-PCS | Mod: CPTII,S$GLB,, | Performed by: NURSE PRACTITIONER

## 2023-04-26 PROCEDURE — 99999 PR PBB SHADOW E&M-EST. PATIENT-LVL V: ICD-10-PCS | Mod: PBBFAC,,, | Performed by: NURSE PRACTITIONER

## 2023-04-26 PROCEDURE — 1159F MED LIST DOCD IN RCRD: CPT | Mod: CPTII,S$GLB,, | Performed by: NURSE PRACTITIONER

## 2023-04-26 PROCEDURE — 3078F PR MOST RECENT DIASTOLIC BLOOD PRESSURE < 80 MM HG: ICD-10-PCS | Mod: CPTII,S$GLB,, | Performed by: NURSE PRACTITIONER

## 2023-04-26 PROCEDURE — 3066F PR DOCUMENTATION OF TREATMENT FOR NEPHROPATHY: ICD-10-PCS | Mod: CPTII,S$GLB,, | Performed by: NURSE PRACTITIONER

## 2023-04-26 PROCEDURE — 1160F RVW MEDS BY RX/DR IN RCRD: CPT | Mod: CPTII,S$GLB,, | Performed by: NURSE PRACTITIONER

## 2023-04-26 PROCEDURE — 3066F NEPHROPATHY DOC TX: CPT | Mod: CPTII,S$GLB,, | Performed by: NURSE PRACTITIONER

## 2023-04-26 PROCEDURE — 1159F PR MEDICATION LIST DOCUMENTED IN MEDICAL RECORD: ICD-10-PCS | Mod: CPTII,S$GLB,, | Performed by: NURSE PRACTITIONER

## 2023-04-26 PROCEDURE — 3078F DIAST BP <80 MM HG: CPT | Mod: CPTII,S$GLB,, | Performed by: NURSE PRACTITIONER

## 2023-04-26 PROCEDURE — 99215 PR OFFICE/OUTPT VISIT, EST, LEVL V, 40-54 MIN: ICD-10-PCS | Mod: S$GLB,,, | Performed by: NURSE PRACTITIONER

## 2023-04-26 PROCEDURE — 99999 PR PBB SHADOW E&M-EST. PATIENT-LVL V: CPT | Mod: PBBFAC,,, | Performed by: NURSE PRACTITIONER

## 2023-04-26 PROCEDURE — 3008F BODY MASS INDEX DOCD: CPT | Mod: CPTII,S$GLB,, | Performed by: NURSE PRACTITIONER

## 2023-04-26 PROCEDURE — 1111F DSCHRG MED/CURRENT MED MERGE: CPT | Mod: CPTII,S$GLB,, | Performed by: NURSE PRACTITIONER

## 2023-04-26 PROCEDURE — 1160F PR REVIEW ALL MEDS BY PRESCRIBER/CLIN PHARMACIST DOCUMENTED: ICD-10-PCS | Mod: CPTII,S$GLB,, | Performed by: NURSE PRACTITIONER

## 2023-04-26 PROCEDURE — 3044F HG A1C LEVEL LT 7.0%: CPT | Mod: CPTII,S$GLB,, | Performed by: NURSE PRACTITIONER

## 2023-04-26 PROCEDURE — 1111F PR DISCHARGE MEDS RECONCILED W/ CURRENT OUTPATIENT MED LIST: ICD-10-PCS | Mod: CPTII,S$GLB,, | Performed by: NURSE PRACTITIONER

## 2023-04-26 PROCEDURE — 99215 OFFICE O/P EST HI 40 MIN: CPT | Mod: S$GLB,,, | Performed by: NURSE PRACTITIONER

## 2023-04-26 NOTE — LETTER
April 26, 2023        [facility]ipe Martinez  [address] [facility] [address]  Phone: [phone]  Fax: [phone]             [facility]- Transplant 1st Fl  [address]  [facility] [address]  Phone: [phone]   Patient: Andrey Reddy   MR Number: [id number]   YOB: 1985   Date of Visit: 4/26/2023       Dear Dr. Piep Martinez    Thank you for referring Andrey Reddy to me for evaluation. Attached you will find relevant portions of my assessment and plan of care.    If you have questions, please do not hesitate to call me. I look forward to following Andrey Reddy along with you.    Sincerely,    Vidhi Joe, CHERELLE    Enclosure    If you would like to receive this communication electronically, please contact [email or web address] or [phone] to request [email or web address] Link access.    [email or web address] Link is a tool which provides read-only access to select patient information with whom you have a relationship. Its easy to use and provides real time access to review your patients record including encounter summaries, notes, results, and demographic information.    If you feel you have received this communication in error or would no longer like to receive these types of communications, please e-mail [email or web address]

## 2023-04-27 ENCOUNTER — LAB VISIT (OUTPATIENT)
Dept: LAB | Facility: HOSPITAL | Age: 38
End: 2023-04-27
Attending: INTERNAL MEDICINE
Payer: COMMERCIAL

## 2023-04-27 DIAGNOSIS — Z94.0 S/P KIDNEY TRANSPLANT: ICD-10-CM

## 2023-04-27 LAB
ALBUMIN SERPL BCP-MCNC: 4 G/DL (ref 3.5–5.2)
ANION GAP SERPL CALC-SCNC: 8 MMOL/L (ref 8–16)
BASOPHILS # BLD AUTO: 0.09 K/UL (ref 0–0.2)
BASOPHILS NFR BLD: 0.9 % (ref 0–1.9)
BUN SERPL-MCNC: 17 MG/DL (ref 6–20)
CALCIUM SERPL-MCNC: 9.4 MG/DL (ref 8.7–10.5)
CHLORIDE SERPL-SCNC: 105 MMOL/L (ref 95–110)
CO2 SERPL-SCNC: 27 MMOL/L (ref 23–29)
CREAT SERPL-MCNC: 1.6 MG/DL (ref 0.5–1.4)
DIFFERENTIAL METHOD: ABNORMAL
EOSINOPHIL # BLD AUTO: 0.1 K/UL (ref 0–0.5)
EOSINOPHIL NFR BLD: 1 % (ref 0–8)
ERYTHROCYTE [DISTWIDTH] IN BLOOD BY AUTOMATED COUNT: 12.3 % (ref 11.5–14.5)
EST. GFR  (NO RACE VARIABLE): 56.2 ML/MIN/1.73 M^2
GLUCOSE SERPL-MCNC: 207 MG/DL (ref 70–110)
HCT VFR BLD AUTO: 34.8 % (ref 40–54)
HGB BLD-MCNC: 11.1 G/DL (ref 14–18)
IMM GRANULOCYTES # BLD AUTO: 0.15 K/UL (ref 0–0.04)
IMM GRANULOCYTES NFR BLD AUTO: 1.5 % (ref 0–0.5)
LYMPHOCYTES # BLD AUTO: 1.2 K/UL (ref 1–4.8)
LYMPHOCYTES NFR BLD: 11.8 % (ref 18–48)
MAGNESIUM SERPL-MCNC: 1.4 MG/DL (ref 1.6–2.6)
MCH RBC QN AUTO: 31.1 PG (ref 27–31)
MCHC RBC AUTO-ENTMCNC: 31.9 G/DL (ref 32–36)
MCV RBC AUTO: 98 FL (ref 82–98)
MONOCYTES # BLD AUTO: 0.6 K/UL (ref 0.3–1)
MONOCYTES NFR BLD: 5.7 % (ref 4–15)
NEUTROPHILS # BLD AUTO: 7.7 K/UL (ref 1.8–7.7)
NEUTROPHILS NFR BLD: 79.1 % (ref 38–73)
NRBC BLD-RTO: 0 /100 WBC
PHOSPHATE SERPL-MCNC: 3.5 MG/DL (ref 2.7–4.5)
PLATELET # BLD AUTO: 240 K/UL (ref 150–450)
PMV BLD AUTO: 10.5 FL (ref 9.2–12.9)
POTASSIUM SERPL-SCNC: 4.3 MMOL/L (ref 3.5–5.1)
RBC # BLD AUTO: 3.57 M/UL (ref 4.6–6.2)
SODIUM SERPL-SCNC: 140 MMOL/L (ref 136–145)
TACROLIMUS BLD-MCNC: 14.3 NG/ML (ref 5–15)
WBC # BLD AUTO: 9.75 K/UL (ref 3.9–12.7)

## 2023-04-27 PROCEDURE — 80197 ASSAY OF TACROLIMUS: CPT | Performed by: INTERNAL MEDICINE

## 2023-04-27 PROCEDURE — 85025 COMPLETE CBC W/AUTO DIFF WBC: CPT | Performed by: INTERNAL MEDICINE

## 2023-04-27 PROCEDURE — 83735 ASSAY OF MAGNESIUM: CPT | Performed by: INTERNAL MEDICINE

## 2023-04-27 PROCEDURE — 36415 COLL VENOUS BLD VENIPUNCTURE: CPT | Performed by: INTERNAL MEDICINE

## 2023-04-27 PROCEDURE — 80069 RENAL FUNCTION PANEL: CPT | Performed by: INTERNAL MEDICINE

## 2023-04-27 RX ORDER — TACROLIMUS 1 MG/1
3 CAPSULE ORAL EVERY 12 HOURS
Qty: 180 CAPSULE | Refills: 11 | Status: SHIPPED | OUTPATIENT
Start: 2023-04-27 | End: 2023-05-01 | Stop reason: DRUGHIGH

## 2023-04-27 NOTE — TELEPHONE ENCOUNTER
Spoke with patient regarding below. Tacro dose to 3/3.Patient confirmed checking BS TID, ranging from 150-200. Patient has endo appt scheduled.----- Message from Monica Handley MD sent at 4/27/2023  1:26 PM CDT -----  Lower tacro to 3 mg BID  Please confirm he is monitoring glucose

## 2023-05-01 ENCOUNTER — PATIENT MESSAGE (OUTPATIENT)
Dept: TRANSPLANT | Facility: CLINIC | Age: 38
End: 2023-05-01
Payer: MEDICARE

## 2023-05-01 ENCOUNTER — TELEPHONE (OUTPATIENT)
Dept: TRANSPLANT | Facility: CLINIC | Age: 38
End: 2023-05-01
Payer: MEDICARE

## 2023-05-01 ENCOUNTER — PROCEDURE VISIT (OUTPATIENT)
Dept: UROLOGY | Facility: CLINIC | Age: 38
End: 2023-05-01
Payer: COMMERCIAL

## 2023-05-01 ENCOUNTER — LAB VISIT (OUTPATIENT)
Dept: LAB | Facility: HOSPITAL | Age: 38
End: 2023-05-01
Attending: INTERNAL MEDICINE
Payer: MEDICARE

## 2023-05-01 VITALS
WEIGHT: 143.31 LBS | HEIGHT: 74 IN | DIASTOLIC BLOOD PRESSURE: 60 MMHG | BODY MASS INDEX: 18.39 KG/M2 | TEMPERATURE: 98 F | HEART RATE: 99 BPM | RESPIRATION RATE: 18 BRPM | SYSTOLIC BLOOD PRESSURE: 146 MMHG

## 2023-05-01 DIAGNOSIS — Z94.0 S/P KIDNEY TRANSPLANT: ICD-10-CM

## 2023-05-01 DIAGNOSIS — Z94.0 KIDNEY REPLACED BY TRANSPLANT: ICD-10-CM

## 2023-05-01 LAB
ALBUMIN SERPL BCP-MCNC: 4.2 G/DL (ref 3.5–5.2)
ANION GAP SERPL CALC-SCNC: 8 MMOL/L (ref 8–16)
BASOPHILS # BLD AUTO: 0.09 K/UL (ref 0–0.2)
BASOPHILS NFR BLD: 0.9 % (ref 0–1.9)
BUN SERPL-MCNC: 22 MG/DL (ref 6–20)
CALCIUM SERPL-MCNC: 9.9 MG/DL (ref 8.7–10.5)
CHLORIDE SERPL-SCNC: 104 MMOL/L (ref 95–110)
CO2 SERPL-SCNC: 27 MMOL/L (ref 23–29)
CREAT SERPL-MCNC: 1.5 MG/DL (ref 0.5–1.4)
DIFFERENTIAL METHOD: ABNORMAL
EOSINOPHIL # BLD AUTO: 0.1 K/UL (ref 0–0.5)
EOSINOPHIL NFR BLD: 1.1 % (ref 0–8)
ERYTHROCYTE [DISTWIDTH] IN BLOOD BY AUTOMATED COUNT: 12.4 % (ref 11.5–14.5)
EST. GFR  (NO RACE VARIABLE): >60 ML/MIN/1.73 M^2
GLUCOSE SERPL-MCNC: 206 MG/DL (ref 70–110)
HCT VFR BLD AUTO: 34.8 % (ref 40–54)
HGB BLD-MCNC: 11 G/DL (ref 14–18)
IMM GRANULOCYTES # BLD AUTO: 0.14 K/UL (ref 0–0.04)
IMM GRANULOCYTES NFR BLD AUTO: 1.4 % (ref 0–0.5)
LYMPHOCYTES # BLD AUTO: 1 K/UL (ref 1–4.8)
LYMPHOCYTES NFR BLD: 9.5 % (ref 18–48)
MAGNESIUM SERPL-MCNC: 1.5 MG/DL (ref 1.6–2.6)
MCH RBC QN AUTO: 30.5 PG (ref 27–31)
MCHC RBC AUTO-ENTMCNC: 31.6 G/DL (ref 32–36)
MCV RBC AUTO: 96 FL (ref 82–98)
MONOCYTES # BLD AUTO: 0.6 K/UL (ref 0.3–1)
MONOCYTES NFR BLD: 5.7 % (ref 4–15)
NEUTROPHILS # BLD AUTO: 8.3 K/UL (ref 1.8–7.7)
NEUTROPHILS NFR BLD: 81.4 % (ref 38–73)
NRBC BLD-RTO: 0 /100 WBC
PHOSPHATE SERPL-MCNC: 3.5 MG/DL (ref 2.7–4.5)
PLATELET # BLD AUTO: 213 K/UL (ref 150–450)
PMV BLD AUTO: 10.3 FL (ref 9.2–12.9)
POTASSIUM SERPL-SCNC: 4.7 MMOL/L (ref 3.5–5.1)
RBC # BLD AUTO: 3.61 M/UL (ref 4.6–6.2)
SODIUM SERPL-SCNC: 139 MMOL/L (ref 136–145)
TACROLIMUS BLD-MCNC: 6.5 NG/ML (ref 5–15)
WBC # BLD AUTO: 10.2 K/UL (ref 3.9–12.7)

## 2023-05-01 PROCEDURE — 36415 COLL VENOUS BLD VENIPUNCTURE: CPT | Performed by: INTERNAL MEDICINE

## 2023-05-01 PROCEDURE — 80069 RENAL FUNCTION PANEL: CPT | Performed by: INTERNAL MEDICINE

## 2023-05-01 PROCEDURE — 85025 COMPLETE CBC W/AUTO DIFF WBC: CPT | Performed by: INTERNAL MEDICINE

## 2023-05-01 PROCEDURE — 83735 ASSAY OF MAGNESIUM: CPT | Performed by: INTERNAL MEDICINE

## 2023-05-01 PROCEDURE — 80197 ASSAY OF TACROLIMUS: CPT | Performed by: INTERNAL MEDICINE

## 2023-05-01 RX ORDER — TACROLIMUS 1 MG/1
4 CAPSULE ORAL EVERY 12 HOURS
Qty: 240 CAPSULE | Refills: 11 | Status: SHIPPED | OUTPATIENT
Start: 2023-05-01 | End: 2023-05-15 | Stop reason: SDUPTHER

## 2023-05-01 RX ORDER — LIDOCAINE HYDROCHLORIDE 20 MG/ML
JELLY TOPICAL ONCE
Status: COMPLETED | OUTPATIENT
Start: 2023-05-01 | End: 2023-05-01

## 2023-05-01 RX ADMIN — LIDOCAINE HYDROCHLORIDE: 20 JELLY TOPICAL at 01:05

## 2023-05-01 NOTE — TELEPHONE ENCOUNTER
Pt notified of repeat prograf level on thursday    ----- Message from Ashley White DO sent at 5/1/2023 10:36 AM CDT -----  Acceptable FK level. Recheck on Thursday

## 2023-05-01 NOTE — TELEPHONE ENCOUNTER
Pt notified to increase prograf to 4/4. Recheck on thursday    ----- Message from Monica Handley MD sent at 5/1/2023 10:37 AM CDT -----  Increase tacro to 4 mg BID. If you reach him this AM, have him take an extra 1 mg now & then start 4 mg tonight, to get level back up faster.

## 2023-05-01 NOTE — PROCEDURES
CYSTOSCOPY W/ STENT REMOVAL    Date/Time: 5/1/2023 1:12 PM  Performed by: Nitesh Salvador MD  Authorized by: Juan Markham Jr., MD   Preparation: Patient was prepped and draped in the usual sterile fashion.  Local anesthesia used: yes    Anesthesia:  Local anesthesia used: yes    Sedation:  Patient sedated: no    Patient tolerance: patient tolerated the procedure well with no immediate complications  Comments: UA neg  Stent removed without difficulty    Follow up with transplant nephrology

## 2023-05-01 NOTE — TELEPHONE ENCOUNTER
"AYANNA called pt to follow-up on in-basket message regarding transportation to appointment at [facility] today.     SW called pt at [phone], pt did not answer, no vm.    Ayanna called pt's mother, Keri Reddy, at [phone] and was able to speak with pt. Pt reports a neighbor he met at the [address] [employer] apartments will be providing transportation to and from pt's appointment today at [facility]. Pt reports comfortable with transportation plan. Pt reports his car "is acting up" and pt's father had to return home to go back to work. Pt is staying in [place] apartments with mother who does NOT drive. AYANNA encouraged pt to call about Medicare transportation and determine plans for future transportation needs.    SW remains available.    Rebekah White LMSW  "

## 2023-05-01 NOTE — PROGRESS NOTES
Kidney Post-Transplant Assessment    Referring Physician: Pipe Martinez  Current Nephrologist: Pipe Martinez    ORGAN: LEFT KIDNEY  Donor Type: donation after circulatory death   PHS Increased Risk: no  Cold Ischemia: 1,264 mins  Induction Medications: thymoglobulin    Subjective:     CC:  Reassessment of renal allograft function and management of chronic immunosuppression.    HPI:  Mr. Reddy is a 37 y.o. year old Black or  male with history of ESRD secondary to diabetic nephropathy and HTN who received a donation after circulatory death  kidney transplant on 4/9/23 (Thymo induction, CIT 21 hrs, KDPI 40%). His most recent creatinine is 1.5.  He takes mycophenolate mofetil, prednisone, and tacrolimus for maintenance immunosuppression. His post transplant course has been uncomplicated to date.    Had ureteral stent removed 5/1 tolerated well. Has surgery visit today as well, incision healing nicely with dermabond.    Complaints of hand/wrist arthritis, was previously following with local pain management for this, Has been taking tylenol and wearing wrist brace. Plans to follow up with pain management once he returns home, would like to do PT.    Drinking 40-50 oz water daily, no problems with urination. BP elevated in clinic, -140 at home. No peripheral edema. Tolerating IS without issues, no diarrhea or vomiting.       Current Outpatient Medications   Medication Sig Dispense Refill    amLODIPine (NORVASC) 10 MG tablet Take 1 tablet (10 mg total) by mouth once daily. 30 tablet 11    atorvastatin (LIPITOR) 10 MG tablet Take 1 tablet (10 mg total) by mouth every evening. 30 tablet 5    bisacodyL (DULCOLAX) 5 mg EC tablet Take 2 tablets (10 mg total) by mouth daily as needed for Constipation.  0    blood sugar diagnostic Strp Test blood sugar 3 (three) times daily. 100 each 4    blood-glucose meter Misc Use as instructed to test blood sugar daily 1 each 0    docusate sodium (COLACE) 100  "MG capsule Take 1 capsule (100 mg total) by mouth 3 (three) times daily as needed for Constipation.  0    famotidine (PEPCID) 20 MG tablet Take 1 tablet (20 mg total) by mouth every evening. 30 tablet 0    hydrALAZINE (APRESOLINE) 50 MG tablet Take 1 tablet (50 mg total) by mouth every 8 (eight) hours. 90 tablet 5    k phos di & mono-sod phos mono (K-PHOS-NEUTRAL) 250 mg Tab Take 1 tablet by mouth 2 (two) times daily. 60 tablet 1    ketoconazole (NIZORAL) 200 mg Tab Take ½ tablet (100 mg total) by mouth once daily. 15 tablet 11    lancets Misc Test blood sugar 3 (three) times daily. 100 each 4    mycophenolate (CELLCEPT) 250 mg Cap Take 4 capsules (1,000 mg total) by mouth 2 (two) times daily. 240 capsule 11    oxyCODONE (ROXICODONE) 5 MG immediate release tablet Take 1 tablet (5 mg total) by mouth every 8 (eight) hours as needed for Pain. 15 tablet 0    pen needle, diabetic 32 gauge x 5/32" Ndle 1 each by Misc.(Non-Drug; Combo Route) route 3 (three) times daily. 100 each 4    predniSONE (DELTASONE) 5 MG tablet Take 20mg by mouth daily 4/11-4/17;   Take 15mg daily 4/18-4/24;   Take 10mg daily 4/25-5/1;   Take 5mg daily thereafter 5/2/23 70 tablet 11    pregabalin (LYRICA) 75 MG capsule Take 75 mg by mouth 3 (three) times daily.      simethicone (MYLICON) 80 MG chewable tablet Take 1 tablet (80 mg total) by mouth 3 (three) times daily as needed for Flatulence. 28 tablet 0    SITagliptin phosphate (JANUVIA) 100 MG Tab Take 1 tablet (100 mg total) by mouth once daily. 31 tablet 3    tacrolimus (PROGRAF) 1 MG Cap Take 4 capsules (4 mg total) by mouth every 12 (twelve) hours. 240 capsule 11    valGANciclovir (VALCYTE) 450 mg Tab Take 2 tablets (900 mg total) by mouth every morning. Stop: 10/6/23 60 tablet 5    sulfamethoxazole-trimethoprim 400-80mg (BACTRIM,SEPTRA) 400-80 mg per tablet Take 1 tablet by mouth every morning. Stop: 10/6/23 30 tablet 5     No current facility-administered medications for this visit. " "      Past Medical History:   Diagnosis Date    Allergy     Anemia     Diabetes mellitus     Disorder of kidney and ureter     Hyperlipidemia     Hypertension     Secondary hyperparathyroidism        Review of Systems   Constitutional:  Negative for activity change, appetite change and fever.   HENT:  Negative for congestion, mouth sores and sore throat.    Eyes:  Negative for visual disturbance.   Respiratory:  Negative for cough, chest tightness and shortness of breath.    Cardiovascular:  Negative for chest pain, palpitations and leg swelling.   Gastrointestinal:  Negative for abdominal distention, abdominal pain, constipation, diarrhea and nausea.   Genitourinary:  Negative for difficulty urinating, frequency and hematuria.   Musculoskeletal:  Positive for arthralgias (bilateral hands/wrist). Negative for gait problem.   Skin:  Positive for wound.   Allergic/Immunologic: Positive for immunocompromised state. Negative for environmental allergies and food allergies.   Neurological:  Negative for dizziness, weakness and numbness.   Psychiatric/Behavioral:  Negative for sleep disturbance. The patient is not nervous/anxious.      Objective:   Blood pressure (!) 153/77, pulse 86, temperature 97.2 °F (36.2 °C), temperature source Skin, resp. rate 16, height 6' 2" (1.88 m), weight 66.7 kg (147 lb 0.8 oz), SpO2 99 %.body mass index is 18.88 kg/m².    Physical Exam  Vitals and nursing note reviewed.   Constitutional:       Appearance: Normal appearance.   HENT:      Head: Normocephalic.   Cardiovascular:      Rate and Rhythm: Normal rate and regular rhythm.      Heart sounds: Normal heart sounds.   Pulmonary:      Effort: Pulmonary effort is normal.      Breath sounds: Normal breath sounds.   Abdominal:      General: Bowel sounds are normal. There is no distension.      Palpations: Abdomen is soft.      Tenderness: There is no abdominal tenderness.      Comments: Surgical incision well approximated with dermabond, no " drainage or swelling   Musculoskeletal:         General: Normal range of motion.   Skin:     General: Skin is warm and dry.   Neurological:      General: No focal deficit present.      Mental Status: He is alert.   Psychiatric:         Behavior: Behavior normal.       Labs:  Lab Results   Component Value Date    WBC 10.20 05/01/2023    HGB 11.0 (L) 05/01/2023    HCT 34.8 (L) 05/01/2023     05/01/2023    K 4.7 05/01/2023     05/01/2023    CO2 27 05/01/2023    BUN 22 (H) 05/01/2023    CREATININE 1.5 (H) 05/01/2023    EGFRNORACEVR >60.0 05/01/2023    CALCIUM 9.9 05/01/2023    PHOS 3.5 05/01/2023    MG 1.5 (L) 05/01/2023    ALBUMIN 4.2 05/01/2023    AST 12 04/09/2023    ALT 13 04/09/2023    UTPCR 1.35 (H) 10/13/2021    .1 (H) 04/09/2023    TACROLIMUS 6.5 05/01/2023     Labs were reviewed with the patient    Assessment:     1. S/P kidney transplant    2. CKD (chronic kidney disease), stage II    3. Long-term use of immunosuppressant medication    4. Diabetic nephropathy associated with type 2 diabetes mellitus    5. Renovascular hypertension    6. At risk for opportunistic infections        Plan:   Surgery visit today as well  OK to go home tomorrow if morning lab work stable  Encouraged increased water intake    1. CKD stage: 2    2. Immunosuppression: Prograf trough 6.5.  Continue Prograf 4/4 (dose increased 5/1/2023), Ketoconazole 100 mg QD to augment prograf levels, MMF 1000 mg BID, and Prednisone taper. Will continue to monitor for drug toxicities    3. Allograft Function: Stable. Continue good po hydration.   -history of ESRD secondary to diabetic nephropathy and HTN s/p donation after circulatory death  kidney transplant on 4/9/23 (Thymo induction, CIT 21 hrs, KDPI 40%).  Lab Results   Component Value Date    CREATININE 1.5 (H) 05/01/2023 5/1/2023  POD 22   eGFR >60 mL/min/1.73 m^2 >60.0       4. Hypertension management: advise low salt diet and home BP monitoring    Norvasc 10 mg QD,  Hydralazine 50 mg TID    5. Metabolic Bone Disease/Secondary Hyperparathyroidism:stable   mg BID  Lab Results   Component Value Date    .1 (H) 04/09/2023    CALCIUM 9.9 05/01/2023    PHOS 3.5 05/01/2023       5/1/2023  POD 22   Magnesium 1.6 - 2.6 mg/dL 1.5 (A)       6. Electrolytes:  Will monitor /guidelines  Lab Results   Component Value Date     05/01/2023    K 4.7 05/01/2023     05/01/2023    CO2 27 05/01/2023       7. Anemia: stable. No need for intervention   Lab Results   Component Value Date    WBC 10.20 05/01/2023    HGB 11.0 (L) 05/01/2023    HCT 34.8 (L) 05/01/2023    MCV 96 05/01/2023     05/01/2023         8.  Cytopenias: no significant cytopenias will monitor as per our guidelines. Medicine list reviewed including potential causes of drug-induced cytopenias    9.Proteinuria: continue p/c ratio as per guidelines     10. BK virus infection screening:  will continue to monitor per guidelines    11. Weight education: provided during the clinic visit   Body mass index is 18.88 kg/m².     12.Patient safety education regarding immunosuppression including prophylaxis posttransplant for CMV, PCP : Education provided about vaccination and prevention of infections     PCP ppx: bactrim STOP 10/6/23  CMV ppx: valcyte STOP 10/6/23  Fungal ppx: none       Follow-up:   Clinic: return to transplant clinic weekly for the first month after transplant; every 2 weeks during months 2-3; then at 6-, 9-, 12-, 18-, 24-, and 36- months post-transplant to reassess for complications from immunosuppression toxicity and monitor for rejection.  Annually thereafter.    Labs: since patient remains at high risk for rejection and drug-related complications that warrant close monitoring, labs will be ordered as follows: continue twice weekly CBC, renal panel, and drug level for first month; then same labs once weekly through 3rd month post-transplant.  Urine for UA and protein/creatinine ratio monthly.   Serum BK - PCR at 1-, 3-, 6-, 9-, 12-, 18-, 24-, 36-, 48-, and 60 months post-transplant.  Hepatic panel at 1-, 2-, 3-, 6-, 9-, 12-, 18-, 24-, and 36- months post-transplant.    Vidhi Joe NP       Education:   Material provided to the patient.  Patient reminded to call with any health changes since these can be early signs of significant complications.  Also, I advised the patient to be sure any new medications or changes of old medications are discussed with either a pharmacist or physician knowledgeable with transplant to avoid rejection/drug toxicity related to significant drug interactions.    Patient advised that it is recommended that all transplanted patients, and their close contacts and household members receive Covid vaccination.

## 2023-05-01 NOTE — PATIENT INSTRUCTIONS
What to Expect After a Cystoscopy with Stent Removal  For the next 24-48 hours, you may feel a mild burning when you urinate. This burning is normal and expected. Drink plenty of water to dilute the urine to help relieve the burning sensation. You may also see a small amount of blood in your urine after the procedure.    Unless you are already taking antibiotics, you may be given an antibiotic after the test to prevent infection.    Signs and Symptoms to Report  Call the [facility] at [phone] if you develop any of the following:  Fever of 101 degrees or higher  Chills or persistent bleeding  Inability to urinate    After hours or on weekends, you may reach a urology resident on call at this number: [phone].

## 2023-05-03 ENCOUNTER — TELEPHONE (OUTPATIENT)
Dept: TRANSPLANT | Facility: CLINIC | Age: 38
End: 2023-05-03

## 2023-05-03 ENCOUNTER — PATIENT MESSAGE (OUTPATIENT)
Dept: TRANSPLANT | Facility: CLINIC | Age: 38
End: 2023-05-03

## 2023-05-03 ENCOUNTER — OFFICE VISIT (OUTPATIENT)
Dept: TRANSPLANT | Facility: CLINIC | Age: 38
End: 2023-05-03
Payer: COMMERCIAL

## 2023-05-03 VITALS
WEIGHT: 147.06 LBS | BODY MASS INDEX: 18.87 KG/M2 | RESPIRATION RATE: 16 BRPM | WEIGHT: 147.06 LBS | SYSTOLIC BLOOD PRESSURE: 153 MMHG | HEART RATE: 86 BPM | RESPIRATION RATE: 16 BRPM | DIASTOLIC BLOOD PRESSURE: 77 MMHG | DIASTOLIC BLOOD PRESSURE: 77 MMHG | SYSTOLIC BLOOD PRESSURE: 153 MMHG | HEIGHT: 74 IN | HEART RATE: 86 BPM | HEIGHT: 74 IN | OXYGEN SATURATION: 99 % | BODY MASS INDEX: 18.87 KG/M2 | TEMPERATURE: 97 F | TEMPERATURE: 97 F | OXYGEN SATURATION: 99 %

## 2023-05-03 DIAGNOSIS — Z91.89 AT RISK FOR OPPORTUNISTIC INFECTIONS: ICD-10-CM

## 2023-05-03 DIAGNOSIS — Z51.81 MEDICATION MONITORING ENCOUNTER: ICD-10-CM

## 2023-05-03 DIAGNOSIS — Z79.60 LONG-TERM USE OF IMMUNOSUPPRESSANT MEDICATION: ICD-10-CM

## 2023-05-03 DIAGNOSIS — N18.2 CKD (CHRONIC KIDNEY DISEASE), STAGE II: ICD-10-CM

## 2023-05-03 DIAGNOSIS — E11.21 DIABETIC NEPHROPATHY ASSOCIATED WITH TYPE 2 DIABETES MELLITUS: ICD-10-CM

## 2023-05-03 DIAGNOSIS — Z94.0 S/P KIDNEY TRANSPLANT: Primary | ICD-10-CM

## 2023-05-03 DIAGNOSIS — I15.0 RENOVASCULAR HYPERTENSION: ICD-10-CM

## 2023-05-03 PROCEDURE — 3078F DIAST BP <80 MM HG: CPT | Mod: CPTII,S$GLB,, | Performed by: TRANSPLANT SURGERY

## 2023-05-03 PROCEDURE — 3044F PR MOST RECENT HEMOGLOBIN A1C LEVEL <7.0%: ICD-10-PCS | Mod: CPTII,S$GLB,, | Performed by: TRANSPLANT SURGERY

## 2023-05-03 PROCEDURE — 99213 PR OFFICE/OUTPT VISIT, EST, LEVL III, 20-29 MIN: ICD-10-PCS | Mod: 24,S$GLB,, | Performed by: TRANSPLANT SURGERY

## 2023-05-03 PROCEDURE — 1111F DSCHRG MED/CURRENT MED MERGE: CPT | Mod: CPTII,S$GLB,, | Performed by: TRANSPLANT SURGERY

## 2023-05-03 PROCEDURE — 1160F PR REVIEW ALL MEDS BY PRESCRIBER/CLIN PHARMACIST DOCUMENTED: ICD-10-PCS | Mod: CPTII,S$GLB,, | Performed by: NURSE PRACTITIONER

## 2023-05-03 PROCEDURE — 99213 OFFICE O/P EST LOW 20 MIN: CPT | Mod: 24,S$GLB,, | Performed by: TRANSPLANT SURGERY

## 2023-05-03 PROCEDURE — 99999 PR PBB SHADOW E&M-EST. PATIENT-LVL III: ICD-10-PCS | Mod: PBBFAC,,,

## 2023-05-03 PROCEDURE — 3044F HG A1C LEVEL LT 7.0%: CPT | Mod: CPTII,S$GLB,, | Performed by: TRANSPLANT SURGERY

## 2023-05-03 PROCEDURE — 99999 PR PBB SHADOW E&M-EST. PATIENT-LVL V: ICD-10-PCS | Mod: PBBFAC,,, | Performed by: NURSE PRACTITIONER

## 2023-05-03 PROCEDURE — 99999 PR PBB SHADOW E&M-EST. PATIENT-LVL III: CPT | Mod: PBBFAC,,,

## 2023-05-03 PROCEDURE — 3077F SYST BP >= 140 MM HG: CPT | Mod: CPTII,S$GLB,, | Performed by: NURSE PRACTITIONER

## 2023-05-03 PROCEDURE — 3077F SYST BP >= 140 MM HG: CPT | Mod: CPTII,S$GLB,, | Performed by: TRANSPLANT SURGERY

## 2023-05-03 PROCEDURE — 3066F PR DOCUMENTATION OF TREATMENT FOR NEPHROPATHY: ICD-10-PCS | Mod: CPTII,S$GLB,, | Performed by: TRANSPLANT SURGERY

## 2023-05-03 PROCEDURE — 1159F MED LIST DOCD IN RCRD: CPT | Mod: CPTII,S$GLB,, | Performed by: NURSE PRACTITIONER

## 2023-05-03 PROCEDURE — 1111F DSCHRG MED/CURRENT MED MERGE: CPT | Mod: CPTII,S$GLB,, | Performed by: NURSE PRACTITIONER

## 2023-05-03 PROCEDURE — 1111F PR DISCHARGE MEDS RECONCILED W/ CURRENT OUTPATIENT MED LIST: ICD-10-PCS | Mod: CPTII,S$GLB,, | Performed by: NURSE PRACTITIONER

## 2023-05-03 PROCEDURE — 99215 OFFICE O/P EST HI 40 MIN: CPT | Mod: S$GLB,,, | Performed by: NURSE PRACTITIONER

## 2023-05-03 PROCEDURE — 99999 PR PBB SHADOW E&M-EST. PATIENT-LVL V: CPT | Mod: PBBFAC,,, | Performed by: NURSE PRACTITIONER

## 2023-05-03 PROCEDURE — 1160F RVW MEDS BY RX/DR IN RCRD: CPT | Mod: CPTII,S$GLB,, | Performed by: NURSE PRACTITIONER

## 2023-05-03 PROCEDURE — 1111F PR DISCHARGE MEDS RECONCILED W/ CURRENT OUTPATIENT MED LIST: ICD-10-PCS | Mod: CPTII,S$GLB,, | Performed by: TRANSPLANT SURGERY

## 2023-05-03 PROCEDURE — 3078F PR MOST RECENT DIASTOLIC BLOOD PRESSURE < 80 MM HG: ICD-10-PCS | Mod: CPTII,S$GLB,, | Performed by: TRANSPLANT SURGERY

## 2023-05-03 PROCEDURE — 3078F PR MOST RECENT DIASTOLIC BLOOD PRESSURE < 80 MM HG: ICD-10-PCS | Mod: CPTII,S$GLB,, | Performed by: NURSE PRACTITIONER

## 2023-05-03 PROCEDURE — 3044F HG A1C LEVEL LT 7.0%: CPT | Mod: CPTII,S$GLB,, | Performed by: NURSE PRACTITIONER

## 2023-05-03 PROCEDURE — 3008F PR BODY MASS INDEX (BMI) DOCUMENTED: ICD-10-PCS | Mod: CPTII,S$GLB,, | Performed by: NURSE PRACTITIONER

## 2023-05-03 PROCEDURE — 1159F PR MEDICATION LIST DOCUMENTED IN MEDICAL RECORD: ICD-10-PCS | Mod: CPTII,S$GLB,, | Performed by: NURSE PRACTITIONER

## 2023-05-03 PROCEDURE — 99215 PR OFFICE/OUTPT VISIT, EST, LEVL V, 40-54 MIN: ICD-10-PCS | Mod: S$GLB,,, | Performed by: NURSE PRACTITIONER

## 2023-05-03 PROCEDURE — 3044F PR MOST RECENT HEMOGLOBIN A1C LEVEL <7.0%: ICD-10-PCS | Mod: CPTII,S$GLB,, | Performed by: NURSE PRACTITIONER

## 2023-05-03 PROCEDURE — 3008F BODY MASS INDEX DOCD: CPT | Mod: CPTII,S$GLB,, | Performed by: TRANSPLANT SURGERY

## 2023-05-03 PROCEDURE — 3066F PR DOCUMENTATION OF TREATMENT FOR NEPHROPATHY: ICD-10-PCS | Mod: CPTII,S$GLB,, | Performed by: NURSE PRACTITIONER

## 2023-05-03 PROCEDURE — 3008F BODY MASS INDEX DOCD: CPT | Mod: CPTII,S$GLB,, | Performed by: NURSE PRACTITIONER

## 2023-05-03 PROCEDURE — 3066F NEPHROPATHY DOC TX: CPT | Mod: CPTII,S$GLB,, | Performed by: NURSE PRACTITIONER

## 2023-05-03 PROCEDURE — 3077F PR MOST RECENT SYSTOLIC BLOOD PRESSURE >= 140 MM HG: ICD-10-PCS | Mod: CPTII,S$GLB,, | Performed by: NURSE PRACTITIONER

## 2023-05-03 PROCEDURE — 3077F PR MOST RECENT SYSTOLIC BLOOD PRESSURE >= 140 MM HG: ICD-10-PCS | Mod: CPTII,S$GLB,, | Performed by: TRANSPLANT SURGERY

## 2023-05-03 PROCEDURE — 3066F NEPHROPATHY DOC TX: CPT | Mod: CPTII,S$GLB,, | Performed by: TRANSPLANT SURGERY

## 2023-05-03 PROCEDURE — 3078F DIAST BP <80 MM HG: CPT | Mod: CPTII,S$GLB,, | Performed by: NURSE PRACTITIONER

## 2023-05-03 PROCEDURE — 3008F PR BODY MASS INDEX (BMI) DOCUMENTED: ICD-10-PCS | Mod: CPTII,S$GLB,, | Performed by: TRANSPLANT SURGERY

## 2023-05-03 NOTE — PROGRESS NOTES
Transplant Surgery  Kidney Transplant Recipient Follow-up    Referring Physician: Pipe Martinez  Current Nephrologist: Pipe Martinez    Subjective:     Chief Complaint: Andrey Reddy is a 37 y.o. year old Black or  male who is status post Kidney transplant performed on 4/9/2023.    ORGAN: LEFT KIDNEY   Disease Etiology: Diabetes Mellitus - Type II  Donor Type: Donation after Circulatory Death    Donor CMV Status: Positive   Donor HBcAB: Negative   Donor HCV Status: Negative     History of Present Illness: He reports no concerns.  From a transplant perspective, he reports normal urination, no incisional pain, and no dysuria.  Andrey is here for management of his immunosuppression medication.  Andrey states that his immunosuppression is being well tolerated.      External provider notes reviewed: No    Review of Systems   Constitutional: Negative.    Gastrointestinal: Negative.    Genitourinary: Negative.      Objective:     Physical Exam  Constitutional:       Appearance: Normal appearance.   Pulmonary:      Effort: Pulmonary effort is normal.   Abdominal:      General: Abdomen is flat. There is no distension.      Palpations: Abdomen is soft.      Tenderness: There is no abdominal tenderness.      Hernia: No hernia is present.       Musculoskeletal:      Right lower leg: No edema.      Left lower leg: No edema.   Skin:     General: Skin is warm and dry.   Neurological:      Mental Status: He is alert.   Psychiatric:         Mood and Affect: Mood normal.     Lab Results   Component Value Date    CREATININE 1.5 (H) 05/01/2023    BUN 22 (H) 05/01/2023     Lab Results   Component Value Date    WBC 10.20 05/01/2023    HGB 11.0 (L) 05/01/2023    HCT 34.8 (L) 05/01/2023    HCT 31 (L) 04/09/2023     05/01/2023     Lab Results   Component Value Date    TACROLIMUS 6.5 05/01/2023       Diagnostics:  The following labs have been reviewed: CBC  BMP  TACROLIMUS LEVEL  The following radiology  images have been independently reviewed and interpreted: None    Assessment and Plan:        S/P Kidney transplant.  Chronic immunosuppressive medications for rejection prophylaxis at target.  Plan: no adjustment needed.  Continue monitoring symptoms, labs and drug levels for drug-related toxicity and side effects.      Additional testing to be completed according to the Kidney: Written Order Guideline for Kidney Transplant Follow-Up (KI-09)    Interpretation of tests and discussion of patient management involves all members of the multidisciplinary transplant team.  Patient advised that it is recommended that all transplant candidates, and their close contacts and household members receive Covid vaccination.  Follow-up: Patient reminded to call with any health changes, since these can be early signs of significant complications.  Also, I advised the patient to be sure any new medications or changes of old medications are discussed with either a pharmacist, or physician knowledgeable with transplant to avoid rejection/drug toxicity related to significant drug interactions.    Julito Saleh MD       [facility] Patient Status  Functional Status: 90% - Able to carry on normal activity: minor symptoms of disease  Physical Capacity: No Limitations

## 2023-05-03 NOTE — LETTER
May 3, 2023        [facility]ipe Martinez  [address] [facility] [address]  Phone: [phone]  Fax: [phone]             [facility] Trans[facility] 1st Fl  [address]  [facility] [address]  Phone: [phone]   Patient: Andrey Reddy   MR Number: [id number]   YOB: 1985   Date of Visit: 5/3/2023       Dear Dr. Pipe Martinez    Thank you for referring Andrey Reddy to me for evaluation. Attached you will find relevant portions of my assessment and plan of care.    If you have questions, please do not hesitate to call me. I look forward to following Andrey Reddy along with you.    Sincerely,    Vidhi Joe, CHERELLE    Enclosure    If you would like to receive this communication electronically, please contact [email or web address] or [phone] to request [employer] access.    [employer] is a tool which provides read-only access to select patient information with whom you have a relationship. Its easy to use and provides real time access to review your patients record including encounter summaries, notes, results, and demographic information.    If you feel you have received this communication in error or would no longer like to receive these types of communications, please e-mail [email or web address]

## 2023-05-04 ENCOUNTER — LAB VISIT (OUTPATIENT)
Dept: LAB | Facility: HOSPITAL | Age: 38
End: 2023-05-04
Attending: INTERNAL MEDICINE
Payer: MEDICARE

## 2023-05-04 ENCOUNTER — PATIENT MESSAGE (OUTPATIENT)
Dept: TRANSPLANT | Facility: CLINIC | Age: 38
End: 2023-05-04
Payer: MEDICARE

## 2023-05-04 DIAGNOSIS — Z94.0 S/P KIDNEY TRANSPLANT: ICD-10-CM

## 2023-05-04 LAB
ALBUMIN SERPL BCP-MCNC: 4.1 G/DL (ref 3.5–5.2)
ALBUMIN SERPL BCP-MCNC: 4.1 G/DL (ref 3.5–5.2)
ALP SERPL-CCNC: 68 U/L (ref 55–135)
ALT SERPL W/O P-5'-P-CCNC: 11 U/L (ref 10–44)
ANION GAP SERPL CALC-SCNC: 9 MMOL/L (ref 8–16)
AST SERPL-CCNC: 9 U/L (ref 10–40)
BASOPHILS # BLD AUTO: 0.08 K/UL (ref 0–0.2)
BASOPHILS NFR BLD: 1 % (ref 0–1.9)
BILIRUB DIRECT SERPL-MCNC: 0.3 MG/DL (ref 0.1–0.3)
BILIRUB SERPL-MCNC: 0.6 MG/DL (ref 0.1–1)
BUN SERPL-MCNC: 24 MG/DL (ref 6–20)
CALCIUM SERPL-MCNC: 9.6 MG/DL (ref 8.7–10.5)
CHLORIDE SERPL-SCNC: 104 MMOL/L (ref 95–110)
CO2 SERPL-SCNC: 25 MMOL/L (ref 23–29)
CREAT SERPL-MCNC: 1.6 MG/DL (ref 0.5–1.4)
DIFFERENTIAL METHOD: ABNORMAL
EOSINOPHIL # BLD AUTO: 0.1 K/UL (ref 0–0.5)
EOSINOPHIL NFR BLD: 0.9 % (ref 0–8)
ERYTHROCYTE [DISTWIDTH] IN BLOOD BY AUTOMATED COUNT: 12.2 % (ref 11.5–14.5)
EST. GFR  (NO RACE VARIABLE): 56.2 ML/MIN/1.73 M^2
GLUCOSE SERPL-MCNC: 205 MG/DL (ref 70–110)
HCT VFR BLD AUTO: 34.5 % (ref 40–54)
HGB BLD-MCNC: 11.3 G/DL (ref 14–18)
IMM GRANULOCYTES # BLD AUTO: 0.09 K/UL (ref 0–0.04)
IMM GRANULOCYTES NFR BLD AUTO: 1.1 % (ref 0–0.5)
LYMPHOCYTES # BLD AUTO: 0.9 K/UL (ref 1–4.8)
LYMPHOCYTES NFR BLD: 11.4 % (ref 18–48)
MAGNESIUM SERPL-MCNC: 1.5 MG/DL (ref 1.6–2.6)
MCH RBC QN AUTO: 31 PG (ref 27–31)
MCHC RBC AUTO-ENTMCNC: 32.8 G/DL (ref 32–36)
MCV RBC AUTO: 95 FL (ref 82–98)
MONOCYTES # BLD AUTO: 0.4 K/UL (ref 0.3–1)
MONOCYTES NFR BLD: 5.4 % (ref 4–15)
NEUTROPHILS # BLD AUTO: 6.6 K/UL (ref 1.8–7.7)
NEUTROPHILS NFR BLD: 80.2 % (ref 38–73)
NRBC BLD-RTO: 0 /100 WBC
PHOSPHATE SERPL-MCNC: 3.3 MG/DL (ref 2.7–4.5)
PLATELET # BLD AUTO: 210 K/UL (ref 150–450)
PMV BLD AUTO: 10.7 FL (ref 9.2–12.9)
POTASSIUM SERPL-SCNC: 4 MMOL/L (ref 3.5–5.1)
PROT SERPL-MCNC: 7 G/DL (ref 6–8.4)
RBC # BLD AUTO: 3.65 M/UL (ref 4.6–6.2)
SODIUM SERPL-SCNC: 138 MMOL/L (ref 136–145)
TACROLIMUS BLD-MCNC: 8 NG/ML (ref 5–15)
WBC # BLD AUTO: 8.19 K/UL (ref 3.9–12.7)

## 2023-05-04 PROCEDURE — 80069 RENAL FUNCTION PANEL: CPT | Performed by: INTERNAL MEDICINE

## 2023-05-04 PROCEDURE — 84075 ASSAY ALKALINE PHOSPHATASE: CPT | Performed by: INTERNAL MEDICINE

## 2023-05-04 PROCEDURE — 80197 ASSAY OF TACROLIMUS: CPT | Performed by: INTERNAL MEDICINE

## 2023-05-04 PROCEDURE — 83735 ASSAY OF MAGNESIUM: CPT | Performed by: INTERNAL MEDICINE

## 2023-05-04 PROCEDURE — 85025 COMPLETE CBC W/AUTO DIFF WBC: CPT | Performed by: INTERNAL MEDICINE

## 2023-05-04 PROCEDURE — 87799 DETECT AGENT NOS DNA QUANT: CPT | Performed by: INTERNAL MEDICINE

## 2023-05-04 PROCEDURE — 36415 COLL VENOUS BLD VENIPUNCTURE: CPT | Performed by: INTERNAL MEDICINE

## 2023-05-08 LAB
BKV DNA SERPL NAA+PROBE-ACNC: <125 COPIES/ML
BKV DNA SERPL NAA+PROBE-LOG#: <2.1 LOG (10) COPIES/ML
BKV DNA SERPL QL NAA+PROBE: NOT DETECTED

## 2023-05-09 ENCOUNTER — TELEPHONE (OUTPATIENT)
Dept: TRANSPLANT | Facility: CLINIC | Age: 38
End: 2023-05-09
Payer: MEDICARE

## 2023-05-09 ENCOUNTER — PATIENT MESSAGE (OUTPATIENT)
Dept: TRANSPLANT | Facility: CLINIC | Age: 38
End: 2023-05-09
Payer: MEDICARE

## 2023-05-09 DIAGNOSIS — Z94.0 KIDNEY REPLACED BY TRANSPLANT: ICD-10-CM

## 2023-05-09 DIAGNOSIS — Z76.82 KIDNEY TRANSPLANT CANDIDATE: ICD-10-CM

## 2023-05-09 RX ORDER — HYDRALAZINE HYDROCHLORIDE 50 MG/1
50 TABLET, FILM COATED ORAL EVERY 8 HOURS
Qty: 90 TABLET | Refills: 5 | Status: SHIPPED | OUTPATIENT
Start: 2023-05-09 | End: 2023-05-15 | Stop reason: SDUPTHER

## 2023-05-09 RX ORDER — KETOCONAZOLE 200 MG/1
100 TABLET ORAL DAILY
Qty: 15 TABLET | Refills: 11 | Status: SHIPPED | OUTPATIENT
Start: 2023-05-09 | End: 2023-05-15 | Stop reason: SDUPTHER

## 2023-05-09 NOTE — TELEPHONE ENCOUNTER
Received message from patient regarding labs.Called [facility] where [facility] is to have labs and they confirmed receiving patient lab orders. Spoke with patient and confirmed with patient that lab orders were sent. Labs were also emailed to patient.Patient will go tomorrow for labs. Patient verbalized understanding.

## 2023-05-10 ENCOUNTER — DOCUMENTATION ONLY (OUTPATIENT)
Dept: TRANSPLANT | Facility: CLINIC | Age: 38
End: 2023-05-10
Payer: MEDICARE

## 2023-05-11 ENCOUNTER — PATIENT MESSAGE (OUTPATIENT)
Dept: TRANSPLANT | Facility: CLINIC | Age: 38
End: 2023-05-11
Payer: MEDICARE

## 2023-05-11 ENCOUNTER — PATIENT MESSAGE (OUTPATIENT)
Dept: ADMINISTRATIVE | Facility: OTHER | Age: 38
End: 2023-05-11
Payer: MEDICARE

## 2023-05-12 ENCOUNTER — TELEPHONE (OUTPATIENT)
Dept: TRANSPLANT | Facility: CLINIC | Age: 38
End: 2023-05-12
Payer: MEDICARE

## 2023-05-12 NOTE — TELEPHONE ENCOUNTER
Return call to patient he was able to take care of the issue with the pharmacy.  ----- Message from Jory Beth sent at 5/11/2023  3:28 PM CDT -----  Regarding: Patient advice  Name of Caller: Andrey Reddy         Contact Preference: [phone]         Nature of Call: Name of Caller: Requesting a call to discuss medication prices and options

## 2023-05-15 ENCOUNTER — PATIENT MESSAGE (OUTPATIENT)
Dept: TRANSPLANT | Facility: CLINIC | Age: 38
End: 2023-05-15
Payer: MEDICARE

## 2023-05-15 DIAGNOSIS — Z94.0 KIDNEY REPLACED BY TRANSPLANT: ICD-10-CM

## 2023-05-15 DIAGNOSIS — E11.22 CONTROLLED TYPE 2 DIABETES MELLITUS WITH CHRONIC KIDNEY DISEASE ON CHRONIC DIALYSIS, WITHOUT LONG-TERM CURRENT USE OF INSULIN: ICD-10-CM

## 2023-05-15 DIAGNOSIS — Z94.0 S/P KIDNEY TRANSPLANT: ICD-10-CM

## 2023-05-15 DIAGNOSIS — Z76.82 KIDNEY TRANSPLANT CANDIDATE: ICD-10-CM

## 2023-05-15 DIAGNOSIS — N18.6 CONTROLLED TYPE 2 DIABETES MELLITUS WITH CHRONIC KIDNEY DISEASE ON CHRONIC DIALYSIS, WITHOUT LONG-TERM CURRENT USE OF INSULIN: ICD-10-CM

## 2023-05-15 DIAGNOSIS — Z99.2 CONTROLLED TYPE 2 DIABETES MELLITUS WITH CHRONIC KIDNEY DISEASE ON CHRONIC DIALYSIS, WITHOUT LONG-TERM CURRENT USE OF INSULIN: ICD-10-CM

## 2023-05-15 RX ORDER — PREGABALIN 75 MG/1
75 CAPSULE ORAL 3 TIMES DAILY
Qty: 90 CAPSULE | Refills: 3 | Status: ON HOLD | OUTPATIENT
Start: 2023-05-15 | End: 2023-11-18 | Stop reason: HOSPADM

## 2023-05-15 RX ORDER — VALGANCICLOVIR 450 MG/1
900 TABLET, FILM COATED ORAL EVERY MORNING
Qty: 60 TABLET | Refills: 4 | Status: SHIPPED | OUTPATIENT
Start: 2023-05-15 | End: 2023-10-17 | Stop reason: ALTCHOICE

## 2023-05-15 RX ORDER — ATORVASTATIN CALCIUM 10 MG/1
10 TABLET, FILM COATED ORAL NIGHTLY
Qty: 90 TABLET | Refills: 3 | Status: SHIPPED | OUTPATIENT
Start: 2023-05-15 | End: 2023-11-29 | Stop reason: SDUPTHER

## 2023-05-15 RX ORDER — AMLODIPINE BESYLATE 10 MG/1
10 TABLET ORAL DAILY
Qty: 30 TABLET | Refills: 11 | Status: SHIPPED | OUTPATIENT
Start: 2023-05-15 | End: 2023-05-30 | Stop reason: ALTCHOICE

## 2023-05-15 RX ORDER — SULFAMETHOXAZOLE AND TRIMETHOPRIM 400; 80 MG/1; MG/1
1 TABLET ORAL EVERY MORNING
Qty: 30 TABLET | Refills: 4 | Status: SHIPPED | OUTPATIENT
Start: 2023-05-15 | End: 2023-10-17 | Stop reason: ALTCHOICE

## 2023-05-15 RX ORDER — HYDRALAZINE HYDROCHLORIDE 50 MG/1
50 TABLET, FILM COATED ORAL EVERY 8 HOURS
Qty: 90 TABLET | Refills: 11 | Status: SHIPPED | OUTPATIENT
Start: 2023-05-15 | End: 2023-07-17

## 2023-05-15 RX ORDER — MYCOPHENOLATE MOFETIL 250 MG/1
1000 CAPSULE ORAL 2 TIMES DAILY
Qty: 240 CAPSULE | Refills: 11 | Status: ON HOLD | OUTPATIENT
Start: 2023-05-15 | End: 2023-11-17 | Stop reason: HOSPADM

## 2023-05-15 RX ORDER — TACROLIMUS 1 MG/1
4 CAPSULE ORAL EVERY 12 HOURS
Qty: 240 CAPSULE | Refills: 11 | Status: SHIPPED | OUTPATIENT
Start: 2023-05-15 | End: 2023-05-16 | Stop reason: DRUGHIGH

## 2023-05-15 RX ORDER — KETOCONAZOLE 200 MG/1
100 TABLET ORAL DAILY
Qty: 15 TABLET | Refills: 11 | Status: SHIPPED | OUTPATIENT
Start: 2023-05-15 | End: 2023-08-25

## 2023-05-15 NOTE — TELEPHONE ENCOUNTER
Call to patient stating that his local [facility] pharmacy is giving the run around and would all medications to go trough [facility], especially his IS meds.

## 2023-05-16 ENCOUNTER — PATIENT MESSAGE (OUTPATIENT)
Dept: TRANSPLANT | Facility: CLINIC | Age: 38
End: 2023-05-16
Payer: MEDICARE

## 2023-05-16 DIAGNOSIS — Z94.0 S/P KIDNEY TRANSPLANT: ICD-10-CM

## 2023-05-16 LAB
EXT ALBUMIN: 4.1 (ref 3.4–5)
EXT ALKALINE PHOSPHATASE: 64 (ref 46–116)
EXT ALT: 15 (ref 16–63)
EXT AST: <8 (ref 15–37)
EXT BILIRUBIN DIRECT: 0.2 MG/DL (ref 0–0.2)
EXT BILIRUBIN TOTAL: 0.6 (ref 0.2–1)
EXT BK VIRUS DNA QN PCR: ABNORMAL
EXT BUN: 19 (ref 7–18)
EXT CALCIUM: 8.9 (ref 8.5–10.1)
EXT CHLORIDE: 104 (ref 98–107)
EXT CO2: 29
EXT CREATININE UA: 72.5
EXT CREATININE: 1.3 MG/DL
EXT EOSINOPHIL%: 1.5
EXT GLUCOSE: 146 (ref 74–106)
EXT HBV DNA QUANT PCR: ABNORMAL
EXT HCV QUANT: ABNORMAL
EXT HEMATOCRIT: 33.7 (ref 42–54)
EXT HEMOGLOBIN: 11.6 (ref 14–18)
EXT HIV RNA QUANT PCR: ABNORMAL
EXT LYMPH%: 12.1
EXT MAGNESIUM: 1.7 (ref 1.8–2.4)
EXT MONOCYTES%: 8.4
EXT PHOSPHORUS: 3.4 (ref 2.6–4.7)
EXT PLATELETS: 227 (ref 140–440)
EXT POTASSIUM: 4.4 (ref 3.5–5.1)
EXT PROT/CREAT RATIO UR: 0.23
EXT PROTEIN TOTAL: 7.3 (ref 6.4–8.2)
EXT SEGS%: 77.5
EXT SODIUM: 140 MMOL/L (ref 136–145)
EXT TACROLIMUS LVL: 7
EXT URINE PROTEIN: 16.8
EXT WBC: 5.9 (ref 5–10)

## 2023-05-16 RX ORDER — TACROLIMUS 1 MG/1
CAPSULE ORAL
Qty: 270 CAPSULE | Refills: 11 | Status: SHIPPED | OUTPATIENT
Start: 2023-05-16 | End: 2023-06-05

## 2023-05-16 NOTE — TELEPHONE ENCOUNTER
Pt notified of prograf 5/4. Repeat fk in 1 week.    ----- Message from Monica Handley MD sent at 5/16/2023  1:25 PM CDT -----  Increase tacro to 5 mg QAM and 4 mg nightly  Repeat level in 1 week

## 2023-05-18 ENCOUNTER — PATIENT MESSAGE (OUTPATIENT)
Dept: ADMINISTRATIVE | Facility: OTHER | Age: 38
End: 2023-05-18
Payer: MEDICARE

## 2023-05-22 ENCOUNTER — DOCUMENTATION ONLY (OUTPATIENT)
Dept: TRANSPLANT | Facility: CLINIC | Age: 38
End: 2023-05-22
Payer: MEDICARE

## 2023-05-22 LAB
EXT ALBUMIN: 4 (ref 3.4–5)
EXT EOSINOPHIL%: 1 (ref 0–7)
EXT HEMATOCRIT: 33.9 (ref 42–54)
EXT HEMOGLOBIN: 11.3 (ref 14–18)
EXT LYMPH%: 9.8 (ref 20.5–51.1)
EXT MAGNESIUM: 1.7 (ref 1.8–2.4)
EXT MONOCYTES%: 9.7 (ref 1.7–9)
EXT PHOSPHORUS: 3.4 (ref 2.6–4.7)
EXT PLATELETS: 260 (ref 140–440)
EXT SEGS%: 79.1 (ref 42.2–75.2)
EXT TACROLIMUS LVL: 5.6
EXT WBC: 6.9 (ref 5–10)

## 2023-05-24 ENCOUNTER — DOCUMENTATION ONLY (OUTPATIENT)
Dept: TRANSPLANT | Facility: CLINIC | Age: 38
End: 2023-05-24
Payer: MEDICARE

## 2023-05-30 ENCOUNTER — OFFICE VISIT (OUTPATIENT)
Dept: TRANSPLANT | Facility: CLINIC | Age: 38
End: 2023-05-30
Payer: COMMERCIAL

## 2023-05-30 ENCOUNTER — PATIENT MESSAGE (OUTPATIENT)
Dept: TRANSPLANT | Facility: CLINIC | Age: 38
End: 2023-05-30

## 2023-05-30 DIAGNOSIS — Z94.0 S/P KIDNEY TRANSPLANT: ICD-10-CM

## 2023-05-30 DIAGNOSIS — E87.5 HYPERKALEMIA: ICD-10-CM

## 2023-05-30 DIAGNOSIS — Z91.89 AT RISK FOR OPPORTUNISTIC INFECTIONS: ICD-10-CM

## 2023-05-30 DIAGNOSIS — Z94.0 DECEASED-DONOR KIDNEY TRANSPLANT: ICD-10-CM

## 2023-05-30 DIAGNOSIS — N18.31 STAGE 3A CHRONIC KIDNEY DISEASE: Primary | ICD-10-CM

## 2023-05-30 DIAGNOSIS — Z79.899 IMMUNOSUPPRESSIVE MANAGEMENT ENCOUNTER FOLLOWING KIDNEY TRANSPLANT: ICD-10-CM

## 2023-05-30 DIAGNOSIS — Z29.89 PROPHYLACTIC IMMUNOTHERAPY: ICD-10-CM

## 2023-05-30 DIAGNOSIS — Z94.0 IMMUNOSUPPRESSIVE MANAGEMENT ENCOUNTER FOLLOWING KIDNEY TRANSPLANT: ICD-10-CM

## 2023-05-30 DIAGNOSIS — E83.39 HYPOPHOSPHATEMIA: ICD-10-CM

## 2023-05-30 LAB
EXT ALBUMIN: 3.8 (ref 3.4–5)
EXT BUN: 19 (ref 7–18)
EXT CALCIUM: 9.1 (ref 8.5–10.1)
EXT CHLORIDE: 104 (ref 98–107)
EXT CO2: 32 (ref 21–32)
EXT CREATININE: 1.2 MG/DL (ref 0.7–1.3)
EXT EOSINOPHIL%: 0.7 (ref 0–7)
EXT GLUCOSE: 164 (ref 74–106)
EXT HEMATOCRIT: 35.7 (ref 42–54)
EXT HEMOGLOBIN: 12.2 (ref 14–18)
EXT LYMPH%: 14.2 (ref 20.5–51.1)
EXT MAGNESIUM: 1.9 (ref 1.8–2.4)
EXT MONOCYTES%: 10.3 (ref 1.7–9)
EXT PHOSPHORUS: 3.8 (ref 2.6–4.7)
EXT PLATELETS: 250 (ref 140–440)
EXT POTASSIUM: 5.4 (ref 3.5–5.1)
EXT SEGS%: 74.3 (ref 42.2–75.2)
EXT SODIUM: 142 MMOL/L (ref 136–145)
EXT TACROLIMUS LVL: 7.3
EXT WBC: 5.7 (ref 5–10)

## 2023-05-30 PROCEDURE — 3044F PR MOST RECENT HEMOGLOBIN A1C LEVEL <7.0%: ICD-10-PCS | Mod: CPTII,95,, | Performed by: INTERNAL MEDICINE

## 2023-05-30 PROCEDURE — 3044F HG A1C LEVEL LT 7.0%: CPT | Mod: CPTII,95,, | Performed by: INTERNAL MEDICINE

## 2023-05-30 PROCEDURE — 3066F NEPHROPATHY DOC TX: CPT | Mod: CPTII,95,, | Performed by: INTERNAL MEDICINE

## 2023-05-30 PROCEDURE — 99215 OFFICE O/P EST HI 40 MIN: CPT | Mod: 95,,, | Performed by: INTERNAL MEDICINE

## 2023-05-30 PROCEDURE — 1160F PR REVIEW ALL MEDS BY PRESCRIBER/CLIN PHARMACIST DOCUMENTED: ICD-10-PCS | Mod: CPTII,95,, | Performed by: INTERNAL MEDICINE

## 2023-05-30 PROCEDURE — 1159F PR MEDICATION LIST DOCUMENTED IN MEDICAL RECORD: ICD-10-PCS | Mod: CPTII,95,, | Performed by: INTERNAL MEDICINE

## 2023-05-30 PROCEDURE — 99215 PR OFFICE/OUTPT VISIT, EST, LEVL V, 40-54 MIN: ICD-10-PCS | Mod: 95,,, | Performed by: INTERNAL MEDICINE

## 2023-05-30 PROCEDURE — 3066F PR DOCUMENTATION OF TREATMENT FOR NEPHROPATHY: ICD-10-PCS | Mod: CPTII,95,, | Performed by: INTERNAL MEDICINE

## 2023-05-30 PROCEDURE — 1159F MED LIST DOCD IN RCRD: CPT | Mod: CPTII,95,, | Performed by: INTERNAL MEDICINE

## 2023-05-30 PROCEDURE — 1160F RVW MEDS BY RX/DR IN RCRD: CPT | Mod: CPTII,95,, | Performed by: INTERNAL MEDICINE

## 2023-05-30 RX ORDER — HYDROCODONE BITARTRATE AND ACETAMINOPHEN 7.5; 325 MG/1; MG/1
TABLET ORAL
COMMUNITY
Start: 2023-05-25 | End: 2023-06-27 | Stop reason: ALTCHOICE

## 2023-05-30 RX ORDER — NIFEDIPINE 60 MG/1
60 TABLET, EXTENDED RELEASE ORAL DAILY
Qty: 30 TABLET | Refills: 11 | Status: SHIPPED | OUTPATIENT
Start: 2023-05-30 | End: 2023-12-05 | Stop reason: SDUPTHER

## 2023-05-30 RX ORDER — PREDNISONE 5 MG/1
5 TABLET ORAL DAILY
Qty: 91 TABLET | Refills: 3 | Status: SHIPPED | OUTPATIENT
Start: 2023-05-30 | End: 2023-06-06 | Stop reason: SDUPTHER

## 2023-05-30 NOTE — PROGRESS NOTES
The patient location is: Home  The chief complaint leading to consultation is: reassess kidney function, immunosuppression and related issues post kidney transplant    Visit type: audiovisual    Face to Face time with patient: 25 min  40 minutes of total time spent on the encounter, which includes face to face time and non-face to face time preparing to see the patient (eg, review of tests), Obtaining and/or reviewing separately obtained history, Documenting clinical information in the electronic or other health record, Independently interpreting results (not separately reported) and communicating results to the patient/family/caregiver, or Care coordination (not separately reported).     Each patient to whom he or she provides medical services by telemedicine is:  (1) informed of the relationship between the physician and patient and the respective role of any other health care provider with respect to management of the patient; and (2) notified that he or she may decline to receive medical services by telemedicine and may withdraw from such care at any time.    Notes:   Post-Transplant Assessment    Referring Physician: Pipe Martinez  Current Nephrologist: Pipe Martinez    ORGAN: LEFT KIDNEY  Donor Type: donation after circulatory death   PHS Increased Risk: no  Cold Ischemia: 1,264 mins  Induction Medications: thymoglobulin    Subjective:     CC:  Reassessment of renal allograft function and management of chronic immunosuppression.    HPI:  Mr. Reddy is a 37 y.o. year old Black or  male who received a donation after circulatory death  kidney transplant on 4/9/23.  He has CKD stage 3 - GFR 30-59 and his baseline creatinine is between 1.2-1.6. He takes mycophenolate mofetil, prednisone, and tacrolimus for maintenance immunosuppression. He is additionally on ketoconazole to augment tacro levels.    Pertinent Nephrology/Transplant History:   ESRD from DM II  DCD DDKT 4/9/23, KDPI 38%,  "CIT>21hrs; Thymo induction  CMV D+,R-  PCP ppx: bactrim STOP 10/6/23  CMV ppx: valcyte STOP 10/6/23    POST TRANSPLANT UPDATE 05/30/2023   Andrey feels well and denies nay concerns. He notes no issues with his IS and has no concerns.    He is monitoring his sugars which run 150-170s most days. No low sugars noted. He has endo follow up in future already scheduled.  BPs reported to be in 130-140/70s-88 range  Reports his incision is well healed. Urinating w/o problems, "like a racehorse."  Denies heartburn off Pepcid  Uses pill box with help of Mom.    Current Outpatient Medications   Medication Sig    atorvastatin (LIPITOR) 10 MG tablet Take 1 tablet (10 mg total) by mouth every evening.    blood sugar diagnostic Strp Test blood sugar 3 (three) times daily.    blood-glucose meter Misc Use as instructed to test blood sugar daily    hydrALAZINE (APRESOLINE) 50 MG tablet Take 1 tablet (50 mg total) by mouth every 8 (eight) hours.    lancets Misc Test blood sugar 3 (three) times daily.    mycophenolate (CELLCEPT) 250 mg Cap Take 4 capsules (1,000 mg total) by mouth 2 (two) times daily. Txp Date:4/9/2023 (Kidney) Disch Date:4/13/2023 ICD10:Z94.0 Txp Location:[facility]    pen needle, diabetic 32 gauge x 5/32" Ndle 1 each by Misc.(Non-Drug; Combo Route) route 3 (three) times daily.    pregabalin (LYRICA) 75 MG capsule Take 1 capsule (75 mg total) by mouth 3 (three) times daily.    SITagliptin phosphate (JANUVIA) 100 MG Tab Take 1 tablet (100 mg total) by mouth once daily.    sulfamethoxazole-trimethoprim 400-80mg (BACTRIM,SEPTRA) 400-80 mg per tablet Take 1 tablet by mouth every morning. Stop: 10/6/23    tacrolimus (PROGRAF) 1 MG Cap Take 5 capsules (5 mg total) by mouth every morning AND 4 capsules (4 mg total) every evening. Txp Date:4/9/2023 (Kidney) Disch Date:4/13/2023 ICD10:Z94.0 Txp Location:[facility].    valGANciclovir (VALCYTE) 450 mg Tab Take 2 tablets (900 mg total) by mouth every morning. Stop: 10/6/23    " HYDROcodone-acetaminophen (NORCO) 7.5-325 mg per tablet Take by mouth.    ketoconazole (NIZORAL) 200 mg Tab Take ½ tablet (100 mg total) by mouth once daily.    NIFEdipine (PROCARDIA-XL) 60 MG (OSM) 24 hr tablet Take 1 tablet (60 mg total) by mouth once daily.    predniSONE (DELTASONE) 5 MG tablet Take 1 tablet (5 mg total) by mouth once daily.     No current facility-administered medications for this visit.       Review of Systems   Constitutional: Negative.    HENT: Negative.     Respiratory:  Negative for shortness of breath.    Cardiovascular:  Negative for chest pain and leg swelling.   Gastrointestinal:  Negative for abdominal pain, nausea and vomiting.   Genitourinary:  Negative for difficulty urinating.   Skin:  Negative for rash.   Allergic/Immunologic: Positive for immunocompromised state.     Objective:     There were no vitals taken for this visit.body mass index is unknown because there is no height or weight on file.    Physical Exam Healthy appearing in no distress. Incision well healed on screen.    Labs:  Recent Labs   Lab 10/13/21  0806 10/13/21  0807 10/13/21  1136 10/28/22  1205 04/09/23  0320 04/09/23  0857 04/27/23  0757 05/01/23  0712 05/04/23  0724 05/04/23  0742   WBC 4.75  --   --   --  5.72   < > 9.75 10.20  --  8.19   Hemoglobin 12.7 L  --   --   --  9.8 L   < > 11.1 L 11.0 L  --  11.3 L   POC Hematocrit  --   --   --   --   --    < >  --   --   --   --    Hematocrit 37.6 L  --   --   --  29.8 L   < > 34.8 L 34.8 L  --  34.5 L   Sodium 139  --   --   --  140   < > 140 139  --  138   Potassium 4.6  --   --   --  3.9   < > 4.3 4.7  --  4.0   Chloride 104  --   --   --  103   < > 105 104  --  104   CO2 21 L  --   --   --  25   < > 27 27  --  25   BUN 30 H  --   --   --  25 H   < > 17 22 H  --  24 H   Creatinine 4.9 H  --   --   --  3.7 H   < > 1.6 H 1.5 H  --  1.6 H   eGFR if non  14.0 A  --   --   --   --   --   --   --   --   --    Calcium 10.0  --   --   --  9.2   < > 9.4  9.9  --  9.6   Phosphorus 4.0  --   --   --  4.0   < > 3.5 3.5  --  3.3   Magnesium  --   --   --   --   --    < > 1.4 L 1.5 L  --  1.5 L   Albumin 4.5  --   --   --  3.9   < > 4.0 4.2  --  4.1  4.1   AST 15  --   --   --  12  --   --   --   --  9 L   ALT 18  --   --   --  13  --   --   --   --  11   Prot/Creat Ratio, Urine  --   --  1.35 H  --   --   --   --   --  0.12  --    PTH, Intact  --  210.3 H  --  370.8 H 354.1 H  --   --   --   --   --    Tacrolimus Lvl  --   --   --   --   --    < > 14.3 6.5  --  8.0    < > = values in this interval not displayed.       Recent Labs   Lab 05/10/23  0846 05/17/23  0000 2337   EXT WBC 5.9 6.9 5.7   EXT SEGS% 77.5 79.1 H 74.3   EXT Platelets 227 260 250   EXT Hemoglobin 11.6 L 11.3 L 12.2 A   EXT Hematocrit 33.7 L 33.9 L 35.7 A   EXT Creatinine 1.3  --  1.2   EXT CREATININE UA 72.5  --   --    EXT Sodium 140  --  142   EXT Potassium 4.4  --  5.4 A   EXT BUN 19 H  --  19 A   EXT CO2 29  --  32   EXT Calcium 8.9  --  9.1   EXT Phosphorus 3.4 3.4 3.8   EXT Glucose 146 H  --  164 A   EXT Albumin 4.1 4 3.8   EXT AST <8 L  --   --    EXT ALT 15 L  --   --    EXT BilirubiN Total 0.6  --   --      Recent Labs   Lab 05/10/23  0846 05/17/23  0000 23  0837   EXT Tacrolimus Lvl 7.0 5.6 7.3   EXT PROT/CREAT Ratio UR 0.23  --   --      Labs were reviewed with the patient.    Assessment:     1. Stage 3a chronic kidney disease    2. -donor kidney transplant    3. Immunosuppressive management encounter following kidney transplant    4. Prophylactic immunotherapy    5. At risk for opportunistic infections    6. Hypophosphatemia    7. S/P kidney transplant    8. Hyperkalemia       Plan:   New nursing orders:  -  He is requesting updated blue card as he is having difficulty following old one  -  Check G6PD  with next lab in case needs to stop bactrim    CKD II-IIIa post DCD kidney transplant 23, KDPI 38%, CIT>21hr  -creat 1.2-1.6, stable  Screening for proteinuria  -  last p/c ratios this May 0.12 & 0.23, excellent    Immunosuppression Management  - On tacro, MMF and prednisone  - Target tacro 7-9, most recent 7.3 at lower range. Will not adjust unless trend lower down.  -Continue monitoring for toxicities and SE, none presently noted    At risk for opportunistic infections  -Anti-infective prophylaxis against opportunistic infections  -Bactrim and Valcyte until 10/6/23 for PJP and CMV prophy  -Screening for BK infection to prevent allograft dysfunction  -May '23 BK PCR not detected  -Continue blood PCR screening as per guidelines to prevent BK viremia and nephropathy leading to allograft dysfunction and potential graft failure    Hypertension, renal  - adequate control noted, but I wish to optimize to < 130/80 consistently. Will change amlodipine to nifedipine    Metabolic bone disease [Secondary hyperparathyroidism (SPTH), Phosphorus metabolism disorders]  - Hypophosphatemia resolved, stop KPN  - Will tolerate mild asymptomatic low level d/t pill burden, DDI, and adverse SE     Electrolyte disorders  - Mild hyperkalemia - lower K in diet. Will watch to see if need to d/c bactrim and check G6PD for possible dapsone use.    Follow-up:   Clinic: return to transplant clinic weekly for the first month after transplant; every 2 weeks during months 2-3; then at 6-, 9-, 12-, 18-, 24-, and 36- months post-transplant to reassess for complications from immunosuppression toxicity and monitor for rejection.  Annually thereafter.    Labs: since patient remains at high risk for rejection and drug-related complications that warrant close monitoring, labs will be ordered as follows: continue twice weekly CBC, renal panel, and drug level for first month; then same labs once weekly through 3rd month post-transplant.  Urine for UA and protein/creatinine ratio monthly.  Serum BK - PCR at 1-, 3-, 6-, 9-, 12-, 18-, 24-, 36- 48-, and 60 months post-transplant.  Hepatic panel at 1-, 2-, 3-, 6-, 9-,  12-, 18-, 24-, and 36- months post-transplant.    Monica Handley MD       [facility] Patient Status  Functional Status: 80% - Normal activity with effort: some symptoms of disease  Physical Capacity: No Limitations

## 2023-05-30 NOTE — PATIENT INSTRUCTIONS
STOP phosphorus {K phos neutral!]  Keep following low potassium diet  Change amlodipine to nifedipine for better BP  control. When you run out amlodipine, do not refill and get nidefipine instead.

## 2023-05-30 NOTE — LETTER
May 30, 2023        Pipe Martinez  [address]  [facility] [address]  Phone: [phone]  Fax: [phone]             [facility] Rivas- Transplant [facility] Fl  [address]  [facility] [address]  Phone: [phone]     Patient: Andrey Reddy   MR Number: [id number]   YOB: 1985   Date of Visit: 5/30/2023       Dear Dr. Pipe Martinez    Thank you for referring Andrey Reddy to me for evaluation. Attached you will find relevant portions of my assessment and plan of care.    If you have questions, please do not hesitate to call me. I look forward to following Andrey Reddy along with you.    Sincerely,    Monica Handley MD    Enclosure    If you would like to receive this communication electronically, please contact [email or web address] or [phone] to request [employer] access.    [employer] is a tool which provides read-only access to select patient information with whom you have a relationship. Its easy to use and provides real time access to review your patients record including encounter summaries, notes, results, and demographic information.    If you feel you have received this communication in error or would no longer like to receive these types of communications, please e-mail [email or web address]

## 2023-06-03 ENCOUNTER — DOCUMENT SCAN (OUTPATIENT)
Dept: HOME HEALTH SERVICES | Facility: HOSPITAL | Age: 38
End: 2023-06-03
Payer: MEDICARE

## 2023-06-05 ENCOUNTER — DOCUMENTATION ONLY (OUTPATIENT)
Dept: TRANSPLANT | Facility: CLINIC | Age: 38
End: 2023-06-05
Payer: MEDICARE

## 2023-06-05 ENCOUNTER — PATIENT MESSAGE (OUTPATIENT)
Dept: TRANSPLANT | Facility: CLINIC | Age: 38
End: 2023-06-05
Payer: MEDICARE

## 2023-06-05 DIAGNOSIS — Z94.0 S/P KIDNEY TRANSPLANT: ICD-10-CM

## 2023-06-05 LAB
EXT ALBUMIN: 4 (ref 3.4–5)
EXT ANC: 4.89 (ref 1.78–5.38)
EXT BUN: 22 (ref 7–18)
EXT CALCIUM: 9.5 (ref 8.5–10.1)
EXT CHLORIDE: 104 (ref 98–107)
EXT CO2: 29 (ref 21–32)
EXT CREATININE: 1.2 MG/DL (ref 0.7–1.3)
EXT EOSINOPHIL%: 0.8 (ref 0–7)
EXT GLUCOSE: 212 (ref 74–106)
EXT HEMATOCRIT: 34.4 (ref 42–54)
EXT HEMOGLOBIN: 11.6 (ref 14–18)
EXT LYMPH%: 11.3 (ref 20.5–51.1)
EXT MONOCYTES%: 8.4 (ref 1.7–9)
EXT PHOSPHORUS: 3.5 (ref 2.6–4.7)
EXT PLATELETS: 215 (ref 140–440)
EXT POTASSIUM: 4.5 (ref 3.5–5.1)
EXT SEGS%: 79 (ref 42–75.2)
EXT SODIUM: 138 MMOL/L (ref 136–145)
EXT TACROLIMUS LVL: 10.6
EXT WBC: 6.2 (ref 5–10)

## 2023-06-05 RX ORDER — TACROLIMUS 1 MG/1
4 CAPSULE ORAL EVERY 12 HOURS
Qty: 240 CAPSULE | Refills: 11 | Status: SHIPPED | OUTPATIENT
Start: 2023-06-05 | End: 2023-06-15

## 2023-06-05 NOTE — TELEPHONE ENCOUNTER
Written order received from Dr. White.  Messaged patient instructing him to decrease tacrolimus to 4 mg twice a day.  Asked pt to contact us with any questions.       ----- Message from Ashley White DO sent at 6/5/2023  3:01 PM CDT -----  Stable Hgb  Near baseline graft function. Acceptable electrolytes   High FK. Decrease FK to 4 mg BID from 5/4

## 2023-06-06 DIAGNOSIS — Z94.0 S/P KIDNEY TRANSPLANT: ICD-10-CM

## 2023-06-06 DIAGNOSIS — Z99.2 CONTROLLED TYPE 2 DIABETES MELLITUS WITH CHRONIC KIDNEY DISEASE ON CHRONIC DIALYSIS, WITHOUT LONG-TERM CURRENT USE OF INSULIN: Primary | ICD-10-CM

## 2023-06-06 DIAGNOSIS — E11.22 CONTROLLED TYPE 2 DIABETES MELLITUS WITH CHRONIC KIDNEY DISEASE ON CHRONIC DIALYSIS, WITHOUT LONG-TERM CURRENT USE OF INSULIN: Primary | ICD-10-CM

## 2023-06-06 DIAGNOSIS — N18.6 CONTROLLED TYPE 2 DIABETES MELLITUS WITH CHRONIC KIDNEY DISEASE ON CHRONIC DIALYSIS, WITHOUT LONG-TERM CURRENT USE OF INSULIN: Primary | ICD-10-CM

## 2023-06-06 RX ORDER — PREDNISONE 5 MG/1
5 TABLET ORAL DAILY
Qty: 91 TABLET | Refills: 3 | Status: SHIPPED | OUTPATIENT
Start: 2023-06-06

## 2023-06-06 NOTE — TELEPHONE ENCOUNTER
"Patient called asking to have all prescriptions sent to and filled at [facility] pharmacy to avoid confusion and delay of treatments.        ----- Message from Vahid Coronel sent at 6/6/2023  2:10 PM CDT -----  Consult/Advisory:          Name Of Caller: Self      Contact Preference?:  [phone]      What is the nature of the call?: Calling to speak w/ Itzel about complications receiving refills          Additional Notes:  "Thank you for all that you do for our patients"      "

## 2023-06-07 ENCOUNTER — PATIENT MESSAGE (OUTPATIENT)
Dept: TRANSPLANT | Facility: CLINIC | Age: 38
End: 2023-06-07
Payer: MEDICARE

## 2023-06-15 ENCOUNTER — PATIENT MESSAGE (OUTPATIENT)
Dept: TRANSPLANT | Facility: CLINIC | Age: 38
End: 2023-06-15
Payer: MEDICARE

## 2023-06-15 ENCOUNTER — DOCUMENTATION ONLY (OUTPATIENT)
Dept: TRANSPLANT | Facility: CLINIC | Age: 38
End: 2023-06-15
Payer: MEDICARE

## 2023-06-15 DIAGNOSIS — Z94.0 S/P KIDNEY TRANSPLANT: ICD-10-CM

## 2023-06-15 LAB
EXT ALBUMIN: 4.2 (ref 3.4–5)
EXT ANC: 2.58 (ref 1.78–5.38)
EXT BUN: 21 (ref 7–18)
EXT CALCIUM: 8.8 (ref 8.5–10.1)
EXT CHLORIDE: 108 (ref 98–107)
EXT CO2: 30 (ref 21–32)
EXT CREATININE: 1.4 MG/DL (ref 0.7–1.3)
EXT GLUCOSE: 187 (ref 74–106)
EXT HEMATOCRIT: 37 (ref 42–54)
EXT HEMOGLOBIN: 12.5 (ref 14–18)
EXT MAGNESIUM: 1.5 (ref 1.8–2.4)
EXT PHOSPHORUS: 3.5 (ref 2.6–4.7)
EXT PLATELETS: 193 (ref 140–440)
EXT POTASSIUM: 4.6 (ref 3.5–5.1)
EXT SODIUM: 143 MMOL/L (ref 136–145)
EXT TACROLIMUS LVL: 5.8
EXT WBC: 3.5 (ref 5–10)

## 2023-06-15 RX ORDER — TACROLIMUS 1 MG/1
CAPSULE ORAL
Qty: 270 CAPSULE | Refills: 11 | Status: SHIPPED | OUTPATIENT
Start: 2023-06-15 | End: 2023-06-29 | Stop reason: ALTCHOICE

## 2023-06-15 NOTE — TELEPHONE ENCOUNTER
Received written order from Dr. Troy.  Messaged patient instructing him to increase tacro to 5 mg in the morning and keep the evening dose at 4 mg.  Asked pt to take ketoconazole as prescribed and not to miss any doses.  Asked pt to contact us with any questions.       ----- Message from Monica Handley MD sent at 6/15/2023  1:18 PM CDT -----  Increase tacro to 5/4 and confirm taking tac + keto as prescribed.

## 2023-06-22 ENCOUNTER — DOCUMENTATION ONLY (OUTPATIENT)
Dept: TRANSPLANT | Facility: CLINIC | Age: 38
End: 2023-06-22
Payer: MEDICARE

## 2023-06-22 LAB
EXT ALBUMIN: 4.2 (ref 3.4–5)
EXT BUN: 21 (ref 7–18)
EXT CALCIUM: 8.8 (ref 8.5–10.1)
EXT CHLORIDE: 108 (ref 98–107)
EXT CO2: 30 (ref 21–32)
EXT CREATININE: 1.4 MG/DL (ref 0.7–1.3)
EXT EOSINOPHIL%: 1.4 (ref 0–7)
EXT GLUCOSE: 187 (ref 74–106)
EXT HEMATOCRIT: 37 (ref 42–54)
EXT HEMOGLOBIN: 12.5 (ref 14–18)
EXT LYMPH%: 15.4 (ref 20.5–51.1)
EXT MAGNESIUM: 1.5 (ref 1.8–2.4)
EXT MONOCYTES%: 8.9 (ref 1.7–9)
EXT PHOSPHORUS: 3.5 (ref 2.6–4.7)
EXT PLATELETS: 193 (ref 140–440)
EXT POTASSIUM: 4.6 (ref 3.5–5.1)
EXT SEGS%: 73.7 (ref 42.2–75.2)
EXT SODIUM: 143 MMOL/L (ref 136–145)
EXT WBC: 3.5 (ref 5–10)

## 2023-06-26 ENCOUNTER — DOCUMENTATION ONLY (OUTPATIENT)
Dept: TRANSPLANT | Facility: CLINIC | Age: 38
End: 2023-06-26
Payer: MEDICARE

## 2023-06-26 LAB
EXT ALBUMIN: 4.2 (ref 3.4–5)
EXT BUN: 24 (ref 7–18)
EXT CALCIUM: 9 (ref 8.5–10.1)
EXT CHLORIDE: 107 (ref 98–107)
EXT CO2: 27 (ref 21–32)
EXT CREATININE: 1.4 MG/DL (ref 0.7–1.3)
EXT EOSINOPHIL%: 1.7 (ref 0–7)
EXT GLUCOSE: 139 (ref 74–106)
EXT HEMATOCRIT: 37.7 (ref 42–54)
EXT HEMOGLOBIN: 12.6 (ref 14–18)
EXT LYMPH%: 19.9 (ref 20.5–51.1)
EXT MAGNESIUM: 1.6 (ref 1.8–2.4)
EXT MONOCYTES%: 13.2 (ref 1.7–9)
EXT PHOSPHORUS: 4.1 (ref 2.6–4.7)
EXT PLATELETS: 181 (ref 140–440)
EXT POTASSIUM: 4.7 (ref 3.5–5.1)
EXT SEGS%: 64.2 (ref 42.2–75.2)
EXT SODIUM: 144 MMOL/L (ref 136–145)
EXT TACROLIMUS LVL: 6.2
EXT WBC: 3 (ref 5–10)

## 2023-06-27 ENCOUNTER — DOCUMENTATION ONLY (OUTPATIENT)
Dept: TRANSPLANT | Facility: CLINIC | Age: 38
End: 2023-06-27

## 2023-06-27 ENCOUNTER — PATIENT MESSAGE (OUTPATIENT)
Dept: TRANSPLANT | Facility: CLINIC | Age: 38
End: 2023-06-27

## 2023-06-27 ENCOUNTER — OFFICE VISIT (OUTPATIENT)
Dept: TRANSPLANT | Facility: CLINIC | Age: 38
End: 2023-06-27
Payer: COMMERCIAL

## 2023-06-27 DIAGNOSIS — Z79.899 IMMUNOSUPPRESSIVE MANAGEMENT ENCOUNTER FOLLOWING KIDNEY TRANSPLANT: ICD-10-CM

## 2023-06-27 DIAGNOSIS — Z91.89 AT RISK FOR OPPORTUNISTIC INFECTIONS: ICD-10-CM

## 2023-06-27 DIAGNOSIS — Z29.89 PROPHYLACTIC IMMUNOTHERAPY: ICD-10-CM

## 2023-06-27 DIAGNOSIS — R06.09 DYSPNEA ON EXERTION: ICD-10-CM

## 2023-06-27 DIAGNOSIS — I15.1 HYPERTENSION SECONDARY TO OTHER RENAL DISORDERS: ICD-10-CM

## 2023-06-27 DIAGNOSIS — Z94.0 IMMUNOSUPPRESSIVE MANAGEMENT ENCOUNTER FOLLOWING KIDNEY TRANSPLANT: ICD-10-CM

## 2023-06-27 DIAGNOSIS — Z94.0 DECEASED-DONOR KIDNEY TRANSPLANT: ICD-10-CM

## 2023-06-27 DIAGNOSIS — N18.31 STAGE 3A CHRONIC KIDNEY DISEASE: Primary | ICD-10-CM

## 2023-06-27 LAB
EXT ALBUMIN: 4.1 (ref 3.4–5)
EXT ANC: 2.89 (ref 1.78–5.38)
EXT BUN: 25 (ref 7–18)
EXT CALCIUM: 8.9 (ref 8.5–10.1)
EXT CHLORIDE: 107 (ref 98–107)
EXT CO2: 31 (ref 21–32)
EXT CREATININE: 1.5 MG/DL (ref 0.7–1.3)
EXT EOSINOPHIL%: 1.5
EXT GLUCOSE: 213 (ref 74–106)
EXT HEMATOCRIT: 38.3 (ref 42–54)
EXT HEMOGLOBIN: 12.9 (ref 14–18)
EXT LYMPH%: 15.3 (ref 20.5–51.1)
EXT MAGNESIUM: 1.4 (ref 1.8–2.4)
EXT MONOCYTES%: 12.1 (ref 1.7–9)
EXT PHOSPHORUS: 4.3 (ref 2.6–4.7)
EXT PLATELETS: 182 (ref 140–440)
EXT POTASSIUM: 4.6 (ref 3.5–5.1)
EXT SEGS%: 70.1
EXT SODIUM: 143 MMOL/L (ref 136–145)
EXT TACROLIMUS LVL: 7.2
EXT WBC: 4.1 (ref 5–10)

## 2023-06-27 PROCEDURE — 3044F PR MOST RECENT HEMOGLOBIN A1C LEVEL <7.0%: ICD-10-PCS | Mod: CPTII,95,, | Performed by: INTERNAL MEDICINE

## 2023-06-27 PROCEDURE — 99215 PR OFFICE/OUTPT VISIT, EST, LEVL V, 40-54 MIN: ICD-10-PCS | Mod: 95,,, | Performed by: INTERNAL MEDICINE

## 2023-06-27 PROCEDURE — 1160F PR REVIEW ALL MEDS BY PRESCRIBER/CLIN PHARMACIST DOCUMENTED: ICD-10-PCS | Mod: CPTII,95,, | Performed by: INTERNAL MEDICINE

## 2023-06-27 PROCEDURE — 1160F RVW MEDS BY RX/DR IN RCRD: CPT | Mod: CPTII,95,, | Performed by: INTERNAL MEDICINE

## 2023-06-27 PROCEDURE — 1159F PR MEDICATION LIST DOCUMENTED IN MEDICAL RECORD: ICD-10-PCS | Mod: CPTII,95,, | Performed by: INTERNAL MEDICINE

## 2023-06-27 PROCEDURE — 3066F NEPHROPATHY DOC TX: CPT | Mod: CPTII,95,, | Performed by: INTERNAL MEDICINE

## 2023-06-27 PROCEDURE — 3066F PR DOCUMENTATION OF TREATMENT FOR NEPHROPATHY: ICD-10-PCS | Mod: CPTII,95,, | Performed by: INTERNAL MEDICINE

## 2023-06-27 PROCEDURE — 99215 OFFICE O/P EST HI 40 MIN: CPT | Mod: 95,,, | Performed by: INTERNAL MEDICINE

## 2023-06-27 PROCEDURE — 1159F MED LIST DOCD IN RCRD: CPT | Mod: CPTII,95,, | Performed by: INTERNAL MEDICINE

## 2023-06-27 PROCEDURE — 3044F HG A1C LEVEL LT 7.0%: CPT | Mod: CPTII,95,, | Performed by: INTERNAL MEDICINE

## 2023-06-27 RX ORDER — HYDROCHLOROTHIAZIDE 25 MG/1
25 TABLET ORAL DAILY
Qty: 30 TABLET | Refills: 11 | Status: SHIPPED | OUTPATIENT
Start: 2023-06-27 | End: 2023-10-17

## 2023-06-27 NOTE — LETTER
June 27, 2023        Pipe Martinez  [address]  [facility] [address]  Phone: [phone]  Fax: [phone]             [facility]urw[facility] Rivas- Transplant [facility] Fl  [address]  [facility] [address]  Phone: [phone]     Patient: Andrey Reddy   MR Number: [id number]   YOB: 1985   Date of Visit: 6/27/2023       Dear Dr. Pipe Martinez    Thank you for referring Andrey Reddy to me for evaluation. Attached you will find relevant portions of my assessment and plan of care.    If you have questions, please do not hesitate to call me. I look forward to following Andrey Reddy along with you.    Sincerely,    Monica Handley MD    Enclosure    If you would like to receive this communication electronically, please contact [email or web address] or [phone] to request [employer] access.    [employer] is a tool which provides read-only access to select patient information with whom you have a relationship. Its easy to use and provides real time access to review your patients record including encounter summaries, notes, results, and demographic information.    If you feel you have received this communication in error or would no longer like to receive these types of communications, please e-mail [email or web address]

## 2023-06-27 NOTE — PATIENT INSTRUCTIONS
Continue current medications  Start diuretic called hydrochlorothiazide 25 mg every morning for better blood pressure control  I will order 2D echocardiogram to check on your heart function given he gets short of breath with activity.

## 2023-06-27 NOTE — PROGRESS NOTES
The patient location is: Home  The chief complaint leading to consultation is: reassess kidney function, immunosuppression and related issues post kidney transplant    Visit type: audiovisual    Face to Face time with patient:26 min  40 minutes of total time spent on the encounter, which includes face to face time and non-face to face time preparing to see the patient (eg, review of tests), Obtaining and/or reviewing separately obtained history, Documenting clinical information in the electronic or other health record, Independently interpreting results (not separately reported) and communicating results to the patient/family/caregiver, or Care coordination (not separately reported).     Each patient to whom he or she provides medical services by telemedicine is:  (1) informed of the relationship between the physician and patient and the respective role of any other health care provider with respect to management of the patient; and (2) notified that he or she may decline to receive medical services by telemedicine and may withdraw from such care at any time.    Notes:   Post-Transplant Assessment    Referring Physician: Pipe Martinez  Current Nephrologist: Pipe Martinez    ORGAN: LEFT KIDNEY  Donor Type: donation after circulatory death   PHS Increased Risk: no  Cold Ischemia: 1,264 mins  Induction Medications: thymoglobulin    Subjective:     CC:  Reassessment of renal allograft function and management of chronic immunosuppression.    HPI:  Mr. Reddy is a 38 y.o. year old Black or  male who received a donation after circulatory death  kidney transplant on 4/9/23.  He has CKD stage 3 - GFR 30-59 and his baseline creatinine is between 1.2-1.6. He takes mycophenolate mofetil, prednisone, and tacrolimus for maintenance immunosuppression. He is additionally on ketoconazole to augment tacro levels.    Pertinent Nephrology/Transplant History:   ESRD from DM II  DCD DDKT 4/9/23, KDPI 38%,  "CIT>21hrs; Thymo induction  CMV D+,R-  PCP ppx: bactrim STOP 10/6/23  CMV ppx: valcyte STOP 10/6/23    05/30/2023 Andrey feels well and denies nay concerns. He notes no issues with his IS and has no concerns.    He is monitoring his sugars which run 150-170s most days. No low sugars noted. He has endo follow up in future already scheduled.  BPs reported to be in 130-140/70s-88 range  Reports his incision is well healed. Urinating w/o problems, "like a racehorse."  Denies heartburn off Pepcid  Uses pill box with help of Mom.    6/27/23  Andrey notes occ HA every few days. He believes the HA have been  present since txp.  With review of systems questioning, he also notes occasional dyspnea on exertion.  He also had that during dialysis, and states it was attributed to volume overload.  He reports no dyspnea at rest or lying down, and notes no peripheral edema.  He denies voiding difficulties.  He reports blood pressures are usually 140-150 over 80s-90s, as high as diastolic of 98.     Current Outpatient Medications   Medication Sig    atorvastatin (LIPITOR) 10 MG tablet Take 1 tablet (10 mg total) by mouth every evening.    blood sugar diagnostic Strp Test blood sugar 3 (three) times daily.    blood-glucose meter Misc Use as instructed to test blood sugar daily    HYDROcodone-acetaminophen (NORCO) 7.5-325 mg per tablet Take by mouth.    ketoconazole (NIZORAL) 200 mg Tab Take ½ tablet (100 mg total) by mouth once daily.    lancets Misc Test blood sugar 3 (three) times daily.    mycophenolate (CELLCEPT) 250 mg Cap Take 4 capsules (1,000 mg total) by mouth 2 (two) times daily. Txp Date:4/9/2023 (Kidney) Disch Date:4/13/2023 ICD10:Z94.0 Txp Location:[facility]    NIFEdipine (PROCARDIA-XL) 60 MG (OSM) 24 hr tablet Take 1 tablet (60 mg total) by mouth once daily.    pen needle, diabetic 32 gauge x 5/32" Ndle 1 each by Misc.(Non-Drug; Combo Route) route 3 (three) times daily.    predniSONE (DELTASONE) 5 MG tablet Take 1 tablet " (5 mg total) by mouth once daily.    pregabalin (LYRICA) 75 MG capsule Take 1 capsule (75 mg total) by mouth 3 (three) times daily.    SITagliptin phosphate (JANUVIA) 100 MG Tab Take 1 tablet (100 mg total) by mouth once daily.    sulfamethoxazole-trimethoprim 400-80mg (BACTRIM,SEPTRA) 400-80 mg per tablet Take 1 tablet by mouth every morning. Stop: 10/6/23    tacrolimus (PROGRAF) 1 MG Cap Take 5 capsules (5 mg total) by mouth every morning AND 4 capsules (4 mg total) every evening. Txp Date:4/9/2023 (Kidney) Disch Date:4/13/2023 ICD10:Z94.0 Txp Location:[facility].    valGANciclovir (VALCYTE) 450 mg Tab Take 2 tablets (900 mg total) by mouth every morning. Stop: 10/6/23    hydrALAZINE (APRESOLINE) 50 MG tablet Take 1 tablet (50 mg total) by mouth every 8 (eight) hours.     No current facility-administered medications for this visit.       Review of Systems   Constitutional: Negative.    HENT: Negative.     Respiratory:  Positive for shortness of breath (With exertion).    Cardiovascular:  Negative for chest pain and leg swelling.   Gastrointestinal:  Negative for abdominal pain, nausea and vomiting.   Genitourinary:  Negative for difficulty urinating.   Skin:  Negative for rash.   Allergic/Immunologic: Positive for immunocompromised state.   Neurological:  Positive for headaches.     Objective:     There were no vitals taken for this visit.body mass index is unknown because there is no height or weight on file.    Physical Exam Pleasant NAD, unlabored resp.    Labs:  Recent Labs   Lab 10/13/21  0806 10/13/21  0807 10/13/21  1136 10/28/22  1205 04/09/23  0320 04/09/23  0857 04/27/23  0757 05/01/23  0712 05/04/23  0724 05/04/23  0742   WBC 4.75  --   --   --  5.72   < > 9.75 10.20  --  8.19   Hemoglobin 12.7 L  --   --   --  9.8 L   < > 11.1 L 11.0 L  --  11.3 L   POC Hematocrit  --   --   --   --   --    < >  --   --   --   --    Hematocrit 37.6 L  --   --   --  29.8 L   < > 34.8 L 34.8 L  --  34.5 L   Sodium 139  --   --    --  140   < > 140 139  --  138   Potassium 4.6  --   --   --  3.9   < > 4.3 4.7  --  4.0   Chloride 104  --   --   --  103   < > 105 104  --  104   CO2 21 L  --   --   --  25   < > 27 27  --  25   BUN 30 H  --   --   --  25 H   < > 17 22 H  --  24 H   Creatinine 4.9 H  --   --   --  3.7 H   < > 1.6 H 1.5 H  --  1.6 H   eGFR if non  14.0 A  --   --   --   --   --   --   --   --   --    Calcium 10.0  --   --   --  9.2   < > 9.4 9.9  --  9.6   Phosphorus 4.0  --   --   --  4.0   < > 3.5 3.5  --  3.3   Magnesium  --   --   --   --   --    < > 1.4 L 1.5 L  --  1.5 L   Albumin 4.5  --   --   --  3.9   < > 4.0 4.2  --  4.1  4.1   AST 15  --   --   --  12  --   --   --   --  9 L   ALT 18  --   --   --  13  --   --   --   --  11   Prot/Creat Ratio, Urine  --   --  1.35 H  --   --   --   --   --  0.12  --    PTH, Intact  --  210.3 H  --  370.8 H 354.1 H  --   --   --   --   --    Tacrolimus Lvl  --   --   --   --   --    < > 14.3 6.5  --  8.0    < > = values in this interval not displayed.         Recent Labs   Lab 05/10/23  0846 05/17/23  0000 05/30/23  0851 06/13/23  0900 06/20/23  0819   EXT ANC  --   --  4.89 2.58  --    EXT WBC 5.9   < > 6.2 3.5 L  3.5 A 3.0 L   EXT SEGS% 77.5   < > 79 A 73.7 64.2   EXT Platelets 227   < > 215 193  193 181   EXT Hemoglobin 11.6 L   < > 11.6 A 12.5 L  12.5 A 12.6 L   EXT Hematocrit 33.7 L   < > 34.4 A 37.0 L  37 A 37.7 L   EXT Creatinine 1.3   < > 1.2 1.40 H  1.4 A 1.40 H   EXT CREATININE UA 72.5  --   --   --   --    EXT Sodium 140   < > 138 143  143 144   EXT Potassium 4.4   < > 4.5 4.6  4.6 4.7   EXT BUN 19 H   < > 22 A 21 H  21 A 24 H   EXT CO2 29   < > 29 30  30 27   EXT Calcium 8.9   < > 9.5 8.8  8.8 9.0   EXT Phosphorus 3.4   < > 3.5 3.5  3.5 4.1 A   EXT Glucose 146 H   < > 212 A 187 H  187 A 139 H   EXT Albumin 4.1   < > 4 4.2  4.2 4.2   EXT AST <8 L  --   --   --   --    EXT ALT 15 L  --   --   --   --    EXT BilirubiN Total 0.6  --   --   --   --      < > = values in this interval not displayed.       Recent Labs   Lab 05/10/23  0846 23  0000 23  0851 23  0900 23  0819   EXT Tacrolimus Lvl 7.0   < > 10.6 5.8 6.2   EXT PROT/CREAT Ratio UR 0.23  --   --   --   --     < > = values in this interval not displayed.       Labs were reviewed with the patient.    Assessment:     1. Stage 3a chronic kidney disease    2. -donor kidney transplant    3. Immunosuppressive management encounter following kidney transplant    4. Prophylactic immunotherapy    5. At risk for opportunistic infections    6. Hypertension secondary to other renal disorders    7. Dyspnea on exertion       Plan:   New nursing orders:  -   2D echo for ROYAL  - reinforce he is to start HCTZ fro better BP IN ADDITION to other meds    CKD II-IIIa post DCD kidney transplant 23, KDPI 38%, CIT>21hr  -creat 1.2-1.6, moat recent 1.4 = stable  Screening for proteinuria  - last p/c ratios this May 10th 0.23, excellent    Immunosuppression Management  - On tacro, MMF and prednisone + ketoconazole to augment tacro levels  - Target tacro 7-9, most recent 6.2; await repeat level drawn this morning  - Continue monitoring for toxicities and SE, none presently noted    At risk for opportunistic infections  -Anti-infective prophylaxis against opportunistic infections  -Bactrim and Valcyte until 10/6/23 for PJP and CMV prophy  -Screening for BK infection to prevent allograft dysfunction  -May 10, 2023 BK PCR not detected  -Continue blood PCR screening as per guidelines to prevent BK viremia and nephropathy leading to allograft dysfunction and potential graft failure    Hypertension, renal  - Add HCTZ 25 mg daily    ROYAL   -repeat 2D echocardiogram [it was pretty normal last ]    Follow-up:   Clinic: return to transplant clinic weekly for the first month after transplant; every 2 weeks during months 2-3; then at 6-, 9-, 12-, 18-, 24-, and 36- months post-transplant to reassess for  complications from immunosuppression toxicity and monitor for rejection.  Annually thereafter.    Labs: since patient remains at high risk for rejection and drug-related complications that warrant close monitoring, labs will be ordered as follows: continue twice weekly CBC, renal panel, and drug level for first month; then same labs once weekly through 3rd month post-transplant.  Urine for UA and protein/creatinine ratio monthly.  Serum BK - PCR at 1-, 3-, 6-, 9-, 12-, 18-, 24-, 36- 48-, and 60 months post-transplant.  Hepatic panel at 1-, 2-, 3-, 6-, 9-, 12-, 18-, 24-, and 36- months post-transplant.    MD MONICA Velásquez Patient Status  Functional Status: 80% - Normal activity with effort: some symptoms of disease  Physical Capacity: No Limitations

## 2023-06-28 ENCOUNTER — TELEPHONE (OUTPATIENT)
Dept: TRANSPLANT | Facility: CLINIC | Age: 38
End: 2023-06-28
Payer: MEDICARE

## 2023-06-28 NOTE — TELEPHONE ENCOUNTER
Social Work Transplant Note:    SW attempted to return pt's call regarding [facility]F assistance, no answer, and no vm. SW unable to leave message.     Rebekah White LMSW

## 2023-06-28 NOTE — TELEPHONE ENCOUNTER
----- Message from Tesha ARREDONDO Do, RN sent at 6/28/2023  8:44 AM CDT -----  Regarding: FW: Consult advisory  Contact: Pt    ----- Message -----  From: Kelly Samaniego  Sent: 6/28/2023   8:14 AM CDT  To: [facility] Post-Kidney Transplant Clinical  Subject: Consult advisory                                 Pt is requesting a callback  regarding his blood pressure. Pt stated his BP is normally not that high. Please adv pt              Confirmed contact below:   Contact Name:Andrey Reddy  Phone Number: [phone]

## 2023-06-29 ENCOUNTER — PATIENT MESSAGE (OUTPATIENT)
Dept: TRANSPLANT | Facility: CLINIC | Age: 38
End: 2023-06-29
Payer: MEDICARE

## 2023-06-29 DIAGNOSIS — Z94.0 S/P KIDNEY TRANSPLANT: Primary | ICD-10-CM

## 2023-06-29 NOTE — TELEPHONE ENCOUNTER
Received written order from Dr. Troy.  Messaged patient explaining the coversion to Envarsus from prograf to help with headaches and tremors.  Instructed pt to continue Prograf until he receives Envarsus or he can finish out the Prograf on hand then convert to Envarsus 8 mg by mouth once daily every morning.  Instructed pt to stop Prograf when he starts Envarsus.  Asked pt to contact us with any questions.        ----- Message from Monica Handley MD sent at 6/29/2023  1:15 PM CDT -----  Tacro level ok; can convert to envarsus 8 mg daily to see if that helps with headaches

## 2023-07-03 ENCOUNTER — TELEPHONE (OUTPATIENT)
Dept: TRANSPLANT | Facility: CLINIC | Age: 38
End: 2023-07-03
Payer: MEDICARE

## 2023-07-03 NOTE — TELEPHONE ENCOUNTER
Social Work Transplant Note:    KAROLINA called and spoke to pt regarding [facility] assisting with paying for insurance premiums. KAROLINA advised pt he can contact [facility] directly to discuss an extension in assistance; however, typically [facility] does not continue to provide financial assistance after a patient is transplanted.     Pt had numerous questions about changing his insurance coverage. KAROLINA advised pt speak with his insurance provider directly and to speak with transplant financial coordinators, pt agreed.    KAROLINA sent in-basket message to , Zac Courtney and pt's nurse coordinator, Lyric Lazcano RN, stating above.    Rebekah White LMSW

## 2023-07-05 ENCOUNTER — TELEPHONE (OUTPATIENT)
Dept: TRANSPLANT | Facility: CLINIC | Age: 38
End: 2023-07-05
Payer: MEDICARE

## 2023-07-05 NOTE — TELEPHONE ENCOUNTER
----- Message from Karissa Courtney sent at 7/5/2023  2:38 PM CDT -----  Regarding: RE: Insurance premiums  Called and spoke with patient. Working with patient to assist with his concerns.     Thanks   ----- Message -----  From: Rebekah Lujan [facility]  Sent: 7/3/2023  11:38 AM CDT  To: Karissa Courtney, Tesha ARREDONDO Do RN, #  Subject: RE: Insurance premiums                           I just spoke with Mr. Reddy to answer his questions about AKF. He has quite a few questions about changing his insurance.     I advised he speak with the transplant financial coordinators. Zac, he said he had spoken with you before.    Thanks.   ----- Message -----  From: Tesha ARREDONDO Do, RN  Sent: 6/27/2023  10:31 AM CDT  To: [facility] Kidney Transplant Social Workers  Subject: Insurance premiums                               Hi,   Patient states that his [employer] premium was covered by AKF but he just received an invoice for $770.  Could you look into this please?  Itzel

## 2023-07-07 ENCOUNTER — DOCUMENTATION ONLY (OUTPATIENT)
Dept: TRANSPLANT | Facility: CLINIC | Age: 38
End: 2023-07-07
Payer: MEDICARE

## 2023-07-07 LAB
EXT ALBUMIN: 4.5 (ref 3.4–5)
EXT ALKALINE PHOSPHATASE: 73 (ref 46–116)
EXT ALT: 70 (ref 16–63)
EXT ANC: 3.57 (ref 1.78–5.38)
EXT AST: 25 (ref 15–37)
EXT BACTERIA UA: ABNORMAL
EXT BILIRUBIN DIRECT: 0.2 MG/DL (ref 0–0.2)
EXT BILIRUBIN TOTAL: 0.5 (ref 0.2–1)
EXT BUN: 27 (ref 7–18)
EXT CALCIUM: 9.2 (ref 8.5–10.1)
EXT CHLORIDE: 103 (ref 98–107)
EXT CO2: 28 (ref 21–32)
EXT CREATININE UA: 62.6 (ref 100–180)
EXT CREATININE: 1.5 MG/DL (ref 0.7–1.3)
EXT EOSINOPHIL%: 1.1 (ref 0–7)
EXT GLUCOSE UA: ABNORMAL
EXT GLUCOSE: 292 (ref 74–106)
EXT HEMATOCRIT: 38.7 (ref 42–54)
EXT HEMOGLOBIN: 13.1 (ref 14–18)
EXT LYMPH%: 12.5 (ref 20.5–51.1)
EXT MAGNESIUM: 1.7 (ref 1.8–2.4)
EXT MONOCYTES%: 10 (ref 1.7–9)
EXT NITRITES UA: NEGATIVE
EXT PHOSPHORUS: 4.2 (ref 2.6–4.7)
EXT PLATELETS: 177 (ref 140–400)
EXT POTASSIUM: 4.1 (ref 3.5–5.1)
EXT PROTEIN TOTAL: 7.6 (ref 6.4–8.2)
EXT PROTEIN UA: ABNORMAL
EXT RBC UA: ABNORMAL
EXT SEGS%: 75.8 (ref 42.2–75.2)
EXT SODIUM: 140 MMOL/L (ref 136–145)
EXT TACROLIMUS LVL: 8.8
EXT URINE PROTEIN: 23.6 (ref 1–14)
EXT WBC UA: ABNORMAL
EXT WBC: 4.7 (ref 5–10)

## 2023-07-17 ENCOUNTER — OFFICE VISIT (OUTPATIENT)
Dept: TRANSPLANT | Facility: CLINIC | Age: 38
End: 2023-07-17
Payer: MEDICARE

## 2023-07-17 VITALS
RESPIRATION RATE: 16 BRPM | TEMPERATURE: 97 F | BODY MASS INDEX: 21.16 KG/M2 | HEART RATE: 69 BPM | HEIGHT: 73 IN | OXYGEN SATURATION: 98 % | WEIGHT: 159.63 LBS | SYSTOLIC BLOOD PRESSURE: 145 MMHG | DIASTOLIC BLOOD PRESSURE: 86 MMHG

## 2023-07-17 DIAGNOSIS — Z94.0 S/P KIDNEY TRANSPLANT: Primary | ICD-10-CM

## 2023-07-17 DIAGNOSIS — N18.31 STAGE 3A CHRONIC KIDNEY DISEASE: ICD-10-CM

## 2023-07-17 DIAGNOSIS — Z79.60 LONG-TERM USE OF IMMUNOSUPPRESSANT MEDICATION: ICD-10-CM

## 2023-07-17 DIAGNOSIS — I15.0 RENOVASCULAR HYPERTENSION: ICD-10-CM

## 2023-07-17 DIAGNOSIS — Z91.89 AT RISK FOR OPPORTUNISTIC INFECTIONS: ICD-10-CM

## 2023-07-17 PROCEDURE — 3079F DIAST BP 80-89 MM HG: CPT | Mod: CPTII,S$GLB,, | Performed by: NURSE PRACTITIONER

## 2023-07-17 PROCEDURE — 99215 OFFICE O/P EST HI 40 MIN: CPT | Mod: S$GLB,,, | Performed by: NURSE PRACTITIONER

## 2023-07-17 PROCEDURE — 3008F PR BODY MASS INDEX (BMI) DOCUMENTED: ICD-10-PCS | Mod: CPTII,S$GLB,, | Performed by: NURSE PRACTITIONER

## 2023-07-17 PROCEDURE — 99999 PR PBB SHADOW E&M-EST. PATIENT-LVL V: CPT | Mod: PBBFAC,,, | Performed by: NURSE PRACTITIONER

## 2023-07-17 PROCEDURE — 1159F MED LIST DOCD IN RCRD: CPT | Mod: CPTII,S$GLB,, | Performed by: NURSE PRACTITIONER

## 2023-07-17 PROCEDURE — 3044F PR MOST RECENT HEMOGLOBIN A1C LEVEL <7.0%: ICD-10-PCS | Mod: CPTII,S$GLB,, | Performed by: NURSE PRACTITIONER

## 2023-07-17 PROCEDURE — 3008F BODY MASS INDEX DOCD: CPT | Mod: CPTII,S$GLB,, | Performed by: NURSE PRACTITIONER

## 2023-07-17 PROCEDURE — 3066F NEPHROPATHY DOC TX: CPT | Mod: CPTII,S$GLB,, | Performed by: NURSE PRACTITIONER

## 2023-07-17 PROCEDURE — 3066F PR DOCUMENTATION OF TREATMENT FOR NEPHROPATHY: ICD-10-PCS | Mod: CPTII,S$GLB,, | Performed by: NURSE PRACTITIONER

## 2023-07-17 PROCEDURE — 3077F SYST BP >= 140 MM HG: CPT | Mod: CPTII,S$GLB,, | Performed by: NURSE PRACTITIONER

## 2023-07-17 PROCEDURE — 1159F PR MEDICATION LIST DOCUMENTED IN MEDICAL RECORD: ICD-10-PCS | Mod: CPTII,S$GLB,, | Performed by: NURSE PRACTITIONER

## 2023-07-17 PROCEDURE — 99215 PR OFFICE/OUTPT VISIT, EST, LEVL V, 40-54 MIN: ICD-10-PCS | Mod: S$GLB,,, | Performed by: NURSE PRACTITIONER

## 2023-07-17 PROCEDURE — 3079F PR MOST RECENT DIASTOLIC BLOOD PRESSURE 80-89 MM HG: ICD-10-PCS | Mod: CPTII,S$GLB,, | Performed by: NURSE PRACTITIONER

## 2023-07-17 PROCEDURE — 3044F HG A1C LEVEL LT 7.0%: CPT | Mod: CPTII,S$GLB,, | Performed by: NURSE PRACTITIONER

## 2023-07-17 PROCEDURE — 3077F PR MOST RECENT SYSTOLIC BLOOD PRESSURE >= 140 MM HG: ICD-10-PCS | Mod: CPTII,S$GLB,, | Performed by: NURSE PRACTITIONER

## 2023-07-17 PROCEDURE — 99999 PR PBB SHADOW E&M-EST. PATIENT-LVL V: ICD-10-PCS | Mod: PBBFAC,,, | Performed by: NURSE PRACTITIONER

## 2023-07-17 NOTE — PATIENT INSTRUCTIONS
It is my privilege to participate in your transplant care! Please be sure to let us know if you have any questions or concerns about your health care - we cannot help you if we do not know. Don't forget we are on call 24/7 for any emergencies.      Best Wishes,  Vidhi Joe NP-C

## 2023-07-17 NOTE — PROGRESS NOTES
Kidney Post-Transplant Assessment    Referring Physician: Pipe Martinez  Current Nephrologist: Pipe Martinez    ORGAN: LEFT KIDNEY  Donor Type: donation after circulatory death   PHS Increased Risk: no  Cold Ischemia: 1,264 mins  Induction Medications: thymoglobulin    Subjective:     CC:  Reassessment of renal allograft function and management of chronic immunosuppression.    HPI:  Mr. Reddy is a 38 y.o. year old Black or  male with history of ESRD secondary to diabetic nephropathy and HTN who received a donation after circulatory death  kidney transplant on 4/9/23 (Thymo induction, CIT 21 hrs, KDPI 40%). His most recent creatinine is 1.5.  He takes mycophenolate mofetil, prednisone, and tacrolimus for maintenance immunosuppression. His post transplant course has been uncomplicated to date.    Following with general nephrology Dr. Poole. Recently changed insurance, so no longer to see his previous pain management for hand/wrist arthritis.    Staying hydrated, no problems with urination. BP at goal, no peripheral edema.     Tolerating IS without issues, no diarrhea or vomiting.     Current Outpatient Medications   Medication Sig Dispense Refill    atorvastatin (LIPITOR) 10 MG tablet Take 1 tablet (10 mg total) by mouth every evening. (Patient taking differently: Take 10 mg by mouth every evening. Needs refill) 90 tablet 3    blood sugar diagnostic Strp Test blood sugar 3 (three) times daily. 100 each 4    blood-glucose meter Misc Use as instructed to test blood sugar daily 1 each 0    hydroCHLOROthiazide (HYDRODIURIL) 25 MG tablet Take 1 tablet (25 mg total) by mouth once daily. 30 tablet 11    ketoconazole (NIZORAL) 200 mg Tab Take ½ tablet (100 mg total) by mouth once daily. 15 tablet 11    lancets Misc Test blood sugar 3 (three) times daily. 100 each 4    mycophenolate (CELLCEPT) 250 mg Cap Take 4 capsules (1,000 mg total) by mouth 2 (two) times daily. Txp Date:4/9/2023 (Kidney) Disch  "Date:4/13/2023 ICD10:Z94.0 Txp Location:[facility] 240 capsule 11    NIFEdipine (PROCARDIA-XL) 60 MG (OSM) 24 hr tablet Take 1 tablet (60 mg total) by mouth once daily. 30 tablet 11    pen needle, diabetic 32 gauge x 5/32" Ndle 1 each by Misc.(Non-Drug; Combo Route) route 3 (three) times daily. 100 each 4    predniSONE (DELTASONE) 5 MG tablet Take 1 tablet (5 mg total) by mouth once daily. 91 tablet 3    pregabalin (LYRICA) 75 MG capsule Take 1 capsule (75 mg total) by mouth 3 (three) times daily. 90 capsule 3    SITagliptin phosphate (JANUVIA) 100 MG Tab Take 1 tablet (100 mg total) by mouth once daily. 31 tablet 3    sulfamethoxazole-trimethoprim 400-80mg (BACTRIM,SEPTRA) 400-80 mg per tablet Take 1 tablet by mouth every morning. Stop: 10/6/23 30 tablet 4    tacrolimus XR, ENVARSUS, (ENVARSUS XR) 1 mg Tb24 Take 8 tablets (8 mg total) by mouth every morning. 240 tablet 11    valGANciclovir (VALCYTE) 450 mg Tab Take 2 tablets (900 mg total) by mouth every morning. Stop: 10/6/23 60 tablet 4     No current facility-administered medications for this visit.       Past Medical History:   Diagnosis Date    Allergy     Anemia     Diabetes mellitus     Disorder of kidney and ureter     Hyperlipidemia     Hypertension     Secondary hyperparathyroidism        Review of Systems   Constitutional:  Negative for activity change, appetite change and fever.   HENT:  Negative for congestion, mouth sores and sore throat.    Eyes:  Negative for visual disturbance.   Respiratory:  Negative for cough, chest tightness and shortness of breath.    Cardiovascular:  Negative for chest pain, palpitations and leg swelling.   Gastrointestinal:  Negative for abdominal distention, abdominal pain, constipation, diarrhea and nausea.   Genitourinary:  Negative for difficulty urinating, frequency and hematuria.   Musculoskeletal:  Positive for arthralgias (bilateral hands/wrist). Negative for gait problem.   Allergic/Immunologic: Positive for " "immunocompromised state. Negative for environmental allergies and food allergies.   Neurological:  Negative for dizziness, weakness and numbness.   Psychiatric/Behavioral:  Negative for sleep disturbance. The patient is not nervous/anxious.      Objective:   Blood pressure (!) 145/86, pulse 69, temperature 97.3 °F (36.3 °C), temperature source Skin, resp. rate 16, height 6' 1" (1.854 m), weight 72.4 kg (159 lb 9.8 oz), SpO2 98 %.body mass index is 21.06 kg/m².    Physical Exam  Vitals and nursing note reviewed.   Constitutional:       Appearance: Normal appearance.   HENT:      Head: Normocephalic.   Cardiovascular:      Rate and Rhythm: Normal rate and regular rhythm.      Heart sounds: Normal heart sounds.   Pulmonary:      Effort: Pulmonary effort is normal.      Breath sounds: Normal breath sounds.   Abdominal:      General: Bowel sounds are normal. There is no distension.      Palpations: Abdomen is soft.      Tenderness: There is no abdominal tenderness.   Musculoskeletal:         General: Normal range of motion.   Skin:     General: Skin is warm and dry.   Neurological:      General: No focal deficit present.      Mental Status: He is alert.   Psychiatric:         Behavior: Behavior normal.       Labs:  Lab Results   Component Value Date    WBC 8.19 05/04/2023    HGB 11.3 (L) 05/04/2023    HCT 34.5 (L) 05/04/2023     05/04/2023    K 4.0 05/04/2023     05/04/2023    CO2 25 05/04/2023    BUN 24 (H) 05/04/2023    CREATININE 1.6 (H) 05/04/2023    EGFRNORACEVR 56.2 (A) 05/04/2023    CALCIUM 9.6 05/04/2023    PHOS 3.3 05/04/2023    MG 1.5 (L) 05/04/2023    ALBUMIN 4.1 05/04/2023    ALBUMIN 4.1 05/04/2023    AST 9 (L) 05/04/2023    ALT 11 05/04/2023    UTPCR 0.12 05/04/2023    .1 (H) 04/09/2023    TACROLIMUS 8.0 05/04/2023     Labs were reviewed with the patient    Assessment:     1. S/P kidney transplant    2. Long-term use of immunosuppressant medication    3. Stage 3a chronic kidney disease  "   4. Renovascular hypertension    5. At risk for opportunistic infections      Plan:   Needs UPC and BK PCR per protocol with next lab work  Continue follow up with general nephrology for CKD care  Would like to VV for next visit      1. Immunosuppression: Prograf trough 8.8.  Continue Envarsus 8 mg QD, Ketoconazole 100 mg QD to augment prograf levels, MMF 1000 mg BID, and Prednisone 5 mg QD. Will continue to monitor for drug toxicities    2. Allograft Function: Stable. Continue good po hydration.   -history of ESRD secondary to diabetic nephropathy and HTN s/p donation after circulatory death  kidney transplant on 4/9/23 (Thymo induction, CIT 21 hrs, KDPI 40%).      7/6/2023  POD 88   EXT Creatinine 0.70 - 1.30 mg/dL 1.50 (A)   GFR 58 (CKD EPI)    3. Hypertension management: advise low salt diet and home BP monitoring    HCTZ 25 mg QD, Nifedipine 60 mg QD     4. Metabolic Bone Disease/Secondary Hyperparathyroidism:stable   mg BID   7/6/2023  POD 88   EXT Calcium 8.5 - 10.1 9.2   EXT Phosphorus 2.6 - 4.7 4.2   EXT Magnesium 1.8 - 2.4 1.7 (A)       5. Electrolytes:  Will monitor /guidelines   7/6/2023  POD 88   EXT Sodium 136 - 145 mmol/L 140   EXT Potassium 3.5 - 5.1 4.1   EXT Chloride 98 - 107 103   EXT CO2 21 - 32 28       6. Anemia: stable. No need for intervention    7/6/2023  POD 88   EXT WBC 5.0 - 10.0 4.7 (A)   EXT ANC 1.78 - 5.38 3.57   EXT Hemoglobin 14.0 - 18.0 13.1 (A)   EXT Hematocrit 42.0 - 54.0 38.7 (A)   EXT Platelets 140 - 400 177         7.  Cytopenias: no significant cytopenias will monitor as per our guidelines. Medicine list reviewed including potential causes of drug-induced cytopenias    8. Proteinuria: continue p/c ratio as per guidelines     9. BK virus infection screening:  will continue to monitor per guidelines    10. Weight education: provided during the clinic visit   Body mass index is 21.06 kg/m².     11. Patient safety education regarding immunosuppression including prophylaxis  posttransplant for CMV, PCP : Education provided about vaccination and prevention of infections     PCP ppx: bactrim STOP 10/6/23  CMV ppx: valcyte STOP 10/6/23  Fungal ppx: none       Follow-up:   Clinic: return to transplant clinic weekly for the first month after transplant; every 2 weeks during months 2-3; then at 6-, 9-, 12-, 18-, 24-, and 36- months post-transplant to reassess for complications from immunosuppression toxicity and monitor for rejection.  Annually thereafter.    Labs: since patient remains at high risk for rejection and drug-related complications that warrant close monitoring, labs will be ordered as follows: continue twice weekly CBC, renal panel, and drug level for first month; then same labs once weekly through 3rd month post-transplant.  Urine for UA and protein/creatinine ratio monthly.  Serum BK - PCR at 1-, 3-, 6-, 9-, 12-, 18-, 24-, 36-, 48-, and 60 months post-transplant.  Hepatic panel at 1-, 2-, 3-, 6-, 9-, 12-, 18-, 24-, and 36- months post-transplant.    Vidhi Joe NP       Education:   Material provided to the patient.  Patient reminded to call with any health changes since these can be early signs of significant complications.  Also, I advised the patient to be sure any new medications or changes of old medications are discussed with either a pharmacist or physician knowledgeable with transplant to avoid rejection/drug toxicity related to significant drug interactions.    Patient advised that it is recommended that all transplanted patients, and their close contacts and household members receive Covid vaccination.

## 2023-07-17 NOTE — LETTER
July 17, 2023        Pipe Martinez  [address] [facility] [facility] [address]  Phone: [phone]  Fax: [phone]             [facility]- Transplant 1st Fl  [address]  [facility] [address]  Phone: [phone]   Patient: Andrey Reddy   MR Number: [id number]   YOB: 1985   Date of Visit: 7/17/2023       Dear Dr. Pipe Martinez    Thank you for referring Andrey Reddy to me for evaluation. Attached you will find relevant portions of my assessment and plan of care.    If you have questions, please do not hesitate to call me. I look forward to following Andrey Reddy along with you.    Sincerely,    Vidhi Joe, CHERELLE    Enclosure    If you would like to receive this communication electronically, please contact [email or web address] or [phone] to request Smart M[employer] Link access.    Smart M[employer] Link is a tool which provides read-only access to select patient information with whom you have a relationship. Its easy to use and provides real time access to review your patients record including encounter summaries, notes, results, and demographic information.    If you feel you have received this communication in error or would no longer like to receive these types of communications, please e-mail [email or web address]

## 2023-07-20 ENCOUNTER — PATIENT MESSAGE (OUTPATIENT)
Dept: TRANSPLANT | Facility: CLINIC | Age: 38
End: 2023-07-20
Payer: MEDICARE

## 2023-08-15 ENCOUNTER — DOCUMENTATION ONLY (OUTPATIENT)
Dept: TRANSPLANT | Facility: CLINIC | Age: 38
End: 2023-08-15
Payer: MEDICARE

## 2023-08-15 ENCOUNTER — PATIENT MESSAGE (OUTPATIENT)
Dept: TRANSPLANT | Facility: CLINIC | Age: 38
End: 2023-08-15
Payer: MEDICARE

## 2023-08-15 DIAGNOSIS — Z94.0 S/P KIDNEY TRANSPLANT: ICD-10-CM

## 2023-08-15 LAB
EXT ALBUMIN: 4.2 (ref 3.4–5)
EXT BUN: 32 (ref 7–18)
EXT CALCIUM: 9.4 (ref 8.5–10.1)
EXT CHLORIDE: 102 (ref 98–107)
EXT CO2: 30 (ref 21–32)
EXT CREATININE: 1.9 MG/DL (ref 0.7–1.3)
EXT GLUCOSE: 273 (ref 74–106)
EXT HEMATOCRIT: 38.2 (ref 42–54)
EXT HEMOGLOBIN: 13.3 (ref 14–18)
EXT MAGNESIUM: 1.8 (ref 1.8–2.4)
EXT PHOSPHORUS: 4.3 (ref 2.6–4.7)
EXT PLATELETS: 199 (ref 140–440)
EXT POTASSIUM: 4.6 (ref 3.5–5.1)
EXT SODIUM: 140 MMOL/L (ref 136–145)
EXT TACROLIMUS LVL: 13.1
EXT WBC: 4.3 (ref 5–10)

## 2023-08-15 NOTE — TELEPHONE ENCOUNTER
Received written order from Dr. Troy.  Messaged pt instructing him to decrease Envarsus to 7 mg once a day and to increase oral hydration.  Will repeat labs on Tuesday 8/22/23.  Asked pt to contact us with any questions.        ----- Message from Monica Handley MD sent at 8/15/2023  1:24 PM CDT -----  1.2---> 1.9  Needs more hydration since being outside in heat AND lower tacro to 7 mg daily  Repeat renal panel and tacro level in 1 week

## 2023-08-23 NOTE — PROGRESS NOTES
Subjective:      Patient ID: Andrey Reddy is a 38 y.o. male.    Chief Complaint:  No chief complaint on file.    History of Present Illness  Andrey Reddy is here for follow up of DM.  Previously seen by inpatient endocrine team.  Last seen 2023.  This is their first visit with me.      S/p kidney transplant on 2023  Prior to transplant Home Diabetes Medications:  Not on medications  Prednisone: 5mg daily     With regards to diabetes:    Diagnosed: ~7-8 years ago   FH: mother  Known complications:  DKA Denies  RN Denies  Eye Exam: within the last year   PN Denies  Podiatry: Denies  Nephropathy +  CAD Denies  Denies history of pancreatitis & personal/family history of medullary thyroid cancer.     Current regimen:  Januvia 100mg daily     Other medications tried:  Metformin - CKD  Trulicity     Glucose Monitor:   2 times a day testing  Log reviewed: oral recall  Fastin-150  30 min-1hr after dinner: 180-200    In clinic glucose 345    Hypoglycemia:  Denies   Knows how to correct with 15 grams of carbs- juice, coke, or a peppermint.     Diet/Exercise:  Eats 2 meals a day.   Snacks : occasionally   Drinks : water, Pedialyte, juice.   Exercise - tries to stay active       Diabetes Management Status    Hemoglobin A1C   Date Value Ref Range Status   2023 4.5 4.0 - 5.6 % Final     Comment:     ADA Screening Guidelines:  5.7-6.4%  Consistent with prediabetes  >or=6.5%  Consistent with diabetes    High levels of fetal hemoglobin interfere with the HbA1C  assay. Heterozygous hemoglobin variants (HbS, HgC, etc)do  not significantly interfere with this assay.   However, presence of multiple variants may affect accuracy.     10/13/2021 4.7 4.0 - 5.6 % Final     Comment:     ADA Screening Guidelines:  5.7-6.4%  Consistent with prediabetes  >or=6.5%  Consistent with diabetes    High levels of fetal hemoglobin interfere with the HbA1C  assay. Heterozygous hemoglobin variants (HbS, HgC, etc)do  not  "significantly interfere with this assay.   However, presence of multiple variants may affect accuracy.         Statin: Taking  ACE/ARB: Not taking  Screening or Prevention Patient's value Goal Complete/Controlled?   HgA1C Testing and Control   Lab Results   Component Value Date    HGBA1C 4.5 04/09/2023      Annually/Less than 8% Yes   Lipid profile : 04/09/2023 Annually Yes   LDL control Lab Results   Component Value Date    LDLCALC 104.4 04/09/2023    Annually/Less than 100 mg/dl  No   Nephropathy screening No results found for: "LABMICR"  Lab Results   Component Value Date    PROTEINUA Negative 05/04/2023    Annually Yes   Blood pressure BP Readings from Last 1 Encounters:   08/31/23 128/80    Less than 140/90 No   Dilated retinal exam : 06/28/2017 Annually Yes   Foot exam   Most Recent Foot Exam Date: Not Found Annually No       Review of Systems  As above      Visit Vitals  /80   Pulse 71   Ht 6' 1" (1.854 m)   Wt 69.3 kg (152 lb 14.2 oz)   SpO2 98%   BMI 20.17 kg/m²       Body mass index is 20.17 kg/m².    Lab Review:   Lab Results   Component Value Date    HGBA1C 4.5 04/09/2023    HGBA1C 4.7 10/13/2021       Lab Results   Component Value Date    CHOL 169 04/09/2023    HDL 58 04/09/2023    LDLCALC 104.4 04/09/2023    TRIG 33 04/09/2023    CHOLHDL 34.3 04/09/2023     Lab Results   Component Value Date     05/04/2023    K 4.0 05/04/2023     05/04/2023    CO2 25 05/04/2023     (H) 05/04/2023    BUN 24 (H) 05/04/2023    CREATININE 1.6 (H) 05/04/2023    CALCIUM 9.6 05/04/2023    PROT 7.0 05/04/2023    ALBUMIN 4.1 05/04/2023    ALBUMIN 4.1 05/04/2023    BILITOT 0.6 05/04/2023    ALKPHOS 68 05/04/2023    AST 9 (L) 05/04/2023    ALT 11 05/04/2023    ANIONGAP 9 05/04/2023    ESTGFRAFRICA 16.2 (A) 10/13/2021    EGFRNONAA 14.0 (A) 10/13/2021     Vit D, 25-Hydroxy   Date Value Ref Range Status   04/09/2023 36 30 - 96 ng/mL Final     Comment:     Vitamin D deficiency.........<10 ng/mL                " "              Vitamin D insufficiency......10-29 ng/mL       Vitamin D sufficiency........> or equal to 30 ng/mL  Vitamin D toxicity............>100 ng/mL       Assessment and Plan     1. Controlled type 2 diabetes mellitus with chronic kidney disease on chronic dialysis, without long-term current use of insulin  Ambulatory referral/consult to Endocrinology    flash glucose scanning reader (FREESTYLE [place]ELMA 2 READER) [facility]    flash glucose sensor (FREESTYLE SELMA 2 SENSOR) Kit    insulin degludec (TRESIBA FLEXTOUCH U-100) 100 unit/mL (3 mL) insulin pen    BD ULTRA-FINE SILVIA PEN NEEDLE 32 gauge x 5/32" Ndle    Ambulatory referral/consult to Diabetes Education      2. S/P kidney transplant  Ambulatory referral/consult to Endocrinology      3. Secondary hyperparathyroidism            Controlled type 2 diabetes mellitus with chronic kidney disease on chronic dialysis, without long-term current use of insulin  -- DE to discuss dietary changes.  -- Labs in 3 months.  -- A1c goal <7%.  -- Medications discussed:  MFM - CKD  GLP1-DPP4   SETH   SGLT2 - did not discuss today  Insulin   -- Reviewed logs/CGM:  Reported glucose relatively controlled.  Serum glucose and in clinic glucose elevated. I suspect global hyperglycemia.  Increase glucose checks.   Instructed to send glucose logs in 3-5 days.  Reach out to me sooner for any glucose <70 or consistently >200.  -- Submit for Freestyle [place].  -- Long discussion about glucose goals, medication options. Discussed likelihood of needing additional medication and/or MDI. Patient not particularly receptive of needing to start insulin but agreeable to start low dose basal insulin.   -- Medication Changes:   Januvia 100mg daily  Tresiba 10units daily    Injection training provided.    -- Reviewed goals of therapy are to get the best control we can without hypoglycemia.  -- Reviewed patient's current insulin regimen. Clarified proper insulin dose and timing in relation to meals, etc. " Insulin injection sites and proper rotation instructed.    -- Advised frequent self blood glucose monitoring.  Patient encouraged to document glucose results and bring them to every clinic visit.  -- Hypoglycemia precautions discussed. Instructed on precautions before driving.    -- Call for Bg repeatedly < 90 or > 180.   -- Close adherence to lifestyle changes recommended.   -- Periodic follow ups for eye evaluations, foot care and dental care suggested.    S/P kidney transplant  -- Continue following with Transplant.     Secondary hyperparathyroidism  -- Secondary to renal.      Follow up in about 4 weeks (around 9/28/2023).

## 2023-08-25 ENCOUNTER — DOCUMENTATION ONLY (OUTPATIENT)
Dept: TRANSPLANT | Facility: CLINIC | Age: 38
End: 2023-08-25
Payer: MEDICARE

## 2023-08-25 ENCOUNTER — TELEPHONE (OUTPATIENT)
Dept: TRANSPLANT | Facility: CLINIC | Age: 38
End: 2023-08-25
Payer: MEDICARE

## 2023-08-25 ENCOUNTER — PATIENT MESSAGE (OUTPATIENT)
Dept: TRANSPLANT | Facility: CLINIC | Age: 38
End: 2023-08-25
Payer: MEDICARE

## 2023-08-25 DIAGNOSIS — Z94.0 KIDNEY REPLACED BY TRANSPLANT: ICD-10-CM

## 2023-08-25 DIAGNOSIS — R79.89 ELEVATED SERUM CREATININE: Primary | ICD-10-CM

## 2023-08-25 LAB
EXT BUN: 26 (ref 7–18)
EXT CHLORIDE: 100 (ref 98–107)
EXT CO2: 30 (ref 21–32)
EXT CREATININE: 1.9 MG/DL (ref 0.7–1.3)
EXT POTASSIUM: 4.3 (ref 3.5–5.1)
EXT SODIUM: 138 MMOL/L (ref 136–145)
EXT TACROLIMUS LVL: 12.8

## 2023-08-25 NOTE — TELEPHONE ENCOUNTER
Spoke to patient after receiving written orders from Dr. Troy for an ultrasound and kidney biopsy next week.  Ultrasound appt given to patient scheduled for 8/31 at 7/15 AM.  Biopsy request submitted.  Notified pt that he will get a call early next week about biopsy date and time.  Requesting biopsy on same day as US and endocrine appt.  Will notify pt of any medication changes.  Pt verbalized understanding.

## 2023-08-26 NOTE — TELEPHONE ENCOUNTER
Messaged pt instructing him to stop ketoconazole per written order from Dr. Troy.        ----- Message from Monica Handley MD sent at 8/25/2023  4:16 PM CDT -----  Also: STOP KETOCONAZOLE and leave Envarsus the same. This will drop the tacrolimus level by about half, so we may actually need to go up on the envarsus dose, but will be better and safer in long term.

## 2023-08-28 ENCOUNTER — TELEPHONE (OUTPATIENT)
Dept: TRANSPLANT | Facility: CLINIC | Age: 38
End: 2023-08-28
Payer: MEDICARE

## 2023-08-28 RX ORDER — LIDOCAINE HYDROCHLORIDE 10 MG/ML
1 INJECTION, SOLUTION EPIDURAL; INFILTRATION; INTRACAUDAL; PERINEURAL ONCE
Status: CANCELLED | OUTPATIENT
Start: 2023-08-28 | End: 2023-08-28

## 2023-08-28 RX ORDER — SODIUM CHLORIDE 9 MG/ML
INJECTION, SOLUTION INTRAVENOUS CONTINUOUS
Status: CANCELLED | OUTPATIENT
Start: 2023-08-28

## 2023-08-28 NOTE — TELEPHONE ENCOUNTER
----- Message from Monica Handley MD sent at 8/25/2023  4:16 PM CDT -----  Also: STOP KETOCONAZOLE and leave Envarsus the same. This will drop the tacrolimus level by about half, so we may actually need to go up on the envarsus dose, but will be better and safer in long term.

## 2023-08-29 ENCOUNTER — TELEPHONE (OUTPATIENT)
Dept: INTERVENTIONAL RADIOLOGY/VASCULAR | Facility: HOSPITAL | Age: 38
End: 2023-08-29
Payer: MEDICARE

## 2023-08-29 ENCOUNTER — PATIENT MESSAGE (OUTPATIENT)
Dept: TRANSPLANT | Facility: CLINIC | Age: 38
End: 2023-08-29
Payer: MEDICARE

## 2023-08-31 ENCOUNTER — HOSPITAL ENCOUNTER (OUTPATIENT)
Dept: RADIOLOGY | Facility: HOSPITAL | Age: 38
Discharge: HOME | End: 2023-08-31
Attending: INTERNAL MEDICINE
Payer: MEDICARE

## 2023-08-31 ENCOUNTER — PATIENT MESSAGE (OUTPATIENT)
Dept: TRANSPLANT | Facility: CLINIC | Age: 38
End: 2023-08-31
Payer: MEDICARE

## 2023-08-31 ENCOUNTER — OFFICE VISIT (OUTPATIENT)
Dept: ENDOCRINOLOGY | Facility: CLINIC | Age: 38
End: 2023-08-31
Payer: MEDICARE

## 2023-08-31 ENCOUNTER — HOSPITAL ENCOUNTER (OUTPATIENT)
Dept: INTERVENTIONAL RADIOLOGY/VASCULAR | Facility: HOSPITAL | Age: 38
Discharge: HOME | End: 2023-08-31
Attending: INTERNAL MEDICINE
Payer: MEDICARE

## 2023-08-31 VITALS
DIASTOLIC BLOOD PRESSURE: 80 MMHG | OXYGEN SATURATION: 98 % | HEIGHT: 73 IN | HEART RATE: 71 BPM | SYSTOLIC BLOOD PRESSURE: 128 MMHG | BODY MASS INDEX: 20.26 KG/M2 | WEIGHT: 152.88 LBS

## 2023-08-31 DIAGNOSIS — Z94.0 S/P KIDNEY TRANSPLANT: ICD-10-CM

## 2023-08-31 DIAGNOSIS — N25.81 SECONDARY HYPERPARATHYROIDISM: ICD-10-CM

## 2023-08-31 DIAGNOSIS — Z94.0 KIDNEY REPLACED BY TRANSPLANT: ICD-10-CM

## 2023-08-31 DIAGNOSIS — N18.6 CONTROLLED TYPE 2 DIABETES MELLITUS WITH CHRONIC KIDNEY DISEASE ON CHRONIC DIALYSIS, WITHOUT LONG-TERM CURRENT USE OF INSULIN: ICD-10-CM

## 2023-08-31 DIAGNOSIS — R79.89 ELEVATED SERUM CREATININE: ICD-10-CM

## 2023-08-31 DIAGNOSIS — Z94.0 S/P KIDNEY TRANSPLANT: Primary | ICD-10-CM

## 2023-08-31 DIAGNOSIS — Z99.2 CONTROLLED TYPE 2 DIABETES MELLITUS WITH CHRONIC KIDNEY DISEASE ON CHRONIC DIALYSIS, WITHOUT LONG-TERM CURRENT USE OF INSULIN: Primary | ICD-10-CM

## 2023-08-31 DIAGNOSIS — E11.22 CONTROLLED TYPE 2 DIABETES MELLITUS WITH CHRONIC KIDNEY DISEASE ON CHRONIC DIALYSIS, WITHOUT LONG-TERM CURRENT USE OF INSULIN: Primary | ICD-10-CM

## 2023-08-31 DIAGNOSIS — E11.22 CONTROLLED TYPE 2 DIABETES MELLITUS WITH CHRONIC KIDNEY DISEASE ON CHRONIC DIALYSIS, WITHOUT LONG-TERM CURRENT USE OF INSULIN: ICD-10-CM

## 2023-08-31 DIAGNOSIS — N18.6 CONTROLLED TYPE 2 DIABETES MELLITUS WITH CHRONIC KIDNEY DISEASE ON CHRONIC DIALYSIS, WITHOUT LONG-TERM CURRENT USE OF INSULIN: Primary | ICD-10-CM

## 2023-08-31 DIAGNOSIS — Z99.2 CONTROLLED TYPE 2 DIABETES MELLITUS WITH CHRONIC KIDNEY DISEASE ON CHRONIC DIALYSIS, WITHOUT LONG-TERM CURRENT USE OF INSULIN: ICD-10-CM

## 2023-08-31 PROCEDURE — 76776 US EXAM K TRANSPL W/DOPPLER: CPT | Mod: TC

## 2023-08-31 PROCEDURE — 3079F DIAST BP 80-89 MM HG: CPT | Mod: CPTII,S$GLB,, | Performed by: NURSE PRACTITIONER

## 2023-08-31 PROCEDURE — 76776 US EXAM K TRANSPL W/DOPPLER: CPT | Mod: 26,,, | Performed by: RADIOLOGY

## 2023-08-31 PROCEDURE — 3066F NEPHROPATHY DOC TX: CPT | Mod: CPTII,S$GLB,, | Performed by: NURSE PRACTITIONER

## 2023-08-31 PROCEDURE — 3008F BODY MASS INDEX DOCD: CPT | Mod: CPTII,S$GLB,, | Performed by: NURSE PRACTITIONER

## 2023-08-31 PROCEDURE — 3074F SYST BP LT 130 MM HG: CPT | Mod: CPTII,S$GLB,, | Performed by: NURSE PRACTITIONER

## 2023-08-31 PROCEDURE — 1159F MED LIST DOCD IN RCRD: CPT | Mod: CPTII,S$GLB,, | Performed by: NURSE PRACTITIONER

## 2023-08-31 PROCEDURE — 1160F PR REVIEW ALL MEDS BY PRESCRIBER/CLIN PHARMACIST DOCUMENTED: ICD-10-PCS | Mod: CPTII,S$GLB,, | Performed by: NURSE PRACTITIONER

## 2023-08-31 PROCEDURE — 3074F PR MOST RECENT SYSTOLIC BLOOD PRESSURE < 130 MM HG: ICD-10-PCS | Mod: CPTII,S$GLB,, | Performed by: NURSE PRACTITIONER

## 2023-08-31 PROCEDURE — 99999 PR PBB SHADOW E&M-EST. PATIENT-LVL V: ICD-10-PCS | Mod: PBBFAC,,, | Performed by: NURSE PRACTITIONER

## 2023-08-31 PROCEDURE — 99215 OFFICE O/P EST HI 40 MIN: CPT | Mod: S$GLB,,, | Performed by: NURSE PRACTITIONER

## 2023-08-31 PROCEDURE — 3066F PR DOCUMENTATION OF TREATMENT FOR NEPHROPATHY: ICD-10-PCS | Mod: CPTII,S$GLB,, | Performed by: NURSE PRACTITIONER

## 2023-08-31 PROCEDURE — 1159F PR MEDICATION LIST DOCUMENTED IN MEDICAL RECORD: ICD-10-PCS | Mod: CPTII,S$GLB,, | Performed by: NURSE PRACTITIONER

## 2023-08-31 PROCEDURE — 99215 PR OFFICE/OUTPT VISIT, EST, LEVL V, 40-54 MIN: ICD-10-PCS | Mod: S$GLB,,, | Performed by: NURSE PRACTITIONER

## 2023-08-31 PROCEDURE — 3079F PR MOST RECENT DIASTOLIC BLOOD PRESSURE 80-89 MM HG: ICD-10-PCS | Mod: CPTII,S$GLB,, | Performed by: NURSE PRACTITIONER

## 2023-08-31 PROCEDURE — 76776 US TRANSPLANT KIDNEY WITH DOPPLER: ICD-10-PCS | Mod: 26,,, | Performed by: RADIOLOGY

## 2023-08-31 PROCEDURE — 3044F PR MOST RECENT HEMOGLOBIN A1C LEVEL <7.0%: ICD-10-PCS | Mod: CPTII,S$GLB,, | Performed by: NURSE PRACTITIONER

## 2023-08-31 PROCEDURE — 3008F PR BODY MASS INDEX (BMI) DOCUMENTED: ICD-10-PCS | Mod: CPTII,S$GLB,, | Performed by: NURSE PRACTITIONER

## 2023-08-31 PROCEDURE — 99999 PR PBB SHADOW E&M-EST. PATIENT-LVL V: CPT | Mod: PBBFAC,,, | Performed by: NURSE PRACTITIONER

## 2023-08-31 PROCEDURE — 3044F HG A1C LEVEL LT 7.0%: CPT | Mod: CPTII,S$GLB,, | Performed by: NURSE PRACTITIONER

## 2023-08-31 PROCEDURE — 1160F RVW MEDS BY RX/DR IN RCRD: CPT | Mod: CPTII,S$GLB,, | Performed by: NURSE PRACTITIONER

## 2023-08-31 RX ORDER — FLASH GLUCOSE SCANNING READER
EACH MISCELLANEOUS
Qty: 1 EACH | Refills: 0 | Status: SHIPPED | OUTPATIENT
Start: 2023-08-31

## 2023-08-31 RX ORDER — FLASH GLUCOSE SENSOR
KIT MISCELLANEOUS
Qty: 6 KIT | Refills: 3 | Status: SHIPPED | OUTPATIENT
Start: 2023-08-31 | End: 2023-08-31 | Stop reason: SDUPTHER

## 2023-08-31 RX ORDER — AMLODIPINE BESYLATE 10 MG/1
10 TABLET ORAL
COMMUNITY
End: 2023-10-17 | Stop reason: ALTCHOICE

## 2023-08-31 RX ORDER — OXYCODONE AND ACETAMINOPHEN 5; 325 MG/1; MG/1
1 TABLET ORAL
Status: ON HOLD | COMMUNITY
Start: 2023-05-17 | End: 2023-11-18 | Stop reason: HOSPADM

## 2023-08-31 RX ORDER — INSULIN DEGLUDEC 100 U/ML
10 INJECTION, SOLUTION SUBCUTANEOUS DAILY
Qty: 9 ML | Refills: 3 | Status: SHIPPED | OUTPATIENT
Start: 2023-08-31 | End: 2023-08-31 | Stop reason: SDUPTHER

## 2023-08-31 RX ORDER — TRAMADOL HYDROCHLORIDE 50 MG/1
50 TABLET ORAL EVERY 4 HOURS PRN
Status: ON HOLD | COMMUNITY
Start: 2023-08-23 | End: 2023-11-17 | Stop reason: HOSPADM

## 2023-08-31 RX ORDER — CYCLOBENZAPRINE HCL 10 MG
10 TABLET ORAL DAILY PRN
COMMUNITY

## 2023-08-31 RX ORDER — DULOXETIN HYDROCHLORIDE 30 MG/1
30 CAPSULE, DELAYED RELEASE ORAL
Status: ON HOLD | COMMUNITY
Start: 2023-03-08 | End: 2023-11-17 | Stop reason: SDUPTHER

## 2023-08-31 RX ORDER — PREDNISOLONE 5 MG/1
TABLET ORAL
Status: ON HOLD | COMMUNITY
Start: 2023-05-09 | End: 2023-11-17 | Stop reason: HOSPADM

## 2023-08-31 RX ORDER — INSULIN DEGLUDEC 100 U/ML
10 INJECTION, SOLUTION SUBCUTANEOUS DAILY
Qty: 9 ML | Refills: 3 | Status: SHIPPED | OUTPATIENT
Start: 2023-08-31 | End: 2023-09-05

## 2023-08-31 RX ORDER — PEN NEEDLE, DIABETIC 31 GX5/16"
NEEDLE, DISPOSABLE MISCELLANEOUS
Qty: 100 EACH | Refills: 3 | Status: SHIPPED | OUTPATIENT
Start: 2023-08-31 | End: 2023-09-05

## 2023-08-31 RX ORDER — FLASH GLUCOSE SENSOR
KIT MISCELLANEOUS
Qty: 6 KIT | Refills: 3 | Status: SHIPPED | OUTPATIENT
Start: 2023-08-31

## 2023-08-31 RX ORDER — FLASH GLUCOSE SCANNING READER
EACH MISCELLANEOUS
Qty: 1 EACH | Refills: 0 | Status: SHIPPED | OUTPATIENT
Start: 2023-08-31 | End: 2023-08-31 | Stop reason: SDUPTHER

## 2023-08-31 NOTE — PATIENT INSTRUCTIONS
Instructed to send glucose logs in 3-5 days.  Reach out to me sooner for any glucose <70 or consistently >200.    Insulin types  You are taking two types of insulin and each of them has unique properties.     Long acting insulin:  Tresiba is the long-acting insulin. It lasts about 24 hours after injection. You need to take it once a day at the same time. Skipping a meal does not usually affect the dose of lantus.     Hypoglycemia (Low Blood Glucose)  Low blood glucose occurs with the following conditions.  Not Enough Food or Missing Meal.  Too Much Insulin  More Exercise Than Usual    It is best to use something that you can always carry with you. Choose a food that is all carbohydrate because it will be very fast acting. Try not to choose chocolate or other high fat foods. They will not work fast enough and you may also end up over-treating your lows. The suggested amount of carbohydrate to start with is 15 grams. Don't keep eating until you feel better. Eat the required amount and stop. The feelings will pass and you will be grateful that you did not overdo it.    Some people with diabetes know when their blood glucose is low and some do not. If you are a person who is not aware of hypoglycemia, it is important to test your blood glucose more often. Everyone with diabetes should test before driving a car to assure safety on the road. Blood glucose should be above 100 mg/dl before driving and at bedtime.     The symptoms of low blood sugars are usually heart racing, sweating, anxiety, feeling hungry, tremor, weakness or most severely loss of consciousness.     Rule of 15:   Test your blood sugar  If glucose is between 50-70 mg/dL then ingest 15 grams of fast-acting carbs  If glucose is less than 50 mg/dL then ingest 30 grams of fast-acting carbs  Ingest 15 grams of fast-acting carbohydrate - such as:   3-4 glucose tablets  4 oz juice  ½ can regular soda pop  15 skittles or mini jelly beans   Re-check your blood  sugar in 15 minutes. If its less than 70mg/dl, repeat steps 2 and 3.  If your next meal is more than 1 hour away, eat an additional 15 grams of carbohydrate and 1 ounce of protein (examples include crackers with cheese or one-half of a sandwich with peanut butter). It is important not to eat too much because this can raise your blood sugar above the target level.    After your blood sugar has normalized, think about why you went low. If you notice a pattern of low blood sugars, contact your Diabetes Team. We may need to adjust your medication.

## 2023-08-31 NOTE — TELEPHONE ENCOUNTER
Received written order from Dr. Troy.  Message sent to patient instructing him to hold one dose of Envarsus and restart the next day at 5 mg once a day.  Instructed pt of the need to repeat a kidney ultrasound in 3 months.  Will reach back out to schedule.  Repeat labs on Friday 9/8/23.  Asked pt to contact us with any questions.       ----- Message from Monica Handley MD sent at 8/31/2023  1:06 PM CDT -----  Cont weekly renal panel and tacro toxicity.  Lower Envarsus to 5 mg daily after SKIPPING one dose.

## 2023-08-31 NOTE — PROGRESS NOTES
Patient ate sausage, eggs, and grits this morning at 0900. Notified IR team and Dr. Reddy with IR spoke with pt at bedside regarding proceeding with biopsy with no sedation vs rescheduling to later date. Pt stated he wanted to reschedule. Spoke with Corry Hamm with clinic and pt rescheduled to 9/8/23 with arrival time of 0630. Pt verbalized understanding and need to be fasting. Ms. Hamm also stated pt would receive reminder call on 9/7/23.

## 2023-08-31 NOTE — ASSESSMENT & PLAN NOTE
-- DE to discuss dietary changes.  -- Labs in 3 months.  -- A1c goal <7%.  -- Medications discussed:  MFM - CKD  GLP1-DPP4   SETH   SGLT2 - did not discuss today  Insulin   -- Reviewed logs/CGM:  Reported glucose relatively controlled.  Serum glucose and in clinic glucose elevated. I suspect global hyperglycemia.  Increase glucose checks.   Instructed to send glucose logs in 3-5 days.  Reach out to me sooner for any glucose <70 or consistently >200.  -- Submit for Freestyle [place].  -- Long discussion about glucose goals, medication options. Discussed likelihood of needing additional medication and/or MDI. Patient not particularly receptive of needing to start insulin but agreeable to start low dose basal insulin.   -- Medication Changes:   Januvia 100mg daily  Tresiba 10units daily    Injection training provided.    -- Reviewed goals of therapy are to get the best control we can without hypoglycemia.  -- Reviewed patient's current insulin regimen. Clarified proper insulin dose and timing in relation to meals, etc. Insulin injection sites and proper rotation instructed.    -- Advised frequent self blood glucose monitoring.  Patient encouraged to document glucose results and bring them to every clinic visit.  -- Hypoglycemia precautions discussed. Instructed on precautions before driving.    -- Call for Bg repeatedly < 90 or > 180.   -- Close adherence to lifestyle changes recommended.   -- Periodic follow ups for eye evaluations, foot care and dental care suggested.

## 2023-09-01 ENCOUNTER — TELEPHONE (OUTPATIENT)
Dept: INTERVENTIONAL RADIOLOGY/VASCULAR | Facility: HOSPITAL | Age: 38
End: 2023-09-01
Payer: MEDICARE

## 2023-09-01 NOTE — TELEPHONE ENCOUNTER
Called patient regarding cmp results from yesterday. Explained his blood sugar was elevated at 415, his creatinine has increased from 1.6 to 2.2, and his eGFR has decreased from 56.2 to 38.1. Advised to take his diabetic medications appropriately, and drink plenty water to flush his kidneys. Patient verbalized understanding and agreement.

## 2023-09-04 ENCOUNTER — PATIENT MESSAGE (OUTPATIENT)
Dept: ENDOCRINOLOGY | Facility: CLINIC | Age: 38
End: 2023-09-04
Payer: MEDICARE

## 2023-09-04 DIAGNOSIS — Z99.2 CONTROLLED TYPE 2 DIABETES MELLITUS WITH CHRONIC KIDNEY DISEASE ON CHRONIC DIALYSIS, WITHOUT LONG-TERM CURRENT USE OF INSULIN: ICD-10-CM

## 2023-09-04 DIAGNOSIS — N18.6 CONTROLLED TYPE 2 DIABETES MELLITUS WITH CHRONIC KIDNEY DISEASE ON CHRONIC DIALYSIS, WITHOUT LONG-TERM CURRENT USE OF INSULIN: ICD-10-CM

## 2023-09-04 DIAGNOSIS — E11.22 CONTROLLED TYPE 2 DIABETES MELLITUS WITH CHRONIC KIDNEY DISEASE ON CHRONIC DIALYSIS, WITHOUT LONG-TERM CURRENT USE OF INSULIN: ICD-10-CM

## 2023-09-05 RX ORDER — INSULIN DEGLUDEC 100 U/ML
15 INJECTION, SOLUTION SUBCUTANEOUS DAILY
Qty: 13.5 ML | Refills: 3 | Status: SHIPPED | OUTPATIENT
Start: 2023-09-05 | End: 2023-09-25 | Stop reason: SDUPTHER

## 2023-09-05 RX ORDER — INSULIN ASPART 100 [IU]/ML
4 INJECTION, SOLUTION INTRAVENOUS; SUBCUTANEOUS
Qty: 10.8 ML | Refills: 3 | Status: SHIPPED | OUTPATIENT
Start: 2023-09-05 | End: 2023-09-25

## 2023-09-05 RX ORDER — PEN NEEDLE, DIABETIC 31 GX5/16"
NEEDLE, DISPOSABLE MISCELLANEOUS
Qty: 100 EACH | Refills: 3 | Status: SHIPPED | OUTPATIENT
Start: 2023-09-05 | End: 2023-09-25 | Stop reason: SDUPTHER

## 2023-09-07 ENCOUNTER — TELEPHONE (OUTPATIENT)
Dept: INTERVENTIONAL RADIOLOGY/VASCULAR | Facility: HOSPITAL | Age: 38
End: 2023-09-07
Payer: MEDICARE

## 2023-09-08 ENCOUNTER — HOSPITAL ENCOUNTER (OUTPATIENT)
Dept: INTERVENTIONAL RADIOLOGY/VASCULAR | Facility: HOSPITAL | Age: 38
Discharge: HOME | End: 2023-09-08
Attending: FAMILY MEDICINE
Payer: MEDICARE

## 2023-09-08 DIAGNOSIS — Z94.0 S/P KIDNEY TRANSPLANT: ICD-10-CM

## 2023-09-08 DIAGNOSIS — R79.89 ELEVATED SERUM CREATININE: ICD-10-CM

## 2023-09-08 LAB
ALBUMIN SERPL BCP-MCNC: 4.1 G/DL (ref 3.5–5.2)
ALP SERPL-CCNC: 65 U/L (ref 55–135)
ALT SERPL W/O P-5'-P-CCNC: 26 U/L (ref 10–44)
ANION GAP SERPL CALC-SCNC: 11 MMOL/L (ref 8–16)
AST SERPL-CCNC: 23 U/L (ref 10–40)
BILIRUB SERPL-MCNC: 0.8 MG/DL (ref 0.1–1)
BUN SERPL-MCNC: 22 MG/DL (ref 6–20)
CALCIUM SERPL-MCNC: 9.6 MG/DL (ref 8.7–10.5)
CHLORIDE SERPL-SCNC: 106 MMOL/L (ref 95–110)
CO2 SERPL-SCNC: 24 MMOL/L (ref 23–29)
CREAT SERPL-MCNC: 1.9 MG/DL (ref 0.5–1.4)
EST. GFR  (NO RACE VARIABLE): 45.5 ML/MIN/1.73 M^2
GLUCOSE SERPL-MCNC: 325 MG/DL (ref 70–110)
POCT GLUCOSE: 322 MG/DL (ref 70–110)
POTASSIUM SERPL-SCNC: 3.6 MMOL/L (ref 3.5–5.1)
PROT SERPL-MCNC: 6.9 G/DL (ref 6–8.4)
SODIUM SERPL-SCNC: 141 MMOL/L (ref 136–145)

## 2023-09-08 PROCEDURE — 25000003 PHARM REV CODE 250: Performed by: RADIOLOGY

## 2023-09-08 PROCEDURE — 36415 COLL VENOUS BLD VENIPUNCTURE: CPT

## 2023-09-08 PROCEDURE — 50200 IR BIOPSY KIDNEY: ICD-10-PCS | Mod: RT,,, | Performed by: RADIOLOGY

## 2023-09-08 PROCEDURE — 50200 RENAL BIOPSY PERQ: CPT | Mod: RT

## 2023-09-08 PROCEDURE — 63600175 PHARM REV CODE 636 W HCPCS: Performed by: RADIOLOGY

## 2023-09-08 PROCEDURE — 76942 ECHO GUIDE FOR BIOPSY: CPT | Mod: 26,,, | Performed by: RADIOLOGY

## 2023-09-08 PROCEDURE — A4215 STERILE NEEDLE: HCPCS

## 2023-09-08 PROCEDURE — 27200940 IR BIOPSY KIDNEY

## 2023-09-08 PROCEDURE — 76942 ECHO GUIDE FOR BIOPSY: CPT | Mod: TC

## 2023-09-08 PROCEDURE — 80053 COMPREHEN METABOLIC PANEL: CPT

## 2023-09-08 PROCEDURE — 76942 PR U/S GUIDANCE FOR NEEDLE GUIDANCE: ICD-10-PCS | Mod: 26,,, | Performed by: RADIOLOGY

## 2023-09-08 PROCEDURE — 82962 GLUCOSE BLOOD TEST: CPT

## 2023-09-08 PROCEDURE — 63600175 PHARM REV CODE 636 W HCPCS

## 2023-09-08 PROCEDURE — 50200 RENAL BIOPSY PERQ: CPT | Mod: RT,,, | Performed by: RADIOLOGY

## 2023-09-08 RX ORDER — SODIUM CHLORIDE 9 MG/ML
INJECTION, SOLUTION INTRAVENOUS CONTINUOUS
Status: DISCONTINUED | OUTPATIENT
Start: 2023-09-08 | End: 2023-09-09 | Stop reason: HOSPADM

## 2023-09-08 RX ORDER — INSULIN ASPART 100 [IU]/ML
0-5 INJECTION, SOLUTION INTRAVENOUS; SUBCUTANEOUS EVERY 6 HOURS PRN
Status: DISCONTINUED | OUTPATIENT
Start: 2023-09-08 | End: 2023-09-09 | Stop reason: HOSPADM

## 2023-09-08 RX ORDER — FENTANYL CITRATE 50 UG/ML
INJECTION, SOLUTION INTRAMUSCULAR; INTRAVENOUS
Status: COMPLETED | OUTPATIENT
Start: 2023-09-08 | End: 2023-09-08

## 2023-09-08 RX ORDER — MIDAZOLAM HYDROCHLORIDE 1 MG/ML
INJECTION INTRAMUSCULAR; INTRAVENOUS
Status: COMPLETED | OUTPATIENT
Start: 2023-09-08 | End: 2023-09-08

## 2023-09-08 RX ORDER — ONDANSETRON 2 MG/ML
4 INJECTION INTRAMUSCULAR; INTRAVENOUS EVERY 6 HOURS PRN
Status: DISCONTINUED | OUTPATIENT
Start: 2023-09-08 | End: 2023-09-09 | Stop reason: HOSPADM

## 2023-09-08 RX ORDER — LIDOCAINE HYDROCHLORIDE 20 MG/ML
INJECTION, SOLUTION EPIDURAL; INFILTRATION; INTRACAUDAL; PERINEURAL
Status: COMPLETED | OUTPATIENT
Start: 2023-09-08 | End: 2023-09-08

## 2023-09-08 RX ORDER — GLUCAGON 1 MG
1 KIT INJECTION
Status: DISCONTINUED | OUTPATIENT
Start: 2023-09-08 | End: 2023-09-09 | Stop reason: HOSPADM

## 2023-09-08 RX ORDER — LIDOCAINE HYDROCHLORIDE 10 MG/ML
1 INJECTION, SOLUTION EPIDURAL; INFILTRATION; INTRACAUDAL; PERINEURAL ONCE
Status: DISCONTINUED | OUTPATIENT
Start: 2023-09-08 | End: 2023-09-09 | Stop reason: HOSPADM

## 2023-09-08 RX ORDER — INSULIN ASPART 100 [IU]/ML
5 INJECTION, SOLUTION INTRAVENOUS; SUBCUTANEOUS ONCE
Status: COMPLETED | OUTPATIENT
Start: 2023-09-08 | End: 2023-09-08

## 2023-09-08 RX ADMIN — FENTANYL CITRATE 50 MCG: 50 INJECTION, SOLUTION INTRAMUSCULAR; INTRAVENOUS at 09:09

## 2023-09-08 RX ADMIN — INSULIN ASPART 5 UNITS: 100 INJECTION, SOLUTION INTRAVENOUS; SUBCUTANEOUS at 08:09

## 2023-09-08 RX ADMIN — MIDAZOLAM HYDROCHLORIDE 1 MG: 2 INJECTION, SOLUTION INTRAMUSCULAR; INTRAVENOUS at 09:09

## 2023-09-08 RX ADMIN — LIDOCAINE HYDROCHLORIDE 2 ML: 20 INJECTION, SOLUTION EPIDURAL; INFILTRATION; INTRACAUDAL; PERINEURAL at 09:09

## 2023-09-08 NOTE — PROGRESS NOTES
Pt recovery time allowed and ready for D/C home / review with pt and father  in room S&S of infection as well  as when to go to ED and or call Transplant Coordinator/ question answered and pt up getting  dressed / Bx  site clean  and dry dressing in place

## 2023-09-08 NOTE — PLAN OF CARE
Spoke with Karma in IR regarding patient CBG result 322mg/dl patient denies hyperglycemic symptoms, Karma will relay results to IR resident.

## 2023-09-08 NOTE — H&P
Vascular and Interventional Radiology   History & Physical    Date:  9/8/2023      Chief Complaint:   Elevated serum creatinine in transplanted kidney    History of Present Illness:  Andrey Reddy is a 38 y.o. male who presents for percutaneous transplanted kidney biopsy.    Past Medical History:  Past Medical History:   Diagnosis Date    Allergy     Anemia     Diabetes mellitus     Disorder of kidney and ureter     Hyperlipidemia     Hypertension     Secondary hyperparathyroidism        Past Surgical History:  Past Surgical History:   Procedure Laterality Date    INSERTION OF DIALYSIS CATHETER      KIDNEY TRANSPLANT N/A 4/9/2023    Procedure: TRANSPLANT, KIDNEY;  Surgeon: Julito Saleh MD;  Location: [address];  Service: Transplant;  Laterality: N/A;  Off Ice - 0919  Reperfused - 0945        Sedation History:    Denies any adverse reactions.  Denies problems laying flat.    Social History:  Social History     Tobacco Use    Smoking status: Never    Smokeless tobacco: Never   Substance Use Topics    Alcohol use: Yes    Drug use: Yes     Types: Marijuana        Home Medications:   Prior to Admission medications    Medication Sig Start Date End Date Taking? Authorizing Provider   atorvastatin (LIPITOR) 10 MG tablet Take 1 tablet (10 mg total) by mouth every evening.  Patient taking differently: Take 10 mg by mouth every evening. Needs refill 5/15/23 5/14/24 Yes Monica Handley MD   hydroCHLOROthiazide (HYDRODIURIL) 25 MG tablet Take 1 tablet (25 mg total) by mouth once daily. 6/27/23 6/26/24 Yes Monica Handley MD   insulin aspart U-100 (NOVOLOG FLEXPEN U-100 INSULIN) 100 unit/mL (3 mL) InPn pen Inject 4 Units into the skin 3 (three) times daily with meals. 9/5/23 9/4/24 Yes Rupal Mayorga NP   insulin degludec (TRESIBA FLEXTOUCH U-100) 100 unit/mL (3 mL) insulin pen Inject 15 Units into the skin once daily. 9/5/23 9/4/24 Yes Rupal Mayorga NP   mycophenolate (CELLCEPT) 250 mg Cap Take  "4 capsules (1,000 mg total) by mouth 2 (two) times daily. Txp Date:4/9/2023 (Kidney) Disch Date:4/13/2023 ICD10:Z94.0 Txp Location:[facility] 5/15/23 5/14/24 Yes Monica Handley MD   prednisoLONE (MILLIPRED) 5 mg Tab  5/9/23  Yes Provider, Historical   predniSONE (DELTASONE) 5 MG tablet Take 1 tablet (5 mg total) by mouth once daily. 6/6/23  Yes Vidhi Joe NP   pregabalin (LYRICA) 75 MG capsule Take 1 capsule (75 mg total) by mouth 3 (three) times daily. 5/15/23 10/9/23 Yes Monica Handley MD   SITagliptin phosphate (JANUVIA) 100 MG Tab Take 1 tablet (100 mg total) by mouth once daily. 6/6/23 6/5/24 Yes Vidhi Joe NP   sulfamethoxazole-trimethoprim 400-80mg (BACTRIM,SEPTRA) 400-80 mg per tablet Take 1 tablet by mouth every morning. Stop: 10/6/23 5/15/23 10/6/23 Yes Monica Handley MD   tacrolimus XR, ENVARSUS, (ENVARSUS XR) 1 mg Tb24 Take 5 tablets (5 mg total) by mouth every morning. 9/1/23  Yes Monica Handley MD   valGANciclovir (VALCYTE) 450 mg Tab Take 2 tablets (900 mg total) by mouth every morning. Stop: 10/6/23 5/15/23 10/9/23 Yes Monica Handley MD   amLODIPine (NORVASC) 10 MG tablet Take 10 mg by mouth.    Provider, Historical   BD ULTRA-FINE SILVIA PEN NEEDLE 32 gauge x 5/32" Ndle Use with insulin 4 times daily. 9/5/23   Rupal Mayorga NP   blood sugar diagnostic Strp Test blood sugar 3 (three) times daily. 4/11/23   Monica Handley MD   blood-glucose meter Misc Use as instructed to test blood sugar daily 4/11/23   Monica Handley MD   cyclobenzaprine (FLEXERIL) 10 MG tablet Take 10 mg by mouth daily as needed.    Provider, Historical   DULoxetine (CYMBALTA) 30 MG capsule Take 30 mg by mouth. 3/8/23   Provider, Historical   flash glucose scanning reader (FREESTYLE SELMA 2 READER) [facility] Use as directed. 8/31/23   Rupal Mayorga, CHERELLE   flash glucose sensor (FREESTYLE SELMA 2 SENSOR) Kit Change every 14 day. 8/31/23   M[place]esh, " CHERELLE Goldsmith   lancets Misc Test blood sugar 3 (three) times daily. 4/11/23   Monica Handley MD   NIFEdipine (PROCARDIA-XL) 60 MG (OSM) 24 hr tablet Take 1 tablet (60 mg total) by mouth once daily. 5/30/23 5/29/24  Monica Handley MD   ondansetron HCl (ZOFRAN ORAL) 4 mg. 11/3/22 11/2/23  Provider, Historical   oxyCODONE-acetaminophen (PERCOCET) 5-325 mg per tablet Take 1 tablet by mouth. 5/17/23   Provider, Historical   traMADoL (ULTRAM) 50 mg tablet Take 50 mg by mouth every 4 (four) hours as needed. 8/23/23   Provider, Historical       Inpatient Medications:    Current Outpatient Medications:     atorvastatin (LIPITOR) 10 MG tablet, Take 1 tablet (10 mg total) by mouth every evening. (Patient taking differently: Take 10 mg by mouth every evening. Needs refill), Disp: 90 tablet, Rfl: 3    hydroCHLOROthiazide (HYDRODIURIL) 25 MG tablet, Take 1 tablet (25 mg total) by mouth once daily., Disp: 30 tablet, Rfl: 11    insulin aspart U-100 (NOVOLOG FLEXPEN U-100 INSULIN) 100 unit/mL (3 mL) InPn pen, Inject 4 Units into the skin 3 (three) times daily with meals., Disp: 10.8 mL, Rfl: 3    insulin degludec (TRESIBA FLEXTOUCH U-100) 100 unit/mL (3 mL) insulin pen, Inject 15 Units into the skin once daily., Disp: 13.5 mL, Rfl: 3    mycophenolate (CELLCEPT) 250 mg Cap, Take 4 capsules (1,000 mg total) by mouth 2 (two) times daily. Txp Date:4/9/2023 (Kidney) Disch Date:4/13/2023 ICD10:Z94.0 Txp Location:[facility], Disp: 240 capsule, Rfl: 11    prednisoLONE (MILLIPRED) 5 mg Tab, , Disp: , Rfl:     predniSONE (DELTASONE) 5 MG tablet, Take 1 tablet (5 mg total) by mouth once daily., Disp: 91 tablet, Rfl: 3    pregabalin (LYRICA) 75 MG capsule, Take 1 capsule (75 mg total) by mouth 3 (three) times daily., Disp: 90 capsule, Rfl: 3    SITagliptin phosphate (JANUVIA) 100 MG Tab, Take 1 tablet (100 mg total) by mouth once daily., Disp: 31 tablet, Rfl: 3    sulfamethoxazole-trimethoprim 400-80mg (BACTRIM,SEPTRA) 400-80 mg  "per tablet, Take 1 tablet by mouth every morning. Stop: 10/6/23, Disp: 30 tablet, Rfl: 4    tacrolimus XR, ENVARSUS, (ENVARSUS XR) 1 mg Tb24, Take 5 tablets (5 mg total) by mouth every morning., Disp: 150 tablet, Rfl: 11    valGANciclovir (VALCYTE) 450 mg Tab, Take 2 tablets (900 mg total) by mouth every morning. Stop: 10/6/23, Disp: 60 tablet, Rfl: 4    amLODIPine (NORVASC) 10 MG tablet, Take 10 mg by mouth., Disp: , Rfl:     BD ULTRA-FINE SILVIA PEN NEEDLE 32 gauge x 5/32" Ndle, Use with insulin 4 times daily., Disp: 100 each, Rfl: 3    blood sugar diagnostic Strp, Test blood sugar 3 (three) times daily., Disp: 100 each, Rfl: 4    blood-glucose meter Misc, Use as instructed to test blood sugar daily, Disp: 1 each, Rfl: 0    cyclobenzaprine (FLEXERIL) 10 MG tablet, Take 10 mg by mouth daily as needed., Disp: , Rfl:     DULoxetine (CYMBALTA) 30 MG capsule, Take 30 mg by mouth., Disp: , Rfl:     flash glucose scanning reader (FREESTYLE SELMA 2 READER) [facility], Use as directed., Disp: 1 each, Rfl: 0    flash glucose sensor (FREESTYLE SELMA 2 SENSOR) Kit, Change every 14 day., Disp: 6 kit, Rfl: 3    lancets Misc, Test blood sugar 3 (three) times daily., Disp: 100 each, Rfl: 4    NIFEdipine (PROCARDIA-XL) 60 MG (OSM) 24 hr tablet, Take 1 tablet (60 mg total) by mouth once daily., Disp: 30 tablet, Rfl: 11    ondansetron HCl (ZOFRAN ORAL), 4 mg., Disp: , Rfl:     oxyCODONE-acetaminophen (PERCOCET) 5-325 mg per tablet, Take 1 tablet by mouth., Disp: , Rfl:     traMADoL (ULTRAM) 50 mg tablet, Take 50 mg by mouth every 4 (four) hours as needed., Disp: , Rfl:     Current Facility-Administered Medications:     0.9%  NaCl infusion, , Intravenous, Continuous, Colleen Nath NP    LIDOcaine (PF) 10 mg/ml (1%) injection 10 mg, 1 mL, Other, Once, Colleen Nath, NP     Anticoagulants/Antiplatelets:   no anticoagulation    Allergies:   Review of patient's allergies indicates:   Allergen Reactions    Shrimp        Review of " Systems:   As documented in primary provider H&P.    Vital Signs (Most Recent):  Temp: 98 °F (36.7 °C) (09/08/23 0645)  Pulse: 78 (09/08/23 0645)  Resp: 16 (09/08/23 0645)  BP: (!) 145/79 (09/08/23 0645)  SpO2: 100 % (09/08/23 0645)    Physical Exam:  No acute distress, laying comfortably in bed, pleasant and cooperative  Regular rate and rhythm  Breathing unlabored  Abdomen benign  Extremities warm and well perfused    Sedation Exam:  ASA: II - Patient appears to have mild systemic disease, adequately controlled   Mallampati: II (hard and soft palate, upper portion of tonsils anduvula visible)     Laboratory:  Lab Results   Component Value Date    INR 1.0 08/31/2023       Lab Results   Component Value Date    WBC 5.09 08/31/2023    HGB 12.7 (L) 08/31/2023    HCT 38.4 (L) 08/31/2023    MCV 93 08/31/2023     08/31/2023      Lab Results   Component Value Date     (H) 08/31/2023     08/31/2023    K 5.1 08/31/2023     08/31/2023    CO2 30 (H) 08/31/2023    BUN 25 (H) 08/31/2023    CREATININE 2.2 (H) 08/31/2023    CALCIUM 10.0 08/31/2023    MG 1.5 (L) 05/04/2023    ALT 14 08/31/2023    AST 15 08/31/2023    ALBUMIN 4.4 08/31/2023    BILITOT 0.8 08/31/2023    BILIDIR 0.3 05/04/2023       Imaging:  Reviewed.      ASSESSMENT/PLAN:   37 yo M with a history of ESRD 2/2 diabetic nephropathy s/p kidney transplant 4/9/23 presenting with elevated serum creatinine. Sliding scale insulin ordered for patient's blood glucose of 322.                      Sedation Plan: moderate  Patient will undergo: percutaneous biopsy of transplanted kidney      Giovanni Reddy M.D.  PGY-2  Diagnostic and Interventional Radiology Resident

## 2023-09-08 NOTE — PROCEDURES
Radiology Post-Procedure Note    Pre Op Diagnosis: NETO    Post Op Diagnosis: Same    Procedure: Renal transplant biopsy    Procedure performed by: Vaughn Rodriguez MD    Written Informed Consent Obtained: Yes  Specimen Removed: YES 4 x 18 gauge cores  Estimated Blood Loss: Minimal    Findings:   Under US guidance, 17/18 gauge biopsy system was used to sample RLQ renal allograft. Tract embolized with Gelfoam slurry. No complications. See dictation.    Patient tolerated procedure well.    Vaughn Rodriguez M.D.  Interventional Radiology  Department of Radiology

## 2023-09-08 NOTE — DISCHARGE INSTRUCTIONS
For scheduling: Call [facility] at [phone]    For questions or concerns call: ERIC MON-FRI 8 AM- 5PM [phone]. Radiology resident on call [phone].    For immediate concerns that are not emergent, you may call our radiology clinic at: [phone]

## 2023-09-08 NOTE — DISCHARGE SUMMARY
Radiology Discharge Summary      Hospital Course: No complications    Admit Date: 9/8/2023  Discharge Date: 09/08/2023     Instructions Given to Patient: Yes  Diet: Resume prior diet  Activity: activity as tolerated and no driving for today    Description of Condition on Discharge: Stable  Vital Signs (Most Recent): Temp: 98 °F (36.7 °C) (09/08/23 0645)  Pulse: 77 (09/08/23 0928)  Resp: 16 (09/08/23 0928)  BP: 124/69 (09/08/23 0928)  SpO2: 97 % (09/08/23 0928)    Discharge Disposition: Home    Discharge Diagnosis: NETO s/p renal transplant biopsy    Follow up: As scheduled    Vaughn Rodriguez M.D.  Interventional Radiology  Department of Radiology

## 2023-09-08 NOTE — Clinical Note
Right: Abdomen.   Scrubbed with Chlorhexidine/Alcohol.    Hair: N/A.  Skin prep dry before draping.  Prepped by: Vaughn Rodriguez MD 9/19/2023 9:20 AM.

## 2023-09-08 NOTE — PROGRESS NOTES
Pt arrived to MPU #6 from IR S/P Renal Bx / pt resting with lights out per request / V/S to flow sheet plan for 2 hour recovery / R Abdomen dressing dry and  intact

## 2023-09-11 VITALS
RESPIRATION RATE: 18 BRPM | BODY MASS INDEX: 20.15 KG/M2 | WEIGHT: 152 LBS | OXYGEN SATURATION: 100 % | TEMPERATURE: 98 F | DIASTOLIC BLOOD PRESSURE: 65 MMHG | HEIGHT: 73 IN | SYSTOLIC BLOOD PRESSURE: 119 MMHG | HEART RATE: 78 BPM

## 2023-09-13 ENCOUNTER — PATIENT MESSAGE (OUTPATIENT)
Dept: DIABETES | Facility: CLINIC | Age: 38
End: 2023-09-13
Payer: MEDICARE

## 2023-09-13 ENCOUNTER — PATIENT MESSAGE (OUTPATIENT)
Dept: TRANSPLANT | Facility: CLINIC | Age: 38
End: 2023-09-13
Payer: MEDICARE

## 2023-09-14 LAB
FINAL PATHOLOGIC DIAGNOSIS: NORMAL
GROSS: NORMAL
Lab: NORMAL

## 2023-09-15 ENCOUNTER — PATIENT MESSAGE (OUTPATIENT)
Dept: TRANSPLANT | Facility: CLINIC | Age: 38
End: 2023-09-15
Payer: MEDICARE

## 2023-09-20 ENCOUNTER — PATIENT MESSAGE (OUTPATIENT)
Dept: DIABETES | Facility: CLINIC | Age: 38
End: 2023-09-20

## 2023-09-20 ENCOUNTER — CLINICAL SUPPORT (OUTPATIENT)
Dept: DIABETES | Facility: CLINIC | Age: 38
End: 2023-09-20
Payer: MEDICARE

## 2023-09-20 DIAGNOSIS — Z99.2 CONTROLLED TYPE 2 DIABETES MELLITUS WITH CHRONIC KIDNEY DISEASE ON CHRONIC DIALYSIS, WITHOUT LONG-TERM CURRENT USE OF INSULIN: ICD-10-CM

## 2023-09-20 DIAGNOSIS — E11.22 CONTROLLED TYPE 2 DIABETES MELLITUS WITH CHRONIC KIDNEY DISEASE ON CHRONIC DIALYSIS, WITHOUT LONG-TERM CURRENT USE OF INSULIN: ICD-10-CM

## 2023-09-20 DIAGNOSIS — N18.6 CONTROLLED TYPE 2 DIABETES MELLITUS WITH CHRONIC KIDNEY DISEASE ON CHRONIC DIALYSIS, WITHOUT LONG-TERM CURRENT USE OF INSULIN: ICD-10-CM

## 2023-09-20 PROCEDURE — G0108 PR DIAB MANAGE TRN  PER INDIV: ICD-10-PCS | Mod: 95,,, | Performed by: INTERNAL MEDICINE

## 2023-09-20 PROCEDURE — G0108 DIAB MANAGE TRN  PER INDIV: HCPCS | Mod: 95,,, | Performed by: INTERNAL MEDICINE

## 2023-09-20 NOTE — Clinical Note
Pt is waiting on Bethany from [facility]? Said today he hadn't heard anything - just wanted to make ya'll aware no moves yet

## 2023-09-20 NOTE — PROGRESS NOTES
Diabetes Care Specialist Progress Note  Author: Karo Rojas RD, CDE  Date: 9/20/2023    Program Intake  Reason for Diabetes Program Visit:: Initial Diabetes Assessment  Type of Intervention:: Individual  Individual: Education  Education: Self-Management Skill Review    Current diabetes risk level:: low    In the last 12 months, have you:: been admitted to a hospital  Was the ER or hospital admission related to diabetes?: No        Lab Results   Component Value Date    HGBA1C 4.5 04/09/2023           Clinical  Problem Review  Reviewed Problem List with Patient: yes  Active comorbidities affecting diabetes self-care.: yes  Comorbidities: Organ Transplant    Clinical Assessment  Current Diabetes Treatment: Insulin, Oral Medication   Januvia   Tresiba 14 units daily  Novolog 5 units AC (no scale)      Have you ever experienced hypoglycemia (low blood sugar)?: no  Have you ever experienced hyperglycemia (high blood sugar)?: yes  In the last month, how often have you experienced high blood sugar?: once a day        SMBG 3x/day  Lowest:  130s  Highest:  323  (after eating)    FBG yesterday: 160            Medication Information  Medication adherence impacting ability to self-manage diabetes?: No    Labs  Lab Compliance Barriers: No    Nutritional Status  Diet: Regular  Meal Plan 24 Hour Recall: Breakfast, Lunch, Dinner        Drinks:  diet coke  Meal Plan 24 Hour Recall - Breakfast: cereal  Meal Plan 24 Hour Recall - Lunch: fish sandwich  Meal Plan 24 Hour Recall - Dinner: burger and fries  Current nutritional status an area of need that is impacting patient's ability to self-manage diabetes?: No            Additional Social History  Support  Is Support an area impacting ability to self-manage diabetes?: No    Access to Mass Media & Technology  Does the patient have access to any of the following devices or technologies?: Smart phone, Internet Access  Media or technology needs impacting ability to self-manage diabetes?:  No    Cognitive/Behavioral Health  Alert and Oriented: Yes  Difficulty Thinking: No  Requires Prompting: No  Requires assistance for routine expression?: No  Cognitive or behavioral barriers impacting ability to self-manage diabetes?: No    Culture/Mormon  Culture or Spiritism beliefs that may impact ability to access healthcare: No    Communication  Language preference: English  Hearing Problems: No  Vision Problems: No  Communication needs impacting ability to self-manage diabetes?: No    Health Literacy  Preferred Learning Method: Face to Face  How often do you need to have someone help you read instructions, pamphlets, or written material from your doctor or pharmacy?: Never  Health literacy needs impacting ability to self-manage diabetes?: No          Diabetes Self-Management Skills Assessment  Diabetes Disease Process/Treatment Options  Patient/caregiver able to state what happens when someone has diabetes.: yes  Patient/caregiver knows what type of diabetes they have.: yes  Diabetes Type : Type II  Diabetes Disease Process/Treatment Options: Skills Assessment Completed: Yes  Assessment indicates:: Instruction Needed  Area of need?: Yes    Nutrition/Healthy Eating  Challenges to healthy eating:: portion control  Method of carbohydrate measurement:: no knowledge of what carbs are  Patient can identify foods that impact blood sugar.: yes  Nutrition/Healthy Eating Skills Assessment Completed:: Yes  Assessment indicates:: Instruction Needed  Area of need?: Yes    Physical Activity/Exercise  Patient can identify forms of physical activity.: yes  Patient can identify reasons why exercise/physical activity is important in diabetes management.: yes  Physical Activity/Exercise Skills Assessment Completed: : Yes  Assessment indicates:: Instruction Needed  Area of need?: Yes    Medications  Patient is able to describe current diabetes management routine.: yes  Patient is able to identify current diabetes medications,  dosages, and appropriate timing of medications.: yes  Patient reports problems or concerns with current medication regimen.: no  Medication Skills Assessment Completed:: Yes  Assessment indicates:: Instruction Needed  Area of need?: Yes    Home Blood Glucose Monitoring  Patient states that blood sugar is checked at home daily.: yes  Monitoring Method:: home glucometer  How often do you check your blood sugar?: 3 times a day  Home Blood Glucose Monitoring Skills Assessment Completed: : Yes  Assessment indicates:: Instruction Needed  Area of need?: Yes    Acute Complications  Patient is able to identify types of acute complications: Yes  Patient Identified:: Hyperglycemia  Acute Complications Skills Assessment Completed: : Yes  Assessment indicates:: Instruction Needed  Area of need?: Yes    Chronic Complications  Patient can identify major chronic complications of diabetes.: yes  Chronic Complications Skills Assessment Completed: : Yes  Assessment indicates:: Adequate understanding  Area of need?: No    Psychosocial/Coping  Patient can identify ways of coping with chronic disease.: yes  Area of need?: No            Assessment Summary and Plan    Based on today's diabetes care assessment, the following areas of need were identified:        9/20/2023    12:02 AM   Diabetes Self-Management Skills   Diabetes Disease Process/Treatment Options Yes  Was not on insulin prior to Tx  Discussed progression post-tx and possible need for life long insulin vs getting off some insulin         Nutrition/Healthy Eating Yes  See Care Plan         Physical Activity/Exercise Yes  Encouraged regular PA  Has started walking again       Medication Yes  Reviewed timing and MOA of both insulins       Home Blood Glucose Monitoring Yes    Waiting on Bethany from [facility] - paperwork?             Acute Complications Yes  - Discussed hypoglycemia vs hyperglycemia symptoms and discussed appropriate treatments for each.   - Reviewed that pt is at high  risk of hypo with current medication regimen.   - Discussed general vs severe hyperglycemia and risk of DKA/HHS.     Chronic Complications No   Psychosocial/Coping No      Today's interventions were provided through individual discussion, instruction, and written materials were provided.      Patient verbalized understanding of instruction and written materials.  Pt was able to return back demonstration of instructions today. Patient understood key points, needs reinforcement and further instruction.                 Diabetes Self-Management Care Plan:    Today's Diabetes Self-Management Care Plan was developed with Andrey's input. Andrey has agreed to work toward the following goal(s) to improve his/her overall diabetes control.      Care Plan: Diabetes Management   Updates made since 8/21/2023 12:00 AM        Problem: Healthy Eating         Long-Range Goal: Eat 3 meals daily with 3-4 servings of Carbohydrate per meal.    Start Date: 9/20/2023   Expected End Date: 9/30/2024   Priority: High   Barriers: Knowledge deficit   Note:    Emphasized importance of eliminating all sugar sweetened beverages, including fruit juice.   Discussed sugar free drink options.     Discussed carb vs non-carb foods.  Instructed on appropriate amounts of carbs to have at meals and snacks.     Instructed/reviewed how to read nutrition label and what to look for to determine total carb amounts.    Discussed need to limit total/saturated fats despite lesser effect on BG increase.   Encouraged carb sources primarily from whole grains, fresh/frozen fruits, and low-fat milk and yogurt.   Discussed meal plans and snack ideas amenable to pt.         Drinks:  diet coke      Work on being more mindful or carb intake            Task: Reviewed the sources and role of Carbohydrate, Protein, and Fat and how each nutrient impacts blood sugar. Completed 9/20/2023        Task: Provided visual examples using dry measuring cups, food models, and other  familiar objects such as computer mouse, deck or cards, tennis ball etc. to help with visualization of portions.         Task: Explained how to count carbohydrates using the food label and the use of dry measuring cups for accurate carb counting.         Task: Discussed strategies for choosing healthier menu options when dining out. Completed 9/20/2023        Task: Recommended replacing beverages containing high sugar content with noncaloric/sugar free options and/or water. Completed 9/20/2023        Task: Review the importance of balancing carbohydrates with each meal using portion control techniques to count servings of carbohydrate and label reading to identify serving size and amount of total carbs per serving. Completed 9/20/2023        Task: Provided Sample plate method and reviewed the use of the plate to estimate amounts of carbohydrate per meal.                     Follow Up Plan     F/u within 3 months          Today's care plan and follow up schedule was discussed with patient.  Andrey verbalized understanding of the care plan, goals, and agrees to follow up plan.        The patient was encouraged to communicate with his/her health care provider/physician and care team regarding his/her condition(s) and treatment.  I provided the patient with my contact information today and encouraged to contact me via phone or [facility]'s Patient Portal as needed.           Length of Visit   Total Time: 30 Minutes

## 2023-09-21 ENCOUNTER — DOCUMENTATION ONLY (OUTPATIENT)
Dept: TRANSPLANT | Facility: CLINIC | Age: 38
End: 2023-09-21
Payer: MEDICARE

## 2023-09-21 LAB
EXT BUN: 24 (ref 7–18)
EXT CALCIUM: 9.2 (ref 8.5–10.1)
EXT CHLORIDE: 103 (ref 98–107)
EXT CO2: 31 (ref 21–32)
EXT CREATININE: 1.6 MG/DL (ref 0.7–1.3)
EXT GLUCOSE: 229 (ref 74–106)
EXT POTASSIUM: 4.1 (ref 3.5–5.1)
EXT SODIUM: 140 MMOL/L (ref 136–145)
EXT TACROLIMUS LVL: 4.1

## 2023-09-25 DIAGNOSIS — E11.22 CONTROLLED TYPE 2 DIABETES MELLITUS WITH CHRONIC KIDNEY DISEASE ON CHRONIC DIALYSIS, WITHOUT LONG-TERM CURRENT USE OF INSULIN: ICD-10-CM

## 2023-09-25 DIAGNOSIS — N18.6 CONTROLLED TYPE 2 DIABETES MELLITUS WITH CHRONIC KIDNEY DISEASE ON CHRONIC DIALYSIS, WITHOUT LONG-TERM CURRENT USE OF INSULIN: ICD-10-CM

## 2023-09-25 DIAGNOSIS — Z99.2 CONTROLLED TYPE 2 DIABETES MELLITUS WITH CHRONIC KIDNEY DISEASE ON CHRONIC DIALYSIS, WITHOUT LONG-TERM CURRENT USE OF INSULIN: ICD-10-CM

## 2023-09-25 RX ORDER — INSULIN ASPART 100 [IU]/ML
6 INJECTION, SOLUTION INTRAVENOUS; SUBCUTANEOUS
Qty: 16.2 ML | Refills: 3 | Status: SHIPPED | OUTPATIENT
Start: 2023-09-25 | End: 2023-10-23

## 2023-09-25 RX ORDER — INSULIN DEGLUDEC 100 U/ML
16 INJECTION, SOLUTION SUBCUTANEOUS DAILY
Qty: 14.4 ML | Refills: 3 | Status: SHIPPED | OUTPATIENT
Start: 2023-09-25 | End: 2023-10-23

## 2023-09-25 RX ORDER — PEN NEEDLE, DIABETIC 31 GX5/16"
NEEDLE, DISPOSABLE MISCELLANEOUS
Qty: 100 EACH | Refills: 3 | Status: SHIPPED | OUTPATIENT
Start: 2023-09-25 | End: 2023-11-04 | Stop reason: SDUPTHER

## 2023-10-17 ENCOUNTER — DOCUMENTATION ONLY (OUTPATIENT)
Dept: TRANSPLANT | Facility: CLINIC | Age: 38
End: 2023-10-17
Payer: MEDICARE

## 2023-10-17 ENCOUNTER — PATIENT MESSAGE (OUTPATIENT)
Dept: TRANSPLANT | Facility: CLINIC | Age: 38
End: 2023-10-17

## 2023-10-17 ENCOUNTER — OFFICE VISIT (OUTPATIENT)
Dept: TRANSPLANT | Facility: CLINIC | Age: 38
End: 2023-10-17
Payer: MEDICARE

## 2023-10-17 DIAGNOSIS — N18.31 STAGE 3A CHRONIC KIDNEY DISEASE: Primary | ICD-10-CM

## 2023-10-17 DIAGNOSIS — I12.9 RENAL HYPERTENSION: ICD-10-CM

## 2023-10-17 DIAGNOSIS — Z79.899 IMMUNOCOMPROMISED STATE DUE TO DRUG THERAPY: ICD-10-CM

## 2023-10-17 DIAGNOSIS — Z94.0 KIDNEY TRANSPLANT STATUS: ICD-10-CM

## 2023-10-17 DIAGNOSIS — E87.3 ALKALEMIA: ICD-10-CM

## 2023-10-17 DIAGNOSIS — Z29.89 PROPHYLACTIC IMMUNOTHERAPY: ICD-10-CM

## 2023-10-17 DIAGNOSIS — D84.821 IMMUNOCOMPROMISED STATE DUE TO DRUG THERAPY: ICD-10-CM

## 2023-10-17 DIAGNOSIS — Z91.89 AT RISK FOR OPPORTUNISTIC INFECTIONS: ICD-10-CM

## 2023-10-17 LAB
EXT ALBUMIN: 4.1 (ref 3.4–5)
EXT BACTERIA UA: ABNORMAL
EXT BK VIRUS DNA QN PCR: NOT DETECTED
EXT BUN: 25 (ref 7–18)
EXT CALCIUM: 9.3 (ref 8.5–10.1)
EXT CHLORIDE: 103 (ref 98–107)
EXT CO2: 33 (ref 21–32)
EXT CREATININE: 1.5 MG/DL (ref 0.7–1.3)
EXT GLUCOSE UA: ABNORMAL
EXT GLUCOSE: 258 (ref 74–106)
EXT HEMATOCRIT: 40.4 (ref 42–54)
EXT HEMOGLOBIN: 13.6 (ref 14–18)
EXT MAGNESIUM: 1.5 (ref 1.8–2.4)
EXT NITRITES UA: NEGATIVE
EXT PHOSPHORUS: 3.1 (ref 2.6–4.7)
EXT PLATELETS: 221 (ref 140–440)
EXT POTASSIUM: 4.5 (ref 3.5–5.1)
EXT PROTEIN UA: ABNORMAL
EXT RBC UA: ABNORMAL
EXT SODIUM: 141 MMOL/L (ref 136–145)
EXT TACROLIMUS LVL: 4.1
EXT WBC UA: ABNORMAL
EXT WBC: 5.2 (ref 5–10)

## 2023-10-17 PROCEDURE — 99215 OFFICE O/P EST HI 40 MIN: CPT | Mod: 95,,, | Performed by: INTERNAL MEDICINE

## 2023-10-17 PROCEDURE — 3066F NEPHROPATHY DOC TX: CPT | Mod: CPTII,95,, | Performed by: INTERNAL MEDICINE

## 2023-10-17 PROCEDURE — 1160F RVW MEDS BY RX/DR IN RCRD: CPT | Mod: CPTII,95,, | Performed by: INTERNAL MEDICINE

## 2023-10-17 PROCEDURE — 1159F PR MEDICATION LIST DOCUMENTED IN MEDICAL RECORD: ICD-10-PCS | Mod: CPTII,95,, | Performed by: INTERNAL MEDICINE

## 2023-10-17 PROCEDURE — 1159F MED LIST DOCD IN RCRD: CPT | Mod: CPTII,95,, | Performed by: INTERNAL MEDICINE

## 2023-10-17 PROCEDURE — 99215 PR OFFICE/OUTPT VISIT, EST, LEVL V, 40-54 MIN: ICD-10-PCS | Mod: 95,,, | Performed by: INTERNAL MEDICINE

## 2023-10-17 PROCEDURE — 3066F PR DOCUMENTATION OF TREATMENT FOR NEPHROPATHY: ICD-10-PCS | Mod: CPTII,95,, | Performed by: INTERNAL MEDICINE

## 2023-10-17 PROCEDURE — 3044F PR MOST RECENT HEMOGLOBIN A1C LEVEL <7.0%: ICD-10-PCS | Mod: CPTII,95,, | Performed by: INTERNAL MEDICINE

## 2023-10-17 PROCEDURE — 3044F HG A1C LEVEL LT 7.0%: CPT | Mod: CPTII,95,, | Performed by: INTERNAL MEDICINE

## 2023-10-17 PROCEDURE — 1160F PR REVIEW ALL MEDS BY PRESCRIBER/CLIN PHARMACIST DOCUMENTED: ICD-10-PCS | Mod: CPTII,95,, | Performed by: INTERNAL MEDICINE

## 2023-10-17 RX ORDER — HYDROCHLOROTHIAZIDE 25 MG/1
12.5 TABLET ORAL DAILY
Qty: 45 TABLET | Refills: 4 | Status: ON HOLD | OUTPATIENT
Start: 2023-10-17 | End: 2023-11-18 | Stop reason: HOSPADM

## 2023-10-17 RX ORDER — NIFEDIPINE 30 MG/1
30 TABLET, EXTENDED RELEASE ORAL NIGHTLY
Qty: 30 TABLET | Refills: 11 | Status: SHIPPED | OUTPATIENT
Start: 2023-10-17 | End: 2023-11-29 | Stop reason: SDUPTHER

## 2023-10-17 NOTE — PROGRESS NOTES
Subjective:      Patient ID: Andrey Reddy is a 38 y.o. male.    Chief Complaint:  No chief complaint on file.    History of Present Illness  Andrey Reddy is here for follow up of DM.  Previously seen by me 8/2023.  This is a MyChart video visit.    The patient location is: [place]  The chief complaint leading to consultation is: DM  Visit type: Virtual visit with synchronous audio and video  Total time spent with patient: see below  Each patient to whom he or she provides medical services by telemedicine is:  (1) informed of the relationship between the physician and patient and the respective role of any other health care provider with respect to management of the patient; and (2) notified that he or she may decline to receive medical services by telemedicine and may withdraw from such care at any time.    S/p kidney transplant on 4/9/2023  Prior to transplant Home Diabetes Medications:  Not on medications  Prednisone: 5mg daily     With regards to diabetes:    Diagnosed: ~7-8 years ago   DE 9/2023  FH: mother  Known complications:  DKA Denies  RN Denies  Eye Exam: within the last year   PN Denies  Podiatry: Denies  Nephropathy +  CAD Denies  Denies history of pancreatitis & personal/family history of medullary thyroid cancer.     Diet/Exercise:  Eats 2 meals a day.   Snacks : occasionally   Drinks : water, Pedialyte, juice.   Exercise - tries to stay active   Recent illness, injury, steroids; Denies     Current regimen:  Januvia 100mg daily  Tresiba 12 units daily  Novolog 4 units before meals only     Reports compliance.     Other medications tried:  Metformin - CKD  Trulicity - denies issues but stopped due to DM control     Glucose Monitor: [facility]  6 times a day testing  Log reviewed: sensor downloaded and reviewed    Hypoglycemia:  Denies any since dose change  Knows how to correct with 15 grams of carbs- juice, coke, or a peppermint.     Diabetes Management Status    Hemoglobin A1C   Date Value Ref Range  "Status   04/09/2023 4.5 4.0 - 5.6 % Final     Comment:     ADA Screening Guidelines:  5.7-6.4%  Consistent with prediabetes  >or=6.5%  Consistent with diabetes    High levels of fetal hemoglobin interfere with the HbA1C  assay. Heterozygous hemoglobin variants (HbS, HgC, etc)do  not significantly interfere with this assay.   However, presence of multiple variants may affect accuracy.     10/13/2021 4.7 4.0 - 5.6 % Final     Comment:     ADA Screening Guidelines:  5.7-6.4%  Consistent with prediabetes  >or=6.5%  Consistent with diabetes    High levels of fetal hemoglobin interfere with the HbA1C  assay. Heterozygous hemoglobin variants (HbS, HgC, etc)do  not significantly interfere with this assay.   However, presence of multiple variants may affect accuracy.         Statin: Taking  ACE/ARB: Not taking  Screening or Prevention Patient's value Goal Complete/Controlled?   HgA1C Testing and Control   Lab Results   Component Value Date    HGBA1C 4.5 04/09/2023      Annually/Less than 8% Yes   Lipid profile : 04/09/2023 Annually Yes   LDL control Lab Results   Component Value Date    LDLCALC 104.4 04/09/2023    Annually/Less than 100 mg/dl  No   Nephropathy screening No results found for: "LABMICR"  Lab Results   Component Value Date    PROTEINUA Negative 05/04/2023    Annually Yes   Blood pressure BP Readings from Last 1 Encounters:   09/08/23 119/65    Less than 140/90 No   Dilated retinal exam : 06/28/2017 Annually Yes   Foot exam   Most Recent Foot Exam Date: Not Found Annually No       Review of Systems  As above      There were no vitals taken for this visit.      There is no height or weight on file to calculate BMI.    Lab Review:   Lab Results   Component Value Date    HGBA1C 4.5 04/09/2023    HGBA1C 4.7 10/13/2021       Lab Results   Component Value Date    CHOL 169 04/09/2023    HDL 58 04/09/2023    LDLCALC 104.4 04/09/2023    TRIG 33 04/09/2023    CHOLHDL 34.3 04/09/2023     Lab Results   Component Value Date "     09/08/2023    K 3.6 09/08/2023     09/08/2023    CO2 24 09/08/2023     (H) 09/08/2023    BUN 22 (H) 09/08/2023    CREATININE 1.9 (H) 09/08/2023    CALCIUM 9.6 09/08/2023    PROT 6.9 09/08/2023    ALBUMIN 4.1 09/08/2023    BILITOT 0.8 09/08/2023    ALKPHOS 65 09/08/2023    AST 23 09/08/2023    ALT 26 09/08/2023    ANIONGAP 11 09/08/2023    ESTGFRAFRICA 16.2 (A) 10/13/2021    EGFRNONAA 14.0 (A) 10/13/2021     Vit D, 25-Hydroxy   Date Value Ref Range Status   04/09/2023 36 30 - 96 ng/mL Final     Comment:     Vitamin D deficiency.........<10 ng/mL                              Vitamin D insufficiency......10-29 ng/mL       Vitamin D sufficiency........> or equal to 30 ng/mL  Vitamin D toxicity............>100 ng/mL       Assessment and Plan     1. Controlled type 2 diabetes mellitus with chronic kidney disease on chronic dialysis, without long-term current use of insulin  semaglutide (RYBELSUS) 3 mg tablet    insulin degludec (TRESIBA FLEXTOUCH U-100) 100 unit/mL (3 mL) insulin pen    insulin aspart U-100 (NOVOLOG FLEXPEN U-100 INSULIN) 100 unit/mL (3 mL) InPn pen    Hemoglobin A1C    Comprehensive Metabolic Panel      2. S/P kidney transplant            Controlled type 2 diabetes mellitus with chronic kidney disease on chronic dialysis, without long-term current use of insulin  -- Labs in 3 months.  -- A1c goal <7%.  -- Medications discussed:  MFM - CKD  GLP1-DPP4   SETH   SGLT2 - did not discuss today  Insulin   -- Reviewed logs/CGM:  Global hyperglycemia.   Instructed to send glucose logs in 14 days.  Reach out to me sooner for any glucose <70 or consistently >200.  -- Long discussion about glucose goals, medication options. Trial on GLP1 - noting he is concerned about weight loss so will titrate slow.   -- Medication Changes:   Rybelsus 3mg daily  Tresiba 12 units daily  Novolog 4 units before meals only    -- Reviewed goals of therapy are to get the best control we can without hypoglycemia.  --  Reviewed patient's current insulin regimen. Clarified proper insulin dose and timing in relation to meals, etc. Insulin injection sites and proper rotation instructed.    -- Advised frequent self blood glucose monitoring.  Patient encouraged to document glucose results and bring them to every clinic visit.  -- Hypoglycemia precautions discussed. Instructed on precautions before driving.    -- Call for Bg repeatedly < 90 or > 180.   -- Close adherence to lifestyle changes recommended.   -- Periodic follow ups for eye evaluations, foot care and dental care suggested.    S/P kidney transplant  -- Continue following with Transplant.         Follow up in about 6 weeks (around 12/4/2023).      I spent 20 minutes face-to-face with the patient, over half of the visit was spent on counseling and/or coordinating the care of the patient.    Counseling includes:  Diagnostic results, impressions, recommendations   Prognosis   Risk and benefits of management/treatment options   Instructions for management treatment and or follow-up   Importance of compliance with management   Risk factor reduction   Patient education    Sugar clinic in 6 weeks  RTC 12 weeks

## 2023-10-17 NOTE — PROGRESS NOTES
The patient location is: Home  The chief complaint leading to consultation is: reassess kidney function, immunosuppression and related issues post kidney transplant    Visit type: audiovisual    Face to Face time with patient: 31 min  50 minutes of total time spent on the encounter, which includes face to face time and non-face to face time preparing to see the patient (eg, review of tests), Obtaining and/or reviewing separately obtained history, Documenting clinical information in the electronic or other health record, Independently interpreting results (not separately reported) and communicating results to the patient/family/caregiver, or Care coordination (not separately reported).     Each patient to whom he or she provides medical services by telemedicine is:  (1) informed of the relationship between the physician and patient and the respective role of any other health care provider with respect to management of the patient; and (2) notified that he or she may decline to receive medical services by telemedicine and may withdraw from such care at any time.    Notes:   Post-Transplant Assessment    Referring Physician: Pipe Martinez  Current Nephrologist: Pipe Martinez    ORGAN: LEFT KIDNEY  Donor Type: donation after circulatory death   PHS Increased Risk: no  Cold Ischemia: 1,264 mins  Induction Medications: thymoglobulin    Subjective:     CC:  Reassessment of renal allograft function and management of chronic immunosuppression.    HPI:  Mr. Reddy is a 38 y.o. year old Black or  male who received a donation after circulatory death  kidney transplant on 4/9/23.  He has CKD stage 3 - GFR 30-59 and his baseline creatinine is between 1.2-1.6. He takes mycophenolate mofetil, prednisone, and tacrolimus for maintenance immunosuppression. He is additionally on ketoconazole to augment tacro levels.    Pertinent Nephrology/Transplant History:   ESRD from DM II  DCD DDKT 4/9/23, KDPI 38%,  "CIT>21hrs; Thymo induction  CMV D+,R-  PCP ppx: bactrim STOP 10/6/23  CMV ppx: valcyte STOP 10/6/23    05/30/2023 Andrey feels well and denies nay concerns. He notes no issues with his IS and has no concerns.    He is monitoring his sugars which run 150-170s most days. No low sugars noted. He has endo follow up in future already scheduled.  BPs reported to be in 130-140/70s-88 range  Reports his incision is well healed. Urinating w/o problems, "like a racehorse."  Denies heartburn off Pepcid  Uses pill box with help of Mom.    6/27/23  Andrey notes occ HA every few days. He believes the HA have been  present since txp.  With review of systems questioning, he also notes occasional dyspnea on exertion.  He also had that during dialysis, and states it was attributed to volume overload.  He reports no dyspnea at rest or lying down, and notes no peripheral edema.  He denies voiding difficulties.  He reports blood pressures are usually 140-150 over 80s-90s, as high as diastolic of 98.     10/17/23 Andrey is now over 6 months post kidney transplant.  His biggest concern is ongoing pain and that due to a change in insurance he lost his pain management physician.  He is also worried about poor blood sugar control, but has been in touch with Endocrine.  From the transplant perspective, he believes the tremors are better overall since he converted to Envarsus (tacrolimus XR), but starting to come back.  His last tacrolimus level is pending.  He noted high blood pressures at home running 140-160/80-90.  I verified he is taking nifedipine, but not amlodipine.  He reports no voiding concerns.    Current Outpatient Medications   Medication Sig    amLODIPine (NORVASC) 10 MG tablet Take 10 mg by mouth.    atorvastatin (LIPITOR) 10 MG tablet Take 1 tablet (10 mg total) by mouth every evening. (Patient taking differently: Take 10 mg by mouth every evening. Needs refill)    BD ULTRA-FINE SILVIA PEN NEEDLE 32 gauge x 5/32" Ndle Use " with insulin 4 times daily.    blood sugar diagnostic Strp Test blood sugar 4 times a day.    blood-glucose meter Misc Use as instructed to test blood sugar daily    cyclobenzaprine (FLEXERIL) 10 MG tablet Take 10 mg by mouth daily as needed.    DULoxetine (CYMBALTA) 30 MG capsule Take 30 mg by mouth.    flash glucose scanning reader (FREESTYLE SELMA 2 READER) [facility] Use as directed.    flash glucose sensor (FREESTYLE SELMA 2 SENSOR) Kit Change every 14 day.    hydroCHLOROthiazide (HYDRODIURIL) 25 MG tablet Take 1 tablet (25 mg total) by mouth once daily.    insulin aspart U-100 (NOVOLOG FLEXPEN U-100 INSULIN) 100 unit/mL (3 mL) InPn pen Inject 6 Units into the skin 3 (three) times daily with meals.    insulin degludec (TRESIBA FLEXTOUCH U-100) 100 unit/mL (3 mL) insulin pen Inject 16 Units into the skin once daily.    lancets Misc Test blood sugar 3 (three) times daily.    mycophenolate (CELLCEPT) 250 mg Cap Take 4 capsules (1,000 mg total) by mouth 2 (two) times daily. Txp Date:4/9/2023 (Kidney) Disch Date:4/13/2023 ICD10:Z94.0 Txp Location:[facility]    NIFEdipine (PROCARDIA-XL) 60 MG (OSM) 24 hr tablet Take 1 tablet (60 mg total) by mouth once daily.    ondansetron HCl (ZOFRAN ORAL) 4 mg.    oxyCODONE-acetaminophen (PERCOCET) 5-325 mg per tablet Take 1 tablet by mouth.    prednisoLONE (MILLIPRED) 5 mg Tab     predniSONE (DELTASONE) 5 MG tablet Take 1 tablet (5 mg total) by mouth once daily.    pregabalin (LYRICA) 75 MG capsule Take 1 capsule (75 mg total) by mouth 3 (three) times daily.    SITagliptin phosphate (JANUVIA) 100 MG Tab Take 1 tablet (100 mg total) by mouth once daily.    tacrolimus XR, ENVARSUS, (ENVARSUS XR) 1 mg Tb24 Take 5 tablets (5 mg total) by mouth every morning.    traMADoL (ULTRAM) 50 mg tablet Take 50 mg by mouth every 4 (four) hours as needed.    valGANciclovir (VALCYTE) 450 mg Tab Take 2 tablets (900 mg total) by mouth every morning. Stop: 10/6/23     No current facility-administered medications  for this visit.       Review of Systems   Constitutional: Negative.    HENT: Negative.     Respiratory:  Positive for shortness of breath (With exertion).    Cardiovascular:  Negative for chest pain and leg swelling.   Gastrointestinal:  Negative for abdominal pain, nausea and vomiting.   Genitourinary:  Negative for difficulty urinating.   Skin:  Negative for rash.   Allergic/Immunologic: Positive for immunocompromised state.   Neurological:  Positive for headaches.       Objective:     There were no vitals taken for this visit.body mass index is unknown because there is no height or weight on file.    Physical Exam speech clear, affect seems a little bit down/sad/flat.  No acute distress.    Labs:  Recent Labs   Lab 10/13/21  0806 10/13/21  0807 10/13/21  1136 10/28/22  1205 04/09/23  0320 04/09/23  0857 04/27/23  0757 05/01/23  0712 05/04/23  0724 05/04/23  0742 08/31/23  0927 09/08/23  0651   WBC 4.75  --   --   --  5.72   < > 9.75 10.20  --  8.19 5.09  --    Hemoglobin 12.7 L  --   --   --  9.8 L   < > 11.1 L 11.0 L  --  11.3 L 12.7 L  --    POC Hematocrit  --   --   --   --   --    < >  --   --   --   --   --   --    Hematocrit 37.6 L  --   --   --  29.8 L   < > 34.8 L 34.8 L  --  34.5 L 38.4 L  --    Sodium 139  --   --   --  140   < > 140 139  --  138 142 141   Potassium 4.6  --   --   --  3.9   < > 4.3 4.7  --  4.0 5.1 3.6   Chloride 104  --   --   --  103   < > 105 104  --  104 103 106   CO2 21 L  --   --   --  25   < > 27 27  --  25 30 H 24   BUN 30 H  --   --   --  25 H   < > 17 22 H  --  24 H 25 H 22 H   Creatinine 4.9 H  --   --   --  3.7 H   < > 1.6 H 1.5 H  --  1.6 H 2.2 H 1.9 H   eGFR if non  14.0 A  --   --   --   --   --   --   --   --   --   --   --    Calcium 10.0  --   --   --  9.2   < > 9.4 9.9  --  9.6 10.0 9.6   Phosphorus 4.0  --   --   --  4.0   < > 3.5 3.5  --  3.3  --   --    Magnesium  --   --   --   --   --    < > 1.4 L 1.5 L  --  1.5 L  --   --    Albumin 4.5  --   --    --  3.9   < > 4.0 4.2  --  4.1  4.1 4.4 4.1   AST 15  --   --   --  12  --   --   --   --  9 L 15 23   ALT 18  --   --   --  13  --   --   --   --  11 14 26   Prot/Creat Ratio, Urine  --   --  1.35 H  --   --   --   --   --  0.12  --   --   --    PTH, Intact  --  210.3 H  --  370.8 H 354.1 H  --   --   --   --   --   --   --    Tacrolimus Lvl  --   --   --   --   --    < > 14.3 6.5  --  8.0 14.2  --     < > = values in this interval not displayed.         Recent Labs   Lab 05/10/23  0846 05/17/23  0000 06/13/23  0900 06/20/23  0819 06/27/23  0851 07/06/23  0848 08/08/23  0000 08/22/23  0952 09/21/23  0848 10/11/23  0903   EXT ANC  --    < > 2.58  --  2.89 3.57  --   --   --   --    EXT WBC 5.9   < > 3.5 L  3.5 A 3.0 L 4.1 A 4.7 L 4.3 A  --   --  5.2   EXT SEGS% 77.5   < > 73.7 64.2 70.1 75.8 H  --   --   --   --    EXT Platelets 227   < > 193  193 181 182 177 199  --   --  221   EXT Hemoglobin 11.6 L   < > 12.5 L  12.5 A 12.6 L 12.9 A 13.1 L 13.3 A  --   --  13.6 A   EXT Hematocrit 33.7 L   < > 37.0 L  37 A 37.7 L 38.3 A 38.7 L 38.2 A  --   --  40.4 A   EXT Creatinine 1.3   < > 1.40 H  1.4 A 1.40 H 1.5 A 1.50 H 1.9 A 1.9 A 1.60 H 1.5 A   EXT CREATININE UA 72.5  --   --   --   --  62.6 L  --   --   --   --    EXT Sodium 140   < > 143  143 144 143 140 140 138 140 141   EXT Potassium 4.4   < > 4.6  4.6 4.7 4.6 4.1 4.6 4.3 4.1 4.5   EXT BUN 19 H   < > 21 H  21 A 24 H 25 A 27 H 32 A 26 A 24 H 25 A   EXT CO2 29   < > 30  30 27 31 28 30 30 31 33 A   EXT Calcium 8.9   < > 8.8  8.8 9.0 8.9 9.2 9.4  --  9.2 9.3   EXT Phosphorus 3.4   < > 3.5  3.5 4.1 A 4.3 4.2 4.3  --   --  3.1   EXT Glucose 146 H   < > 187 H  187 A 139 H 213 A 292 H 273 A  --  229 H 258 A   EXT Albumin 4.1   < > 4.2  4.2 4.2 4.1 4.5 4.2  --   --  4.1   EXT AST <8 L  --   --   --   --  25  --   --   --   --    EXT ALT 15 L  --   --   --   --  70 H  --   --   --   --    EXT BilirubiN Total 0.6  --   --   --   --  0.5  --   --   --   --     < > =  values in this interval not displayed.       Recent Labs   Lab 05/10/23  0846 05/17/23  0000 07/06/23  0848 08/08/23  0000 08/22/23  0952 09/21/23  0848 10/11/23  0903   EXT Tacrolimus Lvl 7.0   < > 8.8   < > 12.8 4.1 Pending   EXT PROT/CREAT Ratio UR 0.23  --   --   --   --   --   --    EXT Protein UA  --   --  1+  --   --   --  1+ A   EXT WBC UA  --   --  0-2  --   --   --  3-5   EXT RBC UA  --   --  NONE SEEN  --   --   --  0-2    < > = values in this interval not displayed.       Labs were reviewed with the patient.    Assessment:     1. Stage 3a chronic kidney disease    2. Kidney transplant status    3. Immunocompromised state due to drug therapy    4. Prophylactic immunotherapy    5. At risk for opportunistic infections    6. Renal hypertension    7. Alkalemia         Plan:   New nursing orders:  -  ask him to cut hydrochlorothiazide in half.  This was not mentioned during our visit.  - review instructions to increase nifedipine to 60 mg q.a.m. and 30 mg q.p.m..  30 [place] [facility] [place]  I reviewed this during our visit, but would like to reinforce.  - please follow-up on pending tacrolimus level, BK PCR.  We also need to get a urine protein/creatinine ratio with next labs if 1 was not done this time.    CKD II-IIIa post DCD kidney transplant 4/9/23, KDPI 38%, CIT>21hr  -creat 1.2-1.6, most recent 1.5   Screening for proteinuria  - last p/c ratio pending, UA 1+    Immunosuppression Management for pt on prophylactic IS against rejection  - On tacro XR (changed from IS d/t tremors), MMF and prednisone   - Target tacro 7-9, repeat level pending from last week  - Continue monitoring for toxicities and SE.  He is noting the tremors are more pronounced, and will await to see the level prior to making any further changes.  No significant toxicities appreciated otherwise.    At risk for opportunistic infections given immunosuppressed state  -Prophylaxis against opportunistic infections  -Bactrim and  Valcyte stopped as directed  -Screening for BK infection to prevent allograft dysfunction  -Oct BK PCR pending  -Continue blood PCR screening as per guidelines to prevent BK viremia and nephropathy leading to allograft dysfunction and potential graft failure    Hypertension, renal  - HCTZ 25 --> 12.5 mg daily, lower due to alkalemia  - continue nifedipine 60 mg q.a.m. and add 30 mg q.p.m.    Alkalemia -  - suspect diuretic induced metabolic alkalosis.  Decrease HCTZ.    Follow-up:   Clinic: return to transplant clinic weekly for the first month after transplant; every 2 weeks during months 2-3; then at 6-, 9-, 12-, 18-, 24-, and 36- months post-transplant to reassess for complications from immunosuppression toxicity and monitor for rejection.  Annually thereafter.    Labs: since patient remains at high risk for rejection and drug-related complications that warrant close monitoring, labs will be ordered as follows: continue twice weekly CBC, renal panel, and drug level for first month; then same labs once weekly through 3rd month post-transplant.  Urine for UA and protein/creatinine ratio monthly.  Serum BK - PCR at 1-, 3-, 6-, 9-, 12-, 18-, 24-, 36- 48-, and 60 months post-transplant.  Hepatic panel at 1-, 2-, 3-, 6-, 9-, 12-, 18-, 24-, and 36- months post-transplant.    Monica Handley MD       [facility] Patient Status  Functional Status: 80% - Normal activity with effort: some symptoms of disease  Physical Capacity: No Limitations

## 2023-10-17 NOTE — Clinical Note
-  Please inform him to cut hydrochlorothiazide in half.  This was not mentioned during our visit, from 25 to 12.5 mg tab. - review instructions to increase nifedipine to 60 mg q.a.m. and 30 mg q.p.m..  30 mg prescription sent to [facility] pharmacy.  I reviewed this during our visit, but would like to reinforce. - please follow-up on pending tacrolimus level, BK PCR.  We also need to get a urine protein/creatinine ratio with next labs if 1 was not done this time.

## 2023-10-17 NOTE — LETTER
October 17, 2023        Pipe Martinez  [address]  [facility] [address]  Phone: [phone]  Fax: [phone]             [facility]  [address]  [facility] [address]  Phone: [phone]     Patient: Andrey Reddy   MR Number: [id number]   YOB: 1985   Date of Visit: 10/17/2023       Dear Dr. Pipe Martinez    Thank you for referring Andrey Reddy to me for evaluation. Attached you will find relevant portions of my assessment and plan of care.    If you have questions, please do not hesitate to call me. I look forward to following Andrey Reddy along with you.    Sincerely,    Monica Handley MD    Enclosure    If you would like to receive this communication electronically, please contact [email or web address] or [phone] to request [employer] Link access.    [employer] Link is a tool which provides read-only access to select patient information with whom you have a relationship. Its easy to use and provides real time access to review your patients record including encounter summaries, notes, results, and demographic information.    If you feel you have received this communication in error or would no longer like to receive these types of communications, please e-mail [email or web address]

## 2023-10-19 ENCOUNTER — PATIENT MESSAGE (OUTPATIENT)
Dept: TRANSPLANT | Facility: CLINIC | Age: 38
End: 2023-10-19
Payer: MEDICARE

## 2023-10-23 ENCOUNTER — OFFICE VISIT (OUTPATIENT)
Dept: ENDOCRINOLOGY | Facility: CLINIC | Age: 38
End: 2023-10-23
Payer: MEDICARE

## 2023-10-23 DIAGNOSIS — Z94.0 S/P KIDNEY TRANSPLANT: ICD-10-CM

## 2023-10-23 DIAGNOSIS — E11.22 CONTROLLED TYPE 2 DIABETES MELLITUS WITH CHRONIC KIDNEY DISEASE ON CHRONIC DIALYSIS, WITHOUT LONG-TERM CURRENT USE OF INSULIN: Primary | ICD-10-CM

## 2023-10-23 DIAGNOSIS — N18.6 CONTROLLED TYPE 2 DIABETES MELLITUS WITH CHRONIC KIDNEY DISEASE ON CHRONIC DIALYSIS, WITHOUT LONG-TERM CURRENT USE OF INSULIN: Primary | ICD-10-CM

## 2023-10-23 DIAGNOSIS — Z99.2 CONTROLLED TYPE 2 DIABETES MELLITUS WITH CHRONIC KIDNEY DISEASE ON CHRONIC DIALYSIS, WITHOUT LONG-TERM CURRENT USE OF INSULIN: Primary | ICD-10-CM

## 2023-10-23 PROCEDURE — 99214 OFFICE O/P EST MOD 30 MIN: CPT | Mod: 25,95,, | Performed by: NURSE PRACTITIONER

## 2023-10-23 PROCEDURE — 99214 PR OFFICE/OUTPT VISIT, EST, LEVL IV, 30-39 MIN: ICD-10-PCS | Mod: 25,95,, | Performed by: NURSE PRACTITIONER

## 2023-10-23 PROCEDURE — 3066F PR DOCUMENTATION OF TREATMENT FOR NEPHROPATHY: ICD-10-PCS | Mod: CPTII,95,, | Performed by: NURSE PRACTITIONER

## 2023-10-23 PROCEDURE — 3044F HG A1C LEVEL LT 7.0%: CPT | Mod: CPTII,95,, | Performed by: NURSE PRACTITIONER

## 2023-10-23 PROCEDURE — 1160F PR REVIEW ALL MEDS BY PRESCRIBER/CLIN PHARMACIST DOCUMENTED: ICD-10-PCS | Mod: CPTII,95,, | Performed by: NURSE PRACTITIONER

## 2023-10-23 PROCEDURE — 3044F PR MOST RECENT HEMOGLOBIN A1C LEVEL <7.0%: ICD-10-PCS | Mod: CPTII,95,, | Performed by: NURSE PRACTITIONER

## 2023-10-23 PROCEDURE — 1159F MED LIST DOCD IN RCRD: CPT | Mod: CPTII,95,, | Performed by: NURSE PRACTITIONER

## 2023-10-23 PROCEDURE — 95251 CONT GLUC MNTR ANALYSIS I&R: CPT | Mod: NDTC,S$GLB,, | Performed by: NURSE PRACTITIONER

## 2023-10-23 PROCEDURE — 95251 PR GLUCOSE MONITOR, 72 HOUR, PHYS INTERP: ICD-10-PCS | Mod: NDTC,S$GLB,, | Performed by: NURSE PRACTITIONER

## 2023-10-23 PROCEDURE — 1159F PR MEDICATION LIST DOCUMENTED IN MEDICAL RECORD: ICD-10-PCS | Mod: CPTII,95,, | Performed by: NURSE PRACTITIONER

## 2023-10-23 PROCEDURE — 1160F RVW MEDS BY RX/DR IN RCRD: CPT | Mod: CPTII,95,, | Performed by: NURSE PRACTITIONER

## 2023-10-23 PROCEDURE — 3066F NEPHROPATHY DOC TX: CPT | Mod: CPTII,95,, | Performed by: NURSE PRACTITIONER

## 2023-10-23 RX ORDER — ORAL SEMAGLUTIDE 3 MG/1
3 TABLET ORAL DAILY
Qty: 30 TABLET | Refills: 1 | Status: ON HOLD | OUTPATIENT
Start: 2023-10-23 | End: 2023-11-18 | Stop reason: HOSPADM

## 2023-10-23 RX ORDER — INSULIN DEGLUDEC 100 U/ML
12 INJECTION, SOLUTION SUBCUTANEOUS DAILY
Qty: 10.8 ML | Refills: 3 | Status: SHIPPED | OUTPATIENT
Start: 2023-10-23 | End: 2023-10-26 | Stop reason: SDUPTHER

## 2023-10-23 RX ORDER — INSULIN ASPART 100 [IU]/ML
4 INJECTION, SOLUTION INTRAVENOUS; SUBCUTANEOUS
Qty: 10.8 ML | Refills: 3 | Status: SHIPPED | OUTPATIENT
Start: 2023-10-23 | End: 2023-10-26 | Stop reason: SDUPTHER

## 2023-10-23 NOTE — ASSESSMENT & PLAN NOTE
-- Labs in 3 months.  -- A1c goal <7%.  -- Medications discussed:  MFM - CKD  GLP1-DPP4   SETH   SGLT2 - did not discuss today  Insulin   -- Reviewed logs/CGM:  Global hyperglycemia.   Instructed to send glucose logs in 14 days.  Reach out to me sooner for any glucose <70 or consistently >200.  -- Long discussion about glucose goals, medication options. Trial on GLP1 - noting he is concerned about weight loss so will titrate slow.   -- Medication Changes:   Rybelsus 3mg daily  Tresiba 12 units daily  Novolog 4 units before meals only    -- Reviewed goals of therapy are to get the best control we can without hypoglycemia.  -- Reviewed patient's current insulin regimen. Clarified proper insulin dose and timing in relation to meals, etc. Insulin injection sites and proper rotation instructed.    -- Advised frequent self blood glucose monitoring.  Patient encouraged to document glucose results and bring them to every clinic visit.  -- Hypoglycemia precautions discussed. Instructed on precautions before driving.    -- Call for Bg repeatedly < 90 or > 180.   -- Close adherence to lifestyle changes recommended.   -- Periodic follow ups for eye evaluations, foot care and dental care suggested.

## 2023-10-23 NOTE — Clinical Note
Upload [facility] 6 weeks VV in 12 weeks with labs prior Orders Placed This Encounter     Hemoglobin A1C     Comprehensive Metabolic Panel

## 2023-10-26 DIAGNOSIS — E11.22 CONTROLLED TYPE 2 DIABETES MELLITUS WITH CHRONIC KIDNEY DISEASE ON CHRONIC DIALYSIS, WITHOUT LONG-TERM CURRENT USE OF INSULIN: ICD-10-CM

## 2023-10-26 DIAGNOSIS — N18.6 CONTROLLED TYPE 2 DIABETES MELLITUS WITH CHRONIC KIDNEY DISEASE ON CHRONIC DIALYSIS, WITHOUT LONG-TERM CURRENT USE OF INSULIN: ICD-10-CM

## 2023-10-26 DIAGNOSIS — Z99.2 CONTROLLED TYPE 2 DIABETES MELLITUS WITH CHRONIC KIDNEY DISEASE ON CHRONIC DIALYSIS, WITHOUT LONG-TERM CURRENT USE OF INSULIN: ICD-10-CM

## 2023-10-26 RX ORDER — INSULIN DEGLUDEC 100 U/ML
12 INJECTION, SOLUTION SUBCUTANEOUS DAILY
Qty: 10.8 ML | Refills: 3 | Status: ON HOLD | OUTPATIENT
Start: 2023-10-26 | End: 2023-11-18 | Stop reason: SDUPTHER

## 2023-10-26 RX ORDER — INSULIN ASPART 100 [IU]/ML
4 INJECTION, SOLUTION INTRAVENOUS; SUBCUTANEOUS
Qty: 10.8 ML | Refills: 3 | Status: ON HOLD | OUTPATIENT
Start: 2023-10-26 | End: 2023-11-18 | Stop reason: SDUPTHER

## 2023-10-30 ENCOUNTER — PATIENT MESSAGE (OUTPATIENT)
Dept: TRANSPLANT | Facility: CLINIC | Age: 38
End: 2023-10-30
Payer: MEDICARE

## 2023-11-04 DIAGNOSIS — Z99.2 CONTROLLED TYPE 2 DIABETES MELLITUS WITH CHRONIC KIDNEY DISEASE ON CHRONIC DIALYSIS, WITHOUT LONG-TERM CURRENT USE OF INSULIN: ICD-10-CM

## 2023-11-04 DIAGNOSIS — N18.6 CONTROLLED TYPE 2 DIABETES MELLITUS WITH CHRONIC KIDNEY DISEASE ON CHRONIC DIALYSIS, WITHOUT LONG-TERM CURRENT USE OF INSULIN: ICD-10-CM

## 2023-11-04 DIAGNOSIS — E11.22 CONTROLLED TYPE 2 DIABETES MELLITUS WITH CHRONIC KIDNEY DISEASE ON CHRONIC DIALYSIS, WITHOUT LONG-TERM CURRENT USE OF INSULIN: ICD-10-CM

## 2023-11-06 RX ORDER — PEN NEEDLE, DIABETIC 31 GX5/16"
NEEDLE, DISPOSABLE MISCELLANEOUS
Qty: 100 EACH | Refills: 3 | Status: SHIPPED | OUTPATIENT
Start: 2023-11-06 | End: 2024-03-12 | Stop reason: SDUPTHER

## 2023-11-10 ENCOUNTER — DOCUMENTATION ONLY (OUTPATIENT)
Dept: TRANSPLANT | Facility: CLINIC | Age: 38
End: 2023-11-10

## 2023-11-10 LAB
EXT ALBUMIN: 4.2 (ref 3.4–5)
EXT ALKALINE PHOSPHATASE: ABNORMAL
EXT ALLOSURE: ABNORMAL
EXT ALT: ABNORMAL
EXT AMYLASE: ABNORMAL
EXT ANC: 1.51 (ref 1.78–5.38)
EXT AST: ABNORMAL
EXT BACTERIA UA: ABNORMAL
EXT BANDS%: ABNORMAL
EXT BILIRUBIN DIRECT: ABNORMAL
EXT BILIRUBIN TOTAL: ABNORMAL
EXT BK VIRUS DNA QN PCR: ABNORMAL
EXT BUN: 23 (ref 7–18)
EXT C PEPTIDE: ABNORMAL
EXT CALCIUM: 9.3 (ref 8.5–10.1)
EXT CHLORIDE: 102 (ref 98–107)
EXT CHOLESTEROL: ABNORMAL
EXT CMV DNA QUANT. BY PCR: ABNORMAL
EXT CO2: 33 (ref 21–32)
EXT CREATININE UA: 133.5 (ref 100–180)
EXT CREATININE: 1.7 MG/DL (ref 0.7–1.3)
EXT CYCLOSPORINE LVL: ABNORMAL
EXT EBV DNA BY PCR: ABNORMAL
EXT EBV IGG: ABNORMAL
EXT EGFR NO RACE VARIABLE: 57.99
EXT EOSINOPHIL%: 0.7 (ref 0–7)
EXT FERRITIN: ABNORMAL
EXT GFR MDRD AF AMER: ABNORMAL
EXT GFR MDRD NON AF AMER: ABNORMAL
EXT GLUCOSE UA: ABNORMAL
EXT GLUCOSE: 141 (ref 74–106)
EXT HBV DNA QUANT PCR: ABNORMAL
EXT HCV QUANT: ABNORMAL
EXT HDL: ABNORMAL
EXT HEMATOCRIT: 40.4 (ref 42–54)
EXT HEMOGLOBIN A1C: ABNORMAL
EXT HEMOGLOBIN: 13.7 (ref 14–18)
EXT HIV RNA QUANT PCR: ABNORMAL
EXT IMMUNKNOW (STIMULATED): ABNORMAL
EXT IRON SATURATION: ABNORMAL
EXT LDH, TOTAL: ABNORMAL
EXT LDL CHOLESTEROL: ABNORMAL
EXT LEFLUNOMIDE METABOLITE: ABNORMAL
EXT LIPASE: ABNORMAL
EXT LYMPH%: 16.8 (ref 20.5–51.1)
EXT MAGNESIUM: 1.6 (ref 1.8–2.4)
EXT MONOCYTES%: 26.7 (ref 1.7–9)
EXT NITRITES UA: ABNORMAL
EXT PHOSPHORUS: 4.1 (ref 2.6–4.7)
EXT PLATELETS: 101 (ref 140–440)
EXT POTASSIUM: 3.3 (ref 3.5–5.1)
EXT PROT/CREAT RATIO UR: 0.26 (ref 0–0.2)
EXT PROTEIN TOTAL: ABNORMAL
EXT PROTEIN UA: ABNORMAL
EXT PTH, INTACT: ABNORMAL
EXT RBC UA: ABNORMAL
EXT SEGS%: 55.4 (ref 42.2–75.2)
EXT SERUM IRON: ABNORMAL
EXT SIROLIMUS LVL: ABNORMAL
EXT SODIUM: 142 MMOL/L (ref 136–145)
EXT TACROLIMUS LVL: ABNORMAL
EXT TIBC: ABNORMAL
EXT TRIGLYCERIDES: ABNORMAL
EXT URIC ACID: ABNORMAL
EXT URINE CULTURE: ABNORMAL
EXT URINE PROTEIN: 34.2 (ref 1–14)
EXT VIT D 25 HYDROXY: ABNORMAL
EXT WBC UA: ABNORMAL
EXT WBC: 2.7 (ref 5–10)
PARVOVIRUS B19 DNA BY PCR: ABNORMAL
QUANTIFERON GOLD TB: ABNORMAL

## 2023-11-13 ENCOUNTER — PATIENT MESSAGE (OUTPATIENT)
Dept: TRANSPLANT | Facility: CLINIC | Age: 38
End: 2023-11-13
Payer: MEDICARE

## 2023-11-13 ENCOUNTER — PATIENT MESSAGE (OUTPATIENT)
Dept: DIABETES | Facility: CLINIC | Age: 38
End: 2023-11-13
Payer: MEDICARE

## 2023-11-14 ENCOUNTER — HOSPITAL ENCOUNTER (INPATIENT)
Facility: HOSPITAL | Age: 38
LOS: 3 days | Discharge: HOME | DRG: 699 | End: 2023-11-18
Attending: EMERGENCY MEDICINE | Admitting: STUDENT IN AN ORGANIZED HEALTH CARE EDUCATION/TRAINING PROGRAM
Payer: MEDICARE

## 2023-11-14 DIAGNOSIS — R11.2 NAUSEA AND VOMITING: ICD-10-CM

## 2023-11-14 DIAGNOSIS — N18.6 CONTROLLED TYPE 2 DIABETES MELLITUS WITH CHRONIC KIDNEY DISEASE ON CHRONIC DIALYSIS, WITHOUT LONG-TERM CURRENT USE OF INSULIN: ICD-10-CM

## 2023-11-14 DIAGNOSIS — Z91.89 AT RISK FOR OPPORTUNISTIC INFECTIONS: ICD-10-CM

## 2023-11-14 DIAGNOSIS — Z79.4 TYPE 2 DIABETES MELLITUS WITH DIABETIC NEPHROPATHY, WITH LONG-TERM CURRENT USE OF INSULIN: ICD-10-CM

## 2023-11-14 DIAGNOSIS — R11.2 NAUSEA VOMITING AND DIARRHEA: ICD-10-CM

## 2023-11-14 DIAGNOSIS — E83.39 HYPOPHOSPHATEMIA: ICD-10-CM

## 2023-11-14 DIAGNOSIS — E11.21 TYPE 2 DIABETES MELLITUS WITH DIABETIC NEPHROPATHY, WITH LONG-TERM CURRENT USE OF INSULIN: ICD-10-CM

## 2023-11-14 DIAGNOSIS — E78.5 HYPERLIPIDEMIA, UNSPECIFIED HYPERLIPIDEMIA TYPE: ICD-10-CM

## 2023-11-14 DIAGNOSIS — K02.9 DENTAL CARIES: ICD-10-CM

## 2023-11-14 DIAGNOSIS — Z99.2 CONTROLLED TYPE 2 DIABETES MELLITUS WITH CHRONIC KIDNEY DISEASE ON CHRONIC DIALYSIS, WITHOUT LONG-TERM CURRENT USE OF INSULIN: ICD-10-CM

## 2023-11-14 DIAGNOSIS — Z94.0 S/P KIDNEY TRANSPLANT: ICD-10-CM

## 2023-11-14 DIAGNOSIS — Z29.89 PROPHYLACTIC IMMUNOTHERAPY: ICD-10-CM

## 2023-11-14 DIAGNOSIS — D63.8 ANEMIA OF CHRONIC DISEASE: ICD-10-CM

## 2023-11-14 DIAGNOSIS — E44.0 MALNUTRITION OF MODERATE DEGREE: ICD-10-CM

## 2023-11-14 DIAGNOSIS — E11.22 CONTROLLED TYPE 2 DIABETES MELLITUS WITH CHRONIC KIDNEY DISEASE ON CHRONIC DIALYSIS, WITHOUT LONG-TERM CURRENT USE OF INSULIN: ICD-10-CM

## 2023-11-14 DIAGNOSIS — I15.0 RENOVASCULAR HYPERTENSION: ICD-10-CM

## 2023-11-14 DIAGNOSIS — R19.7 NAUSEA VOMITING AND DIARRHEA: ICD-10-CM

## 2023-11-14 DIAGNOSIS — N17.9 AKI (ACUTE KIDNEY INJURY): Primary | ICD-10-CM

## 2023-11-14 DIAGNOSIS — B25.9 CYTOMEGALOVIRUS (CMV) VIREMIA: ICD-10-CM

## 2023-11-14 DIAGNOSIS — Z79.60 LONG-TERM USE OF IMMUNOSUPPRESSANT MEDICATION: ICD-10-CM

## 2023-11-14 LAB
ALBUMIN SERPL BCP-MCNC: 4.1 G/DL (ref 3.5–5.2)
ALP SERPL-CCNC: 66 U/L (ref 55–135)
ALT SERPL W/O P-5'-P-CCNC: 36 U/L (ref 10–44)
ANION GAP SERPL CALC-SCNC: 13 MMOL/L (ref 8–16)
AST SERPL-CCNC: 33 U/L (ref 10–40)
BASOPHILS # BLD AUTO: 0.02 K/UL (ref 0–0.2)
BASOPHILS NFR BLD: 0.5 % (ref 0–1.9)
BILIRUB SERPL-MCNC: 1.1 MG/DL (ref 0.1–1)
BILIRUB UR QL STRIP: NEGATIVE
BUN SERPL-MCNC: 48 MG/DL (ref 6–20)
CALCIUM SERPL-MCNC: 9 MG/DL (ref 8.7–10.5)
CHLORIDE SERPL-SCNC: 103 MMOL/L (ref 95–110)
CLARITY UR REFRACT.AUTO: CLEAR
CO2 SERPL-SCNC: 21 MMOL/L (ref 23–29)
COLOR UR AUTO: YELLOW
CREAT SERPL-MCNC: 2.7 MG/DL (ref 0.5–1.4)
DIFFERENTIAL METHOD: ABNORMAL
EOSINOPHIL # BLD AUTO: 0 K/UL (ref 0–0.5)
EOSINOPHIL NFR BLD: 0 % (ref 0–8)
ERYTHROCYTE [DISTWIDTH] IN BLOOD BY AUTOMATED COUNT: 12.3 % (ref 11.5–14.5)
EST. GFR  (NO RACE VARIABLE): 29.8 ML/MIN/1.73 M^2
GLUCOSE SERPL-MCNC: 134 MG/DL (ref 70–110)
GLUCOSE UR QL STRIP: NEGATIVE
HCT VFR BLD AUTO: 41.8 % (ref 40–54)
HGB BLD-MCNC: 14.3 G/DL (ref 14–18)
HGB UR QL STRIP: NEGATIVE
IMM GRANULOCYTES # BLD AUTO: 0.04 K/UL (ref 0–0.04)
IMM GRANULOCYTES NFR BLD AUTO: 1 % (ref 0–0.5)
INFLUENZA A, MOLECULAR: NEGATIVE
INFLUENZA B, MOLECULAR: NEGATIVE
KETONES UR QL STRIP: ABNORMAL
LEUKOCYTE ESTERASE UR QL STRIP: NEGATIVE
LYMPHOCYTES # BLD AUTO: 0.8 K/UL (ref 1–4.8)
LYMPHOCYTES NFR BLD: 19.2 % (ref 18–48)
MAGNESIUM SERPL-MCNC: 2.3 MG/DL (ref 1.6–2.6)
MCH RBC QN AUTO: 30.3 PG (ref 27–31)
MCHC RBC AUTO-ENTMCNC: 34.2 G/DL (ref 32–36)
MCV RBC AUTO: 89 FL (ref 82–98)
MONOCYTES # BLD AUTO: 1 K/UL (ref 0.3–1)
MONOCYTES NFR BLD: 23.7 % (ref 4–15)
NEUTROPHILS # BLD AUTO: 2.2 K/UL (ref 1.8–7.7)
NEUTROPHILS NFR BLD: 55.6 % (ref 38–73)
NITRITE UR QL STRIP: NEGATIVE
NRBC BLD-RTO: 0 /100 WBC
PH UR STRIP: 6 [PH] (ref 5–8)
PLATELET # BLD AUTO: 145 K/UL (ref 150–450)
PMV BLD AUTO: 12.5 FL (ref 9.2–12.9)
POTASSIUM SERPL-SCNC: 4.7 MMOL/L (ref 3.5–5.1)
PROT SERPL-MCNC: 7.2 G/DL (ref 6–8.4)
PROT UR QL STRIP: ABNORMAL
RBC # BLD AUTO: 4.72 M/UL (ref 4.6–6.2)
SODIUM SERPL-SCNC: 137 MMOL/L (ref 136–145)
SP GR UR STRIP: 1.02 (ref 1–1.03)
SPECIMEN SOURCE: NORMAL
URN SPEC COLLECT METH UR: ABNORMAL
WBC # BLD AUTO: 4.01 K/UL (ref 3.9–12.7)

## 2023-11-14 PROCEDURE — 82550 ASSAY OF CK (CPK): CPT | Performed by: PHYSICIAN ASSISTANT

## 2023-11-14 PROCEDURE — 83605 ASSAY OF LACTIC ACID: CPT | Performed by: PHYSICIAN ASSISTANT

## 2023-11-14 PROCEDURE — 83735 ASSAY OF MAGNESIUM: CPT | Performed by: PHYSICIAN ASSISTANT

## 2023-11-14 PROCEDURE — 96374 THER/PROPH/DIAG INJ IV PUSH: CPT

## 2023-11-14 PROCEDURE — U0002 COVID-19 LAB TEST NON-CDC: HCPCS | Performed by: PHYSICIAN ASSISTANT

## 2023-11-14 PROCEDURE — 63600175 PHARM REV CODE 636 W HCPCS: Performed by: NURSE PRACTITIONER

## 2023-11-14 PROCEDURE — 81003 URINALYSIS AUTO W/O SCOPE: CPT | Performed by: NURSE PRACTITIONER

## 2023-11-14 PROCEDURE — 83735 ASSAY OF MAGNESIUM: CPT | Mod: 91 | Performed by: NURSE PRACTITIONER

## 2023-11-14 PROCEDURE — 96361 HYDRATE IV INFUSION ADD-ON: CPT

## 2023-11-14 PROCEDURE — 87040 BLOOD CULTURE FOR BACTERIA: CPT | Mod: 59 | Performed by: PHYSICIAN ASSISTANT

## 2023-11-14 PROCEDURE — 80053 COMPREHEN METABOLIC PANEL: CPT | Performed by: NURSE PRACTITIONER

## 2023-11-14 PROCEDURE — 87502 INFLUENZA DNA AMP PROBE: CPT | Performed by: NURSE PRACTITIONER

## 2023-11-14 PROCEDURE — 99285 EMERGENCY DEPT VISIT HI MDM: CPT | Mod: 25

## 2023-11-14 PROCEDURE — 85025 COMPLETE CBC W/AUTO DIFF WBC: CPT | Performed by: NURSE PRACTITIONER

## 2023-11-14 PROCEDURE — 84100 ASSAY OF PHOSPHORUS: CPT | Performed by: PHYSICIAN ASSISTANT

## 2023-11-14 RX ORDER — ONDANSETRON 2 MG/ML
4 INJECTION INTRAMUSCULAR; INTRAVENOUS
Status: COMPLETED | OUTPATIENT
Start: 2023-11-14 | End: 2023-11-14

## 2023-11-14 RX ADMIN — ONDANSETRON 4 MG: 2 INJECTION INTRAMUSCULAR; INTRAVENOUS at 09:11

## 2023-11-14 RX ADMIN — SODIUM CHLORIDE, POTASSIUM CHLORIDE, SODIUM LACTATE AND CALCIUM CHLORIDE 1000 ML: 600; 310; 30; 20 INJECTION, SOLUTION INTRAVENOUS at 09:11

## 2023-11-15 PROBLEM — N17.9 AKI (ACUTE KIDNEY INJURY): Status: ACTIVE | Noted: 2023-11-15

## 2023-11-15 PROBLEM — Z79.4 TYPE 2 DIABETES MELLITUS, WITH LONG-TERM CURRENT USE OF INSULIN: Status: ACTIVE | Noted: 2021-10-13

## 2023-11-15 PROBLEM — E78.5 HYPERLIPIDEMIA: Chronic | Status: ACTIVE | Noted: 2021-10-13

## 2023-11-15 PROBLEM — B25.9 CYTOMEGALOVIRUS (CMV) VIREMIA: Status: ACTIVE | Noted: 2023-11-15

## 2023-11-15 PROBLEM — E44.0 MALNUTRITION OF MODERATE DEGREE: Status: ACTIVE | Noted: 2023-11-15

## 2023-11-15 PROBLEM — D63.8 ANEMIA OF CHRONIC DISEASE: Status: RESOLVED | Noted: 2023-04-10 | Resolved: 2023-11-15

## 2023-11-15 PROBLEM — R19.7 NAUSEA VOMITING AND DIARRHEA: Status: ACTIVE | Noted: 2023-11-15

## 2023-11-15 PROBLEM — E11.9 TYPE 2 DIABETES MELLITUS, WITH LONG-TERM CURRENT USE OF INSULIN: Status: ACTIVE | Noted: 2021-10-13

## 2023-11-15 PROBLEM — K02.9 DENTAL CARIES: Status: ACTIVE | Noted: 2023-11-15

## 2023-11-15 PROBLEM — R11.2 NAUSEA VOMITING AND DIARRHEA: Status: ACTIVE | Noted: 2023-11-15

## 2023-11-15 LAB
ALBUMIN SERPL BCP-MCNC: 3.4 G/DL (ref 3.5–5.2)
AMYLASE SERPL-CCNC: 89 U/L (ref 20–110)
ANION GAP SERPL CALC-SCNC: 12 MMOL/L (ref 8–16)
BASOPHILS # BLD AUTO: 0.02 K/UL (ref 0–0.2)
BASOPHILS NFR BLD: 0.6 % (ref 0–1.9)
BUN SERPL-MCNC: 44 MG/DL (ref 6–20)
CALCIUM SERPL-MCNC: 8.3 MG/DL (ref 8.7–10.5)
CHLORIDE SERPL-SCNC: 104 MMOL/L (ref 95–110)
CK SERPL-CCNC: 79 U/L (ref 20–200)
CMV DNA SPEC QL NAA+PROBE: ABNORMAL
CO2 SERPL-SCNC: 21 MMOL/L (ref 23–29)
CREAT SERPL-MCNC: 2.3 MG/DL (ref 0.5–1.4)
CYTOMEGALOVIRUS LOG (IU/ML): 5.02 LOGIU/ML
CYTOMEGALOVIRUS PCR, QUANT: ABNORMAL IU/ML
DIFFERENTIAL METHOD: ABNORMAL
EOSINOPHIL # BLD AUTO: 0 K/UL (ref 0–0.5)
EOSINOPHIL NFR BLD: 0 % (ref 0–8)
ERYTHROCYTE [DISTWIDTH] IN BLOOD BY AUTOMATED COUNT: 12.1 % (ref 11.5–14.5)
EST. GFR  (NO RACE VARIABLE): 36.1 ML/MIN/1.73 M^2
GLUCOSE SERPL-MCNC: 114 MG/DL (ref 70–110)
HCT VFR BLD AUTO: 38.4 % (ref 40–54)
HGB BLD-MCNC: 12.8 G/DL (ref 14–18)
IMM GRANULOCYTES # BLD AUTO: 0.03 K/UL (ref 0–0.04)
IMM GRANULOCYTES NFR BLD AUTO: 1 % (ref 0–0.5)
LACTATE SERPL-SCNC: 0.9 MMOL/L (ref 0.5–2.2)
LIPASE SERPL-CCNC: 36 U/L (ref 4–60)
LYMPHOCYTES # BLD AUTO: 0.7 K/UL (ref 1–4.8)
LYMPHOCYTES NFR BLD: 22.7 % (ref 18–48)
MAGNESIUM SERPL-MCNC: 2.2 MG/DL (ref 1.6–2.6)
MAGNESIUM SERPL-MCNC: 2.3 MG/DL (ref 1.6–2.6)
MCH RBC QN AUTO: 29.6 PG (ref 27–31)
MCHC RBC AUTO-ENTMCNC: 33.3 G/DL (ref 32–36)
MCV RBC AUTO: 89 FL (ref 82–98)
MONOCYTES # BLD AUTO: 0.9 K/UL (ref 0.3–1)
MONOCYTES NFR BLD: 27.5 % (ref 4–15)
NEUTROPHILS # BLD AUTO: 1.5 K/UL (ref 1.8–7.7)
NEUTROPHILS NFR BLD: 48.2 % (ref 38–73)
NRBC BLD-RTO: 0 /100 WBC
PHOSPHATE SERPL-MCNC: 3.6 MG/DL (ref 2.7–4.5)
PHOSPHATE SERPL-MCNC: 3.7 MG/DL (ref 2.7–4.5)
PLATELET # BLD AUTO: 123 K/UL (ref 150–450)
PMV BLD AUTO: 11.6 FL (ref 9.2–12.9)
POCT GLUCOSE: 118 MG/DL (ref 70–110)
POCT GLUCOSE: 148 MG/DL (ref 70–110)
POCT GLUCOSE: 157 MG/DL (ref 70–110)
POCT GLUCOSE: 158 MG/DL (ref 70–110)
POCT GLUCOSE: 95 MG/DL (ref 70–110)
POTASSIUM SERPL-SCNC: 3.6 MMOL/L (ref 3.5–5.1)
RBC # BLD AUTO: 4.33 M/UL (ref 4.6–6.2)
SARS-COV-2 RDRP RESP QL NAA+PROBE: NEGATIVE
SODIUM SERPL-SCNC: 137 MMOL/L (ref 136–145)
TACROLIMUS BLD-MCNC: 10.8 NG/ML (ref 5–15)
WBC # BLD AUTO: 3.13 K/UL (ref 3.9–12.7)

## 2023-11-15 PROCEDURE — 25000003 PHARM REV CODE 250: Performed by: PHYSICIAN ASSISTANT

## 2023-11-15 PROCEDURE — 20600001 HC STEP DOWN PRIVATE ROOM

## 2023-11-15 PROCEDURE — 85025 COMPLETE CBC W/AUTO DIFF WBC: CPT | Performed by: CLINICAL NURSE SPECIALIST

## 2023-11-15 PROCEDURE — 99223 1ST HOSP IP/OBS HIGH 75: CPT | Mod: AI,,, | Performed by: CLINICAL NURSE SPECIALIST

## 2023-11-15 PROCEDURE — 83690 ASSAY OF LIPASE: CPT | Performed by: CLINICAL NURSE SPECIALIST

## 2023-11-15 PROCEDURE — 80197 ASSAY OF TACROLIMUS: CPT | Performed by: CLINICAL NURSE SPECIALIST

## 2023-11-15 PROCEDURE — 87507 IADNA-DNA/RNA PROBE TQ 12-25: CPT | Performed by: STUDENT IN AN ORGANIZED HEALTH CARE EDUCATION/TRAINING PROGRAM

## 2023-11-15 PROCEDURE — 83735 ASSAY OF MAGNESIUM: CPT | Performed by: CLINICAL NURSE SPECIALIST

## 2023-11-15 PROCEDURE — 27000207 HC ISOLATION

## 2023-11-15 PROCEDURE — 99223 PR INITIAL HOSPITAL CARE,LEVL III: ICD-10-PCS | Mod: AI,,, | Performed by: CLINICAL NURSE SPECIALIST

## 2023-11-15 PROCEDURE — 87449 NOS EACH ORGANISM AG IA: CPT | Performed by: CLINICAL NURSE SPECIALIST

## 2023-11-15 PROCEDURE — 25000003 PHARM REV CODE 250: Performed by: CLINICAL NURSE SPECIALIST

## 2023-11-15 PROCEDURE — 63600175 PHARM REV CODE 636 W HCPCS: Performed by: CLINICAL NURSE SPECIALIST

## 2023-11-15 PROCEDURE — 99233 PR SUBSEQUENT HOSPITAL CARE,LEVL III: ICD-10-PCS | Mod: ,,, | Performed by: PHYSICIAN ASSISTANT

## 2023-11-15 PROCEDURE — 99233 SBSQ HOSP IP/OBS HIGH 50: CPT | Mod: ,,, | Performed by: PHYSICIAN ASSISTANT

## 2023-11-15 PROCEDURE — 87427 SHIGA-LIKE TOXIN AG IA: CPT | Performed by: CLINICAL NURSE SPECIALIST

## 2023-11-15 PROCEDURE — 87449 NOS EACH ORGANISM AG IA: CPT | Mod: 91 | Performed by: CLINICAL NURSE SPECIALIST

## 2023-11-15 PROCEDURE — 87046 STOOL CULTR AEROBIC BACT EA: CPT | Performed by: CLINICAL NURSE SPECIALIST

## 2023-11-15 PROCEDURE — 87209 SMEAR COMPLEX STAIN: CPT | Performed by: CLINICAL NURSE SPECIALIST

## 2023-11-15 PROCEDURE — 36415 COLL VENOUS BLD VENIPUNCTURE: CPT | Performed by: CLINICAL NURSE SPECIALIST

## 2023-11-15 PROCEDURE — 87045 FECES CULTURE AEROBIC BACT: CPT | Performed by: CLINICAL NURSE SPECIALIST

## 2023-11-15 PROCEDURE — 89055 LEUKOCYTE ASSESSMENT FECAL: CPT | Performed by: CLINICAL NURSE SPECIALIST

## 2023-11-15 PROCEDURE — 94761 N-INVAS EAR/PLS OXIMETRY MLT: CPT

## 2023-11-15 PROCEDURE — 99222 1ST HOSP IP/OBS MODERATE 55: CPT | Mod: ,,,

## 2023-11-15 PROCEDURE — 82150 ASSAY OF AMYLASE: CPT | Performed by: CLINICAL NURSE SPECIALIST

## 2023-11-15 PROCEDURE — 63600175 PHARM REV CODE 636 W HCPCS: Performed by: PHYSICIAN ASSISTANT

## 2023-11-15 PROCEDURE — 80069 RENAL FUNCTION PANEL: CPT | Performed by: CLINICAL NURSE SPECIALIST

## 2023-11-15 PROCEDURE — 99222 PR INITIAL HOSPITAL CARE,LEVL II: ICD-10-PCS | Mod: ,,,

## 2023-11-15 RX ORDER — CYCLOBENZAPRINE HCL 5 MG
10 TABLET ORAL DAILY PRN
Status: DISCONTINUED | OUTPATIENT
Start: 2023-11-15 | End: 2023-11-18 | Stop reason: HOSPADM

## 2023-11-15 RX ORDER — TALC
6 POWDER (GRAM) TOPICAL NIGHTLY PRN
Status: DISCONTINUED | OUTPATIENT
Start: 2023-11-15 | End: 2023-11-18 | Stop reason: HOSPADM

## 2023-11-15 RX ORDER — IBUPROFEN 200 MG
16 TABLET ORAL
Status: DISCONTINUED | OUTPATIENT
Start: 2023-11-15 | End: 2023-11-18 | Stop reason: HOSPADM

## 2023-11-15 RX ORDER — NIFEDIPINE 30 MG/1
30 TABLET, EXTENDED RELEASE ORAL NIGHTLY
Status: DISCONTINUED | OUTPATIENT
Start: 2023-11-15 | End: 2023-11-18 | Stop reason: HOSPADM

## 2023-11-15 RX ORDER — ACETAMINOPHEN 325 MG/1
650 TABLET ORAL EVERY 6 HOURS PRN
Status: DISCONTINUED | OUTPATIENT
Start: 2023-11-15 | End: 2023-11-18 | Stop reason: HOSPADM

## 2023-11-15 RX ORDER — DULOXETIN HYDROCHLORIDE 30 MG/1
30 CAPSULE, DELAYED RELEASE ORAL DAILY
Status: DISCONTINUED | OUTPATIENT
Start: 2023-11-15 | End: 2023-11-18 | Stop reason: HOSPADM

## 2023-11-15 RX ORDER — OXYCODONE AND ACETAMINOPHEN 5; 325 MG/1; MG/1
1 TABLET ORAL EVERY 6 HOURS PRN
Status: DISCONTINUED | OUTPATIENT
Start: 2023-11-15 | End: 2023-11-18 | Stop reason: HOSPADM

## 2023-11-15 RX ORDER — PREDNISONE 5 MG/1
5 TABLET ORAL DAILY
Status: DISCONTINUED | OUTPATIENT
Start: 2023-11-15 | End: 2023-11-18 | Stop reason: HOSPADM

## 2023-11-15 RX ORDER — SODIUM CHLORIDE 0.9 % (FLUSH) 0.9 %
3 SYRINGE (ML) INJECTION
Status: DISCONTINUED | OUTPATIENT
Start: 2023-11-15 | End: 2023-11-18 | Stop reason: HOSPADM

## 2023-11-15 RX ORDER — PROCHLORPERAZINE EDISYLATE 5 MG/ML
5 INJECTION INTRAMUSCULAR; INTRAVENOUS EVERY 8 HOURS PRN
Status: DISCONTINUED | OUTPATIENT
Start: 2023-11-15 | End: 2023-11-18 | Stop reason: HOSPADM

## 2023-11-15 RX ORDER — INSULIN ASPART 100 [IU]/ML
0-5 INJECTION, SOLUTION INTRAVENOUS; SUBCUTANEOUS
Status: DISCONTINUED | OUTPATIENT
Start: 2023-11-15 | End: 2023-11-18 | Stop reason: HOSPADM

## 2023-11-15 RX ORDER — ATORVASTATIN CALCIUM 10 MG/1
10 TABLET, FILM COATED ORAL NIGHTLY
Status: DISCONTINUED | OUTPATIENT
Start: 2023-11-15 | End: 2023-11-18 | Stop reason: HOSPADM

## 2023-11-15 RX ORDER — HEPARIN SODIUM 5000 [USP'U]/ML
5000 INJECTION, SOLUTION INTRAVENOUS; SUBCUTANEOUS EVERY 8 HOURS
Status: DISCONTINUED | OUTPATIENT
Start: 2023-11-15 | End: 2023-11-18 | Stop reason: HOSPADM

## 2023-11-15 RX ORDER — NIFEDIPINE 30 MG/1
60 TABLET, EXTENDED RELEASE ORAL DAILY
Status: DISCONTINUED | OUTPATIENT
Start: 2023-11-15 | End: 2023-11-18 | Stop reason: HOSPADM

## 2023-11-15 RX ORDER — GLUCAGON 1 MG
1 KIT INJECTION
Status: DISCONTINUED | OUTPATIENT
Start: 2023-11-15 | End: 2023-11-18 | Stop reason: HOSPADM

## 2023-11-15 RX ORDER — IBUPROFEN 200 MG
24 TABLET ORAL
Status: DISCONTINUED | OUTPATIENT
Start: 2023-11-15 | End: 2023-11-18 | Stop reason: HOSPADM

## 2023-11-15 RX ORDER — SODIUM CHLORIDE, SODIUM LACTATE, POTASSIUM CHLORIDE, CALCIUM CHLORIDE 600; 310; 30; 20 MG/100ML; MG/100ML; MG/100ML; MG/100ML
INJECTION, SOLUTION INTRAVENOUS CONTINUOUS
Status: DISCONTINUED | OUTPATIENT
Start: 2023-11-15 | End: 2023-11-16

## 2023-11-15 RX ADMIN — HEPARIN SODIUM 5000 UNITS: 5000 INJECTION INTRAVENOUS; SUBCUTANEOUS at 08:11

## 2023-11-15 RX ADMIN — HEPARIN SODIUM 5000 UNITS: 5000 INJECTION INTRAVENOUS; SUBCUTANEOUS at 02:11

## 2023-11-15 RX ADMIN — HEPARIN SODIUM 5000 UNITS: 5000 INJECTION INTRAVENOUS; SUBCUTANEOUS at 05:11

## 2023-11-15 RX ADMIN — DULOXETINE 30 MG: 30 CAPSULE, DELAYED RELEASE ORAL at 08:11

## 2023-11-15 RX ADMIN — GANCICLOVIR SODIUM 174 MG: 500 INJECTION, POWDER, LYOPHILIZED, FOR SOLUTION INTRAVENOUS at 05:11

## 2023-11-15 RX ADMIN — SODIUM CHLORIDE, POTASSIUM CHLORIDE, SODIUM LACTATE AND CALCIUM CHLORIDE: 600; 310; 30; 20 INJECTION, SOLUTION INTRAVENOUS at 02:11

## 2023-11-15 RX ADMIN — AMPICILLIN AND SULBACTAM 1.5 G: 1; .5 INJECTION, POWDER, FOR SOLUTION INTRAVENOUS at 04:11

## 2023-11-15 RX ADMIN — OXYCODONE HYDROCHLORIDE AND ACETAMINOPHEN 1 TABLET: 5; 325 TABLET ORAL at 06:11

## 2023-11-15 RX ADMIN — PREDNISONE 5 MG: 5 TABLET ORAL at 08:11

## 2023-11-15 RX ADMIN — AMPICILLIN AND SULBACTAM 1.5 G: 1; .5 INJECTION, POWDER, FOR SOLUTION INTRAVENOUS at 10:11

## 2023-11-15 RX ADMIN — NIFEDIPINE 30 MG: 30 TABLET, FILM COATED, EXTENDED RELEASE ORAL at 08:11

## 2023-11-15 RX ADMIN — ATORVASTATIN CALCIUM 10 MG: 10 TABLET, FILM COATED ORAL at 08:11

## 2023-11-15 RX ADMIN — TACROLIMUS 5 MG: 4 TABLET, EXTENDED RELEASE ORAL at 08:11

## 2023-11-15 RX ADMIN — NIFEDIPINE 60 MG: 30 TABLET, FILM COATED, EXTENDED RELEASE ORAL at 08:11

## 2023-11-15 NOTE — PROGRESS NOTES
[facility] - Transplant Stepdown  Kidney Transplant  Progress Note      Reason for Follow-up: Reassessment of Kidney Transplant - 4/9/2023  (#1) recipient and management of immunosuppression.           Subjective:   History of Present Illness:  Mr. Reddy is a 39 yo male s/p DCD Ktxp d/t DM on 4/9/23 (Thymo, KDPI 40%, CIT 21 hrs). He now has CKD stage 3 - GFR 30-59 and his baseline creatinine is between 1.2-1.6. He takes mycophenolate mofetil, prednisone, and envarsus for maintenance immunosuppression. He now presents to the ED with complaints of N/V/D and weakness. Patient reports N/V/D started approximately 5 days ago on Friday. He reports bilious emesis, not able to hold food down. He does report being able to take his medications. Diarrhea is watery, non-bloody. He reports starting Rybelsus approx 3 weeks ago and believes this could be contributing to his symptoms. Patient also reports left sided tooth pain one day after N/V/D started. He was seen by his dentist and pt states he was told he has 2 teeth which need to be pulled but no active infection. He was prescribed prophylactic amoxicillin. Tooth pain also limiting PO intake. Pt reports his overall weakness has worsened past 24-48 hours prompting ER visit. He reports a fall earlier today d/t weakness, denies hitting his head or LOC. Labs in ED notable for NETO, Cr 2.7. Low grade 99.9 temp in ED. Pt denies chills, fevers, SOB, chest pain, dysuria. Plan discussed with Dr. Gonzalez. Plan discussed with Dr. Gonzalez, suspect NETO from dehydration from decreased PO intake, ongoing diarrhea. Plan for infectious work-up including blood/urine cx, CXR, stool studies and CMV. Obtain Kidney US. IVF hydration.     Mr. Reddy is a 38 y.o. year old male who is status post Kidney Transplant - 4/9/2023  (#1).    His maintenance immunosuppression consists of:   Immunosuppressants (From admission, onward)      Start     Stop Route Frequency Ordered    11/15/23 0700  tacrolimus XR  (ENVARSUS) 24 hr tablet 5 mg         -- Oral Every morning 11/15/23 0147            Hospital Course:  Patient admitted from ED with N/V/D, weakness, and dental pain.    Interval History: No acute events overnight. Patient reports improvement in N/V. Infectious work up +CMV Viremia. Will start IV GCV. ID consulted. Will also plan for CT max/facial given dental caries to assess for abscess. Continue to monitor     Past Medical, Surgical, Family, and Social History:   Unchanged from H&P.    Scheduled Meds:   ampicillin-sulbactim (UNASYN) IVPB  1.5 g Intravenous Q6H    atorvastatin  10 mg Oral QHS    DULoxetine  30 mg Oral Daily    ganciclovir (CYTOVENE) 174 mg in sodium chloride 0.9% 100 mL IVPB  2.5 mg/kg Intravenous Q12H    heparin (porcine)  5,000 Units Subcutaneous Q8H    NIFEdipine  30 mg Oral Nightly    NIFEdipine  60 mg Oral Daily    predniSONE  5 mg Oral Daily    tacrolimus XR (ENVARSUS)  5 mg Oral QAM     Continuous Infusions:   lactated ringers 125 mL/hr at 11/15/23 0251     PRN Meds:acetaminophen, cyclobenzaprine, dextrose 10%, dextrose 10%, glucagon (human recombinant), glucose, glucose, insulin aspart U-100, melatonin, oxyCODONE-acetaminophen, prochlorperazine, sodium chloride 0.9%    Intake/Output - Last 3 Shifts         11/13 0700  11/14 0659 11/14 0700  11/15 0659 11/15 0700  11/16 0659    P.O.  30     I.V. (mL/kg)  413.9 (6)     IV Piggyback  1000     Total Intake(mL/kg)  1443.9 (20.8)     Urine (mL/kg/hr)  100 400 (0.7)    Stool   0    Total Output  100 400    Net  +1343.9 -400           Urine Occurrence   1 x    Stool Occurrence   1 x             Review of Systems   Constitutional:  Positive for activity change.   HENT:  Positive for dental problem.    Respiratory:  Negative for shortness of breath.    Cardiovascular:  Negative for leg swelling.   Gastrointestinal:  Positive for diarrhea, nausea and vomiting. Negative for abdominal distention and abdominal pain.   Genitourinary:  Negative for  "difficulty urinating.   Allergic/Immunologic: Positive for immunocompromised state.   Psychiatric/Behavioral:  Negative for confusion.       Objective:     Vital Signs (Most Recent):  Temp: 98.6 °F (37 °C) (11/15/23 1114)  Pulse: 89 (11/15/23 1114)  Resp: 18 (11/15/23 1114)  BP: (!) 155/81 (11/15/23 1114)  SpO2: 97 % (11/15/23 1114) Vital Signs (24h Range):  Temp:  [98.6 °F (37 °C)-99.9 °F (37.7 °C)] 98.6 °F (37 °C)  Pulse:  [63-89] 89  Resp:  [12-20] 18  SpO2:  [96 %-100 %] 97 %  BP: (120-177)/(65-95) 155/81     Weight: 69.5 kg (153 lb 3.5 oz)  Height: 6' 1" (185.4 cm)  Body mass index is 20.21 kg/m².     Physical Exam  Vitals and nursing note reviewed.   HENT:      Mouth/Throat:      Dentition: Dental caries present.   Cardiovascular:      Rate and Rhythm: Normal rate.   Pulmonary:      Effort: Pulmonary effort is normal.   Abdominal:      Tenderness: There is no abdominal tenderness. There is no guarding.   Neurological:      Mental Status: He is alert and oriented to person, place, and time.   Psychiatric:         Mood and Affect: Mood normal.          Laboratory:  CBC:   Recent Labs   Lab 11/14/23  2129 11/15/23  0626   WBC 4.01 3.13*   RBC 4.72 4.33*   HGB 14.3 12.8*   HCT 41.8 38.4*   * 123*   MCV 89 89   MCH 30.3 29.6   MCHC 34.2 33.3     BMP:   Recent Labs   Lab 11/14/23  2129 11/15/23  0626   * 114*    137   K 4.7 3.6    104   CO2 21* 21*   BUN 48* 44*   CREATININE 2.7* 2.3*   CALCIUM 9.0 8.3*       Diagnostic Results:  None  Assessment/Plan:     * Cytomegalovirus (CMV) viremia  - Symptoms present for several days  - Pt reports symptoms developed shortly after starting Rybelsus. Rybelsus now stopped  - CMV PCR +, starting IV GCV. ID consulted  - Continue IVF hydration      Dental caries  - Placed on Augmentin outpatient by Dentist  - IV Unasyn started  - Plan for Ct max/facial to assess for any abscess  - Continue to monitor       Malnutrition of moderate degree  Nutrition " "Problem:  moderate  Malnutrition in the context of Acute Illness/Injury  Related to (etiology): Decreased ability to consume sufficient energy    Signs and Symptoms (as evidenced by):  Body Fat Depletion: moderate  Muscle Mass Depletion: mild depletion of lower extremities      Interventions (treatment strategies): protein supplements and dietary consulted and following       NETO (acute kidney injury)  - Suspect d/t volume loss from diarrhea, decreased PO intake  - Kidney US with elevated velocity concerning for renal stenosis. Plan for repeat tomorrow  - IVF hydration  - Daily renal panel    At risk for opportunistic infections  - OI prophylaxis per transplant protocol      Prophylactic immunotherapy  - Continue envarsus and prednisone  - Check prograf level daily and adjust for therapeutic dosage. Monitor for toxic side effects   - Hold Cellcept d/t GI symptoms/CMV PCR      Long-term use of immunosuppressant medication  - see "prophylactic immunotherapy"      S/P kidney transplant  - S/p Ktxp 4/9/23 d/t DM  - Now w/ CKD 3, BL Cr 1.2-1.6  - Now admit with NETO and N/V/D  - Kidney US with elevated renal artery velocity. Plan for repeat tomorrow 11/16/23  - Continue to monitor       Hyperlipidemia  - continue statin      Type 2 diabetes mellitus, with long-term current use of insulin  - Endocrine consulted ,appreciate assistance          Discharge Planning: Not a candidate for discharge at this time     Medical decision making for this encounter includes review of pertinent labs and diagnostic studies, assessment and planning, discussions with consulting providers, discussion with patient/family, and participation in multidisciplinary rounds. Time spent caring for patient: > 90 minutes    Wendy Lin PA-C  Kidney Transplant  [facility] - Trans[facility] Step[facility]    "

## 2023-11-15 NOTE — ASSESSMENT & PLAN NOTE
- Continue envarsus and prednisone  - Check prograf level daily and adjust for therapeutic dosage. Monitor for toxic side effects   - Hold Cellcept d/t GI symptoms

## 2023-11-15 NOTE — ASSESSMENT & PLAN NOTE
- Symptoms present for several days  - Pt reports symptoms developed shortly after starting Rybelsus. Rybelsus now stopped  - Send stool studies, CMV PCR  - IVF hydration

## 2023-11-15 NOTE — SUBJECTIVE & OBJECTIVE
"  Subjective:     Chief Complaint/Reason for Admission: N/V/D    History of Present Illness:  Mr. Reddy is a 37 yo male s/p DCD Ktxp d/t DM on 4/9/23 (Thymo, KDPI 40%, CIT 21 hrs). He now has CKD stage 3 - GFR 30-59 and his baseline creatinine is between 1.2-1.6. He takes mycophenolate mofetil, prednisone, and envarsus for maintenance immunosuppression. He now presents to the ED with complaints of N/V/D and weakness. Patient reports N/V/D started approximately 5 days ago on Friday. He reports bilious emesis, not able to hold food down. He does report being able to take his medications. Diarrhea is watery, non-bloody. He reports starting Rybelsus approx 3 weeks ago and believes this could be contributing to his symptoms. Patient also reports left sided tooth pain one day after N/V/D started. He was seen by his dentist and pt states he was told he has 2 teeth which need to be pulled but no active infection. He was prescribed prophylactic amoxicillin. Tooth pain also limiting PO intake. Pt reports his overall weakness has worsened past 24-48 hours prompting ER visit. He reports a fall earlier today d/t weakness, denies hitting his head or LOC. Labs in ED notable for NETO, Cr 2.7. Low grade 99.9 temp in ED. Pt denies chills, fevers, SOB, chest pain, dysuria. Plan discussed with Dr. Gonzalez. Plan discussed with Dr. Gonzalez, suspect NETO from dehydration from decreased PO intake, ongoing diarrhea. Plan for infectious work-up including blood/urine cx, CXR, stool studies and CMV. Obtain Kidney US. IVF hydration.       PTA Medications   Medication Sig    acetaminophen-codeine 300-30mg (TYLENOL #3) 300-30 mg Tab Take 1 tablet by mouth every 4 (four) hours as needed.    atorvastatin (LIPITOR) 10 MG tablet Take 1 tablet (10 mg total) by mouth every evening. (Patient taking differently: Take 10 mg by mouth every evening. Needs refill)    BD ULTRA-FINE SILVIA PEN NEEDLE 32 gauge x 5/32" Ndle Use with insulin 4 times daily.    blood " sugar diagnostic Strp Test blood sugar 4 times a day. (Patient not taking: Reported on 10/17/2023)    blood-glucose meter Misc Use as instructed to test blood sugar daily (Patient not taking: Reported on 10/17/2023)    cyclobenzaprine (FLEXERIL) 10 MG tablet Take 10 mg by mouth daily as needed.    DULoxetine (CYMBALTA) 30 MG capsule Take 30 mg by mouth.    flash glucose scanning reader (FREESTYLE SELMA 2 READER) Misc Use as directed.    flash glucose sensor (FREESTYLE SELMA 2 SENSOR) Kit Change every 14 day.    hydroCHLOROthiazide (HYDRODIURIL) 25 MG tablet Take 0.5 tablets (12.5 mg total) by mouth once daily.    lancets Misc Test blood sugar 3 (three) times daily. (Patient not taking: Reported on 10/17/2023)    mycophenolate (CELLCEPT) 250 mg Cap Take 4 capsules (1,000 mg total) by mouth 2 (two) times daily. Txp Date:4/9/2023 (Kidney) Disch Date:4/13/2023 ICD10:Z94.0 Txp Location:[facility]    NIFEdipine (PROCARDIA-XL) 30 MG (OSM) 24 hr tablet Take 1 tablet (30 mg total) by mouth nightly.    NIFEdipine (PROCARDIA-XL) 60 MG (OSM) 24 hr tablet Take 1 tablet (60 mg total) by mouth once daily.    NOVOLOG FLEXPEN U-100 INSULIN 100 unit/mL (3 mL) InPn pen Inject 4 Units into the skin 3 (three) times daily with meals.    oxyCODONE-acetaminophen (PERCOCET) 5-325 mg per tablet Take 1 tablet by mouth.    prednisoLONE (MILLIPRED) 5 mg Tab     predniSONE (DELTASONE) 5 MG tablet Take 1 tablet (5 mg total) by mouth once daily.    pregabalin (LYRICA) 75 MG capsule Take 1 capsule (75 mg total) by mouth 3 (three) times daily.    semaglutide (RYBELSUS) 3 mg tablet Take 1 tablet (3 mg total) by mouth once daily.    tacrolimus XR, ENVARSUS, (ENVARSUS XR) 1 mg Tb24 Take 5 tablets (5 mg total) by mouth every morning.    traMADoL (ULTRAM) 50 mg tablet Take 50 mg by mouth every 4 (four) hours as needed.    TRESIBA FLEXTOUCH U-100 100 unit/mL (3 mL) insulin pen Inject 12 Units into the skin once daily.       Review of patient's allergies  "indicates:   Allergen Reactions    Shrimp        Past Medical History:   Diagnosis Date    Allergy     Anemia     Diabetes mellitus     Disorder of kidney and ureter     Hyperlipidemia     Hypertension     Secondary hyperparathyroidism      Past Surgical History:   Procedure Laterality Date    INSERTION OF DIALYSIS CATHETER      KIDNEY TRANSPLANT N/A 4/9/2023    Procedure: TRANSPLANT, KIDNEY;  Surgeon: Julito Saleh MD;  Location: [facility] OR [address] [place] [address];  Service: Transplant;  Laterality: N/A;  Off Ice - 0919  Reperfused - 0945     Family History       Problem Relation (Age of Onset)    Cancer Mother    Diabetes Mother, Father    Hypertension Mother    Stroke Brother          Tobacco Use    Smoking status: Never    Smokeless tobacco: Never   Substance and Sexual Activity    Alcohol use: Yes    Drug use: Yes     Types: Marijuana    Sexual activity: Yes        Review of Systems   Constitutional:  Positive for activity change (decreased), appetite change (decreased) and fatigue.   HENT:  Positive for dental problem.    Respiratory:  Negative for cough and shortness of breath.    Cardiovascular:  Negative for chest pain and leg swelling.   Gastrointestinal:  Positive for abdominal pain, diarrhea, nausea and vomiting. Negative for abdominal distention.   Genitourinary:  Negative for dysuria.   Musculoskeletal:  Positive for back pain.   Allergic/Immunologic: Positive for immunocompromised state.   Neurological:  Positive for weakness. Negative for headaches.   Psychiatric/Behavioral:  Negative for agitation and confusion.      Objective:     Vital Signs (Most Recent):  Temp: 99.7 °F (37.6 °C) (11/15/23 0236)  Pulse: 70 (11/15/23 0236)  Resp: 20 (11/15/23 0236)  BP: (!) 143/65 (11/15/23 0236)  SpO2: 98 % (11/15/23 0236)  Height: 6' 1" (185.4 cm)  Weight: 69.5 kg (153 lb 3.5 oz)  Body mass index is 20.21 kg/m².      Physical Exam  Constitutional:       Appearance: He is ill-appearing.   HENT:      Mouth/Throat:      Mouth: " Mucous membranes are dry.      Pharynx: Oropharynx is clear.   Cardiovascular:      Rate and Rhythm: Normal rate.      Pulses: Normal pulses.   Pulmonary:      Effort: Pulmonary effort is normal.      Breath sounds: Normal breath sounds.   Abdominal:      General: Bowel sounds are normal. There is no distension.      Palpations: Abdomen is soft.      Tenderness: There is no abdominal tenderness.   Skin:     General: Skin is warm and dry.   Neurological:      Mental Status: He is alert and oriented to person, place, and time. Mental status is at baseline.          Laboratory  CBC:   Recent Labs   Lab 11/14/23 2129   WBC 4.01   RBC 4.72   HGB 14.3   HCT 41.8   *   MCV 89   MCH 30.3   MCHC 34.2     CMP:   Recent Labs   Lab 11/14/23 2129   *   CALCIUM 9.0   ALBUMIN 4.1   PROT 7.2      K 4.7   CO2 21*      BUN 48*   CREATININE 2.7*   ALKPHOS 66   ALT 36   AST 33       Diagnostic Results:  None    Patient was SARS-CoV-2 /COVID-19 tested with negative results.

## 2023-11-15 NOTE — ED PROVIDER NOTES
Encounter Date: 11/14/2023       History     Chief Complaint   Patient presents with    Fatigue     Stating general weakness. States fell earlier and his whole body hurts. States weak due to dental abscess and he can't eat.     38-year-old male arrives to the ER for evaluation of generalized malaise, fatigue and weakness.  Symptoms began 2 days ago.  Also reports a dental infection for which she was prescribed antibiotics today.  Very weak over the past 24-48 hours with decreased p.o. intake.  Reports that he fell down today going to the dentist secondary to weakness.  History of kidney failure with transplant in April of 2023.  Upon arrival to the ER he is slightly hypertensive with a low-grade temperature at 99.9° F and a normal pulse.  He appears unwell and weak.  Denies any urinary complaints, no chest pain, no shortness a breath, no nausea or vomiting.      Review of patient's allergies indicates:   Allergen Reactions    Shrimp      Past Medical History:   Diagnosis Date    Allergy     Anemia     Diabetes mellitus     Disorder of kidney and ureter     Hyperlipidemia     Hypertension     Secondary hyperparathyroidism      Past Surgical History:   Procedure Laterality Date    INSERTION OF DIALYSIS CATHETER      KIDNEY TRANSPLANT N/A 4/9/2023    Procedure: TRANSPLANT, KIDNEY;  Surgeon: Julito Saleh MD;  Location: [facility] OR [address] [place] [address];  Service: Transplant;  Laterality: N/A;  Off Ice - 0919  Reperfused - 0945     Family History   Problem Relation Age of Onset    Diabetes Mother     Cancer Mother     Hypertension Mother     Diabetes Father     Stroke Brother      Social History     Tobacco Use    Smoking status: Never    Smokeless tobacco: Never   Substance Use Topics    Alcohol use: Yes    Drug use: Yes     Types: Marijuana     Review of Systems   Constitutional:  Positive for chills and fatigue. Negative for fever.   HENT:  Negative for sore throat.    Respiratory:  Negative for shortness of breath.     Cardiovascular:  Negative for chest pain.   Gastrointestinal:  Negative for nausea.   Genitourinary:  Negative for dysuria.   Musculoskeletal:  Negative for back pain.   Skin:  Negative for rash.   Neurological:  Positive for weakness.   Hematological:  Does not bruise/bleed easily.       Physical Exam     Initial Vitals [11/14/23 2028]   BP Pulse Resp Temp SpO2   (!) 146/91 73 12 99.9 °F (37.7 °C) 98 %      MAP       --         Physical Exam    Constitutional: Vital signs are normal. He appears well-developed and well-nourished.   HENT:   Head: Normocephalic and atraumatic.   Right Ear: Hearing normal.   Left Ear: Hearing normal.   Normal exam   Eyes: Conjunctivae are normal.   Neck:   Normal range of motion of the neck   Cardiovascular:  Normal rate and regular rhythm.           Pulmonary/Chest:   Clear on exam   Abdominal: Abdomen is soft. Bowel sounds are normal.   Tenderness over the allograft kidney  , rebound   Musculoskeletal:         General: Normal range of motion.     Neurological: He is alert and oriented to person, place, and time.   Skin: Skin is warm and intact.   Is warm to touch, patient is slightly clammy   Psychiatric: He has a normal mood and affect. His speech is normal and behavior is normal. Cognition and memory are normal.         ED Course   Procedures  Labs Reviewed   CBC W/ AUTO DIFFERENTIAL - Abnormal; Notable for the following components:       Result Value    Platelets 145 (*)     Immature Granulocytes 1.0 (*)     Lymph # 0.8 (*)     Mono % 23.7 (*)     All other components within normal limits   COMPREHENSIVE METABOLIC PANEL - Abnormal; Notable for the following components:    CO2 21 (*)     Glucose 134 (*)     BUN 48 (*)     Creatinine 2.7 (*)     Total Bilirubin 1.1 (*)     eGFR 29.8 (*)     All other components within normal limits   URINALYSIS, REFLEX TO URINE CULTURE - Abnormal; Notable for the following components:    Protein, UA Trace (*)     Ketones, UA 1+ (*)     All other  components within normal limits    Narrative:     Specimen Source->Urine   INFLUENZA A & B BY MOLECULAR   CULTURE, BLOOD   CULTURE, BLOOD   CLOSTRIDIUM DIFFICILE   CULTURE, STOOL   MAGNESIUM   MAGNESIUM   PHOSPHORUS   CK   SARS-COV-2 RNA AMPLIFICATION, QUAL   LACTIC ACID, PLASMA   CMV DNA, QUANTITATIVE, PCR   TACROLIMUS LEVEL   MAGNESIUM   CBC W/ AUTO DIFFERENTIAL   RENAL FUNCTION PANEL   AMYLASE   LIPASE   STOOL EXAM-OVA,CYSTS,PARASITES   WBC, STOOL   POCT GLUCOSE   POCT GLUCOSE MONITORING CONTINUOUS   POCT GLUCOSE MONITORING CONTINUOUS          Imaging Results              X-Ray Chest AP Portable (Final result)  Result time 11/15/23 01:40:45      Final result by Vidal Montemayor MD (11/15/23 01:40:45)                   Impression:      No acute findings in the chest.      Electronically signed by: Vidal Montemayor MD  Date:    11/15/2023  Time:    01:40               Narrative:    EXAMINATION:  XR CHEST AP PORTABLE    CLINICAL HISTORY:  infectious work-up;    TECHNIQUE:  Single frontal view of the chest was performed.    COMPARISON:  04/12/2023.    FINDINGS:  No consolidation, pleural effusion or pneumothorax.    Cardiomediastinal silhouette is unremarkable.                                        US Transplant Kidney With Doppler (Final result)  Result time 11/15/23 01:18:07      Final result by Vidal Montemayor MD (11/15/23 01:18:07)                   Impression:      Elevated velocity in the main renal artery, with elevated renal artery/iliac ratio, both increased compared to prior.  Interval decrease in intrarenal arterial resistive indices.  Findings are concerning for increasing renal artery stenosis, relative to prior.  Correlation and short-term follow-up recommended.    This report was flagged in Epic as abnormal.    Electronically signed by resident: Phi Neal  Date:    11/15/2023  Time:    01:03    Electronically signed by: Vidal Montemayor MD  Date:    11/15/2023  Time:    01:18                Narrative:    EXAMINATION:  US TRANSPLANT KIDNEY WITH DOPPLER    CLINICAL HISTORY:  NETO;    TECHNIQUE:  Real time gray scale and doppler ultrasound was performed over the patient's renal allograft.    COMPARISON:  Renal transplant Doppler ultrasound 08/31/2023.    FINDINGS:  Renal allograft in the right lower quadrant.  The allograft measures 11.1 cm. Normal perfusion. No hydronephrosis.    No fluid collections.    Vasculature:    Interlobar artery resistive index: 0.56 (previously 0.61) with normal waveform.    Superior segment resistive index: 0.50 (previously 0.61) with normal waveform.    Middle segment resistive index: 0.53 (previously 0.71) with normal waveform.    Inferior segment resistive index: 0.51 (previously 0.61) with normal waveform.    Main renal artery peak systolic velocity: 741 (previously 387)cm/sec with normal waveform.    Renal artery/iliac ratio: 4.1.    The main renal vein is patent.    Iliac artery velocities measure 192 cm/sec proximal to the anastomosis, and 157 cm/sec distal to the anastomosis.                                       Medications   sodium chloride 0.9% flush 3 mL (has no administration in time range)   melatonin tablet 6 mg (has no administration in time range)   acetaminophen tablet 650 mg (has no administration in time range)   heparin (porcine) injection 5,000 Units (has no administration in time range)   prochlorperazine injection Soln 5 mg (has no administration in time range)   atorvastatin tablet 10 mg (has no administration in time range)   cyclobenzaprine tablet 10 mg (has no administration in time range)   DULoxetine DR capsule 30 mg (has no administration in time range)   NIFEdipine 24 hr tablet 30 mg (has no administration in time range)   NIFEdipine 24 hr tablet 60 mg (has no administration in time range)   oxyCODONE-acetaminophen 5-325 mg per tablet 1 tablet (has no administration in time range)   predniSONE tablet 5 mg (has no administration in time range)    tacrolimus XR (ENVARSUS) 24 hr tablet 5 mg (has no administration in time range)   glucose chewable tablet 16 g (has no administration in time range)   glucose chewable tablet 24 g (has no administration in time range)   glucagon (human recombinant) injection 1 mg (has no administration in time range)   insulin aspart U-100 pen 0-5 Units (has no administration in time range)   dextrose 10% bolus 125 mL 125 mL (has no administration in time range)   dextrose 10% bolus 250 mL 250 mL (has no administration in time range)   lactated ringers infusion (has no administration in time range)   lactated ringers bolus 1,000 mL (0 mLs Intravenous Stopped 11/14/23 2325)   ondansetron injection 4 mg (4 mg Intravenous Given 11/14/23 2129)     Medical Decision Making  38-year-old male presenting with generalized malaise and weakness, kidney transplant April 2023  Differential:  Acute kidney injury, kidney failure, UTI, bacteremia, lactic acidosis, electrolyte derangement, dehydration, COVID, influenza    Plan:   Routine labs.  CMP reveals NETO, patient given fluids.  I will repeat renal panel and get an ultrasound of the kidney.  Blood cultures were collected.  I will discuss the case with transplant surgery, anticipate admission    Kidney transplant surgery will admit the patient    Amount and/or Complexity of Data Reviewed  Labs: ordered.  Radiology: ordered.    Risk  Decision regarding hospitalization.                               Clinical Impression:   Final diagnoses:  [N17.9] NETO (acute kidney injury) (Primary)  [R11.2] Nausea and vomiting        ED Disposition Condition    Admit Stable                Bandar Dent PA-C  11/15/23 0154

## 2023-11-15 NOTE — ASSESSMENT & PLAN NOTE
Nutrition Problem:  moderate  Malnutrition in the context of Acute Illness/Injury  Related to (etiology): Decreased ability to consume sufficient energy    Signs and Symptoms (as evidenced by):  Body Fat Depletion: moderate  Muscle Mass Depletion: mild depletion of lower extremities      Interventions (treatment strategies): protein supplements and dietary consulted and following

## 2023-11-15 NOTE — PLAN OF CARE
Pt has call bell in reach, non slip socks on, and bedrails up x2.  Pt encouraged to wash hands.  He has his parents at bedside.  He is on a clear liquid diet with LR at 125cc/hr and accuchecks ac/hs.  Pt has stool samples pending collection.  He has a dental abscess with pain to site.

## 2023-11-15 NOTE — H&P
[facility] Transplant Step[facility]  Kidney Transplant  H&P      Subjective:     Chief Complaint/Reason for Admission: N/V/D    History of Present Illness:  Mr. Reddy is a 37 yo male s/p DCD Ktxp d/t DM on 4/9/23 (Thymo, KDPI 40%, CIT 21 hrs). He now has CKD stage 3 - GFR 30-59 and his baseline creatinine is between 1.2-1.6. He takes mycophenolate mofetil, prednisone, and envarsus for maintenance immunosuppression. He now presents to the ED with complaints of N/V/D and weakness. Patient reports N/V/D started approximately 5 days ago on Friday. He reports bilious emesis, not able to hold food down. He does report being able to take his medications. Diarrhea is watery, non-bloody. He reports starting Rybelsus approx 3 weeks ago and believes this could be contributing to his symptoms. Patient also reports left sided tooth pain one day after N/V/D started. He was seen by his dentist and pt states he was told he has 2 teeth which need to be pulled but no active infection. He was prescribed prophylactic amoxicillin. Tooth pain also limiting PO intake. Pt reports his overall weakness has worsened past 24-48 hours prompting ER visit. He reports a fall earlier today d/t weakness, denies hitting his head or LOC. Labs in ED notable for NETO, Cr 2.7. Low grade 99.9 temp in ED. Pt denies chills, fevers, SOB, chest pain, dysuria. Plan discussed with Dr. Gonzalez. Plan discussed with Dr. Gonzalez, suspect NETO from dehydration from decreased PO intake, ongoing diarrhea. Plan for infectious work-up including blood/urine cx, CXR, stool studies and CMV. Obtain Kidney US. IVF hydration.       PTA Medications   Medication Sig    acetaminophen-codeine 300-30mg (TYLENOL #3) 300-30 mg Tab Take 1 tablet by mouth every 4 (four) hours as needed.    atorvastatin (LIPITOR) 10 MG tablet Take 1 tablet (10 mg total) by mouth every evening. (Patient taking differently: Take 10 mg by mouth every evening. Needs refill)    BD ULTRA-FINE SILVIA PEN NEEDLE 32  "gauge x 5/32" Ndle Use with insulin 4 times daily.    blood sugar diagnostic Strp Test blood sugar 4 times a day. (Patient not taking: Reported on 10/17/2023)    blood-glucose meter Misc Use as instructed to test blood sugar daily (Patient not taking: Reported on 10/17/2023)    cyclobenzaprine (FLEXERIL) 10 MG tablet Take 10 mg by mouth daily as needed.    DULoxetine (CYMBALTA) 30 MG capsule Take 30 mg by mouth.    flash glucose scanning reader (FREESTYLE SELMA 2 READER) Misc Use as directed.    flash glucose sensor (FREESTYLE SELMA 2 SENSOR) Kit Change every 14 day.    hydroCHLOROthiazide (HYDRODIURIL) 25 MG tablet Take 0.5 tablets (12.5 mg total) by mouth once daily.    lancets Misc Test blood sugar 3 (three) times daily. (Patient not taking: Reported on 10/17/2023)    mycophenolate (CELLCEPT) 250 mg Cap Take 4 capsules (1,000 mg total) by mouth 2 (two) times daily. Txp Date:4/9/2023 (Kidney) Disch Date:4/13/2023 ICD10:Z94.0 Txp Location:[facility]    NIFEdipine (PROCARDIA-XL) 30 MG (OSM) 24 hr tablet Take 1 tablet (30 mg total) by mouth nightly.    NIFEdipine (PROCARDIA-XL) 60 MG (OSM) 24 hr tablet Take 1 tablet (60 mg total) by mouth once daily.    NOVOLOG FLEXPEN U-100 INSULIN 100 unit/mL (3 mL) InPn pen Inject 4 Units into the skin 3 (three) times daily with meals.    oxyCODONE-acetaminophen (PERCOCET) 5-325 mg per tablet Take 1 tablet by mouth.    prednisoLONE (MILLIPRED) 5 mg Tab     predniSONE (DELTASONE) 5 MG tablet Take 1 tablet (5 mg total) by mouth once daily.    pregabalin (LYRICA) 75 MG capsule Take 1 capsule (75 mg total) by mouth 3 (three) times daily.    semaglutide (RYBELSUS) 3 mg tablet Take 1 tablet (3 mg total) by mouth once daily.    tacrolimus XR, ENVARSUS, (ENVARSUS XR) 1 mg Tb24 Take 5 tablets (5 mg total) by mouth every morning.    traMADoL (ULTRAM) 50 mg tablet Take 50 mg by mouth every 4 (four) hours as needed.    TRESIBA FLEXTOUCH U-100 100 unit/mL (3 mL) insulin pen Inject 12 Units into the " "skin once daily.       Review of patient's allergies indicates:   Allergen Reactions    Shrimp        Past Medical History:   Diagnosis Date    Allergy     Anemia     Diabetes mellitus     Disorder of kidney and ureter     Hyperlipidemia     Hypertension     Secondary hyperparathyroidism      Past Surgical History:   Procedure Laterality Date    INSERTION OF DIALYSIS CATHETER      KIDNEY TRANSPLANT N/A 4/9/2023    Procedure: TRANSPLANT, KIDNEY;  Surgeon: Julito Saleh MD;  Location: [facility] OR [address] [place] [address];  Service: Transplant;  Laterality: N/A;  Off Ice - 0919  Reperfused - 0945     Family History       Problem Relation (Age of Onset)    Cancer Mother    Diabetes Mother, Father    Hypertension Mother    Stroke Brother          Tobacco Use    Smoking status: Never    Smokeless tobacco: Never   Substance and Sexual Activity    Alcohol use: Yes    Drug use: Yes     Types: Marijuana    Sexual activity: Yes        Review of Systems   Constitutional:  Positive for activity change (decreased), appetite change (decreased) and fatigue.   HENT:  Positive for dental problem.    Respiratory:  Negative for cough and shortness of breath.    Cardiovascular:  Negative for chest pain and leg swelling.   Gastrointestinal:  Positive for abdominal pain, diarrhea, nausea and vomiting. Negative for abdominal distention.   Genitourinary:  Negative for dysuria.   Musculoskeletal:  Positive for back pain.   Allergic/Immunologic: Positive for immunocompromised state.   Neurological:  Positive for weakness. Negative for headaches.   Psychiatric/Behavioral:  Negative for agitation and confusion.      Objective:     Vital Signs (Most Recent):  Temp: 99.7 °F (37.6 °C) (11/15/23 0236)  Pulse: 70 (11/15/23 0236)  Resp: 20 (11/15/23 0236)  BP: (!) 143/65 (11/15/23 0236)  SpO2: 98 % (11/15/23 0236)  Height: 6' 1" (185.4 cm)  Weight: 69.5 kg (153 lb 3.5 oz)  Body mass index is 20.21 kg/m².      Physical Exam  Constitutional:       Appearance: He is " ill-appearing.   HENT:      Mouth/Throat:      Mouth: Mucous membranes are dry.      Pharynx: Oropharynx is clear.   Cardiovascular:      Rate and Rhythm: Normal rate.      Pulses: Normal pulses.   Pulmonary:      Effort: Pulmonary effort is normal.      Breath sounds: Normal breath sounds.   Abdominal:      General: Bowel sounds are normal. There is no distension.      Palpations: Abdomen is soft.      Tenderness: There is no abdominal tenderness.   Skin:     General: Skin is warm and dry.   Neurological:      Mental Status: He is alert and oriented to person, place, and time. Mental status is at baseline.          Laboratory  CBC:   Recent Labs   Lab 11/14/23 2129   WBC 4.01   RBC 4.72   HGB 14.3   HCT 41.8   *   MCV 89   MCH 30.3   MCHC 34.2     CMP:   Recent Labs   Lab 11/14/23 2129   *   CALCIUM 9.0   ALBUMIN 4.1   PROT 7.2      K 4.7   CO2 21*      BUN 48*   CREATININE 2.7*   ALKPHOS 66   ALT 36   AST 33       Diagnostic Results:  None    Patient was SARS-CoV-2 /COVID-19 tested with negative results.   Assessment/Plan:     Cardiac/Vascular  Hyperlipidemia  - continue statin      Renal/  NETO (acute kidney injury)  - Suspect d/t volume loss from diarrhea, decreased PO intake  - Kidney US pending  - Infectious work-up started  - IVF hydration  - Daily renal panel    S/P kidney transplant  - S/p Ktxp 4/9/23 d/t DM  - Now w/ CKD 3, BL Cr 1.2-1.6  - Now admit with NETO and N/V/D      ID  At risk for opportunistic infections  - OI prophylaxis per transplant protocol      Immunology/Multi System  Prophylactic immunotherapy  - Continue envarsus and prednisone  - Check prograf level daily and adjust for therapeutic dosage. Monitor for toxic side effects   - Hold Cellcept d/t GI symptoms      Endocrine  Malnutrition of moderate degree  Nutrition Problem:  moderate  Malnutrition in the context of Acute Illness/Injury  Related to (etiology): Decreased ability to consume sufficient  "energy    Signs and Symptoms (as evidenced by):  Body Fat Depletion: moderate  Muscle Mass Depletion: mild depletion of lower extremities      Interventions (treatment strategies): protein supplements and dietary consulted and following       Type 2 diabetes mellitus, with long-term current use of insulin  - Endocrine consulted ,appreciate assistance      GI  * Nausea vomiting and diarrhea  - Symptoms present for several days  - Pt reports symptoms developed shortly after starting Rybelsus. Rybelsus now stopped  - Send stool studies, CMV PCR  - IVF hydration      Palliative Care  Long-term use of immunosuppressant medication  - see "prophylactic immunotherapy"          Discharge Planning:  Not suitable for discharge at this time    Medical decision making for this encounter includes review of pertinent labs and diagnostic studies, assessment and planning, discussions with consulting providers, discussion with patient/family, and participation in multidisciplinary rounds. Time spent caring for patient: 60 minutes    Rajan Winters NP  Kidney Transplant  [facility] - Transplant Stepdown    "

## 2023-11-15 NOTE — PROGRESS NOTES
Admit Note     Met with mother to assess patients needs due to patients request.  Patient is a 38 y.o. single male, admitted:    NETO (acute kidney injury) [N17.9]  Nausea and vomiting [R11.2]      Patient admitted from emergency department on 11/14/2023 .  At this time, patient presents as good eye contact and well groomed, patient was present in room but did not speak with  due to m.  At this time, patients caregiver presents as alert and oriented x 4, pleasant, good eye contact, well groomed, recall good, concentration/judgement good, average intelligence, calm, communicative, cooperative, and asking and answering questions appropriately.      Household/Family Systems (as reported by patients caregiver)     Patient resides with patient's  self , at [facility] [place] [place] [id number].  Support system includes his mother Rene Reddy ([phone]), and his father Willie Llanes ([phone]).  Patient does not have dependents that are need of being cared for.     Patients primary caregiver is Rene Reddy, patients mother, phone number [phone].  Confirmed patients contact information is [phone] (home);   Telephone Information:   Mobile [phone]   .    During admission, patient's caregiver plans to stay in patient's room.  Confirmed patient and patients caregivers do have access to reliable transportation.    Cognitive Status/Learning     Patients caregiver reports patients reading ability as 12th grade and states patient does not have difficulty with N/A.  Patients caregiver reports patient learns best by multisensory information.   Needed: No.   Highest education level: High School (9-12) or GED    Vocation/Disability (as reported by patients caregiver)    Working for Income: No  If no, reason not working: Disability  Patient is disabled due to ESRD since May 2021.  Prior to disability, patient  was employed as  at [employer].    Adherence     Patients caregiver reports patient has a high level of adherence to patients health care regimen.  Adherence counseling and education provided.  Patient's caregiver verbalizes understanding.    Substance Use    Patients caregiver reports patients substance usage as the following:    Tobacco: none, patient denies any use.  Alcohol: none, patient denies any use.  Illicit Drugs/Non-prescribed Medications: none, patient denies any use.  Patients caregiver states clear understanding of the potential impact of substance use.  Substance abstinence/cessation counseling, education and resources provided and reviewed.     Services Utilizing/ADLS (as reported by patients caregiver)    Infusion Service: Prior to admission, patient utilizing? no  Home Health: Prior to admission, patient utilizing? no  DME: Prior to admission, yes BPC & Glucometer  Pulmonary/Cardiac Rehab: Prior to admission, no  Dialysis:  Prior to admission, no  Transplant Specialty Pharmacy:  Prior to admission, yes; [facility].    Prior to admission, patients caregiver reports patient was independent with ADLS and was driving.  Patients caregiver reports patient is able to care for self at this time..  Patients caregiver reports patient indicates a willingness to care for self once medically cleared to do so.    Insurance/Medications    Insured by   Payer/Plan Subscr  Sex Relation Sub. Ins. ID Effective Group Num   1. HU[employer]NA MANAGE* SERGIOBAO 1985 Male Self [id number]                                   [address] [address]   2. HU[employer] DENTAL* SERGIOBAO 1985 Male Self [id number]                                    [address] [address]      Primary Insurance (for UNOS reporting): Public Insurance - Medicare & Choice  Secondary Insurance (for UNOS reporting): None    Patients caregiver reports patient is able to obtain and afford medications at this time and at time of  discharge.    Living Will/Healthcare Power of     Patients caregiver reports patient does not have a LW and/or HCPA.   provided education regarding LW and HCPA and the completion of forms.    Coping/Mental Health (as reported by patients caregiver)    Patient is coping adequately with the aid of  family members and friends. Patients caregiver is coping adequately with the aid of  family members and friends. Patient mother denied concerns with herself or patient at this time. Patient has visible swelling on his face and asked that this  speak with his mother so he may rest.      Discharge Planning (as reported by patients caregiver)    At time of discharge, patient plans to return to patient's home under the care of Rene Reddy.  Patients father will transport patient.  Per rounds today, expected discharge date has not been medically determined at this time. Patients caretaker verbalizes understanding and is involved in treatment planning and discharge process.    Additional Concerns    Patient's caretaker denies additional needs and/or concerns at this time.  providing ongoing psychosocial support, education, resources and d/c planning as needed.  SW remains available.  remains available. Patient's caregiver verbalizes understanding and agreement with information reviewed,  availability and how to access available resources as needed.

## 2023-11-15 NOTE — ASSESSMENT & PLAN NOTE
- Continue envarsus and prednisone  - Check prograf level daily and adjust for therapeutic dosage. Monitor for toxic side effects   - Hold Cellcept d/t GI symptoms/CMV PCR

## 2023-11-15 NOTE — ASSESSMENT & PLAN NOTE
- Placed on Augmentin outpatient by Dentist  - IV Unasyn started  - Plan for Ct max/facial to assess for any abscess  - Continue to monitor

## 2023-11-15 NOTE — ASSESSMENT & PLAN NOTE
- Symptoms present for several days  - Pt reports symptoms developed shortly after starting Rybelsus. Rybelsus now stopped  - CMV PCR +, starting IV GCV. ID consulted  - Continue IVF hydration

## 2023-11-15 NOTE — CONSULTS
[facility]  Endocrinology  Diabetes Consult Note    Consult Requested by: Mannie Gonzalez Jr.*   Reason for admit: Nausea vomiting and diarrhea    HISTORY OF PRESENT ILLNESS:  Reason for Consult: Management of T2DM, Hyperglycemia     Surgical Procedure and Date: n/a    Diabetes diagnosis year: ~7-8 years ago per chart review     Home Diabetes Medications:  per chart review   Rybelsus 3mg daily  Tresiba 12 units daily  Novolog 4 units before meals only    How often checking glucose at home? 1-3x/day   BG readings on regimen: 150-200s (states he is over 200s 2-3x per week).   Hypoglycemia on the regimen?  Yes states <70 2x per week   Missed doses on regimen?  Yes states he last took his tresiba last Thursday, unsure of last dose of rybelsus     Diabetes Complications include:     DKA Denies  RN Denies  PN Denies  Nephropathy +  CAD Denies    Complicating diabetes co morbidities:   Nephropathy, Glucocorticoid use, hyperglycemia       HPI:   Patient is a 38 y.o. male s/p DCD Ktxp d/t DM on 4/9/23 (Thymo, KDPI 40%, CIT 21 hrs). He now has CKD stage 3 - GFR 30-59 and his baseline creatinine is between 1.2-1.6. He takes mycophenolate mofetil, prednisone, and envarsus for maintenance immunosuppression. He now presents to the ED with complaints of N/V/D and weakness. Patient reports N/V/D started approximately 5 days ago on Friday. He reports bilious emesis, not able to hold food down. He does report being able to take his medications. Diarrhea is watery, non-bloody. He reports starting Rybelsus approx 3 weeks ago and believes this could be contributing to his symptoms. Endocrine consulted for BG management/recs.         Interval HPI:   No acute events overnight. Patient in room 11843/12933 A. Blood glucose stable. BG at and below goal on current insulin regimen (SSI ). Steroid use- Prednisone 5 mg QD.      Renal function- Abnormal - Cr 2.3  Vasopressors-  None     Diet clear liquid Standard Tray      Eating:   <25%  Nausea: Yes  Hypoglycemia and intervention: No  Fever: No  TPN and/or TF: No    PMH, PSH, FH, SH updated and reviewed     ROS:  Review of Systems   Cardiovascular:  Negative for chest pain.   Gastrointestinal:  Positive for diarrhea, nausea and vomiting. Negative for constipation.   Endocrine: Negative for polydipsia and polyuria.       Current Medications and/or Treatments Impacting Glycemic Control  Immunotherapy:    Immunosuppressants           Stop Route Frequency     tacrolimus XR (ENVARSUS) 24 hr tablet 5 mg         -- Oral Every morning          Steroids:   Hormones (From admission, onward)      Start     Stop Route Frequency Ordered    11/15/23 0900  predniSONE tablet 5 mg         -- Oral Daily 11/15/23 0147    11/15/23 0217  melatonin tablet 6 mg         -- Oral Nightly PRN 11/15/23 0121          Pressors:    Autonomic Drugs (From admission, onward)      None          Hyperglycemia/Diabetes Medications:   Antihyperglycemics (From admission, onward)      Start     Stop Route Frequency Ordered    11/15/23 0248  insulin aspart U-100 pen 0-5 Units         -- SubQ Before meals & nightly PRN 11/15/23 0151             PHYSICAL EXAMINATION:  Vitals:    11/15/23 0718   BP: (!) 177/95   Pulse: 81   Resp: 18   Temp: 99.1 °F (37.3 °C)     Body mass index is 20.21 kg/m².     Physical Exam  Constitutional:       General: He is not in acute distress.     Appearance: He is ill-appearing.   HENT:      Head: Normocephalic and atraumatic.      Right Ear: External ear normal.      Left Ear: External ear normal.      Nose: Nose normal.   Pulmonary:      Effort: Pulmonary effort is normal. No respiratory distress.            Labs Reviewed and Include   Recent Labs   Lab 11/14/23  2129 11/15/23  0626   * 114*   CALCIUM 9.0 8.3*   ALBUMIN 4.1 3.4*   PROT 7.2  --     137   K 4.7 3.6   CO2 21* 21*    104   BUN 48* 44*   CREATININE 2.7* 2.3*   ALKPHOS 66  --    ALT 36  --    AST 33  --   "  BILITOT 1.1*  --      Lab Results   Component Value Date    WBC 3.13 (L) 11/15/2023    HGB 12.8 (L) 11/15/2023    HCT 38.4 (L) 11/15/2023    MCV 89 11/15/2023     (L) 11/15/2023     No results for input(s): "TSH", "FREET4" in the last 168 hours.  Lab Results   Component Value Date    HGBA1C 4.5 04/09/2023       Nutritional status:   Body mass index is 20.21 kg/m².  Lab Results   Component Value Date    ALBUMIN 3.4 (L) 11/15/2023    ALBUMIN 4.1 11/14/2023    ALBUMIN 4.1 09/08/2023     No results found for: "PREALBUMIN"    Estimated Creatinine Clearance: 42.4 mL/min (A) (based on SCr of 2.3 mg/dL (H)).    Accu-Checks  Recent Labs     11/15/23  0130 11/15/23  0829 11/15/23  1300   POCTGLUCOSE 95 118* 148*        ASSESSMENT and PLAN    Renal/  NETO (acute kidney injury)  Titrate insulin slowly to avoid hypoglycemia as the risk of hypoglycemia increases with decreased creatinine clearance.        Endocrine  Type 2 diabetes mellitus, with long-term current use of insulin  BG goal: 140-180    - Continue LDC (150/50) prn for hyperglycemia- will continue to monitor for additional insulin requirements once appetite improves/n/v subsides as patient on tresiba 12 units QD and Novolog 4 units TIDWM at home  - POCT Glucose before meals and at bedtime  - Hypoglycemia protocol in place  - Order A1C to assess BG trends within last 3 months.       ** Please notify Endocrine for any change and/or advance in diet**  ** Please call Endocrine for any BG related issues **     Discharge Planning:   TBD. Please notify endocrinology prior to discharge. D/c rybelsus. Consider restarting Januvia as patient was previously taking and denies issues.           GI  * Nausea vomiting and diarrhea  Managed per primary team            Plan discussed with patient, family, and RN at bedside.     Gale Martinez PA-C  Endocrinology  [facility] - [facility]  "

## 2023-11-15 NOTE — SUBJECTIVE & OBJECTIVE
Subjective:   History of Present Illness:  Mr. Reddy is a 39 yo male s/p DCD Ktxp d/t DM on 4/9/23 (Thymo, KDPI 40%, CIT 21 hrs). He now has CKD stage 3 - GFR 30-59 and his baseline creatinine is between 1.2-1.6. He takes mycophenolate mofetil, prednisone, and envarsus for maintenance immunosuppression. He now presents to the ED with complaints of N/V/D and weakness. Patient reports N/V/D started approximately 5 days ago on Friday. He reports bilious emesis, not able to hold food down. He does report being able to take his medications. Diarrhea is watery, non-bloody. He reports starting Rybelsus approx 3 weeks ago and believes this could be contributing to his symptoms. Patient also reports left sided tooth pain one day after N/V/D started. He was seen by his dentist and pt states he was told he has 2 teeth which need to be pulled but no active infection. He was prescribed prophylactic amoxicillin. Tooth pain also limiting PO intake. Pt reports his overall weakness has worsened past 24-48 hours prompting ER visit. He reports a fall earlier today d/t weakness, denies hitting his head or LOC. Labs in ED notable for NETO, Cr 2.7. Low grade 99.9 temp in ED. Pt denies chills, fevers, SOB, chest pain, dysuria. Plan discussed with Dr. Gonzalez. Plan discussed with Dr. Gonzalez, suspect NETO from dehydration from decreased PO intake, ongoing diarrhea. Plan for infectious work-up including blood/urine cx, CXR, stool studies and CMV. Obtain Kidney US. IVF hydration.     Mr. Reddy is a 38 y.o. year old male who is status post Kidney Transplant - 4/9/2023  (#1).    His maintenance immunosuppression consists of:   Immunosuppressants (From admission, onward)      Start     Stop Route Frequency Ordered    11/15/23 0700  tacrolimus XR (ENVARSUS) 24 hr tablet 5 mg         -- Oral Every morning 11/15/23 0147            Hospital Course:  Patient admitted from ED with N/V/D, weakness, and dental pain.    Interval History: No acute  events overnight. Patient reports improvement in N/V. Infectious work up +CMV Viremia. Will start IV GCV. ID consulted. Will also plan for CT max/facial given dental caries to assess for abscess. Continue to monitor     Past Medical, Surgical, Family, and Social History:   Unchanged from H&P.    Scheduled Meds:   ampicillin-sulbactim (UNASYN) IVPB  1.5 g Intravenous Q6H    atorvastatin  10 mg Oral QHS    DULoxetine  30 mg Oral Daily    ganciclovir (CYTOVENE) 174 mg in sodium chloride 0.9% 100 mL IVPB  2.5 mg/kg Intravenous Q12H    heparin (porcine)  5,000 Units Subcutaneous Q8H    NIFEdipine  30 mg Oral Nightly    NIFEdipine  60 mg Oral Daily    predniSONE  5 mg Oral Daily    tacrolimus XR (ENVARSUS)  5 mg Oral QAM     Continuous Infusions:   lactated ringers 125 mL/hr at 11/15/23 0251     PRN Meds:acetaminophen, cyclobenzaprine, dextrose 10%, dextrose 10%, glucagon (human recombinant), glucose, glucose, insulin aspart U-100, melatonin, oxyCODONE-acetaminophen, prochlorperazine, sodium chloride 0.9%    Intake/Output - Last 3 Shifts         11/13 0700  11/14 0659 11/14 0700  11/15 0659 11/15 0700  11/16 0659    P.O.  30     I.V. (mL/kg)  413.9 (6)     IV Piggyback  1000     Total Intake(mL/kg)  1443.9 (20.8)     Urine (mL/kg/hr)  100 400 (0.7)    Stool   0    Total Output  100 400    Net  +1343.9 -400           Urine Occurrence   1 x    Stool Occurrence   1 x             Review of Systems   Constitutional:  Positive for activity change.   HENT:  Positive for dental problem.    Respiratory:  Negative for shortness of breath.    Cardiovascular:  Negative for leg swelling.   Gastrointestinal:  Positive for diarrhea, nausea and vomiting. Negative for abdominal distention and abdominal pain.   Genitourinary:  Negative for difficulty urinating.   Allergic/Immunologic: Positive for immunocompromised state.   Psychiatric/Behavioral:  Negative for confusion.       Objective:     Vital Signs (Most Recent):  Temp: 98.6 °F (37  "°C) (11/15/23 1114)  Pulse: 89 (11/15/23 1114)  Resp: 18 (11/15/23 1114)  BP: (!) 155/81 (11/15/23 1114)  SpO2: 97 % (11/15/23 1114) Vital Signs (24h Range):  Temp:  [98.6 °F (37 °C)-99.9 °F (37.7 °C)] 98.6 °F (37 °C)  Pulse:  [63-89] 89  Resp:  [12-20] 18  SpO2:  [96 %-100 %] 97 %  BP: (120-177)/(65-95) 155/81     Weight: 69.5 kg (153 lb 3.5 oz)  Height: 6' 1" (185.4 cm)  Body mass index is 20.21 kg/m².     Physical Exam  Vitals and nursing note reviewed.   HENT:      Mouth/Throat:      Dentition: Dental caries present.   Cardiovascular:      Rate and Rhythm: Normal rate.   Pulmonary:      Effort: Pulmonary effort is normal.   Abdominal:      Tenderness: There is no abdominal tenderness. There is no guarding.   Neurological:      Mental Status: He is alert and oriented to person, place, and time.   Psychiatric:         Mood and Affect: Mood normal.          Laboratory:  CBC:   Recent Labs   Lab 11/14/23  2129 11/15/23  0626   WBC 4.01 3.13*   RBC 4.72 4.33*   HGB 14.3 12.8*   HCT 41.8 38.4*   * 123*   MCV 89 89   MCH 30.3 29.6   MCHC 34.2 33.3     BMP:   Recent Labs   Lab 11/14/23  2129 11/15/23  0626   * 114*    137   K 4.7 3.6    104   CO2 21* 21*   BUN 48* 44*   CREATININE 2.7* 2.3*   CALCIUM 9.0 8.3*       Diagnostic Results:  None  "

## 2023-11-15 NOTE — ASSESSMENT & PLAN NOTE
BG goal: 140-180    - Continue LDC (150/50) prn for hyperglycemia- will continue to monitor for additional insulin requirements once appetite improves/n/v subsides as patient on tresiba 12 units QD and Novolog 4 units TIDWM at home  - POCT Glucose before meals and at bedtime  - Hypoglycemia protocol in place  - Order A1C to assess BG trends within last 3 months.       ** Please notify Endocrine for any change and/or advance in diet**  ** Please call Endocrine for any BG related issues **     Discharge Planning:   TBD. Please notify endocrinology prior to discharge. D/c rybelsus. Consider restarting Januvia as patient was previously taking and denies issues.

## 2023-11-15 NOTE — ASSESSMENT & PLAN NOTE
- Suspect d/t volume loss from diarrhea, decreased PO intake  - Kidney US with elevated velocity concerning for renal stenosis. Plan for repeat tomorrow  - IVF hydration  - Daily renal panel

## 2023-11-15 NOTE — HPI
Mr. Reddy is a 39 yo male s/p DCD Ktxp d/t DM on 4/9/23 (Thymo, KDPI 40%, CIT 21 hrs). He now has CKD stage 3 - GFR 30-59 and his baseline creatinine is between 1.2-1.6. He takes mycophenolate mofetil, prednisone, and envarsus for maintenance immunosuppression. He now presents to the ED with complaints of N/V/D and weakness. Patient reports N/V/D started approximately 5 days ago on Friday. He reports bilious emesis, not able to hold food down. He does report being able to take his medications. Diarrhea is watery, non-bloody. He reports starting Rybelsus approx 3 weeks ago and believes this could be contributing to his symptoms. Patient also reports left sided tooth pain one day after N/V/D started. He was seen by his dentist and pt states he was told he has 2 teeth which need to be pulled but no active infection. He was prescribed prophylactic amoxicillin. Tooth pain also limiting PO intake. Pt reports his overall weakness has worsened past 24-48 hours prompting ER visit. He reports a fall earlier today d/t weakness, denies hitting his head or LOC. Labs in ED notable for NETO, Cr 2.7. Low grade 99.9 temp in ED. Pt denies chills, fevers, SOB, chest pain, dysuria. Plan discussed with Dr. Gonzalez. Plan discussed with Dr. Gonzalez, suspect NETO from dehydration from decreased PO intake, ongoing diarrhea. Plan for infectious work-up including blood/urine cx, CXR, stool studies and CMV. Obtain Kidney US. IVF hydration.

## 2023-11-15 NOTE — ED TRIAGE NOTES
Andrey Reddy, a 38 y.o. male presents to the ED w/ complaint of fatigue. Pt states his body feels weak.    Triage note:  Chief Complaint   Patient presents with    Fatigue     Stating general weakness. States fell earlier and his whole body hurts. States weak due to dental abscess and he can't eat.     Review of patient's allergies indicates:   Allergen Reactions    Shrimp      Past Medical History:   Diagnosis Date    Allergy     Anemia     Diabetes mellitus     Disorder of kidney and ureter     Hyperlipidemia     Hypertension     Secondary hyperparathyroidism          Adult Physical Assessment  LOC: Andrey Reddy, 38 y.o. male verified via two identifiers.  The patient is awake, alert, oriented and speaking appropriately at this time. AAOX4  APPEARANCE: Patient resting comfortably and appears to be in no acute distress at this time. Patient is clean and well groomed, patient's clothing is properly fastened.  SKIN:The skin is warm and dry, color consistent with ethnicity, patient has normal skin turgor and moist mucus membranes, skin intact, no breakdown or brusing noted.  MUSCULOSKELETAL: Patient moving all extremities well, no obvious swelling or deformities noted. Pt states his body feels weak.  RESPIRATORY: Airway is open and patent, respirations are spontaneous, patient has a normal effort and rate, no accessory muscle use noted.  CARDIAC: Patient has a normal rate and rhythm, no periphreal edema noted in any extremity, capillary refill < 3 seconds in all extremities  ABDOMEN: Soft and non tender to palpation, no abdominal distention noted. Bowel sounds present in all four quadrants.  NEUROLOGIC: Eyes open spontaneously, behavior appropriate to situation, follows commands, facial expression symmetrical, bilateral hand grasp equal and even, purposeful motor response noted, normal sensation in all extremities when touched with a finger.

## 2023-11-15 NOTE — SUBJECTIVE & OBJECTIVE
Interval HPI:   No acute events overnight. Patient in room 72327/83922 A. Blood glucose stable. BG at and below goal on current insulin regimen (SSI ). Steroid use- Prednisone 5 mg QD.      Renal function- Abnormal - Cr 2.3  Vasopressors-  None     Diet clear liquid Standard Tray     Eating:   <25%  Nausea: Yes  Hypoglycemia and intervention: No  Fever: No  TPN and/or TF: No    PMH, PSH, FH, SH updated and reviewed     ROS:  Review of Systems   Cardiovascular:  Negative for chest pain.   Gastrointestinal:  Positive for diarrhea, nausea and vomiting. Negative for constipation.   Endocrine: Negative for polydipsia and polyuria.       Current Medications and/or Treatments Impacting Glycemic Control  Immunotherapy:    Immunosuppressants           Stop Route Frequency     tacrolimus XR (ENVARSUS) 24 hr tablet 5 mg         -- Oral Every morning          Steroids:   Hormones (From admission, onward)      Start     Stop Route Frequency Ordered    11/15/23 0900  predniSONE tablet 5 mg         -- Oral Daily 11/15/23 0147    11/15/23 0217  melatonin tablet 6 mg         -- Oral Nightly PRN 11/15/23 0121          Pressors:    Autonomic Drugs (From admission, onward)      None          Hyperglycemia/Diabetes Medications:   Antihyperglycemics (From admission, onward)      Start     Stop Route Frequency Ordered    11/15/23 0248  insulin aspart U-100 pen 0-5 Units         -- SubQ Before meals & nightly PRN 11/15/23 0151             PHYSICAL EXAMINATION:  Vitals:    11/15/23 0718   BP: (!) 177/95   Pulse: 81   Resp: 18   Temp: 99.1 °F (37.3 °C)     Body mass index is 20.21 kg/m².     Physical Exam  Constitutional:       General: He is not in acute distress.     Appearance: He is ill-appearing.   HENT:      Head: Normocephalic and atraumatic.      Right Ear: External ear normal.      Left Ear: External ear normal.      Nose: Nose normal.   Pulmonary:      Effort: Pulmonary effort is normal. No respiratory distress.

## 2023-11-15 NOTE — HPI
Reason for Consult: Management of T2DM, Hyperglycemia     Surgical Procedure and Date: n/a    Diabetes diagnosis year: ~7-8 years ago per chart review     Home Diabetes Medications:  per chart review   Rybelsus 3mg daily  Tresiba 12 units daily  Novolog 4 units before meals only    How often checking glucose at home? 1-3x/day   BG readings on regimen: 150-200s   Hypoglycemia on the regimen?  Yes states <70 2x per week   Missed doses on regimen?  Yes states he last took his tresiba last Thursday, unsure of last dose of rybelsus     Diabetes Complications include:     DKA Denies  RN Denies  PN Denies  Nephropathy +  CAD Denies    Complicating diabetes co morbidities:   Nephropathy, Glucocorticoid use, hyperglycemia       HPI:   Patient is a 38 y.o. male s/p DCD Ktxp d/t DM on 4/9/23 (Thymo, KDPI 40%, CIT 21 hrs). He now has CKD stage 3 - GFR 30-59 and his baseline creatinine is between 1.2-1.6. He takes mycophenolate mofetil, prednisone, and envarsus for maintenance immunosuppression. He now presents to the ED with complaints of N/V/D and weakness. Patient reports N/V/D started approximately 5 days ago on Friday. He reports bilious emesis, not able to hold food down. He does report being able to take his medications. Diarrhea is watery, non-bloody. He reports starting Rybelsus approx 3 weeks ago and believes this could be contributing to his symptoms. Endocrine consulted for BG management/recs.

## 2023-11-15 NOTE — FIRST PROVIDER EVALUATION
Emergency Department TeleTriage Encounter Note      CHIEF COMPLAINT    Chief Complaint   Patient presents with    Fatigue     Stating general weakness. States fell earlier and his whole body hurts. States weak due to dental abscess and he can't eat.       VITAL SIGNS   Initial Vitals [11/14/23 2028]   BP Pulse Resp Temp SpO2   (!) 146/91 73 12 99.9 °F (37.7 °C) 98 %      MAP       --            ALLERGIES    Review of patient's allergies indicates:   Allergen Reactions    Shrimp        PROVIDER TRIAGE NOTE  This is a teletriage evaluation of a 38 y.o. male presenting to the ED with c/o fatigue and body aches since this morning.  Pt states he hasn't been eating due to nausea.     PE: low grade temp in triage.  Appears fatigued.     Plan: labs, hydrate, influenza    Limited physical exam via telehealth: The patient is awake, alert, answering questions appropriately and is not in respiratory distress. All ED beds are full at present; patient notified of this status.  Patient seen and medically screened by Nurse Practitioner via teletriage.      Initial orders will be placed and care will be transferred to an alternate provider when patient is roomed for a full evaluation. Any additional orders and the final disposition will be determined by that provider.         ORDERS  Labs Reviewed   INFLUENZA A & B BY MOLECULAR   CBC W/ AUTO DIFFERENTIAL   COMPREHENSIVE METABOLIC PANEL   URINALYSIS, REFLEX TO URINE CULTURE   MAGNESIUM       ED Orders (720h ago, onward)      Start Ordered     Status Ordering Provider    11/14/23 2115 11/14/23 2113  lactated ringers bolus 1,000 mL  ED 1 Time         Ordered WILL GUZMAN    11/14/23 2115 11/14/23 2113  ondansetron injection 4 mg  ED 1 Time         Ordered WILL GUZMAN    11/14/23 2113 11/14/23 2112  Influenza A & B by Molecular  STAT         Ordered WILL GUZMAN    11/14/23 2113 11/14/23 2112  CBC auto differential  STAT         Ordered WILL GUZMAN    11/14/23 2113  11/14/23 2112  Comprehensive metabolic panel  STAT         Ordered WILL GUZMAN.    11/14/23 2113 11/14/23 2112  Urinalysis, Reflex to Urine Culture Urine, Clean Catch  STAT         Ordered WILL GUZMAN.    11/14/23 2113 11/14/23 2112  Magnesium  STAT         Ordered WILL GUZMAN              Virtual Visit Note: The provider triage portion of this emergency department evaluation and documentation was performed via [employer] (f[employer]), a HIPAA-compliant telemedicine application, in concert with a tele-presenter in the room. A face to face patient evaluation with one of my colleagues will occur once the patient is placed in an emergency department room.      DISCLAIMER: This note was prepared with [employer] voice recognition transcription software. Garbled syntax, mangled pronouns, and other bizarre constructions may be attributed to that software system.

## 2023-11-15 NOTE — ASSESSMENT & PLAN NOTE
- Suspect d/t volume loss from diarrhea, decreased PO intake  - Kidney US pending  - Infectious work-up started  - IVF hydration  - Daily renal panel

## 2023-11-15 NOTE — ASSESSMENT & PLAN NOTE
- S/p Ktxp 4/9/23 d/t DM  - Now w/ CKD 3, BL Cr 1.2-1.6  - Now admit with NETO and N/V/D  - Kidney US with elevated renal artery velocity. Plan for repeat tomorrow 11/16/23  - Continue to monitor

## 2023-11-15 NOTE — HOSPITAL COURSE
Patient admitted from ED with N/V/D, weakness, and dental pain. Kidney US obtained and showed possible renal artery stenosis but good waveforms. Repeat continues to show possible stenosis. Will repeat US outpatient. Creatinine improved with IVF. CMV PCR 104K, IV GCV started.      Interval History: No acute events overnight. Patient reports diarrhea is starting to improve. ID following, appreciate their assistance. Cont IV GCV.  CT max/fax to assess for abscess unremarkable. Cont 7 day course of Augmentin. VSS. Continue to monitor.

## 2023-11-15 NOTE — NURSING
Nurses Note -- 4 Eyes      11/15/2023   3:11 AM      Skin assessed during: Admit      [x] No Altered Skin Integrity Present    []Prevention Measures Documented      [] Yes- Altered Skin Integrity Present or Discovered   [] LDA Added if Not in Epic (Describe Wound)   [] New Altered Skin Integrity was Present on Admit and Documented in LDA   [] Wound Image Taken    Wound Care Consulted? No    Attending Nurse:  Wilbert Ramirez RN/Staff Member:  [email or web address]

## 2023-11-16 LAB
ALBUMIN SERPL BCP-MCNC: 3.2 G/DL (ref 3.5–5.2)
ANION GAP SERPL CALC-SCNC: 8 MMOL/L (ref 8–16)
BASOPHILS # BLD AUTO: 0.02 K/UL (ref 0–0.2)
BASOPHILS NFR BLD: 0.6 % (ref 0–1.9)
BUN SERPL-MCNC: 27 MG/DL (ref 6–20)
C DIFF GDH STL QL: NEGATIVE
C DIFF TOX A+B STL QL IA: NEGATIVE
CALCIUM SERPL-MCNC: 8.3 MG/DL (ref 8.7–10.5)
CHLORIDE SERPL-SCNC: 105 MMOL/L (ref 95–110)
CO2 SERPL-SCNC: 24 MMOL/L (ref 23–29)
CREAT SERPL-MCNC: 1.3 MG/DL (ref 0.5–1.4)
DIFFERENTIAL METHOD: ABNORMAL
EOSINOPHIL # BLD AUTO: 0 K/UL (ref 0–0.5)
EOSINOPHIL NFR BLD: 0.3 % (ref 0–8)
ERYTHROCYTE [DISTWIDTH] IN BLOOD BY AUTOMATED COUNT: 12.1 % (ref 11.5–14.5)
EST. GFR  (NO RACE VARIABLE): >60 ML/MIN/1.73 M^2
ESTIMATED AVG GLUCOSE: 146 MG/DL (ref 68–131)
GLUCOSE SERPL-MCNC: 115 MG/DL (ref 70–110)
HBA1C MFR BLD: 6.7 % (ref 4–5.6)
HCT VFR BLD AUTO: 38.4 % (ref 40–54)
HGB BLD-MCNC: 12.8 G/DL (ref 14–18)
IMM GRANULOCYTES # BLD AUTO: 0.03 K/UL (ref 0–0.04)
IMM GRANULOCYTES NFR BLD AUTO: 1 % (ref 0–0.5)
LYMPHOCYTES # BLD AUTO: 0.6 K/UL (ref 1–4.8)
LYMPHOCYTES NFR BLD: 19.5 % (ref 18–48)
MAGNESIUM SERPL-MCNC: 1.9 MG/DL (ref 1.6–2.6)
MCH RBC QN AUTO: 29.6 PG (ref 27–31)
MCHC RBC AUTO-ENTMCNC: 33.3 G/DL (ref 32–36)
MCV RBC AUTO: 89 FL (ref 82–98)
MONOCYTES # BLD AUTO: 0.7 K/UL (ref 0.3–1)
MONOCYTES NFR BLD: 23.4 % (ref 4–15)
NEUTROPHILS # BLD AUTO: 1.7 K/UL (ref 1.8–7.7)
NEUTROPHILS NFR BLD: 55.2 % (ref 38–73)
NRBC BLD-RTO: 0 /100 WBC
PHOSPHATE SERPL-MCNC: 2.1 MG/DL (ref 2.7–4.5)
PLATELET # BLD AUTO: 129 K/UL (ref 150–450)
PMV BLD AUTO: 11.4 FL (ref 9.2–12.9)
POCT GLUCOSE: 149 MG/DL (ref 70–110)
POCT GLUCOSE: 159 MG/DL (ref 70–110)
POTASSIUM SERPL-SCNC: 3.6 MMOL/L (ref 3.5–5.1)
RBC # BLD AUTO: 4.33 M/UL (ref 4.6–6.2)
SODIUM SERPL-SCNC: 137 MMOL/L (ref 136–145)
TACROLIMUS BLD-MCNC: 7.3 NG/ML (ref 5–15)
WBC # BLD AUTO: 3.08 K/UL (ref 3.9–12.7)
WBC #/AREA STL HPF: NORMAL /[HPF]

## 2023-11-16 PROCEDURE — 20600001 HC STEP DOWN PRIVATE ROOM

## 2023-11-16 PROCEDURE — 85025 COMPLETE CBC W/AUTO DIFF WBC: CPT | Performed by: CLINICAL NURSE SPECIALIST

## 2023-11-16 PROCEDURE — 63600175 PHARM REV CODE 636 W HCPCS: Performed by: PHYSICIAN ASSISTANT

## 2023-11-16 PROCEDURE — 99233 PR SUBSEQUENT HOSPITAL CARE,LEVL III: ICD-10-PCS | Mod: ,,, | Performed by: PHYSICIAN ASSISTANT

## 2023-11-16 PROCEDURE — 63600175 PHARM REV CODE 636 W HCPCS: Performed by: CLINICAL NURSE SPECIALIST

## 2023-11-16 PROCEDURE — 99233 SBSQ HOSP IP/OBS HIGH 50: CPT | Mod: ,,, | Performed by: PHYSICIAN ASSISTANT

## 2023-11-16 PROCEDURE — 25000003 PHARM REV CODE 250: Performed by: CLINICAL NURSE SPECIALIST

## 2023-11-16 PROCEDURE — 80197 ASSAY OF TACROLIMUS: CPT | Performed by: CLINICAL NURSE SPECIALIST

## 2023-11-16 PROCEDURE — 99223 PR INITIAL HOSPITAL CARE,LEVL III: ICD-10-PCS | Mod: ,,, | Performed by: INTERNAL MEDICINE

## 2023-11-16 PROCEDURE — 83735 ASSAY OF MAGNESIUM: CPT | Performed by: CLINICAL NURSE SPECIALIST

## 2023-11-16 PROCEDURE — 83036 HEMOGLOBIN GLYCOSYLATED A1C: CPT

## 2023-11-16 PROCEDURE — 36415 COLL VENOUS BLD VENIPUNCTURE: CPT | Performed by: CLINICAL NURSE SPECIALIST

## 2023-11-16 PROCEDURE — 80069 RENAL FUNCTION PANEL: CPT | Performed by: CLINICAL NURSE SPECIALIST

## 2023-11-16 PROCEDURE — 25500020 PHARM REV CODE 255: Performed by: STUDENT IN AN ORGANIZED HEALTH CARE EDUCATION/TRAINING PROGRAM

## 2023-11-16 PROCEDURE — 99223 1ST HOSP IP/OBS HIGH 75: CPT | Mod: ,,, | Performed by: INTERNAL MEDICINE

## 2023-11-16 PROCEDURE — 25000003 PHARM REV CODE 250: Performed by: PHYSICIAN ASSISTANT

## 2023-11-16 RX ORDER — SODIUM CHLORIDE 9 MG/ML
INJECTION, SOLUTION INTRAVENOUS CONTINUOUS
Status: DISCONTINUED | OUTPATIENT
Start: 2023-11-16 | End: 2023-11-18 | Stop reason: HOSPADM

## 2023-11-16 RX ADMIN — AMPICILLIN AND SULBACTAM 1.5 G: 1; .5 INJECTION, POWDER, FOR SOLUTION INTRAVENOUS at 10:11

## 2023-11-16 RX ADMIN — GANCICLOVIR SODIUM 348 MG: 500 INJECTION, POWDER, LYOPHILIZED, FOR SOLUTION INTRAVENOUS at 04:11

## 2023-11-16 RX ADMIN — PREDNISONE 5 MG: 5 TABLET ORAL at 09:11

## 2023-11-16 RX ADMIN — TACROLIMUS 5 MG: 4 TABLET, EXTENDED RELEASE ORAL at 05:11

## 2023-11-16 RX ADMIN — HEPARIN SODIUM 5000 UNITS: 5000 INJECTION INTRAVENOUS; SUBCUTANEOUS at 08:11

## 2023-11-16 RX ADMIN — ACETAMINOPHEN 650 MG: 325 TABLET ORAL at 05:11

## 2023-11-16 RX ADMIN — HEPARIN SODIUM 5000 UNITS: 5000 INJECTION INTRAVENOUS; SUBCUTANEOUS at 05:11

## 2023-11-16 RX ADMIN — AMPICILLIN AND SULBACTAM 1.5 G: 1; .5 INJECTION, POWDER, FOR SOLUTION INTRAVENOUS at 04:11

## 2023-11-16 RX ADMIN — DIBASIC SODIUM PHOSPHATE, MONOBASIC POTASSIUM PHOSPHATE AND MONOBASIC SODIUM PHOSPHATE 2 TABLET: 852; 155; 130 TABLET ORAL at 11:11

## 2023-11-16 RX ADMIN — NIFEDIPINE 30 MG: 30 TABLET, FILM COATED, EXTENDED RELEASE ORAL at 08:11

## 2023-11-16 RX ADMIN — ATORVASTATIN CALCIUM 10 MG: 10 TABLET, FILM COATED ORAL at 08:11

## 2023-11-16 RX ADMIN — DULOXETINE 30 MG: 30 CAPSULE, DELAYED RELEASE ORAL at 09:11

## 2023-11-16 RX ADMIN — TACROLIMUS 2 MG: 1 TABLET, EXTENDED RELEASE ORAL at 12:11

## 2023-11-16 RX ADMIN — IOHEXOL 75 ML: 350 INJECTION, SOLUTION INTRAVENOUS at 08:11

## 2023-11-16 RX ADMIN — SODIUM CHLORIDE: 9 INJECTION, SOLUTION INTRAVENOUS at 12:11

## 2023-11-16 RX ADMIN — HEPARIN SODIUM 5000 UNITS: 5000 INJECTION INTRAVENOUS; SUBCUTANEOUS at 03:11

## 2023-11-16 RX ADMIN — OXYCODONE HYDROCHLORIDE AND ACETAMINOPHEN 1 TABLET: 5; 325 TABLET ORAL at 09:11

## 2023-11-16 RX ADMIN — NIFEDIPINE 60 MG: 30 TABLET, FILM COATED, EXTENDED RELEASE ORAL at 09:11

## 2023-11-16 RX ADMIN — AMPICILLIN AND SULBACTAM 1.5 G: 1; .5 INJECTION, POWDER, FOR SOLUTION INTRAVENOUS at 11:11

## 2023-11-16 RX ADMIN — DIBASIC SODIUM PHOSPHATE, MONOBASIC POTASSIUM PHOSPHATE AND MONOBASIC SODIUM PHOSPHATE 2 TABLET: 852; 155; 130 TABLET ORAL at 08:11

## 2023-11-16 RX ADMIN — OXYCODONE HYDROCHLORIDE AND ACETAMINOPHEN 1 TABLET: 5; 325 TABLET ORAL at 03:11

## 2023-11-16 RX ADMIN — SODIUM CHLORIDE, POTASSIUM CHLORIDE, SODIUM LACTATE AND CALCIUM CHLORIDE: 600; 310; 30; 20 INJECTION, SOLUTION INTRAVENOUS at 11:11

## 2023-11-16 RX ADMIN — GANCICLOVIR SODIUM 174 MG: 500 INJECTION, POWDER, LYOPHILIZED, FOR SOLUTION INTRAVENOUS at 05:11

## 2023-11-16 RX ADMIN — SODIUM CHLORIDE: 9 INJECTION, SOLUTION INTRAVENOUS at 08:11

## 2023-11-16 NOTE — ASSESSMENT & PLAN NOTE
- Placed on Augmentin outpatient by Dentist  - IV Unasyn started  - Ct max/facial to assess for any abscess pending  - Continue to monitor

## 2023-11-16 NOTE — HPI
39 y/o M h/o DM, HTN c/b ESRD s/p DCD KT 4/9/23 (CMV D+/R-, thymo induction, MMF/everolimus) presented 11/15 with n/v/d and left sided tooth pain (seen by dentist who told patient teeth will need to be pulled) also found to have CMV quant of 104K,  CT without evidence of dental abscess

## 2023-11-16 NOTE — ASSESSMENT & PLAN NOTE
- S/p Ktxp 4/9/23 d/t DM  - Now w/ CKD 3, BL Cr 1.2-1.6  - Now admit with NETO and N/V/D  - Kidney US with elevated renal artery velocity. Repeat on 11/16/23 still showing possible stenosis but good waveforms  - Continue to monitor

## 2023-11-16 NOTE — SUBJECTIVE & OBJECTIVE
"Past Medical History:   Diagnosis Date    Allergy     Anemia     Diabetes mellitus     Disorder of kidney and ureter     Hyperlipidemia     Hypertension     Secondary hyperparathyroidism        Past Surgical History:   Procedure Laterality Date    INSERTION OF DIALYSIS CATHETER      KIDNEY TRANSPLANT N/A 4/9/2023    Procedure: TRANSPLANT, KIDNEY;  Surgeon: Julito Saleh MD;  Location: [facility] OR [address] [place] [address];  Service: Transplant;  Laterality: N/A;  Off Ice - 0919  Reperfused - 0945       Review of patient's allergies indicates:   Allergen Reactions    Shrimp        Medications:  Medications Prior to Admission   Medication Sig    acetaminophen-codeine 300-30mg (TYLENOL #3) 300-30 mg Tab Take 1 tablet by mouth every 4 (four) hours as needed.    atorvastatin (LIPITOR) 10 MG tablet Take 1 tablet (10 mg total) by mouth every evening. (Patient taking differently: Take 10 mg by mouth every evening. Needs refill)    BD ULTRA-FINE SILVIA PEN NEEDLE 32 gauge x 5/32" Ndle Use with insulin 4 times daily.    blood sugar diagnostic Strp Test blood sugar 4 times a day. (Patient not taking: Reported on 10/17/2023)    blood-glucose meter Misc Use as instructed to test blood sugar daily (Patient not taking: Reported on 10/17/2023)    cyclobenzaprine (FLEXERIL) 10 MG tablet Take 10 mg by mouth daily as needed.    DULoxetine (CYMBALTA) 30 MG capsule Take 30 mg by mouth.    flash glucose scanning reader (FREESTYLE SELMA 2 READER) Misc Use as directed.    flash glucose sensor (FREESTYLE SELMA 2 SENSOR) Kit Change every 14 day.    hydroCHLOROthiazide (HYDRODIURIL) 25 MG tablet Take 0.5 tablets (12.5 mg total) by mouth once daily.    lancets Misc Test blood sugar 3 (three) times daily. (Patient not taking: Reported on 10/17/2023)    mycophenolate (CELLCEPT) 250 mg Cap Take 4 capsules (1,000 mg total) by mouth 2 (two) times daily. Txp Date:4/9/2023 (Kidney) Disch Date:4/13/2023 ICD10:Z94.0 Txp Location:[facility]    NIFEdipine (PROCARDIA-XL) 30 MG (OSM) " 24 hr tablet Take 1 tablet (30 mg total) by mouth nightly.    NIFEdipine (PROCARDIA-XL) 60 MG (OSM) 24 hr tablet Take 1 tablet (60 mg total) by mouth once daily.    NOVOLOG FLEXPEN U-100 INSULIN 100 unit/mL (3 mL) InPn pen Inject 4 Units into the skin 3 (three) times daily with meals.    oxyCODONE-acetaminophen (PERCOCET) 5-325 mg per tablet Take 1 tablet by mouth.    prednisoLONE (MILLIPRED) 5 mg Tab     predniSONE (DELTASONE) 5 MG tablet Take 1 tablet (5 mg total) by mouth once daily.    pregabalin (LYRICA) 75 MG capsule Take 1 capsule (75 mg total) by mouth 3 (three) times daily.    semaglutide (RYBELSUS) 3 mg tablet Take 1 tablet (3 mg total) by mouth once daily.    tacrolimus XR, ENVARSUS, (ENVARSUS XR) 1 mg Tb24 Take 5 tablets (5 mg total) by mouth every morning.    traMADoL (ULTRAM) 50 mg tablet Take 50 mg by mouth every 4 (four) hours as needed.    TRESIBA FLEXTOUCH U-100 100 unit/mL (3 mL) insulin pen Inject 12 Units into the skin once daily.     Antibiotics (From admission, onward)      Start     Stop Route Frequency Ordered    11/15/23 1045  ampicillin-sulbactam (UNASYN) 1.5 g in sodium chloride 0.9 % 100 mL IVPB (MB+)         -- IV Every 6 hours (non-standard times) 11/15/23 0938          Antifungals (From admission, onward)      None          Antivirals (From admission, onward)          Stop Route Frequency     ganciclovir (CYTOVENE) injection         -- IV Every 12 hours (non-standard times)               There is no immunization history on file for this patient.    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   HENT:  Positive for dental problem. Negative for congestion, mouth sores and sinus pressure.    Eyes:  Negative for pain, redness and visual disturbance.   Respiratory:  Negative for cough, shortness of breath and wheezing.    Cardiovascular:  Negative for chest pain and leg swelling.   Gastrointestinal:  Negative for abdominal distention, abdominal pain, diarrhea, nausea and vomiting.   Endocrine: Negative for polyuria.   Genitourinary:  Negative for decreased urine volume, dysuria and frequency.   Musculoskeletal:  Negative for joint swelling.   Skin:  Negative for color change.   Allergic/Immunologic: Negative for food allergies.   Neurological:  Negative for dizziness, weakness and headaches.   Hematological:  Negative for adenopathy.   Psychiatric/Behavioral:  Negative for agitation and confusion. The patient is not nervous/anxious.      Objective:     Vital Signs (Most Recent):  Temp: 98.3 °F (36.8 °C) (11/16/23 1208)  Pulse: 68 (11/16/23 1208)  Resp: 18 (11/16/23 1208)  BP: 119/71 (11/16/23 1208)  SpO2: 99 % (11/16/23 1208) Vital Signs (24h Range):  Temp:  [97.7 °F (36.5 °C)-99.4 °F (37.4 °C)] 98.3 °F (36.8 °C)  Pulse:  [65-93] 68  Resp:  [16-18] 18  SpO2:  [96 %-99 %] 99 %  BP: (119-137)/(71-82) 119/71     Weight: 72.6 kg (160 lb 0.9 oz)  Body mass index is 21.12 kg/m².    Estimated Creatinine Clearance: 78.3 mL/min (based on SCr of 1.3 mg/dL).     Physical Exam  Constitutional:       Appearance: He is well-developed.   HENT:      Head: Normocephalic and atraumatic.   Eyes:      Conjunctiva/sclera: Conjunctivae normal.   Cardiovascular:      Rate and Rhythm: Normal rate and regular rhythm.      Heart sounds: Normal heart sounds. No murmur heard.  Pulmonary:      Effort: Pulmonary effort is normal. No respiratory distress.      Breath sounds: Normal breath sounds. No wheezing.    Abdominal:      General: Bowel sounds are normal. There is no distension.      Palpations: Abdomen is soft.      Tenderness: There is no abdominal tenderness.   Musculoskeletal:         General: No tenderness. Normal range of motion.      Cervical back: Neck supple.   Lymphadenopathy:      Cervical: No cervical adenopathy.   Skin:     General: Skin is warm and dry.      Findings: No rash.      Comments: L face swelling    Neurological:      Mental Status: He is alert and oriented to person, place, and time.      Coordination: Coordination normal.   Psychiatric:         Behavior: Behavior normal.          Significant Labs: CBC:   Recent Labs   Lab 11/14/23  2129 11/15/23  0626 11/16/23  0625   WBC 4.01 3.13* 3.08*   HGB 14.3 12.8* 12.8*   HCT 41.8 38.4* 38.4*   * 123* 129*     CMP:   Recent Labs   Lab 11/14/23  2129 11/15/23  0626 11/16/23  0625    137 137   K 4.7 3.6 3.6    104 105   CO2 21* 21* 24   * 114* 115*   BUN 48* 44* 27*   CREATININE 2.7* 2.3* 1.3   CALCIUM 9.0 8.3* 8.3*   PROT 7.2  --   --    ALBUMIN 4.1 3.4* 3.2*   BILITOT 1.1*  --   --    ALKPHOS 66  --   --    AST 33  --   --    ALT 36  --   --    ANIONGAP 13 12 8       Significant Imaging: I have reviewed all pertinent imaging results/findings within the past 24 hours.

## 2023-11-16 NOTE — CARE UPDATE
-Glucose Goal 140-180    -A1C:   Hemoglobin A1C   Date Value Ref Range Status   11/16/2023 6.7 (H) 4.0 - 5.6 % Final     Comment:     ADA Screening Guidelines:  5.7-6.4%  Consistent with prediabetes  >or=6.5%  Consistent with diabetes    High levels of fetal hemoglobin interfere with the HbA1C  assay. Heterozygous hemoglobin variants (HbS, HgC, etc)do  not significantly interfere with this assay.   However, presence of multiple variants may affect accuracy.           -HOME REGIMEN:   Rybelsus 3mg daily   Tresiba 12 units daily  Novolog 4 units before meals only    -GLUCOSE TREND FOR THE PAST 24HRS:   Recent Labs   Lab 11/15/23  0130 11/15/23  0829 11/15/23  1300 11/15/23  1737 11/15/23  2022   POCTGLUCOSE 95 118* 148* 157* 158*         -NO HYPOGYCEMIAS NOTED     - Diet  Diet renal    -TOLERATING <25 % OF PO DIET     -NPO? No     -Steroids - Prednisone 5 mg QD    -Tube Feeds - None         Plan:   - Continue LDC (150/50) prn for hyperglycemia- will continue to monitor for additional insulin requirements once appetite improves as patient on insulin at baseline   - POCT Glucose before meals and at bedtime  - Hypoglycemia protocol in place      ** Please notify Endocrine for any change and/or advance in diet**  ** Please call Endocrine for any BG related issues **     Discharge Planning:   TBD. Please notify endocrinology prior to discharge. D/c rybelsus.

## 2023-11-16 NOTE — SUBJECTIVE & OBJECTIVE
Subjective:   History of Present Illness:  Mr. Reddy is a 37 yo male s/p DCD Ktxp d/t DM on 4/9/23 (Thymo, KDPI 40%, CIT 21 hrs). He now has CKD stage 3 - GFR 30-59 and his baseline creatinine is between 1.2-1.6. He takes mycophenolate mofetil, prednisone, and envarsus for maintenance immunosuppression. He now presents to the ED with complaints of N/V/D and weakness. Patient reports N/V/D started approximately 5 days ago on Friday. He reports bilious emesis, not able to hold food down. He does report being able to take his medications. Diarrhea is watery, non-bloody. He reports starting Rybelsus approx 3 weeks ago and believes this could be contributing to his symptoms. Patient also reports left sided tooth pain one day after N/V/D started. He was seen by his dentist and pt states he was told he has 2 teeth which need to be pulled but no active infection. He was prescribed prophylactic amoxicillin. Tooth pain also limiting PO intake. Pt reports his overall weakness has worsened past 24-48 hours prompting ER visit. He reports a fall earlier today d/t weakness, denies hitting his head or LOC. Labs in ED notable for NETO, Cr 2.7. Low grade 99.9 temp in ED. Pt denies chills, fevers, SOB, chest pain, dysuria. Plan discussed with Dr. Gonzalez. Plan discussed with Dr. Gonzalez, suspect NETO from dehydration from decreased PO intake, ongoing diarrhea. Plan for infectious work-up including blood/urine cx, CXR, stool studies and CMV. Obtain Kidney US. IVF hydration.     Mr. Reddy is a 38 y.o. year old male who is status post Kidney Transplant - 4/9/2023  (#1).    His maintenance immunosuppression consists of:   Immunosuppressants (From admission, onward)      Start     Stop Route Frequency Ordered    11/17/23 0700  tacrolimus XR (ENVARSUS) 24 hr tablet 7 mg         -- Oral Every morning 11/16/23 1111            Hospital Course:  Patient admitted from ED with N/V/D, weakness, and dental pain. Kidney US obtained and showed  possible renal artery stenosis but good waveforms. Repeat continues to show possible stenosis. Creatinine improved with IVF. 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 IV GCV started.     Interval History: No acute events overnight. Patient reports some improvement in N/V. Still with diarrhea. ID consulted. Went for CT max/fax to assess for abscess, read pending. Continue to monitor.    Past Medical, Surgical, Family, and Social History:   Unchanged from H&P.    Scheduled Meds:   ampicillin-sulbactim (UNASYN) IVPB  1.5 g Intravenous Q6H    atorvastatin  10 mg Oral QHS    DULoxetine  30 mg Oral Daily    ganciclovir (CYTOVENE) 348 mg in sodium chloride 0.9% 100 mL IVPB  5 mg/kg Intravenous Q12H    heparin (porcine)  5,000 Units Subcutaneous Q8H    k phos di & mono-sod phos mono  500 mg Oral BID    NIFEdipine  30 mg Oral Nightly    NIFEdipine  60 mg Oral Daily    predniSONE  5 mg Oral Daily    [START ON 11/17/2023] tacrolimus XR (ENVARSUS)  7 mg Oral QAM     Continuous Infusions:   sodium chloride 0.9% 75 mL/hr at 11/16/23 1252     PRN Meds:acetaminophen, cyclobenzaprine, dextrose 10%, dextrose 10%, glucagon (human recombinant), glucose, glucose, insulin aspart U-100, melatonin, oxyCODONE-acetaminophen, prochlorperazine, sodium chloride 0.9%    Intake/Output - Last 3 Shifts         11/14 0700  11/15 0659 11/15 0700  11/16 0659 11/16 0700  11/17 0659    P.O. 30 240     I.V. (mL/kg) 413.9 (6) 2993.2 (41.2)     IV Piggyback 1000 553.8 100    Total Intake(mL/kg) 1443.9 (20.8) 3787 (52.2) 100 (1.4)    Urine (mL/kg/hr) 100 1000 (0.6)     Stool  0     Total Output 100 1000     Net +1343.9 +2787 +100           Urine Occurrence  1 x     Stool Occurrence  1 x              Review of Systems   Constitutional:  Positive for activity change.   HENT:  Positive for dental problem.    Respiratory:  Negative for shortness of breath.    Cardiovascular:  Negative for leg swelling.   Gastrointestinal:  Positive for diarrhea, nausea and vomiting.  "Negative for abdominal distention and abdominal pain.   Genitourinary:  Negative for difficulty urinating.   Allergic/Immunologic: Positive for immunocompromised state.   Psychiatric/Behavioral:  Negative for confusion.       Objective:     Vital Signs (Most Recent):  Temp: 98.3 °F (36.8 °C) (11/16/23 1208)  Pulse: 68 (11/16/23 1208)  Resp: 18 (11/16/23 1208)  BP: 119/71 (11/16/23 1208)  SpO2: 99 % (11/16/23 1208) Vital Signs (24h Range):  Temp:  [97.7 °F (36.5 °C)-99.4 °F (37.4 °C)] 98.3 °F (36.8 °C)  Pulse:  [65-93] 68  Resp:  [16-18] 18  SpO2:  [96 %-99 %] 99 %  BP: (119-137)/(71-82) 119/71     Weight: 72.6 kg (160 lb 0.9 oz)  Height: 6' 1" (185.4 cm)  Body mass index is 21.12 kg/m².     Physical Exam  Vitals and nursing note reviewed.   HENT:      Mouth/Throat:      Dentition: Dental caries present.   Cardiovascular:      Rate and Rhythm: Normal rate.   Pulmonary:      Effort: Pulmonary effort is normal.   Abdominal:      Tenderness: There is no abdominal tenderness. There is no guarding.   Neurological:      Mental Status: He is alert and oriented to person, place, and time.   Psychiatric:         Mood and Affect: Mood normal.          Laboratory:  CBC:   Recent Labs   Lab 11/14/23  2129 11/15/23  0626 11/16/23  0625   WBC 4.01 3.13* 3.08*   RBC 4.72 4.33* 4.33*   HGB 14.3 12.8* 12.8*   HCT 41.8 38.4* 38.4*   * 123* 129*   MCV 89 89 89   MCH 30.3 29.6 29.6   MCHC 34.2 33.3 33.3       BMP:   Recent Labs   Lab 11/14/23  2129 11/15/23  0626 11/16/23  0625   * 114* 115*    137 137   K 4.7 3.6 3.6    104 105   CO2 21* 21* 24   BUN 48* 44* 27*   CREATININE 2.7* 2.3* 1.3   CALCIUM 9.0 8.3* 8.3*         Diagnostic Results:  None  "

## 2023-11-16 NOTE — PROGRESS NOTES
[facility] - Transplant Stepdown  Kidney Transplant  Progress Note      Reason for Follow-up: Reassessment of Kidney Transplant - 4/9/2023  (#1) recipient and management of immunosuppression.         Subjective:   History of Present Illness:  Mr. Reddy is a 37 yo male s/p DCD Ktxp d/t DM on 4/9/23 (Thymo, KDPI 40%, CIT 21 hrs). He now has CKD stage 3 - GFR 30-59 and his baseline creatinine is between 1.2-1.6. He takes mycophenolate mofetil, prednisone, and envarsus for maintenance immunosuppression. He now presents to the ED with complaints of N/V/D and weakness. Patient reports N/V/D started approximately 5 days ago on Friday. He reports bilious emesis, not able to hold food down. He does report being able to take his medications. Diarrhea is watery, non-bloody. He reports starting Rybelsus approx 3 weeks ago and believes this could be contributing to his symptoms. Patient also reports left sided tooth pain one day after N/V/D started. He was seen by his dentist and pt states he was told he has 2 teeth which need to be pulled but no active infection. He was prescribed prophylactic amoxicillin. Tooth pain also limiting PO intake. Pt reports his overall weakness has worsened past 24-48 hours prompting ER visit. He reports a fall earlier today d/t weakness, denies hitting his head or LOC. Labs in ED notable for NETO, Cr 2.7. Low grade 99.9 temp in ED. Pt denies chills, fevers, SOB, chest pain, dysuria. Plan discussed with Dr. Gonzalez. Plan discussed with Dr. Gonzalez, suspect NETO from dehydration from decreased PO intake, ongoing diarrhea. Plan for infectious work-up including blood/urine cx, CXR, stool studies and CMV. Obtain Kidney US. IVF hydration.     Mr. Reddy is a 38 y.o. year old male who is status post Kidney Transplant - 4/9/2023  (#1).    His maintenance immunosuppression consists of:   Immunosuppressants (From admission, onward)      Start     Stop Route Frequency Ordered    11/17/23 0700  tacrolimus XR  (ENVARSUS) 24 hr tablet 7 mg         -- Oral Every morning 11/16/23 1111            Hospital Course:  Patient admitted from ED with N/V/D, weakness, and dental pain. Kidney US obtained and showed possible renal artery stenosis but good waveforms. Repeat continues to show possible stenosis. Creatinine improved with IVF. 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 IV GCV started.     Interval History: No acute events overnight. Patient reports some improvement in N/V. Still with diarrhea. ID consulted. Went for CT max/fax to assess for abscess, read pending. Continue to monitor.    Past Medical, Surgical, Family, and Social History:   Unchanged from H&P.    Scheduled Meds:   ampicillin-sulbactim (UNASYN) IVPB  1.5 g Intravenous Q6H    atorvastatin  10 mg Oral QHS    DULoxetine  30 mg Oral Daily    ganciclovir (CYTOVENE) 348 mg in sodium chloride 0.9% 100 mL IVPB  5 mg/kg Intravenous Q12H    heparin (porcine)  5,000 Units Subcutaneous Q8H    k phos di & mono-sod phos mono  500 mg Oral BID    NIFEdipine  30 mg Oral Nightly    NIFEdipine  60 mg Oral Daily    predniSONE  5 mg Oral Daily    [START ON 11/17/2023] tacrolimus XR (ENVARSUS)  7 mg Oral QAM     Continuous Infusions:   sodium chloride 0.9% 75 mL/hr at 11/16/23 1252     PRN Meds:acetaminophen, cyclobenzaprine, dextrose 10%, dextrose 10%, glucagon (human recombinant), glucose, glucose, insulin aspart U-100, melatonin, oxyCODONE-acetaminophen, prochlorperazine, sodium chloride 0.9%    Intake/Output - Last 3 Shifts         11/14 0700  11/15 0659 11/15 0700  11/16 0659 11/16 0700  11/17 0659    P.O. 30 240     I.V. (mL/kg) 413.9 (6) 2993.2 (41.2)     IV Piggyback 1000 553.8 100    Total Intake(mL/kg) 1443.9 (20.8) 3787 (52.2) 100 (1.4)    Urine (mL/kg/hr) 100 1000 (0.6)     Stool  0     Total Output 100 1000     Net +1343.9 +2787 +100           Urine Occurrence  1 x     Stool Occurrence  1 x              Review of Systems   Constitutional:  Positive for activity change.  "  HENT:  Positive for dental problem.    Respiratory:  Negative for shortness of breath.    Cardiovascular:  Negative for leg swelling.   Gastrointestinal:  Positive for diarrhea, nausea and vomiting. Negative for abdominal distention and abdominal pain.   Genitourinary:  Negative for difficulty urinating.   Allergic/Immunologic: Positive for immunocompromised state.   Psychiatric/Behavioral:  Negative for confusion.       Objective:     Vital Signs (Most Recent):  Temp: 98.3 °F (36.8 °C) (11/16/23 1208)  Pulse: 68 (11/16/23 1208)  Resp: 18 (11/16/23 1208)  BP: 119/71 (11/16/23 1208)  SpO2: 99 % (11/16/23 1208) Vital Signs (24h Range):  Temp:  [97.7 °F (36.5 °C)-99.4 °F (37.4 °C)] 98.3 °F (36.8 °C)  Pulse:  [65-93] 68  Resp:  [16-18] 18  SpO2:  [96 %-99 %] 99 %  BP: (119-137)/(71-82) 119/71     Weight: 72.6 kg (160 lb 0.9 oz)  Height: 6' 1" (185.4 cm)  Body mass index is 21.12 kg/m².    Physical Exam  Vitals and nursing note reviewed.   HENT:      Mouth/Throat:      Dentition: Dental caries present.   Cardiovascular:      Rate and Rhythm: Normal rate.   Pulmonary:      Effort: Pulmonary effort is normal.   Abdominal:      Tenderness: There is no abdominal tenderness. There is no guarding.   Neurological:      Mental Status: He is alert and oriented to person, place, and time.   Psychiatric:         Mood and Affect: Mood normal.          Laboratory:  CBC:   Recent Labs   Lab 11/14/23  2129 11/15/23  0626 11/16/23  0625   WBC 4.01 3.13* 3.08*   RBC 4.72 4.33* 4.33*   HGB 14.3 12.8* 12.8*   HCT 41.8 38.4* 38.4*   * 123* 129*   MCV 89 89 89   MCH 30.3 29.6 29.6   MCHC 34.2 33.3 33.3       BMP:   Recent Labs   Lab 11/14/23  2129 11/15/23  0626 11/16/23  0625   * 114* 115*    137 137   K 4.7 3.6 3.6    104 105   CO2 21* 21* 24   BUN 48* 44* 27*   CREATININE 2.7* 2.3* 1.3   CALCIUM 9.0 8.3* 8.3*         Diagnostic Results:  None  Assessment/Plan:     * Cytomegalovirus (CMV) viremia  - Symptoms " "present for several days  - Pt reports symptoms developed shortly after starting Rybelsus. Rybelsus now stopped  - CMV PCR +, starting IV GCV. ID consulted  - Continue IVF hydration      Dental caries  - Placed on Augmentin outpatient by Dentist  - IV Unasyn started  - Ct max/facial to assess for any abscess pending  - Continue to monitor       Malnutrition of moderate degree  Nutrition Problem:  moderate  Malnutrition in the context of Acute Illness/Injury  Related to (etiology): Decreased ability to consume sufficient energy    Signs and Symptoms (as evidenced by):  Body Fat Depletion: moderate  Muscle Mass Depletion: mild depletion of lower extremities      Interventions (treatment strategies): protein supplements and dietary consulted and following       NETO (acute kidney injury)  - Suspect d/t volume loss from diarrhea, decreased PO intake  - Kidney US with elevated velocity concerning for renal stenosis.   - Improved with IVF  - Daily renal panel    At risk for opportunistic infections  - OI prophylaxis per transplant protocol      Prophylactic immunotherapy  - Continue envarsus and prednisone  - Check prograf level daily and adjust for therapeutic dosage. Monitor for toxic side effects   - Hold Cellcept d/t GI symptoms/CMV PCR      Long-term use of immunosuppressant medication  - see "prophylactic immunotherapy"      S/P kidney transplant  - S/p Ktxp 4/9/23 d/t DM  - Now w/ CKD 3, BL Cr 1.2-1.6  - Now admit with NETO and N/V/D  - Kidney US with elevated renal artery velocity. Repeat on 11/16/23 still showing possible stenosis but good waveforms  - Continue to monitor       Hyperlipidemia  - continue statin      Type 2 diabetes mellitus, with long-term current use of insulin  - Endocrine consulted ,appreciate assistance          Discharge Planning: Not a candidate for discharge at this time     Medical decision making for this encounter includes review of pertinent labs and diagnostic studies, assessment and " planning, discussions with consulting providers, discussion with patient/family, and participation in multidisciplinary rounds. Time spent caring for patient: > 90 minutes    Wendy Lin, PALilaC  Kidney Transplant  [facility] - Transplant Stepdown

## 2023-11-16 NOTE — PLAN OF CARE
Pt aaox4.  Bed in low and locked position.  Nonskid socks in use.  Call bell, phone, food, drink withi reach in bed or on bedside table.  Mother at bedside.  Both aware to call if needing assistance.  Denies pain.  Accuchecks ac/hs.  LR @ 125cc/hr.  Ganc Q12 IVPB and unasyn Q6hrs started.  Stool send for labs.  ID consulted.  L piv x2.  R ++.  Advanced to renal diet.  See flowsheet for full assessment and details.

## 2023-11-16 NOTE — ASSESSMENT & PLAN NOTE
39 y/o M h/o DM, HTN c/b ESRD s/p DCD KT 4/9/23 (CMV D+/R-, thymo induction, MMF/everolimus) presented 11/15 with n/v/d and left sided tooth pain (seen by dentist who told patient teeth will need to be pulled) also found to have CMV quant of 104K,  CT without evidence of dental abscess  - continue ampsulbactam for dental infection  - continue IV GCV for CMV syndrome  - discussed with team will follow

## 2023-11-16 NOTE — ASSESSMENT & PLAN NOTE
- Suspect d/t volume loss from diarrhea, decreased PO intake  - Kidney US with elevated velocity concerning for renal stenosis.   - Improved with IVF  - Daily renal panel

## 2023-11-16 NOTE — CONSULTS
[facility] - Transplant Stepdown  Infectious Disease  Consult Note    Patient Name: Andrey Reddy  MRN: [id number]  Admission Date: 11/14/2023  Hospital Length of Stay: 1 days  Attending Physician: Mannie Gonzalez Jr.*  Primary Care Provider: Letty Reddy NP     Isolation Status: No active isolations    Patient information was obtained from past medical records.      Inpatient consult to Infectious Diseases  Consult performed by: Rizwan Mcmillan MD  Consult ordered by: Wendy Lin PA-C  Reason for consult: cmv      Assessment/Plan:     ID  * Cytomegalovirus (CMV) viremia  39 y/o M h/o DM, HTN c/b ESRD s/p DCD KT 4/9/23 (CMV D+/R-, thymo induction, MMF/everolimus) presented 11/15 with n/v/d and left sided tooth pain (seen by dentist who told patient teeth will need to be pulled) also found to have CMV quant of 104K,  CT without evidence of dental abscess  - continue ampsulbactam for dental infection  - continue IV GCV for CMV syndrome  - discussed with team will follow        Thank you for your consult. I will follow-up with patient. Please contact us if you have any additional questions.    Rizwan Mcmillan MD  Infectious Disease  [facility] - Transplant Stepdown    Subjective:     Principal Problem: Cytomegalovirus (CMV) viremia    HPI: 39 y/o M h/o DM, HTN c/b ESRD s/p DCD KT 4/9/23 (CMV D+/R-, thymo induction, MMF/everolimus) presented 11/15 with n/v/d and left sided tooth pain (seen by dentist who told patient teeth will need to be pulled) also found to have CMV quant of 104K,  CT without evidence of dental abscess    Past Medical History:   Diagnosis Date    Allergy     Anemia     Diabetes mellitus     Disorder of kidney and ureter     Hyperlipidemia     Hypertension     Secondary hyperparathyroidism        Past Surgical History:   Procedure Laterality Date    INSERTION OF DIALYSIS CATHETER      KIDNEY TRANSPLANT N/A 4/9/2023    Procedure: TRANSPLANT, KIDNEY;  Surgeon: Julito Saleh,  "MD;  Location: [facility] OR [address] [place] [address];  Service: Transplant;  Laterality: N/A;  Off Ice - 0919  Reperfused - 0945       Review of patient's allergies indicates:   Allergen Reactions    Shrimp        Medications:  Medications Prior to Admission   Medication Sig    acetaminophen-codeine 300-30mg (TYLENOL #3) 300-30 mg Tab Take 1 tablet by mouth every 4 (four) hours as needed.    atorvastatin (LIPITOR) 10 MG tablet Take 1 tablet (10 mg total) by mouth every evening. (Patient taking differently: Take 10 mg by mouth every evening. Needs refill)    BD ULTRA-FINE SILVIA PEN NEEDLE 32 gauge x 5/32" Ndle Use with insulin 4 times daily.    blood sugar diagnostic Strp Test blood sugar 4 times a day. (Patient not taking: Reported on 10/17/2023)    blood-glucose meter Misc Use as instructed to test blood sugar daily (Patient not taking: Reported on 10/17/2023)    cyclobenzaprine (FLEXERIL) 10 MG tablet Take 10 mg by mouth daily as needed.    DULoxetine (CYMBALTA) 30 MG capsule Take 30 mg by mouth.    flash glucose scanning reader (FREESTYLE SELMA 2 READER) Misc Use as directed.    flash glucose sensor (FREESTYLE SELMA 2 SENSOR) Kit Change every 14 day.    hydroCHLOROthiazide (HYDRODIURIL) 25 MG tablet Take 0.5 tablets (12.5 mg total) by mouth once daily.    lancets Misc Test blood sugar 3 (three) times daily. (Patient not taking: Reported on 10/17/2023)    mycophenolate (CELLCEPT) 250 mg Cap Take 4 capsules (1,000 mg total) by mouth 2 (two) times daily. Txp Date:4/9/2023 (Kidney) Disch Date:4/13/2023 ICD10:Z94.0 Txp Location:[facility]    NIFEdipine (PROCARDIA-XL) 30 MG (OSM) 24 hr tablet Take 1 tablet (30 mg total) by mouth nightly.    NIFEdipine (PROCARDIA-XL) 60 MG (OSM) 24 hr tablet Take 1 tablet (60 mg total) by mouth once daily.    NOVOLOG FLEXPEN U-100 INSULIN 100 unit/mL (3 mL) InPn pen Inject 4 Units into the skin 3 (three) times daily with meals.    oxyCODONE-acetaminophen (PERCOCET) 5-325 mg per tablet Take 1 tablet " by mouth.    prednisoLONE (MILLIPRED) 5 mg Tab     predniSONE (DELTASONE) 5 MG tablet Take 1 tablet (5 mg total) by mouth once daily.    pregabalin (LYRICA) 75 MG capsule Take 1 capsule (75 mg total) by mouth 3 (three) times daily.    semaglutide (RYBELSUS) 3 mg tablet Take 1 tablet (3 mg total) by mouth once daily.    tacrolimus XR, ENVARSUS, (ENVARSUS XR) 1 mg Tb24 Take 5 tablets (5 mg total) by mouth every morning.    traMADoL (ULTRAM) 50 mg tablet Take 50 mg by mouth every 4 (four) hours as needed.    TRESIBA FLEXTOUCH U-100 100 unit/mL (3 mL) insulin pen Inject 12 Units into the skin once daily.     Antibiotics (From admission, onward)      Start     Stop Route Frequency Ordered    11/15/23 1045  ampicillin-sulbactam (UNASYN) 1.5 g in sodium chloride 0.9 % 100 mL IVPB (MB+)         -- IV Every 6 hours (non-standard times) 11/15/23 0938          Antifungals (From admission, onward)      None          Antivirals (From admission, onward)          Stop Route Frequency     ganciclovir (CYTOVENE) injection         -- IV Every 12 hours (non-standard times)               There is no immunization history on file for this patient.    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   HENT:  Positive for dental problem. Negative for congestion, mouth sores and sinus pressure.    Eyes:  Negative for pain, redness and visual disturbance.   Respiratory:  Negative for cough, shortness of breath and wheezing.     Cardiovascular:  Negative for chest pain and leg swelling.   Gastrointestinal:  Negative for abdominal distention, abdominal pain, diarrhea, nausea and vomiting.   Endocrine: Negative for polyuria.   Genitourinary:  Negative for decreased urine volume, dysuria and frequency.   Musculoskeletal:  Negative for joint swelling.   Skin:  Negative for color change.   Allergic/Immunologic: Negative for food allergies.   Neurological:  Negative for dizziness, weakness and headaches.   Hematological:  Negative for adenopathy.   Psychiatric/Behavioral:  Negative for agitation and confusion. The patient is not nervous/anxious.      Objective:     Vital Signs (Most Recent):  Temp: 98.3 °F (36.8 °C) (11/16/23 1208)  Pulse: 68 (11/16/23 1208)  Resp: 18 (11/16/23 1208)  BP: 119/71 (11/16/23 1208)  SpO2: 99 % (11/16/23 1208) Vital Signs (24h Range):  Temp:  [97.7 °F (36.5 °C)-99.4 °F (37.4 °C)] 98.3 °F (36.8 °C)  Pulse:  [65-93] 68  Resp:  [16-18] 18  SpO2:  [96 %-99 %] 99 %  BP: (119-137)/(71-82) 119/71     Weight: 72.6 kg (160 lb 0.9 oz)  Body mass index is 21.12 kg/m².    Estimated Creatinine Clearance: 78.3 mL/min (based on SCr of 1.3 mg/dL).     Physical Exam  Constitutional:       Appearance: He is well-developed.   HENT:      Head: Normocephalic and atraumatic.   Eyes:      Conjunctiva/sclera: Conjunctivae normal.   Cardiovascular:      Rate and Rhythm: Normal rate and regular rhythm.      Heart sounds: Normal heart sounds. No murmur heard.  Pulmonary:      Effort: Pulmonary effort is normal. No respiratory distress.      Breath sounds: Normal breath sounds. No wheezing.   Abdominal:      General: Bowel sounds are normal. There is no distension.      Palpations: Abdomen is soft.      Tenderness: There is no abdominal tenderness.   Musculoskeletal:         General: No tenderness. Normal range of motion.      Cervical back: Neck supple.   Lymphadenopathy:      Cervical: No cervical adenopathy.   Skin:     General: Skin is  warm and dry.      Findings: No rash.      Comments: L face swelling    Neurological:      Mental Status: He is alert and oriented to person, place, and time.      Coordination: Coordination normal.   Psychiatric:         Behavior: Behavior normal.          Significant Labs: CBC:   Recent Labs   Lab 11/14/23  2129 11/15/23  0626 11/16/23  0625   WBC 4.01 3.13* 3.08*   HGB 14.3 12.8* 12.8*   HCT 41.8 38.4* 38.4*   * 123* 129*     CMP:   Recent Labs   Lab 11/14/23  2129 11/15/23  0626 11/16/23  0625    137 137   K 4.7 3.6 3.6    104 105   CO2 21* 21* 24   * 114* 115*   BUN 48* 44* 27*   CREATININE 2.7* 2.3* 1.3   CALCIUM 9.0 8.3* 8.3*   PROT 7.2  --   --    ALBUMIN 4.1 3.4* 3.2*   BILITOT 1.1*  --   --    ALKPHOS 66  --   --    AST 33  --   --    ALT 36  --   --    ANIONGAP 13 12 8       Significant Imaging: I have reviewed all pertinent imaging results/findings within the past 24 hours.

## 2023-11-17 PROBLEM — E83.39 HYPOPHOSPHATEMIA: Status: ACTIVE | Noted: 2023-11-17

## 2023-11-17 LAB
ALBUMIN SERPL BCP-MCNC: 3 G/DL (ref 3.5–5.2)
ANION GAP SERPL CALC-SCNC: 8 MMOL/L (ref 8–16)
BASOPHILS # BLD AUTO: 0.02 K/UL (ref 0–0.2)
BASOPHILS NFR BLD: 0.7 % (ref 0–1.9)
BUN SERPL-MCNC: 14 MG/DL (ref 6–20)
CALCIUM SERPL-MCNC: 8 MG/DL (ref 8.7–10.5)
CHLORIDE SERPL-SCNC: 107 MMOL/L (ref 95–110)
CO2 SERPL-SCNC: 24 MMOL/L (ref 23–29)
CREAT SERPL-MCNC: 1.2 MG/DL (ref 0.5–1.4)
DIFFERENTIAL METHOD: ABNORMAL
EOSINOPHIL # BLD AUTO: 0 K/UL (ref 0–0.5)
EOSINOPHIL NFR BLD: 0.3 % (ref 0–8)
ERYTHROCYTE [DISTWIDTH] IN BLOOD BY AUTOMATED COUNT: 12.1 % (ref 11.5–14.5)
EST. GFR  (NO RACE VARIABLE): >60 ML/MIN/1.73 M^2
GLUCOSE SERPL-MCNC: 92 MG/DL (ref 70–110)
HCT VFR BLD AUTO: 36.6 % (ref 40–54)
HGB BLD-MCNC: 12.3 G/DL (ref 14–18)
IMM GRANULOCYTES # BLD AUTO: 0.03 K/UL (ref 0–0.04)
IMM GRANULOCYTES NFR BLD AUTO: 1 % (ref 0–0.5)
LYMPHOCYTES # BLD AUTO: 0.9 K/UL (ref 1–4.8)
LYMPHOCYTES NFR BLD: 31 % (ref 18–48)
MAGNESIUM SERPL-MCNC: 1.7 MG/DL (ref 1.6–2.6)
MCH RBC QN AUTO: 29.4 PG (ref 27–31)
MCHC RBC AUTO-ENTMCNC: 33.6 G/DL (ref 32–36)
MCV RBC AUTO: 87 FL (ref 82–98)
MONOCYTES # BLD AUTO: 0.7 K/UL (ref 0.3–1)
MONOCYTES NFR BLD: 24.8 % (ref 4–15)
NEUTROPHILS # BLD AUTO: 1.2 K/UL (ref 1.8–7.7)
NEUTROPHILS NFR BLD: 42.2 % (ref 38–73)
NRBC BLD-RTO: 0 /100 WBC
PHOSPHATE SERPL-MCNC: 2.4 MG/DL (ref 2.7–4.5)
PLATELET # BLD AUTO: 133 K/UL (ref 150–450)
PMV BLD AUTO: 11.8 FL (ref 9.2–12.9)
POCT GLUCOSE: 113 MG/DL (ref 70–110)
POCT GLUCOSE: 118 MG/DL (ref 70–110)
POCT GLUCOSE: 82 MG/DL (ref 70–110)
POTASSIUM SERPL-SCNC: 3.6 MMOL/L (ref 3.5–5.1)
RBC # BLD AUTO: 4.19 M/UL (ref 4.6–6.2)
SODIUM SERPL-SCNC: 139 MMOL/L (ref 136–145)
TACROLIMUS BLD-MCNC: 12.2 NG/ML (ref 5–15)
WBC # BLD AUTO: 2.94 K/UL (ref 3.9–12.7)

## 2023-11-17 PROCEDURE — 25000003 PHARM REV CODE 250: Performed by: CLINICAL NURSE SPECIALIST

## 2023-11-17 PROCEDURE — 63600175 PHARM REV CODE 636 W HCPCS: Performed by: PHYSICIAN ASSISTANT

## 2023-11-17 PROCEDURE — 83735 ASSAY OF MAGNESIUM: CPT | Performed by: CLINICAL NURSE SPECIALIST

## 2023-11-17 PROCEDURE — 80069 RENAL FUNCTION PANEL: CPT | Performed by: CLINICAL NURSE SPECIALIST

## 2023-11-17 PROCEDURE — 99233 SBSQ HOSP IP/OBS HIGH 50: CPT | Mod: ,,, | Performed by: PHYSICIAN ASSISTANT

## 2023-11-17 PROCEDURE — 20600001 HC STEP DOWN PRIVATE ROOM

## 2023-11-17 PROCEDURE — 25000003 PHARM REV CODE 250: Performed by: PHYSICIAN ASSISTANT

## 2023-11-17 PROCEDURE — 99233 PR SUBSEQUENT HOSPITAL CARE,LEVL III: ICD-10-PCS | Mod: ,,, | Performed by: PHYSICIAN ASSISTANT

## 2023-11-17 PROCEDURE — 85025 COMPLETE CBC W/AUTO DIFF WBC: CPT | Performed by: CLINICAL NURSE SPECIALIST

## 2023-11-17 PROCEDURE — 36415 COLL VENOUS BLD VENIPUNCTURE: CPT | Performed by: CLINICAL NURSE SPECIALIST

## 2023-11-17 PROCEDURE — 63600175 PHARM REV CODE 636 W HCPCS: Performed by: CLINICAL NURSE SPECIALIST

## 2023-11-17 PROCEDURE — 94761 N-INVAS EAR/PLS OXIMETRY MLT: CPT

## 2023-11-17 PROCEDURE — 80197 ASSAY OF TACROLIMUS: CPT | Performed by: CLINICAL NURSE SPECIALIST

## 2023-11-17 RX ORDER — AMOXICILLIN AND CLAVULANATE POTASSIUM 875; 125 MG/1; MG/1
1 TABLET, FILM COATED ORAL EVERY 12 HOURS
Qty: 28 TABLET | Refills: 1 | Status: SHIPPED | OUTPATIENT
Start: 2023-11-17 | End: 2023-11-18 | Stop reason: HOSPADM

## 2023-11-17 RX ORDER — DULOXETIN HYDROCHLORIDE 30 MG/1
30 CAPSULE, DELAYED RELEASE ORAL DAILY
COMMUNITY
Start: 2023-11-17

## 2023-11-17 RX ORDER — VALGANCICLOVIR 450 MG/1
900 TABLET, FILM COATED ORAL 2 TIMES DAILY
Qty: 120 TABLET | Refills: 2 | Status: SHIPPED | OUTPATIENT
Start: 2023-11-17 | End: 2024-02-20 | Stop reason: ALTCHOICE

## 2023-11-17 RX ADMIN — HEPARIN SODIUM 5000 UNITS: 5000 INJECTION INTRAVENOUS; SUBCUTANEOUS at 11:11

## 2023-11-17 RX ADMIN — PREDNISONE 5 MG: 5 TABLET ORAL at 08:11

## 2023-11-17 RX ADMIN — ATORVASTATIN CALCIUM 10 MG: 10 TABLET, FILM COATED ORAL at 11:11

## 2023-11-17 RX ADMIN — AMPICILLIN AND SULBACTAM 1.5 G: 1; .5 INJECTION, POWDER, FOR SOLUTION INTRAVENOUS at 05:11

## 2023-11-17 RX ADMIN — DIBASIC SODIUM PHOSPHATE, MONOBASIC POTASSIUM PHOSPHATE AND MONOBASIC SODIUM PHOSPHATE 2 TABLET: 852; 155; 130 TABLET ORAL at 11:11

## 2023-11-17 RX ADMIN — AMPICILLIN AND SULBACTAM 1.5 G: 1; .5 INJECTION, POWDER, FOR SOLUTION INTRAVENOUS at 04:11

## 2023-11-17 RX ADMIN — GANCICLOVIR SODIUM 348 MG: 500 INJECTION, POWDER, LYOPHILIZED, FOR SOLUTION INTRAVENOUS at 04:11

## 2023-11-17 RX ADMIN — AMPICILLIN AND SULBACTAM 1.5 G: 1; .5 INJECTION, POWDER, FOR SOLUTION INTRAVENOUS at 08:11

## 2023-11-17 RX ADMIN — TACROLIMUS 7 MG: 4 TABLET, EXTENDED RELEASE ORAL at 08:11

## 2023-11-17 RX ADMIN — DIBASIC SODIUM PHOSPHATE, MONOBASIC POTASSIUM PHOSPHATE AND MONOBASIC SODIUM PHOSPHATE 2 TABLET: 852; 155; 130 TABLET ORAL at 08:11

## 2023-11-17 RX ADMIN — HEPARIN SODIUM 5000 UNITS: 5000 INJECTION INTRAVENOUS; SUBCUTANEOUS at 06:11

## 2023-11-17 RX ADMIN — DULOXETINE 30 MG: 30 CAPSULE, DELAYED RELEASE ORAL at 08:11

## 2023-11-17 RX ADMIN — NIFEDIPINE 30 MG: 30 TABLET, FILM COATED, EXTENDED RELEASE ORAL at 11:11

## 2023-11-17 RX ADMIN — HEPARIN SODIUM 5000 UNITS: 5000 INJECTION INTRAVENOUS; SUBCUTANEOUS at 02:11

## 2023-11-17 RX ADMIN — AMPICILLIN AND SULBACTAM 1.5 G: 1; .5 INJECTION, POWDER, FOR SOLUTION INTRAVENOUS at 11:11

## 2023-11-17 RX ADMIN — GANCICLOVIR SODIUM 348 MG: 500 INJECTION, POWDER, LYOPHILIZED, FOR SOLUTION INTRAVENOUS at 06:11

## 2023-11-17 NOTE — CARE UPDATE
-Glucose Goal 140-180    -A1C:   Hemoglobin A1C   Date Value Ref Range Status   11/16/2023 6.7 (H) 4.0 - 5.6 % Final     Comment:     ADA Screening Guidelines:  5.7-6.4%  Consistent with prediabetes  >or=6.5%  Consistent with diabetes    High levels of fetal hemoglobin interfere with the HbA1C  assay. Heterozygous hemoglobin variants (HbS, HgC, etc)do  not significantly interfere with this assay.   However, presence of multiple variants may affect accuracy.           -HOME REGIMEN:   Rybelsus 3mg daily   Tresiba 12 units daily  Novolog 4 units before meals only    -GLUCOSE TREND FOR THE PAST 24HRS:   Recent Labs   Lab 11/15/23  0829 11/15/23  1300 11/15/23  1737 11/15/23  2022 11/16/23  1803 11/16/23 2120   POCTGLUCOSE 118* 148* 157* 158* 159* 149*           -NO HYPOGYCEMIAS NOTED     - Diet  Diet renal    -TOLERATING <25 % OF PO DIET     -NPO? No     -Steroids - Prednisone 5 mg QD    -Tube Feeds - None         Plan:   - Continue LDC (150/50) prn for hyperglycemia  - POCT Glucose before meals and at bedtime  - Hypoglycemia protocol in place      ** Please notify Endocrine for any change and/or advance in diet**  ** Please call Endocrine for any BG related issues **     Discharge Planning:   TBD. Please notify endocrinology prior to discharge. D/c rybelsus.

## 2023-11-17 NOTE — SUBJECTIVE & OBJECTIVE
Subjective:   History of Present Illness:  Mr. Reddy is a 37 yo male s/p DCD Ktxp d/t DM on 4/9/23 (Thymo, KDPI 40%, CIT 21 hrs). He now has CKD stage 3 - GFR 30-59 and his baseline creatinine is between 1.2-1.6. He takes mycophenolate mofetil, prednisone, and envarsus for maintenance immunosuppression. He now presents to the ED with complaints of N/V/D and weakness. Patient reports N/V/D started approximately 5 days ago on Friday. He reports bilious emesis, not able to hold food down. He does report being able to take his medications. Diarrhea is watery, non-bloody. He reports starting Rybelsus approx 3 weeks ago and believes this could be contributing to his symptoms. Patient also reports left sided tooth pain one day after N/V/D started. He was seen by his dentist and pt states he was told he has 2 teeth which need to be pulled but no active infection. He was prescribed prophylactic amoxicillin. Tooth pain also limiting PO intake. Pt reports his overall weakness has worsened past 24-48 hours prompting ER visit. He reports a fall earlier today d/t weakness, denies hitting his head or LOC. Labs in ED notable for NETO, Cr 2.7. Low grade 99.9 temp in ED. Pt denies chills, fevers, SOB, chest pain, dysuria. Plan discussed with Dr. Gonzalez. Plan discussed with Dr. Gonzalez, suspect NETO from dehydration from decreased PO intake, ongoing diarrhea. Plan for infectious work-up including blood/urine cx, CXR, stool studies and CMV. Obtain Kidney US. IVF hydration.     Mr. Reddy is a 38 y.o. year old male who is status post Kidney Transplant - 4/9/2023  (#1).    His maintenance immunosuppression consists of:   Immunosuppressants (From admission, onward)      Start     Stop Route Frequency Ordered    11/17/23 0700  tacrolimus XR (ENVARSUS) 24 hr tablet 7 mg         -- Oral Every morning 11/16/23 1111            Hospital Course:  Patient admitted from ED with N/V/D, weakness, and dental pain. Kidney US obtained and showed  possible renal artery stenosis but good waveforms. Repeat continues to show possible stenosis. Will repeat US outpatient. Creatinine improved with IVF. CMV PCR 104K, IV GCV started.      Interval History: No acute events overnight. Patient reports diarrhea is starting to improve. ID following, appreciate their assistance. Cont IV GCV.  CT max/fax to assess for abscess unremarkable. Cont 7 day course of Augmentin. VSS. Continue to monitor.      Past Medical, Surgical, Family, and Social History:   Unchanged from H&P.    Scheduled Meds:   ampicillin-sulbactim (UNASYN) IVPB  1.5 g Intravenous Q6H    atorvastatin  10 mg Oral QHS    DULoxetine  30 mg Oral Daily    ganciclovir (CYTOVENE) 348 mg in sodium chloride 0.9% 100 mL IVPB  5 mg/kg Intravenous Q12H    heparin (porcine)  5,000 Units Subcutaneous Q8H    k phos di & mono-sod phos mono  500 mg Oral BID    NIFEdipine  30 mg Oral Nightly    NIFEdipine  60 mg Oral Daily    predniSONE  5 mg Oral Daily    tacrolimus XR (ENVARSUS)  7 mg Oral QAM     Continuous Infusions:   sodium chloride 0.9% 75 mL/hr at 11/16/23 2014     PRN Meds:acetaminophen, cyclobenzaprine, dextrose 10%, dextrose 10%, glucagon (human recombinant), glucose, glucose, insulin aspart U-100, melatonin, oxyCODONE-acetaminophen, prochlorperazine, sodium chloride 0.9%    Intake/Output - Last 3 Shifts         11/15 0700  11/16 0659 11/16 0700  11/17 0659 11/17 0700  11/18 0659    P.O. 240      I.V. (mL/kg) 2993.2 (41.2)      IV Piggyback 553.8 100     Total Intake(mL/kg) 3787 (52.2) 100 (1.3)     Urine (mL/kg/hr) 1000 (0.6) 1450 (0.8)     Emesis/NG output  0     Other  0     Stool 0 0     Blood  0     Total Output 1000 1450     Net +2787 -1350            Urine Occurrence 1 x 1 x     Stool Occurrence 1 x 2 x     Emesis Occurrence  0 x              Review of Systems   Constitutional:  Positive for activity change.   HENT:  Positive for dental problem.    Respiratory:  Negative for shortness of breath.   "  Cardiovascular:  Negative for leg swelling.   Gastrointestinal:  Positive for diarrhea (improving). Negative for abdominal distention, abdominal pain, nausea and vomiting.   Genitourinary:  Negative for decreased urine volume and difficulty urinating.   Skin:  Negative for wound.   Allergic/Immunologic: Positive for immunocompromised state.   Psychiatric/Behavioral:  Negative for confusion.       Objective:     Vital Signs (Most Recent):  Temp: 98.3 °F (36.8 °C) (11/17/23 0909)  Pulse: 78 (11/17/23 0909)  Resp: 18 (11/17/23 0909)  BP: 120/75 (11/17/23 0909)  SpO2: 98 % (11/17/23 0909) Vital Signs (24h Range):  Temp:  [98 °F (36.7 °C)-99.1 °F (37.3 °C)] 98.3 °F (36.8 °C)  Pulse:  [68-87] 78  Resp:  [18] 18  SpO2:  [97 %-99 %] 98 %  BP: (119-130)/(60-77) 120/75     Weight: 75 kg (165 lb 5.5 oz)  Height: 6' 1" (185.4 cm)  Body mass index is 21.81 kg/m².     Physical Exam  Vitals and nursing note reviewed.   HENT:      Mouth/Throat:      Dentition: Dental caries present.   Cardiovascular:      Rate and Rhythm: Normal rate.      Pulses: Normal pulses.   Pulmonary:      Effort: Pulmonary effort is normal.   Abdominal:      Tenderness: There is no abdominal tenderness. There is no guarding.   Musculoskeletal:      Right lower leg: No edema.      Left lower leg: No edema.   Skin:     General: Skin is warm and dry.   Neurological:      Mental Status: He is alert and oriented to person, place, and time.   Psychiatric:         Mood and Affect: Mood normal.          Laboratory:  CBC:   Recent Labs   Lab 11/15/23  0626 11/16/23  0625 11/17/23  0611   WBC 3.13* 3.08* 2.94*   RBC 4.33* 4.33* 4.19*   HGB 12.8* 12.8* 12.3*   HCT 38.4* 38.4* 36.6*   * 129* 133*   MCV 89 89 87   MCH 29.6 29.6 29.4   MCHC 33.3 33.3 33.6     BMP:   Recent Labs   Lab 11/15/23  0626 11/16/23  0625 11/17/23  0611   * 115* 92    137 139   K 3.6 3.6 3.6    105 107   CO2 21* 24 24   BUN 44* 27* 14   CREATININE 2.3* 1.3 1.2 "   CALCIUM 8.3* 8.3* 8.0*     Labs within the past 24 hours have been reviewed.    Diagnostic Results:  US - Kidney: Results for orders placed during the hospital encounter of 11/14/23    US Transplant Kidney With Doppler    Narrative  EXAMINATION:  US TRANSPLANT KIDNEY WITH DOPPLER    CLINICAL HISTORY:  reassess renal artery stenosis;    TECHNIQUE:  Real time gray scale and doppler ultrasound was performed over the patient's renal allograft.    COMPARISON:  Ultrasound from 11/15/2023    FINDINGS:  Renal allograft in the right lower quadrant.  The allograft measures 11.4 cm. Normal perfusion.  Extrarenal pelvis.  No hydronephrosis.    No fluid collections.    Vasculature:    Resistive indices ranged from 0.68 to 0.73.    Main renal artery peak systolic velocity: 731cm/sec with normal waveform.    Renal artery/iliac ratio: 5.0.    The main renal vein is patent.    Impression  Redemonstration of markedly elevated peak systolic velocity of the main renal artery with elevated renal artery to iliac ratio.  No definite tardus parvus waveforms within the kidney.  This may be secondary to tortuosity versus renal artery stenosis.  Continued close follow-up is recommended.      Electronically signed by: Cristóbal Reddy MD  Date:    11/16/2023  Time:    09:52

## 2023-11-17 NOTE — PLAN OF CARE
Patient aao today, independent with ambulation. Cont gancyclovir and amp. Vitals stable. No complaints from patient. Patients mother at the bedside attentive to patient.

## 2023-11-17 NOTE — PROGRESS NOTES
Transplant  Potential Weekend Discharge Note:    Pt will discharge to patient's home under the care of Rene Reddy ([phone]) and Willie Llanes ([phone]), patient's parents.     Patient spoke little while  was in the room due to pain in mouth. Patient denied mental health concerns, or needing any resources from this . Patients caregiver also denied needs at this time. Per rounds this morning patient does not require referrals from this  at this time. Patient and caregiver agree to contact transplant team with needs, questions, or concerns as they arise.     Pt aware of, involved in, and coping well with this discharge plan. Pt did not have any concerns with the discharge plan at this time. SW remains available at [phone].    Sada James LMSW

## 2023-11-17 NOTE — PLAN OF CARE
Pt. AAOx4, VSS, spo2> 94% on room air. NRS on telemetry monitor. Pt. Ambulating independently. PRN pain medication administered. Antibiotics administered per treatment plan--pt tolerating well. Accucheck AC/HS--no SSI. Normal saline ar 100cc/hr. Mother at bedside. Bed in low locked position, call light within reach, pt instructed to call for assistance needed, pt verbalized understanding, remains free from falls this shift. WCTM.

## 2023-11-17 NOTE — ASSESSMENT & PLAN NOTE
- Placed on Augmentin outpatient by Dentist  - IV Unasyn started  - Ct max/facial negative for abscess   - Continue to monitor

## 2023-11-17 NOTE — PROGRESS NOTES
[facility] - Transplant Stepdown  Kidney Transplant  Progress Note      Reason for Follow-up: Reassessment of Kidney Transplant - 4/9/2023  (#1) recipient and management of immunosuppression.     ORGAN: LEFT KIDNEY    Donor Type: Donation after Circulatory Death       Subjective:   History of Present Illness:  Mr. Reddy is a 37 yo male s/p DCD Ktxp d/t DM on 4/9/23 (Thymo, KDPI 40%, CIT 21 hrs). He now has CKD stage 3 - GFR 30-59 and his baseline creatinine is between 1.2-1.6. He takes mycophenolate mofetil, prednisone, and envarsus for maintenance immunosuppression. He now presents to the ED with complaints of N/V/D and weakness. Patient reports N/V/D started approximately 5 days ago on Friday. He reports bilious emesis, not able to hold food down. He does report being able to take his medications. Diarrhea is watery, non-bloody. He reports starting Rybelsus approx 3 weeks ago and believes this could be contributing to his symptoms. Patient also reports left sided tooth pain one day after N/V/D started. He was seen by his dentist and pt states he was told he has 2 teeth which need to be pulled but no active infection. He was prescribed prophylactic amoxicillin. Tooth pain also limiting PO intake. Pt reports his overall weakness has worsened past 24-48 hours prompting ER visit. He reports a fall earlier today d/t weakness, denies hitting his head or LOC. Labs in ED notable for NETO, Cr 2.7. Low grade 99.9 temp in ED. Pt denies chills, fevers, SOB, chest pain, dysuria. Plan discussed with Dr. Gonzalez. Plan discussed with Dr. Gonzalez, suspect NETO from dehydration from decreased PO intake, ongoing diarrhea. Plan for infectious work-up including blood/urine cx, CXR, stool studies and CMV. Obtain Kidney US. IVF hydration.     Mr. Reddy is a 38 y.o. year old male who is status post Kidney Transplant - 4/9/2023  (#1).    His maintenance immunosuppression consists of:   Immunosuppressants (From admission, onward)       Start     Stop Route Frequency Ordered    11/17/23 0700  tacrolimus XR (ENVARSUS) 24 hr tablet 7 mg         -- Oral Every morning 11/16/23 1111            Hospital Course:  Patient admitted from ED with N/V/D, weakness, and dental pain. Kidney US obtained and showed possible renal artery stenosis but good waveforms. Repeat continues to show possible stenosis. Will repeat US outpatient. Creatinine improved with IVF. CMV PCR 104K, IV GCV started.      Interval History: No acute events overnight. Patient reports diarrhea is starting to improve. ID following, appreciate their assistance. Cont IV GCV.  CT max/fax to assess for abscess unremarkable. Cont unasyn. VSS. Continue to monitor.      Past Medical, Surgical, Family, and Social History:   Unchanged from H&P.    Scheduled Meds:   ampicillin-sulbactim (UNASYN) IVPB  1.5 g Intravenous Q6H    atorvastatin  10 mg Oral QHS    DULoxetine  30 mg Oral Daily    ganciclovir (CYTOVENE) 348 mg in sodium chloride 0.9% 100 mL IVPB  5 mg/kg Intravenous Q12H    heparin (porcine)  5,000 Units Subcutaneous Q8H    k phos di & mono-sod phos mono  500 mg Oral BID    NIFEdipine  30 mg Oral Nightly    NIFEdipine  60 mg Oral Daily    predniSONE  5 mg Oral Daily    tacrolimus XR (ENVARSUS)  7 mg Oral QAM     Continuous Infusions:   sodium chloride 0.9% 75 mL/hr at 11/16/23 2014     PRN Meds:acetaminophen, cyclobenzaprine, dextrose 10%, dextrose 10%, glucagon (human recombinant), glucose, glucose, insulin aspart U-100, melatonin, oxyCODONE-acetaminophen, prochlorperazine, sodium chloride 0.9%    Intake/Output - Last 3 Shifts         11/15 0700  11/16 0659 11/16 0700 11/17 0659 11/17 0700  11/18 0659    P.O. 240      I.V. (mL/kg) 2993.2 (41.2)      IV Piggyback 553.8 100     Total Intake(mL/kg) 3787 (52.2) 100 (1.3)     Urine (mL/kg/hr) 1000 (0.6) 1450 (0.8)     Emesis/NG output  0     Other  0     Stool 0 0     Blood  0     Total Output 1000 1450     Net +2787 -1350            Urine  "Occurrence 1 x 1 x     Stool Occurrence 1 x 2 x     Emesis Occurrence  0 x              Review of Systems   Constitutional:  Positive for activity change.   HENT:  Positive for dental problem.    Respiratory:  Negative for shortness of breath.    Cardiovascular:  Negative for leg swelling.   Gastrointestinal:  Positive for diarrhea (improving). Negative for abdominal distention, abdominal pain, nausea and vomiting.   Genitourinary:  Negative for decreased urine volume and difficulty urinating.   Skin:  Negative for wound.   Allergic/Immunologic: Positive for immunocompromised state.   Psychiatric/Behavioral:  Negative for confusion.       Objective:     Vital Signs (Most Recent):  Temp: 98.3 °F (36.8 °C) (11/17/23 0909)  Pulse: 78 (11/17/23 0909)  Resp: 18 (11/17/23 0909)  BP: 120/75 (11/17/23 0909)  SpO2: 98 % (11/17/23 0909) Vital Signs (24h Range):  Temp:  [98 °F (36.7 °C)-99.1 °F (37.3 °C)] 98.3 °F (36.8 °C)  Pulse:  [68-87] 78  Resp:  [18] 18  SpO2:  [97 %-99 %] 98 %  BP: (119-130)/(60-77) 120/75     Weight: 75 kg (165 lb 5.5 oz)  Height: 6' 1" (185.4 cm)  Body mass index is 21.81 kg/m².     Physical Exam  Vitals and nursing note reviewed.   HENT:      Mouth/Throat:      Dentition: Dental caries present.   Cardiovascular:      Rate and Rhythm: Normal rate.      Pulses: Normal pulses.   Pulmonary:      Effort: Pulmonary effort is normal.   Abdominal:      Tenderness: There is no abdominal tenderness. There is no guarding.   Musculoskeletal:      Right lower leg: No edema.      Left lower leg: No edema.   Skin:     General: Skin is warm and dry.   Neurological:      Mental Status: He is alert and oriented to person, place, and time.   Psychiatric:         Mood and Affect: Mood normal.          Laboratory:  CBC:   Recent Labs   Lab 11/15/23  0626 11/16/23  0625 11/17/23  0611   WBC 3.13* 3.08* 2.94*   RBC 4.33* 4.33* 4.19*   HGB 12.8* 12.8* 12.3*   HCT 38.4* 38.4* 36.6*   * 129* 133*   MCV 89 89 87   MCH " 29.6 29.6 29.4   MCHC 33.3 33.3 33.6     BMP:   Recent Labs   Lab 11/15/23  0626 11/16/23  0625 11/17/23  0611   * 115* 92    137 139   K 3.6 3.6 3.6    105 107   CO2 21* 24 24   BUN 44* 27* 14   CREATININE 2.3* 1.3 1.2   CALCIUM 8.3* 8.3* 8.0*     Labs within the past 24 hours have been reviewed.    Diagnostic Results:  US - Kidney: Results for orders placed during the hospital encounter of 11/14/23    US Transplant Kidney With Doppler    Narrative  EXAMINATION:  US TRANSPLANT KIDNEY WITH DOPPLER    CLINICAL HISTORY:  reassess renal artery stenosis;    TECHNIQUE:  Real time gray scale and doppler ultrasound was performed over the patient's renal allograft.    COMPARISON:  Ultrasound from 11/15/2023    FINDINGS:  Renal allograft in the right lower quadrant.  The allograft measures 11.4 cm. Normal perfusion.  Extrarenal pelvis.  No hydronephrosis.    No fluid collections.    Vasculature:    Resistive indices ranged from 0.68 to 0.73.    Main renal artery peak systolic velocity: 731cm/sec with normal waveform.    Renal artery/iliac ratio: 5.0.    The main renal vein is patent.    Impression  Redemonstration of markedly elevated peak systolic velocity of the main renal artery with elevated renal artery to iliac ratio.  No definite tardus parvus waveforms within the kidney.  This may be secondary to tortuosity versus renal artery stenosis.  Continued close follow-up is recommended.      Electronically signed by: Cristóbal Reddy MD  Date:    11/16/2023  Time:    09:52  Assessment/Plan:     * Cytomegalovirus (CMV) viremia  - Symptoms present for several days  - Pt reports symptoms developed shortly after starting Rybelsus. Rybelsus now stopped  - CMV PCR +, starting IV GCV. ID consulted  - Continue IVF hydration      Dental caries  - Placed on Augmentin outpatient by Dentist  - IV Unasyn started  - Ct max/facial negative for abscess   - Continue to monitor       Malnutrition of moderate  "degree  Nutrition Problem:  moderate  Malnutrition in the context of Acute Illness/Injury  Related to (etiology): Decreased ability to consume sufficient energy    Signs and Symptoms (as evidenced by):  Body Fat Depletion: moderate  Muscle Mass Depletion: mild depletion of lower extremities      Interventions (treatment strategies): protein supplements and dietary consulted and following       NETO (acute kidney injury)  - Suspect d/t volume loss from diarrhea, decreased PO intake  - Kidney US with elevated velocity concerning for renal stenosis.   - Improved with IVF  - Daily renal panel      At risk for opportunistic infections  - OI prophylaxis per transplant protocol      Prophylactic immunotherapy  - Continue envarsus and prednisone  - Check prograf level daily and adjust for therapeutic dosage. Monitor for toxic side effects   - Hold Cellcept d/t GI symptoms/CMV PCR      Long-term use of immunosuppressant medication  - see "prophylactic immunotherapy"      S/P kidney transplant  - S/p Ktxp 4/9/23 d/t DM  - Now w/ CKD 3, BL Cr 1.2-1.6  - Now admit with NETO and N/V/D  - Cr 1.2 after hydration   - Kidney US with elevated renal artery velocity. Repeat on 11/16/23 still showing possible stenosis but good waveforms  - Continue to monitor       Hyperlipidemia  - continue statin      Renovascular hypertension  - Cont nifedipine with hold parameters       Type 2 diabetes mellitus, with long-term current use of insulin  - Endocrine consulted, appreciate assistance      Discharge Planning: Not a candidate for d/c at this time.     Medical decision making for this encounter includes review of pertinent labs and diagnostic studies, assessment and planning, discussions with consulting providers, discussion with patient/family, and participation in multidisciplinary rounds. Time spent caring for patient: 60 minutes    Verito Atkins PA-C  Kidney Transplant  [facility] - Transplant Stepdown    "

## 2023-11-17 NOTE — ASSESSMENT & PLAN NOTE
- S/p Ktxp 4/9/23 d/t DM  - Now w/ CKD 3, BL Cr 1.2-1.6  - Now admit with NETO and N/V/D  - Cr 1.2 after hydration   - Kidney US with elevated renal artery velocity. Repeat on 11/16/23 still showing possible stenosis but good waveforms  - Continue to monitor

## 2023-11-18 ENCOUNTER — PATIENT MESSAGE (OUTPATIENT)
Dept: ENDOCRINOLOGY | Facility: HOSPITAL | Age: 38
End: 2023-11-18
Payer: MEDICARE

## 2023-11-18 VITALS
OXYGEN SATURATION: 99 % | TEMPERATURE: 99 F | BODY MASS INDEX: 21.92 KG/M2 | DIASTOLIC BLOOD PRESSURE: 78 MMHG | WEIGHT: 165.38 LBS | HEART RATE: 65 BPM | HEIGHT: 73 IN | SYSTOLIC BLOOD PRESSURE: 148 MMHG | RESPIRATION RATE: 20 BRPM

## 2023-11-18 LAB
ALBUMIN SERPL BCP-MCNC: 3 G/DL (ref 3.5–5.2)
ANION GAP SERPL CALC-SCNC: 10 MMOL/L (ref 8–16)
BACTERIA STL CULT: NORMAL
BASOPHILS # BLD AUTO: 0.01 K/UL (ref 0–0.2)
BASOPHILS NFR BLD: 0.3 % (ref 0–1.9)
BUN SERPL-MCNC: 11 MG/DL (ref 6–20)
CALCIUM SERPL-MCNC: 8.2 MG/DL (ref 8.7–10.5)
CHLORIDE SERPL-SCNC: 112 MMOL/L (ref 95–110)
CO2 SERPL-SCNC: 20 MMOL/L (ref 23–29)
CREAT SERPL-MCNC: 1.2 MG/DL (ref 0.5–1.4)
DIFFERENTIAL METHOD: ABNORMAL
EOSINOPHIL # BLD AUTO: 0 K/UL (ref 0–0.5)
EOSINOPHIL NFR BLD: 0.3 % (ref 0–8)
ERYTHROCYTE [DISTWIDTH] IN BLOOD BY AUTOMATED COUNT: 11.9 % (ref 11.5–14.5)
EST. GFR  (NO RACE VARIABLE): >60 ML/MIN/1.73 M^2
GLUCOSE SERPL-MCNC: 84 MG/DL (ref 70–110)
HCT VFR BLD AUTO: 37.2 % (ref 40–54)
HGB BLD-MCNC: 12.1 G/DL (ref 14–18)
IMM GRANULOCYTES # BLD AUTO: 0.02 K/UL (ref 0–0.04)
IMM GRANULOCYTES NFR BLD AUTO: 0.6 % (ref 0–0.5)
LYMPHOCYTES # BLD AUTO: 1.1 K/UL (ref 1–4.8)
LYMPHOCYTES NFR BLD: 33.9 % (ref 18–48)
MAGNESIUM SERPL-MCNC: 1.6 MG/DL (ref 1.6–2.6)
MCH RBC QN AUTO: 29.4 PG (ref 27–31)
MCHC RBC AUTO-ENTMCNC: 32.5 G/DL (ref 32–36)
MCV RBC AUTO: 91 FL (ref 82–98)
MONOCYTES # BLD AUTO: 0.6 K/UL (ref 0.3–1)
MONOCYTES NFR BLD: 20.6 % (ref 4–15)
NEUTROPHILS # BLD AUTO: 1.4 K/UL (ref 1.8–7.7)
NEUTROPHILS NFR BLD: 44.3 % (ref 38–73)
NRBC BLD-RTO: 0 /100 WBC
PHOSPHATE SERPL-MCNC: 1.8 MG/DL (ref 2.7–4.5)
PLATELET # BLD AUTO: 142 K/UL (ref 150–450)
PMV BLD AUTO: 11.1 FL (ref 9.2–12.9)
POTASSIUM SERPL-SCNC: 4 MMOL/L (ref 3.5–5.1)
RBC # BLD AUTO: 4.11 M/UL (ref 4.6–6.2)
SODIUM SERPL-SCNC: 142 MMOL/L (ref 136–145)
TACROLIMUS BLD-MCNC: 5.9 NG/ML (ref 5–15)
TACROLIMUS BLD-MCNC: 6.1 NG/ML (ref 5–15)
WBC # BLD AUTO: 3.1 K/UL (ref 3.9–12.7)

## 2023-11-18 PROCEDURE — 80197 ASSAY OF TACROLIMUS: CPT | Performed by: STUDENT IN AN ORGANIZED HEALTH CARE EDUCATION/TRAINING PROGRAM

## 2023-11-18 PROCEDURE — 25000003 PHARM REV CODE 250: Performed by: PHYSICIAN ASSISTANT

## 2023-11-18 PROCEDURE — 25000003 PHARM REV CODE 250: Performed by: CLINICAL NURSE SPECIALIST

## 2023-11-18 PROCEDURE — 83735 ASSAY OF MAGNESIUM: CPT | Performed by: CLINICAL NURSE SPECIALIST

## 2023-11-18 PROCEDURE — 63600175 PHARM REV CODE 636 W HCPCS: Performed by: STUDENT IN AN ORGANIZED HEALTH CARE EDUCATION/TRAINING PROGRAM

## 2023-11-18 PROCEDURE — 80069 RENAL FUNCTION PANEL: CPT | Performed by: CLINICAL NURSE SPECIALIST

## 2023-11-18 PROCEDURE — 36415 COLL VENOUS BLD VENIPUNCTURE: CPT | Performed by: CLINICAL NURSE SPECIALIST

## 2023-11-18 PROCEDURE — 80197 ASSAY OF TACROLIMUS: CPT | Mod: 91 | Performed by: CLINICAL NURSE SPECIALIST

## 2023-11-18 PROCEDURE — 85025 COMPLETE CBC W/AUTO DIFF WBC: CPT | Performed by: CLINICAL NURSE SPECIALIST

## 2023-11-18 PROCEDURE — 63600175 PHARM REV CODE 636 W HCPCS: Performed by: CLINICAL NURSE SPECIALIST

## 2023-11-18 PROCEDURE — 63600175 PHARM REV CODE 636 W HCPCS: Performed by: PHYSICIAN ASSISTANT

## 2023-11-18 RX ORDER — OXYCODONE AND ACETAMINOPHEN 5; 325 MG/1; MG/1
1 TABLET ORAL EVERY 6 HOURS PRN
Qty: 28 TABLET | Refills: 0 | Status: SHIPPED | OUTPATIENT
Start: 2023-11-18

## 2023-11-18 RX ORDER — LINAGLIPTIN 5 MG/1
5 TABLET, FILM COATED ORAL DAILY
Qty: 30 TABLET | Refills: 11 | Status: SHIPPED | OUTPATIENT
Start: 2023-11-18

## 2023-11-18 RX ORDER — AMOXICILLIN 500 MG/1
CAPSULE ORAL
Qty: 30 CAPSULE | Refills: 0 | COMMUNITY
Start: 2023-11-18 | End: 2024-01-26

## 2023-11-18 RX ORDER — INSULIN DEGLUDEC 100 U/ML
12 INJECTION, SOLUTION SUBCUTANEOUS DAILY
Qty: 12 ML | Refills: 3 | Status: SHIPPED | OUTPATIENT
Start: 2023-11-18 | End: 2024-11-17

## 2023-11-18 RX ORDER — INSULIN ASPART 100 [IU]/ML
INJECTION, SOLUTION INTRAVENOUS; SUBCUTANEOUS
Qty: 10.8 ML | Refills: 3 | Status: SHIPPED | OUTPATIENT
Start: 2023-11-18 | End: 2024-02-12

## 2023-11-18 RX ADMIN — TACROLIMUS 6 MG: 4 TABLET, EXTENDED RELEASE ORAL at 08:11

## 2023-11-18 RX ADMIN — PREDNISONE 5 MG: 5 TABLET ORAL at 08:11

## 2023-11-18 RX ADMIN — GANCICLOVIR SODIUM 348 MG: 500 INJECTION, POWDER, LYOPHILIZED, FOR SOLUTION INTRAVENOUS at 06:11

## 2023-11-18 RX ADMIN — AMPICILLIN AND SULBACTAM 1.5 G: 1; .5 INJECTION, POWDER, FOR SOLUTION INTRAVENOUS at 04:11

## 2023-11-18 RX ADMIN — OXYCODONE HYDROCHLORIDE AND ACETAMINOPHEN 1 TABLET: 5; 325 TABLET ORAL at 06:11

## 2023-11-18 RX ADMIN — DIBASIC SODIUM PHOSPHATE, MONOBASIC POTASSIUM PHOSPHATE AND MONOBASIC SODIUM PHOSPHATE 2 TABLET: 852; 155; 130 TABLET ORAL at 08:11

## 2023-11-18 RX ADMIN — HEPARIN SODIUM 5000 UNITS: 5000 INJECTION INTRAVENOUS; SUBCUTANEOUS at 06:11

## 2023-11-18 RX ADMIN — NIFEDIPINE 60 MG: 30 TABLET, FILM COATED, EXTENDED RELEASE ORAL at 08:11

## 2023-11-18 RX ADMIN — DULOXETINE 30 MG: 30 CAPSULE, DELAYED RELEASE ORAL at 08:11

## 2023-11-18 RX ADMIN — AMPICILLIN AND SULBACTAM 1.5 G: 1; .5 INJECTION, POWDER, FOR SOLUTION INTRAVENOUS at 10:11

## 2023-11-18 NOTE — NURSING
Pt alert and oriented x4; pts mom present at the bedside; iv ampicillin given during the night as ordered; pts gancyclovir also started this am as ordered; pt denied any complaints during the night but did complain of a headache and some abdominal pain this am; pt rated the pain a 10/10; pts prn oxycodone given; will continue to monitor

## 2023-11-18 NOTE — PROGRESS NOTES
With am vitals, pt still has a pill sitting on his bedside table. Per the pt, this is his pain medicine for his headache. RN instructed that if pt does not wish to take, it needs to be discarded properly. Pt is very flat and withdrawn, does not interact much with staff. Cooperative.

## 2023-11-18 NOTE — CARE UPDATE
-Glucose Goal 140-180    -A1C:   Hemoglobin A1C   Date Value Ref Range Status   11/16/2023 6.7 (H) 4.0 - 5.6 % Final     Comment:     ADA Screening Guidelines:  5.7-6.4%  Consistent with prediabetes  >or=6.5%  Consistent with diabetes    High levels of fetal hemoglobin interfere with the HbA1C  assay. Heterozygous hemoglobin variants (HbS, HgC, etc)do  not significantly interfere with this assay.   However, presence of multiple variants may affect accuracy.           -HOME REGIMEN:   Rybelsus 3mg daily   Tresiba 12 units daily  Novolog 4 units before meals only    -GLUCOSE TREND FOR THE PAST 24HRS:   Recent Labs   Lab 11/15/23  2022 11/16/23  1803 11/16/23  2120 11/17/23  1250 11/17/23  1736 11/17/23  2340   POCTGLUCOSE 158* 159* 149* 113* 118* 82           -NO HYPOGYCEMIAS NOTED     - Diet  Diet renal    -TOLERATING <25 % OF PO DIET     -NPO? No     -Steroids - Prednisone 5 mg QD    -Tube Feeds - None         Plan:   - Continue LDC (150/50) prn for hyperglycemia  - POCT Glucose before meals and at bedtime  - Hypoglycemia protocol in place      ** Please notify Endocrine for any change and/or advance in diet**  ** Please call Endocrine for any BG related issues **     Discharge Planning:   Addendum:   Notified that patient is being discharged home today. He has had very little needs inpatient so hard to tell what his needs will be outpatient He was not tolerating Rybelsus, so D/C Rybelsus 3mg daily. Recommend starting Tradjenta 5 mg daily instead (Reports being able to tolerate Januvia previously, since recent NETO). He was on scheduled basal and scheduled Novolog at home. Here is guidance for him:  Tresiba 12 units daily (Hold unless BG > 120)  Novolog SSI before meals if BG > 150. He may no longer require Novolog 4 units with meals. recommend holding for now. Send BG logs in 4 days for review and endocrine will provide further instruction to him.       Abel Klein DNP, FNP-C  Department of  Endocrinology  Inpatient Glycemic Management

## 2023-11-18 NOTE — PROGRESS NOTES
AVS printout reviewed with pt and pt's mother, questions encouraged and answered accordingly. Pt and mother verbalized understanding. VSS on bedside monitor, PIV removed x2. Transport pending.

## 2023-11-18 NOTE — PLAN OF CARE
TID staff plan of care  - feeling better, ok to send home with PO amoxicillin and vGCV 900 mg BID with weekly CMV quant and f/u with me in [facility] this week

## 2023-11-18 NOTE — PROGRESS NOTES
Discharge Medication Note:    Hospital Course: MICHAEL Reddy is a 38 y.o. 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 Admitted d/t N/V/D and dental pain. Scr improved with IVF. 11/15/23 CMV PCR +104K - IV GCV started. Transitioned to PO Valcyte 900 mg BID on discharge. Will need weekly CMV PCRs. CT max/fax to assess for abscess unremarkable. Pt started on unasyn while admitted. Pt to complete amoxicillin course as previously prescribed by dentist per nephrologist. MMF currently on hold d/t concurrent infection. Significant modifications to blood glucose management - endocrinology recommendations followed and explained to patient prior to discharge. Advised to provide BG log in 4 days and f/u with endocrinology.     Advised patient to STOP Celebrex (as prescribed by dentist) d/t association with renal dysfunction.     Met with Andrey Reddy and caregiver at discharge to review discharge medications and to update the blue medication card.    - Medications to be delivered to bedside per patient request       Medication List        START taking these medications      k phos di & mono-sod phos mono 250 mg Tab  Commonly known as: K-PHOS-NEUTRAL  Take 2 tablets by mouth 2 (two) times a day.     TRADJENTA 5 mg Tab tablet  Generic drug: linaGLIPtin  Take 1 tablet (5 mg total) by mouth once daily.     valGANciclovir 450 mg Tab  Commonly known as: VALCYTE  Take 2 tablets (900 mg total) by mouth 2 (two) times daily.            CHANGE how you take these medications      atorvastatin 10 MG tablet  Commonly known as: LIPITOR  Take 1 tablet (10 mg total) by mouth every evening.  What changed: additional instructions     DULoxetine 30 MG capsule  Commonly known as: CYMBALTA  What changed: when to take this     NovoLOG Flexpen U-100 Insulin 100 unit/mL (3 mL) Inpn pen  Generic drug: insulin aspart U-100  Utilize Novolog SSI before meals if BG >150 (Max TDD  "15u)  What changed:   how much to take  how to take this  when to take this  additional instructions     oxyCODONE-acetaminophen 5-325 mg per tablet  Commonly known as: PERCOCET  Take 1 tablet by mouth every 6 (six) hours as needed for Pain.  What changed:   when to take this  reasons to take this     tacrolimus XR (ENVARSUS) 1 mg Tb24  Commonly known as: ENVARSUS XR  Take 6 tablets (6 mg total) by mouth every morning.  What changed: how much to take     TRESIBA FLEXTOUCH U-100 100 unit/mL (3 mL) insulin pen  Generic drug: insulin degludec  Inject 12 Units into the skin once daily. HOLD UNLESS BLOOD GLUCOSE >120  What changed: additional instructions            CONTINUE taking these medications      acetaminophen-codeine 300-30mg 300-30 mg Tab  Commonly known as: TYLENOL #3  Take 1 tablet by mouth every 4 (four) hours as needed.     amoxicillin 500 MG capsule  Commonly known as: AMOXIL     BD ULTRA-FINE SILVIA PEN NEEDLE 32 gauge x 5/32" Ndle  Generic drug: pen needle, diabetic  Use with insulin 4 times daily.     cyclobenzaprine 10 MG tablet  Commonly known as: FLEXERIL     FREESTYLE SELMA 2 READER [facility] [place]  Generic drug: flash glucose scanning reader  Use as directed.     FREESTYLE SELMA 2 SENSOR Kit  Generic drug: flash glucose sensor  Change every 14 day.     * NIFEdipine 60 MG (OSM) 24 hr tablet  Commonly known as: PROCARDIA-XL  Take 1 tablet (60 mg total) by mouth once daily.     * NIFEdipine 30 MG (OSM) 24 hr tablet  Commonly known as: PROCARDIA-XL  Take 1 tablet (30 mg total) by mouth nightly.     predniSONE 5 MG tablet  Commonly known as: DELTASONE  Take 1 tablet (5 mg total) by mouth once daily.           * This list has 2 medication(s) that are the same as other medications prescribed for you. Read the directions carefully, and ask your doctor or other care provider to review them with you.                STOP taking these medications      ACCU-CHEK GUIDE ME GLUCOSE MTR Misc  Generic drug: blood-glucose meter   "   blood sugar diagnostic Strp     hydroCHLOROthiazide 25 MG tablet  Commonly known as: HYDRODIURIL     lancets Misc     mycophenolate 250 mg Cap  Commonly known as: CELLCEPT     prednisoLONE 5 mg Tab  Commonly known as: MILLIPRED     pregabalin 75 MG capsule  Commonly known as: LYRICA     RYBELSUS 3 mg tablet  Generic drug: semaglutide     traMADoL 50 mg tablet  Commonly known as: ULTRAM               Where to Get Your Medications        These medications were sent to [facility] [address], [place] [address]      Hours: Mon-Fri 7a-7p, Sat-Sun 10a-4p Phone: [phone]   k phos di & mono-sod phos mono 250 mg Tab  NovoLOG Flexpen U-100 Insulin 100 unit/mL (3 mL) Inpn pen  oxyCODONE-acetaminophen 5-325 mg per tablet  tacrolimus XR (ENVARSUS) 1 mg Tb24  TRADJENTA 5 mg Tab tablet  TRESIBA FLEXTOUCH U-100 100 unit/mL (3 mL) insulin pen  valGANciclovir 450 mg Tab       The following medications have been placed on HOLD and should be restarted in the outpatient setting (when appropriate): MMF    Andrey Reddy and caregiver verbalized understanding and had the opportunity to ask questions.

## 2023-11-19 LAB — O+P STL MICRO: NORMAL

## 2023-11-20 ENCOUNTER — CLINICAL SUPPORT (OUTPATIENT)
Dept: DIABETES | Facility: CLINIC | Age: 38
End: 2023-11-20
Payer: MEDICARE

## 2023-11-20 ENCOUNTER — PATIENT MESSAGE (OUTPATIENT)
Dept: TRANSPLANT | Facility: CLINIC | Age: 38
End: 2023-11-20
Payer: MEDICARE

## 2023-11-20 DIAGNOSIS — E11.21 TYPE 2 DIABETES MELLITUS WITH DIABETIC NEPHROPATHY, WITH LONG-TERM CURRENT USE OF INSULIN: Primary | ICD-10-CM

## 2023-11-20 DIAGNOSIS — Z79.4 TYPE 2 DIABETES MELLITUS WITH DIABETIC NEPHROPATHY, WITH LONG-TERM CURRENT USE OF INSULIN: Primary | ICD-10-CM

## 2023-11-20 LAB
BACTERIA BLD CULT: NORMAL
BACTERIA BLD CULT: NORMAL
E COLI SXT1 STL QL IA: NEGATIVE
E COLI SXT2 STL QL IA: NEGATIVE

## 2023-11-20 PROCEDURE — G0108 DIAB MANAGE TRN  PER INDIV: HCPCS | Mod: 95,,, | Performed by: INTERNAL MEDICINE

## 2023-11-20 PROCEDURE — G0108 PR DIAB MANAGE TRN  PER INDIV: ICD-10-PCS | Mod: 95,,, | Performed by: INTERNAL MEDICINE

## 2023-11-21 LAB
CAMPY SP DNA.DIARRHEA STL QL NAA+PROBE: NOT DETECTED
CRYPTOSP DNA SPEC QL NAA+PROBE: NOT DETECTED
E COLI O157H7 DNA SPEC QL NAA+PROBE: NOT DETECTED
E HISTOLYT DNA SPEC QL NAA+PROBE: NOT DETECTED
G LAMBLIA DNA SPEC QL NAA+PROBE: NOT DETECTED
GPP - ADENOVIRUS 40/41: NOT DETECTED
GPP - ENTEROTOXIGENIC E COLI (ETEC): NOT DETECTED
GPP - SHIGELLA: NOT DETECTED
LACTATE PLASV-SCNC: NOT DETECTED MMOL/L
NOROVIRUS RNA STL QL NAA+PROBE: NOT DETECTED
RV DSRNA STL QL NAA+PROBE: NOT DETECTED
SALMONELLA DNA SPEC QL NAA+PROBE: NOT DETECTED
V CHOLERAE DNA SPEC QL NAA+PROBE: NOT DETECTED
Y ENTERO RECN STL QL NAA+PROBE: NOT DETECTED

## 2023-11-22 ENCOUNTER — DOCUMENTATION ONLY (OUTPATIENT)
Dept: TRANSPLANT | Facility: CLINIC | Age: 38
End: 2023-11-22
Payer: MEDICARE

## 2023-11-22 ENCOUNTER — PATIENT MESSAGE (OUTPATIENT)
Dept: TRANSPLANT | Facility: CLINIC | Age: 38
End: 2023-11-22
Payer: MEDICARE

## 2023-11-22 DIAGNOSIS — E83.42 HYPOMAGNESEMIA: Primary | ICD-10-CM

## 2023-11-22 LAB
EXT ALBUMIN: 3.5 (ref 3.4–5)
EXT ANC: 1.91 (ref 1.72–5.38)
EXT BUN: 13 (ref 7–18)
EXT CALCIUM: 9.6 (ref 8.5–10.1)
EXT CHLORIDE: 104 (ref 98–107)
EXT CMV DNA QUANT. BY PCR: ABNORMAL
EXT CO2: 32 (ref 21–32)
EXT CREATININE: 1.4 MG/DL (ref 0.7–1.3)
EXT EGFR NO RACE VARIABLE: 72.56
EXT EOSINOPHIL%: 0.3
EXT GLUCOSE: 101 (ref 74–106)
EXT HEMATOCRIT: 37.5 (ref 42–54)
EXT HEMOGLOBIN: 12.5 (ref 14–18)
EXT LYMPH%: 31.6
EXT MAGNESIUM: 1.4 (ref 1.8–2.4)
EXT MONOCYTES%: 15
EXT PHOSPHORUS: 4.3 (ref 2.6–4.7)
EXT PLATELETS: 192 (ref 140–440)
EXT POTASSIUM: 4.1 (ref 3.5–5.1)
EXT SODIUM: 141 MMOL/L (ref 136–145)
EXT TACROLIMUS LVL: ABNORMAL
EXT WBC: 3.6 (ref 5–10)

## 2023-11-22 RX ORDER — LANOLIN ALCOHOL/MO/W.PET/CERES
400 CREAM (GRAM) TOPICAL 2 TIMES DAILY
Qty: 60 TABLET | Refills: 11 | Status: SHIPPED | OUTPATIENT
Start: 2023-11-22 | End: 2024-11-21

## 2023-11-22 NOTE — TELEPHONE ENCOUNTER
Message sent to pt instructing him to start taking 400mg MagOx twice a day. Prescription sent to local [facility].  ----- Message from Ashley White DO sent at 11/22/2023  3:50 PM CST -----  Near baseline graft function.   Acceptable electrolytes.Start on MgOx 400 mg BID   FK and CMV pending

## 2023-11-24 ENCOUNTER — PATIENT MESSAGE (OUTPATIENT)
Dept: TRANSPLANT | Facility: CLINIC | Age: 38
End: 2023-11-24
Payer: MEDICARE

## 2023-11-28 NOTE — PROGRESS NOTES
Subjective:      Patient ID: Andrey Reddy is a 38 y.o. male.    Chief Complaint:  No chief complaint on file.    History of Present Illness  Andrey Reddy is here for follow up of DM.  Previously seen by me 10/23/2023.  This is a MyChart video visit.    The patient location is: [place]  The chief complaint leading to consultation is: DM  Visit type: Virtual visit with synchronous audio and video  Total time spent with patient: see below  Each patient to whom he or she provides medical services by telemedicine is:  (1) informed of the relationship between the physician and patient and the respective role of any other health care provider with respect to management of the patient; and (2) notified that he or she may decline to receive medical services by telemedicine and may withdraw from such care at any time.    S/p kidney transplant on 4/9/2023  Prior to transplant Home Diabetes Medications:  Not on medications  Prednisone: 5mg daily     With regards to diabetes:    Diagnosed: ~7-8 years ago   DE 11/2023  FH: mother  Known complications:  DKA Denies  RN Denies  Eye Exam: within the last year   PN Denies  Podiatry: Denies  Nephropathy +  CAD Denies  Denies history of pancreatitis & personal/family history of medullary thyroid cancer.     Diet/Exercise:  Eats 2 meals a day.   Snacks : occasionally   Drinks : water, Pedialyte, juice.   Exercise - tries to stay active   Recent illness, injury, steroids; Denies     Current regimen:  Tradjenta 5mg daily   Tresiba 12 units daily - skips if glucose < 120   Novolog 4 units before meals only, will give less if glucose is < 150      Reports compliance.     Other medications tried:  Metformin - CKD  Trulicity - denies issues but stopped due to DM control   Rybelsus - GI side effects     Glucose Monitor: [place]  6 times a day testing  Log reviewed: sensor downloaded and reviewed    Hypoglycemia:  Yes - timing varies   Knows how to correct with 15 grams of carbs- juice, coke,  "or a peppermint.     Diabetes Management Status    Hemoglobin A1C   Date Value Ref Range Status   11/16/2023 6.7 (H) 4.0 - 5.6 % Final     Comment:     ADA Screening Guidelines:  5.7-6.4%  Consistent with prediabetes  >or=6.5%  Consistent with diabetes    High levels of fetal hemoglobin interfere with the HbA1C  assay. Heterozygous hemoglobin variants (HbS, HgC, etc)do  not significantly interfere with this assay.   However, presence of multiple variants may affect accuracy.     04/09/2023 4.5 4.0 - 5.6 % Final     Comment:     ADA Screening Guidelines:  5.7-6.4%  Consistent with prediabetes  >or=6.5%  Consistent with diabetes    High levels of fetal hemoglobin interfere with the HbA1C  assay. Heterozygous hemoglobin variants (HbS, HgC, etc)do  not significantly interfere with this assay.   However, presence of multiple variants may affect accuracy.     10/13/2021 4.7 4.0 - 5.6 % Final     Comment:     ADA Screening Guidelines:  5.7-6.4%  Consistent with prediabetes  >or=6.5%  Consistent with diabetes    High levels of fetal hemoglobin interfere with the HbA1C  assay. Heterozygous hemoglobin variants (HbS, HgC, etc)do  not significantly interfere with this assay.   However, presence of multiple variants may affect accuracy.         Statin: Taking  ACE/ARB: Not taking  Screening or Prevention Patient's value Goal Complete/Controlled?   HgA1C Testing and Control   Lab Results   Component Value Date    HGBA1C 6.7 (H) 11/16/2023      Annually/Less than 8% Yes   Lipid profile : 04/09/2023 Annually Yes   LDL control Lab Results   Component Value Date    LDLCALC 104.4 04/09/2023    Annually/Less than 100 mg/dl  No   Nephropathy screening No results found for: "LABMICR"  Lab Results   Component Value Date    PROTEINUA Trace (A) 11/14/2023    Annually Yes   Blood pressure BP Readings from Last 1 Encounters:   11/28/23 123/73    Less than 140/90 No   Dilated retinal exam : 06/28/2017 Annually Yes   Foot exam   Most Recent " Foot Exam Date: Not Found Annually No       Review of Systems  As above      There were no vitals taken for this visit.      There is no height or weight on file to calculate BMI.    Lab Review:   Lab Results   Component Value Date    HGBA1C 6.7 (H) 11/16/2023    HGBA1C 4.5 04/09/2023    HGBA1C 4.7 10/13/2021       Lab Results   Component Value Date    CHOL 169 04/09/2023    HDL 58 04/09/2023    LDLCALC 104.4 04/09/2023    TRIG 33 04/09/2023    CHOLHDL 34.3 04/09/2023     Lab Results   Component Value Date     11/18/2023    K 4.0 11/18/2023     (H) 11/18/2023    CO2 20 (L) 11/18/2023    GLU 84 11/18/2023    BUN 11 11/18/2023    CREATININE 1.2 11/18/2023    CALCIUM 8.2 (L) 11/18/2023    PROT 7.2 11/14/2023    ALBUMIN 3.0 (L) 11/18/2023    BILITOT 1.1 (H) 11/14/2023    ALKPHOS 66 11/14/2023    AST 33 11/14/2023    ALT 36 11/14/2023    ANIONGAP 10 11/18/2023    ESTGFRAFRICA 16.2 (A) 10/13/2021    EGFRNONAA 14.0 (A) 10/13/2021     Vit D, 25-Hydroxy   Date Value Ref Range Status   04/09/2023 36 30 - 96 ng/mL Final     Comment:     Vitamin D deficiency.........<10 ng/mL                              Vitamin D insufficiency......10-29 ng/mL       Vitamin D sufficiency........> or equal to 30 ng/mL  Vitamin D toxicity............>100 ng/mL       Assessment and Plan     1. Type 2 diabetes mellitus with diabetic nephropathy, with long-term current use of insulin        2. S/P kidney transplant            Type 2 diabetes mellitus, with long-term current use of insulin  -- Sugar clinic in 2 weeks and VV in 6 weeks.   -- A1c goal <7%.  -- Medications discussed:  MFM - CKD  GLP1-DPP4 - did not tolerate Rybelsus  SETH   SGLT2 - did not discuss today  Insulin   -- Reviewed logs/CGM:  Glucose variable but self-adjusting insulin.  Instructed to send glucose logs in 14 days.  Reach out to me sooner for any glucose <70 or consistently >200.  -- Medication Changes:   Tradjenta 5mg daily  Tresiba 12 units daily  Novolog 3 units  before meals only    -- Reviewed goals of therapy are to get the best control we can without hypoglycemia.  -- Reviewed patient's current insulin regimen. Clarified proper insulin dose and timing in relation to meals, etc. Insulin injection sites and proper rotation instructed.    -- Advised frequent self blood glucose monitoring.  Patient encouraged to document glucose results and bring them to every clinic visit.  -- Hypoglycemia precautions discussed. Instructed on precautions before driving.    -- Call for Bg repeatedly < 90 or > 180.   -- Close adherence to lifestyle changes recommended.   -- Periodic follow ups for eye evaluations, foot care and dental care suggested.    S/P kidney transplant  -- Continue following with Transplant.           Follow up in about 2 weeks (around 12/18/2023).      I spent 20 minutes face-to-face with the patient, over half of the visit was spent on counseling and/or coordinating the care of the patient.    Counseling includes:  Diagnostic results, impressions, recommendations   Prognosis   Risk and benefits of management/treatment options   Instructions for management treatment and or follow-up   Importance of compliance with management   Risk factor reduction   Patient education

## 2023-11-29 ENCOUNTER — PATIENT MESSAGE (OUTPATIENT)
Dept: TRANSPLANT | Facility: CLINIC | Age: 38
End: 2023-11-29
Payer: MEDICARE

## 2023-11-29 ENCOUNTER — DOCUMENTATION ONLY (OUTPATIENT)
Dept: TRANSPLANT | Facility: CLINIC | Age: 38
End: 2023-11-29

## 2023-11-29 DIAGNOSIS — Z94.0 S/P KIDNEY TRANSPLANT: ICD-10-CM

## 2023-11-29 DIAGNOSIS — Z99.2 CONTROLLED TYPE 2 DIABETES MELLITUS WITH CHRONIC KIDNEY DISEASE ON CHRONIC DIALYSIS, WITHOUT LONG-TERM CURRENT USE OF INSULIN: ICD-10-CM

## 2023-11-29 DIAGNOSIS — N18.6 CONTROLLED TYPE 2 DIABETES MELLITUS WITH CHRONIC KIDNEY DISEASE ON CHRONIC DIALYSIS, WITHOUT LONG-TERM CURRENT USE OF INSULIN: ICD-10-CM

## 2023-11-29 DIAGNOSIS — E11.22 CONTROLLED TYPE 2 DIABETES MELLITUS WITH CHRONIC KIDNEY DISEASE ON CHRONIC DIALYSIS, WITHOUT LONG-TERM CURRENT USE OF INSULIN: ICD-10-CM

## 2023-11-29 LAB
EXT CMV DNA QUANT. BY PCR: NORMAL
EXT TACROLIMUS LVL: 4.3

## 2023-11-30 RX ORDER — ATORVASTATIN CALCIUM 10 MG/1
10 TABLET, FILM COATED ORAL NIGHTLY
Qty: 90 TABLET | Refills: 3 | Status: SHIPPED | OUTPATIENT
Start: 2023-11-30 | End: 2024-11-29

## 2023-11-30 RX ORDER — NIFEDIPINE 30 MG/1
30 TABLET, EXTENDED RELEASE ORAL NIGHTLY
Qty: 30 TABLET | Refills: 11 | Status: SHIPPED | OUTPATIENT
Start: 2023-11-30 | End: 2023-12-05 | Stop reason: SDUPTHER

## 2023-11-30 NOTE — TELEPHONE ENCOUNTER
----- Message from Tesha Guaman RN sent at 11/29/2023  2:13 PM CST -----    ----- Message -----  From: Monica Handley MD  Sent: 11/29/2023   1:12 PM CST  To: [facility] Post-Kidney Transplant Clinical    CMV improving Cont weekly checks.  Increase envarsus to 7 mg daily, assuming this is 12 hr trough

## 2023-12-04 ENCOUNTER — CLINICAL SUPPORT (OUTPATIENT)
Dept: ENDOCRINOLOGY | Facility: CLINIC | Age: 38
End: 2023-12-04
Payer: MEDICARE

## 2023-12-04 DIAGNOSIS — Z94.0 S/P KIDNEY TRANSPLANT: ICD-10-CM

## 2023-12-04 DIAGNOSIS — Z79.4 TYPE 2 DIABETES MELLITUS WITH DIABETIC NEPHROPATHY, WITH LONG-TERM CURRENT USE OF INSULIN: Primary | ICD-10-CM

## 2023-12-04 DIAGNOSIS — E11.21 TYPE 2 DIABETES MELLITUS WITH DIABETIC NEPHROPATHY, WITH LONG-TERM CURRENT USE OF INSULIN: Primary | ICD-10-CM

## 2023-12-04 PROCEDURE — 99214 PR OFFICE/OUTPT VISIT, EST, LEVL IV, 30-39 MIN: ICD-10-PCS | Mod: 95,,, | Performed by: INTERNAL MEDICINE

## 2023-12-04 PROCEDURE — 99214 OFFICE O/P EST MOD 30 MIN: CPT | Mod: 95,,, | Performed by: INTERNAL MEDICINE

## 2023-12-04 PROCEDURE — 95251 PR GLUCOSE MONITOR, 72 HOUR, PHYS INTERP: ICD-10-PCS | Mod: NDTC,,, | Performed by: NURSE PRACTITIONER

## 2023-12-04 PROCEDURE — 95251 CONT GLUC MNTR ANALYSIS I&R: CPT | Mod: NDTC,,, | Performed by: NURSE PRACTITIONER

## 2023-12-04 NOTE — PROGRESS NOTES
The patient location is: Home  The chief complaint leading to consultation is: reassess kidney function, immunosuppression and related issues post kidney transplant    Visit type: audiovisual    Face to Face time with patient:35 min  50 minutes of total time spent on the encounter, which includes face to face time and non-face to face time preparing to see the patient (eg, review of tests), Obtaining and/or reviewing separately obtained history, Documenting clinical information in the electronic or other health record, Independently interpreting results (not separately reported) and communicating results to the patient/family/caregiver, or Care coordination (not separately reported).     Each patient to whom he or she provides medical services by telemedicine is:  (1) informed of the relationship between the physician and patient and the respective role of any other health care provider with respect to management of the patient; and (2) notified that he or she may decline to receive medical services by telemedicine and may withdraw from such care at any time.    Notes:   Post-Transplant Assessment    Referring Physician: Pipe Martinez  Current Nephrologist: Pipe Martinez    ORGAN: LEFT KIDNEY  Donor Type: donation after circulatory death   PHS Increased Risk: no  Cold Ischemia: 1,264 mins  Induction Medications: thymoglobulin    Subjective:     CC:  Reassessment of renal allograft function and management of chronic immunosuppression.    HPI:  Mr. Reddy is a 38 y.o. year old Black or  male who received a donation after circulatory death  kidney transplant on 4/9/23.  He has CKD stage 3 - GFR 30-59 and his baseline creatinine is between 1.2-1.6. He takes mycophenolate mofetil, prednisone, and tacrolimus for maintenance immunosuppression. He is additionally on ketoconazole to augment tacro levels.    Pertinent Nephrology/Transplant History:   ESRD from DM II  DCD DDKT 4/9/23, KDPI 38%,  "CIT>21hrs; Thymo induction  CMV D+,R-  6/29/23 change to envarsus for HA  9/8/23 biopsy Diffuse ATI  MMF stopped 11/13/23(N/V/D d/t CMV)  CMV+ 11/15/23: 104K started GCV & stopped MMF    PCP ppx: bactrim STOP 10/6/23  CMV ppx: valcyte STOP 10/6/23    05/30/2023 Andrey feels well and denies nay concerns. He notes no issues with his IS and has no concerns.    He is monitoring his sugars which run 150-170s most days. No low sugars noted. He has endo follow up in future already scheduled.  BPs reported to be in 130-140/70s-88 range  Reports his incision is well healed. Urinating w/o problems, "like a racehorse."  Denies heartburn off Pepcid  Uses pill box with help of Mom.    6/27/23  Andrey notes occ HA every few days. He believes the HA have been  present since txp.  With review of systems questioning, he also notes occasional dyspnea on exertion.  He also had that during dialysis, and states it was attributed to volume overload.  He reports no dyspnea at rest or lying down, and notes no peripheral edema.  He denies voiding difficulties.  He reports blood pressures are usually 140-150 over 80s-90s, as high as diastolic of 98.     10/17/23 Andrey is now over 6 months post kidney transplant.  His biggest concern is ongoing pain and that due to a change in insurance he lost his pain management physician.  He is also worried about poor blood sugar control, but has been in touch with Endocrine.  From the transplant perspective, he believes the tremors are better overall since he converted to Envarsus (tacrolimus XR), but starting to come back.  His last tacrolimus level is pending.  He noted high blood pressures at home running 140-160/80-90.  I verified he is taking nifedipine, but not amlodipine.  He reports no voiding concerns.    12/04/2023  Admitted 11/14/23 with N/V/D and found CMV +; stopped MMF and started on IV GCV. Viral load dropped from 104,210 IU/ML [11/15] to 57,700 [11/22]. His GI issues have " "resolved.  He had two wisdom teeth removed last week, and is recovering well. No dental pain noted today.  He denies any kidney-related complaints, such as pain over allograft, flank pain, dysuria, frequency, or other LUTS.     Current Outpatient Medications   Medication Sig    acetaminophen (TYLENOL) 500 MG tablet Take 1 tablet (500 mg total) by mouth every 6 (six) hours as needed for Pain.    acetaminophen-codeine 300-30mg (TYLENOL #3) 300-30 mg Tab Take 1 tablet by mouth every 4 (four) hours as needed.    amoxicillin (AMOXIL) 500 MG capsule Use as directed by dental provider    atorvastatin (LIPITOR) 10 MG tablet Take 1 tablet (10 mg total) by mouth every evening.    BD ULTRA-FINE SILVIA PEN NEEDLE 32 gauge x 5/32" Ndle Use with insulin 4 times daily.    chlorhexidine (PERIDEX) 0.12 % solution Use as directed 15 mLs in the mouth or throat 2 (two) times daily. for 14 days    cyclobenzaprine (FLEXERIL) 10 MG tablet Take 10 mg by mouth daily as needed.    DULoxetine (CYMBALTA) 30 MG capsule Take 1 capsule (30 mg total) by mouth once daily.    flash glucose scanning reader (FREESTYLE SELMA 2 READER) [facility] Use as directed.    flash glucose sensor (FREESTYLE SELMA 2 SENSOR) Kit Change every 14 day.    k phos di & mono-sod phos mono (K-PHOS-NEUTRAL) 250 mg Tab Take 2 tablets by mouth 2 (two) times a day.    linaGLIPtin (TRADJENTA) 5 mg Tab tablet Take 1 tablet (5 mg total) by mouth once daily.    magnesium oxide (MAG-OX) 400 mg (241.3 mg magnesium) tablet Take 1 tablet (400 mg total) by mouth 2 (two) times daily.    NIFEdipine (PROCARDIA-XL) 30 MG (OSM) 24 hr tablet Take 1 tablet (30 mg total) by mouth nightly.    NIFEdipine (PROCARDIA-XL) 60 MG (OSM) 24 hr tablet Take 1 tablet (60 mg total) by mouth once daily.    NOVOLOG FLEXPEN U-100 INSULIN 100 unit/mL (3 mL) InPn pen Utilize Novolog SSI before meals if BG >150 (Max TDD 15u)    oxyCODONE-acetaminophen (PERCOCET) 5-325 mg per tablet Take 1 tablet by mouth every 6 (six) " hours as needed for Pain.    predniSONE (DELTASONE) 5 MG tablet Take 1 tablet (5 mg total) by mouth once daily.    tacrolimus XR, ENVARSUS, (ENVARSUS XR) 1 mg Tb24 Take 7 tablets (7 mg total) by mouth every morning.    TRESIBA FLEXTOUCH U-100 100 unit/mL (3 mL) insulin pen Inject 12 Units into the skin once daily. HOLD UNLESS BLOOD GLUCOSE >120    valGANciclovir (VALCYTE) 450 mg Tab Take 2 tablets (900 mg total) by mouth 2 (two) times daily.     No current facility-administered medications for this visit.       Review of Systems   Constitutional: Negative.    HENT: Negative.     Respiratory: Negative.     Cardiovascular:  Negative for chest pain and leg swelling.   Gastrointestinal:  Negative for abdominal pain, diarrhea, nausea and vomiting.   Genitourinary:  Negative for difficulty urinating.   Allergic/Immunologic: Positive for immunocompromised state.     Objective:     There were no vitals taken for this visit.body mass index is unknown because there is no height or weight on file.    Physical Exam Pleasant NAD, speech normal. Resp unlabored.    Labs:  Recent Labs   Lab 10/13/21  0806 10/13/21  0807 10/13/21  1136 10/28/22  1205 04/09/23  0320 04/09/23  0857 05/04/23  0724 05/04/23  0742 08/31/23  0927 09/08/23  0651 11/14/23  2129 11/14/23  2333 11/16/23  0625 11/17/23  0611 11/18/23  0648   WBC 4.75  --   --   --  5.72   < >  --    < > 5.09  --  4.01   < > 3.08 L 2.94 L 3.10 L   Hemoglobin 12.7 L  --   --   --  9.8 L   < >  --    < > 12.7 L  --  14.3   < > 12.8 L 12.3 L 12.1 L   POC Hematocrit  --   --   --   --   --    < >  --   --   --   --   --   --   --   --   --    Hematocrit 37.6 L  --   --   --  29.8 L   < >  --    < > 38.4 L  --  41.8   < > 38.4 L 36.6 L 37.2 L   Sodium 139  --   --   --  140   < >  --    < > 142 141 137   < > 137 139 142   Potassium 4.6  --   --   --  3.9   < >  --    < > 5.1 3.6 4.7   < > 3.6 3.6 4.0   Chloride 104  --   --   --  103   < >  --    < > 103 106 103   < > 105 107 112 H    CO2 21 L  --   --   --  25   < >  --    < > 30 H 24 21 L   < > 24 24 20 L   BUN 30 H  --   --   --  25 H   < >  --    < > 25 H 22 H 48 H   < > 27 H 14 11   Creatinine 4.9 H  --   --   --  3.7 H   < >  --    < > 2.2 H 1.9 H 2.7 H   < > 1.3 1.2 1.2   eGFR if non  14.0 A  --   --   --   --   --   --   --   --   --   --   --   --   --   --    Calcium 10.0  --   --   --  9.2   < >  --    < > 10.0 9.6 9.0   < > 8.3 L 8.0 L 8.2 L   Phosphorus 4.0  --   --   --  4.0   < >  --    < >  --   --   --    < > 2.1 L 2.4 L 1.8 L   Magnesium  --   --   --   --   --    < >  --    < >  --   --  2.3   < > 1.9 1.7 1.6   Albumin 4.5  --   --   --  3.9   < >  --    < > 4.4 4.1 4.1   < > 3.2 L 3.0 L 3.0 L   AST 15  --   --   --  12  --   --    < > 15 23 33  --   --   --   --    ALT 18  --   --   --  13  --   --    < > 14 26 36  --   --   --   --    Prot/Creat Ratio, Urine  --   --  1.35 H  --   --   --  0.12  --   --   --   --   --   --   --   --    PTH, Intact  --  210.3 H  --  370.8 H 354.1 H  --   --   --   --   --   --   --   --   --   --    Tacrolimus Lvl  --   --   --   --   --    < >  --    < > 14.2  --   --    < > 7.3 12.2 5.9  6.1    < > = values in this interval not displayed.         Recent Labs   Lab 05/10/23  0846 05/17/23  0000 06/27/23  0851 07/06/23  0848 08/08/23  0000 10/11/23  0903 11/09/23  0905 11/22/23  0900   EXT ANC  --    < > 2.89 3.57  --   --  1.51 A 1.91   EXT WBC 5.9   < > 4.1 A 4.7 L   < > 5.2 2.7 L 3.6 A   EXT SEGS% 77.5   < > 70.1 75.8 H  --   --  55.4  --    EXT Platelets 227   < > 182 177   < > 221 101.0 L 192   EXT Hemoglobin 11.6 L   < > 12.9 A 13.1 L   < > 13.6 A 13.7 L 12.5 A   EXT Hematocrit 33.7 L   < > 38.3 A 38.7 L   < > 40.4 A 40.4 L 37.5 A   EXT Creatinine 1.3   < > 1.5 A 1.50 H   < > 1.5 A 1.70 H 1.40 A   EXT CREATININE UA 72.5  --   --  62.6 L  --   --  133.5  --    EXT Sodium 140   < > 143 140   < > 141 142 141   EXT Potassium 4.4   < > 4.6 4.1   < > 4.5 3.3 L 4.1   EXT BUN  19 H   < > 25 A 27 H   < > 25 A 23 H 13   EXT CO2 29   < > 31 28   < > 33 A 33 H 32   EXT Calcium 8.9   < > 8.9 9.2   < > 9.3 9.3 9.6   EXT Phosphorus 3.4   < > 4.3 4.2   < > 3.1 4.1 4.3   EXT Glucose 146 H   < > 213 A 292 H   < > 258 A 141 H 101   EXT Albumin 4.1   < > 4.1 4.5   < > 4.1 4.2 3.5   EXT AST <8 L  --   --  25  --   --   --   --    EXT ALT 15 L  --   --  70 H  --   --   --   --    EXT BilirubiN Total 0.6  --   --  0.5  --   --   --   --     < > = values in this interval not displayed.       Recent Labs   Lab 05/10/23  0846 05/17/23  0000 07/06/23  0848 08/08/23  0000 09/21/23  0848 10/11/23  0903 11/09/23  0905 11/22/23  0900   EXT Tacrolimus Lvl 7.0   < > 8.8   < > 4.1 4.1  --  4.3  pending   EXT PROT/CREAT Ratio UR 0.23  --   --   --   --   --  0.256 A  --    EXT Protein UA  --   --  1+  --   --  1+ A  --   --    EXT WBC UA  --   --  0-2  --   --  3-5  --   --    EXT RBC UA  --   --  NONE SEEN  --   --  0-2  --   --     < > = values in this interval not displayed.       Labs were reviewed with the patient.    Assessment:     1. Cytomegalovirus (CMV) viremia    2. Chronic kidney disease (CKD), stage II (mild)    3. Kidney transplant status    4. Immunocompromised state due to drug therapy    5. Prophylactic immunotherapy    6. Hypomagnesemia    7. Hypophosphatemia    8. Renal hypertension           Plan:   New nursing orders:  -  continue weekly CMV PCR, CBC, renal panel & collect urine p/c with next lab    CKD II-IIIa post DCD kidney transplant 4/9/23, KDPI 38%, CIT>21hr  Recent Labs   Lab 10/13/21  1136 04/09/23  0320 05/04/23  0724 05/04/23  0742 11/17/23  0611 11/18/23  0648   Creatinine  --    < >  --    < > 1.2 1.2   eGFR  --    < >  --    < > >60.0 >60.0   Prot/Creat Ratio, Urine 1.35 H  --  0.12  --   --   --     < > = values in this interval not displayed.    -creat 1.2-1.6, most recent noted on lower end of his range  Screening for proteinuria  - Obtain urine p/c    Immunosuppression  Management for pt on prophylactic IS against rejection  - On tacro XR (changed from IS d/t tremors), MMF [held d/t CMV] and prednisone. Anticipate restarting MMF once CMV resolved.  - Target tacro 7-9  - Continue monitoring for toxicities and SE.       At risk for opportunistic infections given immunosuppressed state  -Prophylaxis against opportunistic infections  -Bactrim and Valcyte stopped as directed  -Screening for BK infection to prevent allograft dysfunction  -Oct 11 BK PCR not det  -Continue blood PCR screening as per guidelines to prevent BK viremia and nephropathy leading to allograft dysfunction and potential graft failure    Hypertension, renal  - BPs seem to be a bit high; I asked him to record readings over next two weeks then send ot us. May need med adjustment  - continue nifedipine 60 mg q.a.m. and add 30 mg q.p.m.    Hypomagnesemia  -Just picked up supplement a couple days ago; wait for repeat lab    Hypophosphatemia  -Will tolerate mild asymptomatic low level d/t pill burden, DDI, and adverse SE  -lower KPN to 250 mg bid    Follow-up:   Clinic: return to transplant clinic weekly for the first month after transplant; every 2 weeks during months 2-3; then at 6-, 9-, 12-, 18-, 24-, and 36- months post-transplant to reassess for complications from immunosuppression toxicity and monitor for rejection.  Annually thereafter.    Labs: since patient remains at high risk for rejection and drug-related complications that warrant close monitoring, labs will be ordered as follows: continue twice weekly CBC, renal panel, and drug level for first month; then same labs once weekly through 3rd month post-transplant.  Urine for UA and protein/creatinine ratio monthly.  Serum BK - PCR at 1-, 3-, 6-, 9-, 12-, 18-, 24-, 36- 48-, and 60 months post-transplant.  Hepatic panel at 1-, 2-, 3-, 6-, 9-, 12-, 18-, 24-, and 36- months post-transplant.    Monica Handley MD       [facility] Patient Status  Functional Status:  80% - Normal activity with effort: some symptoms of disease  Physical Capacity: No Limitations

## 2023-12-04 NOTE — ASSESSMENT & PLAN NOTE
-- Sugar clinic in 2 weeks and VV in 6 weeks.   -- A1c goal <7%.  -- Medications discussed:  MFM - CKD  GLP1-DPP4 - did not tolerate Rybelsus  SETH   SGLT2 - did not discuss today  Insulin   -- Reviewed logs/CGM:  Glucose variable but self-adjusting insulin.  Instructed to send glucose logs in 14 days.  Reach out to me sooner for any glucose <70 or consistently >200.  -- Medication Changes:   Tradjenta 5mg daily  Tresiba 12 units daily  Novolog 3 units before meals only    -- Reviewed goals of therapy are to get the best control we can without hypoglycemia.  -- Reviewed patient's current insulin regimen. Clarified proper insulin dose and timing in relation to meals, etc. Insulin injection sites and proper rotation instructed.    -- Advised frequent self blood glucose monitoring.  Patient encouraged to document glucose results and bring them to every clinic visit.  -- Hypoglycemia precautions discussed. Instructed on precautions before driving.    -- Call for Bg repeatedly < 90 or > 180.   -- Close adherence to lifestyle changes recommended.   -- Periodic follow ups for eye evaluations, foot care and dental care suggested.

## 2023-12-05 ENCOUNTER — OFFICE VISIT (OUTPATIENT)
Dept: TRANSPLANT | Facility: CLINIC | Age: 38
End: 2023-12-05
Payer: MEDICARE

## 2023-12-05 ENCOUNTER — PATIENT MESSAGE (OUTPATIENT)
Dept: TRANSPLANT | Facility: CLINIC | Age: 38
End: 2023-12-05

## 2023-12-05 DIAGNOSIS — D84.821 IMMUNOCOMPROMISED STATE DUE TO DRUG THERAPY: ICD-10-CM

## 2023-12-05 DIAGNOSIS — E83.39 HYPOPHOSPHATEMIA: ICD-10-CM

## 2023-12-05 DIAGNOSIS — Z94.0 KIDNEY TRANSPLANT STATUS: ICD-10-CM

## 2023-12-05 DIAGNOSIS — Z91.89 AT RISK FOR OPPORTUNISTIC INFECTIONS: ICD-10-CM

## 2023-12-05 DIAGNOSIS — Z79.899 IMMUNOCOMPROMISED STATE DUE TO DRUG THERAPY: ICD-10-CM

## 2023-12-05 DIAGNOSIS — E83.42 HYPOMAGNESEMIA: ICD-10-CM

## 2023-12-05 DIAGNOSIS — I12.9 RENAL HYPERTENSION: ICD-10-CM

## 2023-12-05 DIAGNOSIS — B25.9 CYTOMEGALOVIRUS (CMV) VIREMIA: Primary | ICD-10-CM

## 2023-12-05 DIAGNOSIS — N18.2 CHRONIC KIDNEY DISEASE (CKD), STAGE II (MILD): ICD-10-CM

## 2023-12-05 DIAGNOSIS — Z29.89 PROPHYLACTIC IMMUNOTHERAPY: ICD-10-CM

## 2023-12-05 PROCEDURE — 1159F PR MEDICATION LIST DOCUMENTED IN MEDICAL RECORD: ICD-10-PCS | Mod: CPTII,95,, | Performed by: INTERNAL MEDICINE

## 2023-12-05 PROCEDURE — 3066F NEPHROPATHY DOC TX: CPT | Mod: CPTII,95,, | Performed by: INTERNAL MEDICINE

## 2023-12-05 PROCEDURE — 3044F PR MOST RECENT HEMOGLOBIN A1C LEVEL <7.0%: ICD-10-PCS | Mod: CPTII,95,, | Performed by: INTERNAL MEDICINE

## 2023-12-05 PROCEDURE — 3044F HG A1C LEVEL LT 7.0%: CPT | Mod: CPTII,95,, | Performed by: INTERNAL MEDICINE

## 2023-12-05 PROCEDURE — 1111F DSCHRG MED/CURRENT MED MERGE: CPT | Mod: CPTII,95,, | Performed by: INTERNAL MEDICINE

## 2023-12-05 PROCEDURE — 99215 PR OFFICE/OUTPT VISIT, EST, LEVL V, 40-54 MIN: ICD-10-PCS | Mod: 95,,, | Performed by: INTERNAL MEDICINE

## 2023-12-05 PROCEDURE — 1111F PR DISCHARGE MEDS RECONCILED W/ CURRENT OUTPATIENT MED LIST: ICD-10-PCS | Mod: CPTII,95,, | Performed by: INTERNAL MEDICINE

## 2023-12-05 PROCEDURE — 1160F PR REVIEW ALL MEDS BY PRESCRIBER/CLIN PHARMACIST DOCUMENTED: ICD-10-PCS | Mod: CPTII,95,, | Performed by: INTERNAL MEDICINE

## 2023-12-05 PROCEDURE — 99215 OFFICE O/P EST HI 40 MIN: CPT | Mod: 95,,, | Performed by: INTERNAL MEDICINE

## 2023-12-05 PROCEDURE — 1159F MED LIST DOCD IN RCRD: CPT | Mod: CPTII,95,, | Performed by: INTERNAL MEDICINE

## 2023-12-05 PROCEDURE — 3066F PR DOCUMENTATION OF TREATMENT FOR NEPHROPATHY: ICD-10-PCS | Mod: CPTII,95,, | Performed by: INTERNAL MEDICINE

## 2023-12-05 PROCEDURE — 1160F RVW MEDS BY RX/DR IN RCRD: CPT | Mod: CPTII,95,, | Performed by: INTERNAL MEDICINE

## 2023-12-05 RX ORDER — NIFEDIPINE 30 MG/1
30 TABLET, EXTENDED RELEASE ORAL NIGHTLY
Qty: 30 TABLET | Refills: 11 | Status: SHIPPED | OUTPATIENT
Start: 2023-12-05 | End: 2023-12-29 | Stop reason: DRUGHIGH

## 2023-12-05 RX ORDER — NIFEDIPINE 60 MG/1
60 TABLET, EXTENDED RELEASE ORAL DAILY
Qty: 30 TABLET | Refills: 11 | Status: SHIPPED | OUTPATIENT
Start: 2023-12-05 | End: 2023-12-29

## 2023-12-05 NOTE — LETTER
December 5, 2023        Pipe Martinez  [address] [facility] [address] [address]  Phone: [phone]  Fax: [phone]             [facility] 1st Fl  [address]  [facility] [address]  Phone: [phone]     Patient: Andrey Reddy   MR Number: [id number]   YOB: 1985   Date of Visit: 12/5/2023       Dear Dr. Pipe Martinez    Thank you for referring Andrey Reddy to me for evaluation. Attached you will find relevant portions of my assessment and plan of care.    If you have questions, please do not hesitate to call me. I look forward to following Andrey Reddy along with you.    Sincerely,    Monica Handley MD    Enclosure    If you would like to receive this communication electronically, please contact [email or web address] or [phone] to request [employer] Link access.    [employer] Link is a tool which provides read-only access to select patient information with whom you have a relationship. Its easy to use and provides real time access to review your patients record including encounter summaries, notes, results, and demographic information.    If you feel you have received this communication in error or would no longer like to receive these types of communications, please e-mail [email or web address]

## 2023-12-11 NOTE — PROGRESS NOTES
Diabetes Care Specialist Progress Note  Author: Karo Rojas RD, CDE  Date: 2023    Program Intake  Reason for Diabetes Program Visit:: Intervention  Type of Intervention:: Individual  Individual: Education  Education: Self-Management Skill Review    Diabetes Care Specialist Virtual Visit Note   The patient location is: [place]  The chief complaint leading to consultation is: Diabetes  Visit type: audiovisual  Total time spent with patient: 25 min   Each patient to whom he or she provides medical services by telemedicine is:  (1) informed of the relationship between the physician and patient and the respective role of any other health care provider with respect to management of the patient; and (2) notified that he or she may decline to receive medical services by telemedicine and may withdraw from such care at any time.      Current diabetes risk level:: low    In the last 12 months, have you:: been admitted to a hospital  Was the ER or hospital admission related to diabetes?: No        Lab Results   Component Value Date    HGBA1C 6.7 (H) 2023           Clinical  Problem Review  Reviewed Problem List with Patient: yes  Active comorbidities affecting diabetes self-care.: yes  Comorbidities: Organ Transplant    Clinical Assessment  Current Diabetes Treatment: Insulin, Oral Medication  Tresiba 12 units daily  Novolog 4 units AC    Have you ever experienced hypoglycemia (low blood sugar)?: no  Have you ever experienced hyperglycemia (high blood sugar)?: yes  In the last month, how often have you experienced high blood sugar?: once a week      SMBG via Freestyle Beth[place] 2  Average B mg/dL  Time in Range:  59%  (0% low, 41% high)        Medication Information  Medication adherence impacting ability to self-manage diabetes?: No    Labs  Lab Compliance Barriers: No    Nutritional Status  Current nutritional status an area of need that is impacting patient's ability to self-manage diabetes?: No                               Diabetes Self-Management Care Plan:    Today's Diabetes Self-Management Care Plan was developed with Andrey's input. Andrey has agreed to work toward the following goal(s) to improve his/her overall diabetes control.      Care Plan: Diabetes Management   Updates made since 11/11/2023 12:00 AM        Problem: Healthy Eating         Long-Range Goal: Eat 3 meals daily with 3-4 servings of Carbohydrate per meal.    Start Date: 9/20/2023   Expected End Date: 9/30/2024   This Visit's Progress: On track   Priority: High   Barriers: Knowledge deficit   Note:          Doing well overall.     Tried rybelsus but was unable to tolerate- has since stopped.     Did not resume Januvia - endo made aware - will try tradjenta               Follow Up Plan     F/u in 6 months        Today's care plan and follow up schedule was discussed with patient.  Andrey verbalized understanding of the care plan, goals, and agrees to follow up plan.        The patient was encouraged to communicate with his/her health care provider/physician and care team regarding his/her condition(s) and treatment.  I provided the patient with my contact information today and encouraged to contact me via phone or [facility] Patient Portal as needed.         Length of Visit   Total Time: 30 Minutes

## 2023-12-12 ENCOUNTER — PATIENT MESSAGE (OUTPATIENT)
Dept: TRANSPLANT | Facility: CLINIC | Age: 38
End: 2023-12-12
Payer: MEDICARE

## 2023-12-12 ENCOUNTER — DOCUMENTATION ONLY (OUTPATIENT)
Dept: TRANSPLANT | Facility: CLINIC | Age: 38
End: 2023-12-12
Payer: MEDICARE

## 2023-12-12 LAB
EXT ALBUMIN: 3.5 (ref 3.4–5)
EXT BACTERIA UA: ABNORMAL
EXT BK VIRUS DNA QN PCR: NOT DETECTED
EXT BUN: 21 (ref 7–18)
EXT CALCIUM: 9.4 (ref 8.5–10.1)
EXT CHLORIDE: 106 (ref 98–107)
EXT CMV DNA QUANT. BY PCR: ABNORMAL
EXT CO2: 28 (ref 21–32)
EXT CREATININE UA: 83.5 (ref 100–180)
EXT CREATININE: 1.3 MG/DL (ref 0.7–1.3)
EXT EGFR NO RACE VARIABLE: 79
EXT GLUCOSE UA: NEGATIVE
EXT HEMATOCRIT: 38.9 (ref 42–54)
EXT HEMOGLOBIN: 12.9 (ref 14–18)
EXT MAGNESIUM: 1.8 (ref 1.8–2.4)
EXT NITRITES UA: NEGATIVE
EXT PHOSPHORUS: 3.4 (ref 2.6–4.7)
EXT PLATELETS: 212 (ref 140–440)
EXT POTASSIUM: 4 (ref 3.5–5.1)
EXT PROT/CREAT RATIO UR: 0.2
EXT PROTEIN UA: NEGATIVE
EXT RBC UA: ABNORMAL
EXT SODIUM: 140 MMOL/L (ref 136–145)
EXT TACROLIMUS LVL: 6.4
EXT URINE PROTEIN: 17.8 (ref 1–14)
EXT WBC UA: ABNORMAL
EXT WBC: 3.5 (ref 5–10)

## 2023-12-12 NOTE — PROGRESS NOTES
Subjective:      Patient ID: Andrey Reddy is a 38 y.o. male.    Chief Complaint:  No chief complaint on file.    History of Present Illness  Andrey Reddy is here for follow up of DM.  Previously seen by me 12/4/2023.  This is a MyChart video visit.    The patient location is: [place]  The chief complaint leading to consultation is: DM  Visit type: Virtual visit with synchronous audio and video  Total time spent with patient: see below  Each patient to whom he or she provides medical services by telemedicine is:  (1) informed of the relationship between the physician and patient and the respective role of any other health care provider with respect to management of the patient; and (2) notified that he or she may decline to receive medical services by telemedicine and may withdraw from such care at any time.    S/p kidney transplant on 4/9/2023  Prior to transplant Home Diabetes Medications:  Not on medications  Prednisone: 5mg daily     With regards to diabetes:    Diagnosed: ~7-8 years ago   DE 11/2023  FH: mother  Known complications:  DKA Denies  RN Denies  Eye Exam: within the last year   PN Denies  Podiatry: Denies  Nephropathy +  CAD Denies  Denies history of pancreatitis & personal/family history of medullary thyroid cancer.     Diet/Exercise:  Eats 2 meals a day.   Snacks : occasionally   Drinks : water, Pedialyte, juice.   Exercise - tries to stay active   Recent illness, injury, steroids; Denies     Current Regimen:  Tradjenta 5mg daily  Tresiba 12 units daily  Novolog 4 units before meals only     Reports compliance.     Other medications tried:  Metformin - CKD  Trulicity - denies issues but stopped due to DM control   Rybelsus - GI side effects     Glucose Monitor: [employer]  6 times a day testing  Log reviewed: sensor downloaded and reviewed    Hypoglycemia:  Rare  Knows how to correct with 15 grams of carbs- juice, coke, or a peppermint.     Diabetes Management Status    Hemoglobin A1C   Date Value Ref  "Range Status   11/16/2023 6.7 (H) 4.0 - 5.6 % Final     Comment:     ADA Screening Guidelines:  5.7-6.4%  Consistent with prediabetes  >or=6.5%  Consistent with diabetes    High levels of fetal hemoglobin interfere with the HbA1C  assay. Heterozygous hemoglobin variants (HbS, HgC, etc)do  not significantly interfere with this assay.   However, presence of multiple variants may affect accuracy.     04/09/2023 4.5 4.0 - 5.6 % Final     Comment:     ADA Screening Guidelines:  5.7-6.4%  Consistent with prediabetes  >or=6.5%  Consistent with diabetes    High levels of fetal hemoglobin interfere with the HbA1C  assay. Heterozygous hemoglobin variants (HbS, HgC, etc)do  not significantly interfere with this assay.   However, presence of multiple variants may affect accuracy.     10/13/2021 4.7 4.0 - 5.6 % Final     Comment:     ADA Screening Guidelines:  5.7-6.4%  Consistent with prediabetes  >or=6.5%  Consistent with diabetes    High levels of fetal hemoglobin interfere with the HbA1C  assay. Heterozygous hemoglobin variants (HbS, HgC, etc)do  not significantly interfere with this assay.   However, presence of multiple variants may affect accuracy.         Statin: Taking  ACE/ARB: Not taking  Screening or Prevention Patient's value Goal Complete/Controlled?   HgA1C Testing and Control   Lab Results   Component Value Date    HGBA1C 6.7 (H) 11/16/2023      Annually/Less than 8% Yes   Lipid profile : 04/09/2023 Annually Yes   LDL control Lab Results   Component Value Date    LDLCALC 104.4 04/09/2023    Annually/Less than 100 mg/dl  No   Nephropathy screening No results found for: "LABMICR"  Lab Results   Component Value Date    PROTEINUA Trace (A) 11/14/2023    Annually Yes   Blood pressure BP Readings from Last 1 Encounters:   11/28/23 123/73    Less than 140/90 No   Dilated retinal exam : 06/28/2017 Annually Yes   Foot exam   Most Recent Foot Exam Date: Not Found Annually No       Review of Systems  As above      There " were no vitals taken for this visit.      There is no height or weight on file to calculate BMI.    Lab Review:   Lab Results   Component Value Date    HGBA1C 6.7 (H) 11/16/2023    HGBA1C 4.5 04/09/2023    HGBA1C 4.7 10/13/2021       Lab Results   Component Value Date    CHOL 169 04/09/2023    HDL 58 04/09/2023    LDLCALC 104.4 04/09/2023    TRIG 33 04/09/2023    CHOLHDL 34.3 04/09/2023     Lab Results   Component Value Date     11/18/2023    K 4.0 11/18/2023     (H) 11/18/2023    CO2 20 (L) 11/18/2023    GLU 84 11/18/2023    BUN 11 11/18/2023    CREATININE 1.2 11/18/2023    CALCIUM 8.2 (L) 11/18/2023    PROT 7.2 11/14/2023    ALBUMIN 3.0 (L) 11/18/2023    BILITOT 1.1 (H) 11/14/2023    ALKPHOS 66 11/14/2023    AST 33 11/14/2023    ALT 36 11/14/2023    ANIONGAP 10 11/18/2023    ESTGFRAFRICA 16.2 (A) 10/13/2021    EGFRNONAA 14.0 (A) 10/13/2021     Vit D, 25-Hydroxy   Date Value Ref Range Status   04/09/2023 36 30 - 96 ng/mL Final     Comment:     Vitamin D deficiency.........<10 ng/mL                              Vitamin D insufficiency......10-29 ng/mL       Vitamin D sufficiency........> or equal to 30 ng/mL  Vitamin D toxicity............>100 ng/mL       Assessment and Plan     1. Type 2 diabetes mellitus with diabetic nephropathy, with long-term current use of insulin            Type 2 diabetes mellitus, with long-term current use of insulin  -- Sugar clinic in 4/8 weeks and VV in 12 weeks.   -- A1c goal <7%.  -- Medications discussed:  MFM - CKD  GLP1-DPP4 - did not tolerate Rybelsus  SETH   SGLT2 - did not discuss today  Insulin   -- Reviewed logs/CGM:  Glucose variable but improving.  Instructed to send glucose logs in 14 days.  Reach out to me sooner for any glucose <70 or consistently >200.  -- Medication Changes:   Tradjenta 5mg daily  Tresiba 12 units daily  Novolog 3 units before meals only    -- Reviewed goals of therapy are to get the best control we can without hypoglycemia.  -- Reviewed  patient's current insulin regimen. Clarified proper insulin dose and timing in relation to meals, etc. Insulin injection sites and proper rotation instructed.    -- Advised frequent self blood glucose monitoring.  Patient encouraged to document glucose results and bring them to every clinic visit.  -- Hypoglycemia precautions discussed. Instructed on precautions before driving.    -- Call for Bg repeatedly < 90 or > 180.   -- Close adherence to lifestyle changes recommended.   -- Periodic follow ups for eye evaluations, foot care and dental care suggested.            Follow up in about 3 months (around 3/18/2024).      I spent 20 minutes face-to-face with the patient, over half of the visit was spent on counseling and/or coordinating the care of the patient.    Counseling includes:  Diagnostic results, impressions, recommendations   Prognosis   Risk and benefits of management/treatment options   Instructions for management treatment and or follow-up   Importance of compliance with management   Risk factor reduction   Patient education

## 2023-12-18 ENCOUNTER — CLINICAL SUPPORT (OUTPATIENT)
Dept: ENDOCRINOLOGY | Facility: CLINIC | Age: 38
End: 2023-12-18
Payer: MEDICARE

## 2023-12-18 DIAGNOSIS — E11.21 TYPE 2 DIABETES MELLITUS WITH DIABETIC NEPHROPATHY, WITH LONG-TERM CURRENT USE OF INSULIN: Primary | ICD-10-CM

## 2023-12-18 DIAGNOSIS — Z79.4 TYPE 2 DIABETES MELLITUS WITH DIABETIC NEPHROPATHY, WITH LONG-TERM CURRENT USE OF INSULIN: Primary | ICD-10-CM

## 2023-12-18 PROCEDURE — 99214 OFFICE O/P EST MOD 30 MIN: CPT | Mod: 95,,, | Performed by: INTERNAL MEDICINE

## 2023-12-18 PROCEDURE — 95251 PR GLUCOSE MONITOR, 72 HOUR, PHYS INTERP: ICD-10-PCS | Mod: NDTC,S$GLB,, | Performed by: NURSE PRACTITIONER

## 2023-12-18 PROCEDURE — 95251 CONT GLUC MNTR ANALYSIS I&R: CPT | Mod: NDTC,S$GLB,, | Performed by: NURSE PRACTITIONER

## 2023-12-18 PROCEDURE — 99214 PR OFFICE/OUTPT VISIT, EST, LEVL IV, 30-39 MIN: ICD-10-PCS | Mod: 95,,, | Performed by: INTERNAL MEDICINE

## 2023-12-18 NOTE — ASSESSMENT & PLAN NOTE
-- Sugar clinic in 4/8 weeks and VV in 12 weeks.   -- A1c goal <7%.  -- Medications discussed:  MFM - CKD  GLP1-DPP4 - did not tolerate Rybelsus  SETH   SGLT2 - did not discuss today  Insulin   -- Reviewed logs/CGM:  Glucose variable but improving.  Instructed to send glucose logs in 14 days.  Reach out to me sooner for any glucose <70 or consistently >200.  -- Medication Changes:   Tradjenta 5mg daily  Tresiba 12 units daily  Novolog 3 units before meals only    -- Reviewed goals of therapy are to get the best control we can without hypoglycemia.  -- Reviewed patient's current insulin regimen. Clarified proper insulin dose and timing in relation to meals, etc. Insulin injection sites and proper rotation instructed.    -- Advised frequent self blood glucose monitoring.  Patient encouraged to document glucose results and bring them to every clinic visit.  -- Hypoglycemia precautions discussed. Instructed on precautions before driving.    -- Call for Bg repeatedly < 90 or > 180.   -- Close adherence to lifestyle changes recommended.   -- Periodic follow ups for eye evaluations, foot care and dental care suggested.

## 2023-12-22 ENCOUNTER — DOCUMENTATION ONLY (OUTPATIENT)
Dept: TRANSPLANT | Facility: CLINIC | Age: 38
End: 2023-12-22

## 2023-12-22 LAB — EXT CMV DNA QUANT. BY PCR: NORMAL

## 2023-12-28 ENCOUNTER — PATIENT MESSAGE (OUTPATIENT)
Dept: TRANSPLANT | Facility: CLINIC | Age: 38
End: 2023-12-28
Payer: MEDICARE

## 2023-12-29 DIAGNOSIS — I10 BENIGN ESSENTIAL HTN: Primary | ICD-10-CM

## 2023-12-29 RX ORDER — NIFEDIPINE 60 MG/1
60 TABLET, EXTENDED RELEASE ORAL 2 TIMES DAILY
Qty: 60 TABLET | Refills: 11 | Status: SHIPPED | OUTPATIENT
Start: 2023-12-29 | End: 2024-12-28

## 2023-12-29 NOTE — PATIENT INSTRUCTIONS
BP log reviewed. Please have him increase nifedipine to 60 mg bid. Advise him to reports any new issues (HA, edema, etc) and to be aware we may need to add another agent for optimal BP control.  Also let him know I am please with his monitoring his BPs, and to keep up the good work!

## 2023-12-29 NOTE — TELEPHONE ENCOUNTER
Pt reports HTN uncontrolled; BP log reviewed by Dr. Troy - instructed to increase nifedipine to 60mg BID. Pt verbalized understanding.

## 2024-01-05 ENCOUNTER — PATIENT MESSAGE (OUTPATIENT)
Dept: TRANSPLANT | Facility: CLINIC | Age: 39
End: 2024-01-05
Payer: MEDICARE

## 2024-01-05 ENCOUNTER — DOCUMENTATION ONLY (OUTPATIENT)
Dept: TRANSPLANT | Facility: CLINIC | Age: 39
End: 2024-01-05

## 2024-01-05 LAB
EXT ALBUMIN: 3.7 (ref 3.4–5)
EXT ANC: 2.32 (ref 1.78–5.38)
EXT BACTERIA UA: ABNORMAL
EXT BK VIRUS DNA QN PCR: NOT DETECTED
EXT BUN: 17 (ref 7–18)
EXT CALCIUM: 9.2 (ref 8.5–10.1)
EXT CHLORIDE: 107 (ref 98–107)
EXT CMV DNA QUANT. BY PCR: ABNORMAL
EXT CO2: 29 (ref 21–32)
EXT CREATININE UA: 97.8 (ref 100–180)
EXT CREATININE: 1.3 MG/DL (ref 0.7–1.3)
EXT EGFR NO RACE VARIABLE: 79.04
EXT EOSINOPHIL%: 1.7 (ref 0–7)
EXT GLUCOSE UA: ABNORMAL
EXT GLUCOSE: 152 (ref 74–106)
EXT HEMATOCRIT: 42.7 (ref 42–54)
EXT HEMOGLOBIN: 14.2 (ref 14–18)
EXT LYMPH%: 26.6 (ref 20.5–51.1)
EXT MAGNESIUM: 1.7 (ref 1.8–2.4)
EXT MONOCYTES%: 4.5 (ref 1.7–9)
EXT NITRITES UA: NEGATIVE
EXT PHOSPHORUS: 3.7 (ref 2.6–4.7)
EXT PLATELETS: 191 (ref 140–440)
EXT POTASSIUM: 4.2 (ref 3.5–5.1)
EXT PROT/CREAT RATIO UR: ABNORMAL
EXT PROTEIN UA: ABNORMAL
EXT RBC UA: ABNORMAL
EXT SEGS%: 65.5 (ref 42.2–75.2)
EXT SODIUM: 142 MMOL/L (ref 136–145)
EXT TACROLIMUS LVL: 4.8
EXT URINE PROTEIN: 19.7 (ref 1–14)
EXT WBC UA: ABNORMAL
EXT WBC: 3.5 (ref 5–10)

## 2024-01-08 DIAGNOSIS — Z94.0 S/P KIDNEY TRANSPLANT: ICD-10-CM

## 2024-01-08 NOTE — TELEPHONE ENCOUNTER
Written order received from Dr. Troy.  Messaged pt on 1/5/24 and instructed to increase Envarsus to 9 mg once a day after confirming labs were taken correctly.  Patient responded and stated understanding.       ----- Message from Tesha Guaman RN sent at 1/5/2024  4:12 PM CST -----    ----- Message -----  From: Monica Handley MD  Sent: 1/5/2024   4:08 PM CST  To: [facility]    If level of 4.8 is reported to be 24 hr post dose, increase envarsus to 9 mg daily

## 2024-01-10 ENCOUNTER — PATIENT MESSAGE (OUTPATIENT)
Dept: TRANSPLANT | Facility: CLINIC | Age: 39
End: 2024-01-10
Payer: MEDICARE

## 2024-01-10 NOTE — PROGRESS NOTES
Subjective:      Patient ID: Andrey Reddy is a 38 y.o. male.    Chief Complaint:  No chief complaint on file.    History of Present Illness  Andrey Reddy is here for follow up of DM.  Previously seen by me 12/2023.  This is a MyChart video visit.    The patient location is: [place]  The chief complaint leading to consultation is: DM  Visit type: Virtual visit with synchronous audio and video  Total time spent with patient: see below  Each patient to whom he or she provides medical services by telemedicine is:  (1) informed of the relationship between the physician and patient and the respective role of any other health care provider with respect to management of the patient; and (2) notified that he or she may decline to receive medical services by telemedicine and may withdraw from such care at any time.    S/p kidney transplant on 4/9/2023  Prior to transplant Home Diabetes Medications:  Not on medications  Prednisone: 5mg daily     With regards to Diabetes:    Diagnosed: ~7-8 years ago   DE 11/2023  FH: mother  Known complications:  DKA Denies  RN Denies  Eye Exam: within the last year   PN Denies  Podiatry: Denies  Nephropathy +  CAD Denies  Denies history of pancreatitis & personal/family history of medullary thyroid cancer.     Diet/Exercise:  Eats 2 meals a day.   Snacks : occasionally   Drinks : water, Pedialyte, juice.   Exercise - tries to stay active   Recent illness, injury, steroids; Denies     Current Regimen:  Tradjenta 5mg daily  Tresiba 12 units daily  Novolog 3 units before meals only     Reports compliance.     Other medications tried:  Metformin - CKD  Trulicity - denies issues but stopped due to DM control   Rybelsus - GI side effects     Glucose Monitor: [employer]  6 times a day testing  Log reviewed: sensor downloaded and reviewed    Hypoglycemia:  Rare and timing varies  Knows how to correct with 15 grams of carbs- juice, coke, or a peppermint.     Diabetes Management Status    Hemoglobin A1C  "  Date Value Ref Range Status   11/16/2023 6.7 (H) 4.0 - 5.6 % Final     Comment:     ADA Screening Guidelines:  5.7-6.4%  Consistent with prediabetes  >or=6.5%  Consistent with diabetes    High levels of fetal hemoglobin interfere with the HbA1C  assay. Heterozygous hemoglobin variants (HbS, HgC, etc)do  not significantly interfere with this assay.   However, presence of multiple variants may affect accuracy.     04/09/2023 4.5 4.0 - 5.6 % Final     Comment:     ADA Screening Guidelines:  5.7-6.4%  Consistent with prediabetes  >or=6.5%  Consistent with diabetes    High levels of fetal hemoglobin interfere with the HbA1C  assay. Heterozygous hemoglobin variants (HbS, HgC, etc)do  not significantly interfere with this assay.   However, presence of multiple variants may affect accuracy.     10/13/2021 4.7 4.0 - 5.6 % Final     Comment:     ADA Screening Guidelines:  5.7-6.4%  Consistent with prediabetes  >or=6.5%  Consistent with diabetes    High levels of fetal hemoglobin interfere with the HbA1C  assay. Heterozygous hemoglobin variants (HbS, HgC, etc)do  not significantly interfere with this assay.   However, presence of multiple variants may affect accuracy.         Statin: Taking  ACE/ARB: Not taking  Screening or Prevention Patient's value Goal Complete/Controlled?   HgA1C Testing and Control   Lab Results   Component Value Date    HGBA1C 6.7 (H) 11/16/2023      Annually/Less than 8% Yes   Lipid profile : 04/09/2023 Annually Yes   LDL control Lab Results   Component Value Date    LDLCALC 104.4 04/09/2023    Annually/Less than 100 mg/dl  No   Nephropathy screening No results found for: "LABMICR"  Lab Results   Component Value Date    PROTEINUA Trace (A) 11/14/2023    Annually Yes   Blood pressure BP Readings from Last 1 Encounters:   11/28/23 123/73    Less than 140/90 No   Dilated retinal exam : 06/28/2017 Annually Yes   Foot exam   Most Recent Foot Exam Date: Not Found Annually No       Review of Systems  As " above      There were no vitals taken for this visit.      There is no height or weight on file to calculate BMI.    Lab Review:   Lab Results   Component Value Date    HGBA1C 6.7 (H) 11/16/2023    HGBA1C 4.5 04/09/2023    HGBA1C 4.7 10/13/2021       Lab Results   Component Value Date    CHOL 169 04/09/2023    HDL 58 04/09/2023    LDLCALC 104.4 04/09/2023    TRIG 33 04/09/2023    CHOLHDL 34.3 04/09/2023     Lab Results   Component Value Date     11/18/2023    K 4.0 11/18/2023     (H) 11/18/2023    CO2 20 (L) 11/18/2023    GLU 84 11/18/2023    BUN 11 11/18/2023    CREATININE 1.2 11/18/2023    CALCIUM 8.2 (L) 11/18/2023    PROT 7.2 11/14/2023    ALBUMIN 3.0 (L) 11/18/2023    BILITOT 1.1 (H) 11/14/2023    ALKPHOS 66 11/14/2023    AST 33 11/14/2023    ALT 36 11/14/2023    ANIONGAP 10 11/18/2023    ESTGFRAFRICA 16.2 (A) 10/13/2021    EGFRNONAA 14.0 (A) 10/13/2021     Vit D, 25-Hydroxy   Date Value Ref Range Status   04/09/2023 36 30 - 96 ng/mL Final     Comment:     Vitamin D deficiency.........<10 ng/mL                              Vitamin D insufficiency......10-29 ng/mL       Vitamin D sufficiency........> or equal to 30 ng/mL  Vitamin D toxicity............>100 ng/mL       Assessment and Plan     1. Type 2 diabetes mellitus with diabetic nephropathy, with long-term current use of insulin        2. S/P kidney transplant        3. Secondary hyperparathyroidism            Type 2 diabetes mellitus, with long-term current use of insulin  -- Sugar clinic in 4/8 weeks.   -- A1c goal <7%.  -- Medications discussed:  MFM - CKD  GLP1-DPP4 - did not tolerate Rybelsus  SETH   SGLT2 - did not discuss today  Insulin   -- Reviewed logs/CGM:  Glucose variable.  Reach out to me sooner for any glucose <70 or consistently >200.  -- Instructed to keep a 3 day food/drink/insulin log.  -- Medication Changes:   Tradjenta 5mg daily  Tresiba 12 units daily  Novolog 3 units before meals only    -- Reviewed goals of therapy are to  get the best control we can without hypoglycemia.  -- Reviewed patient's current insulin regimen. Clarified proper insulin dose and timing in relation to meals, etc. Insulin injection sites and proper rotation instructed.    -- Advised frequent self blood glucose monitoring.  Patient encouraged to document glucose results and bring them to every clinic visit.  -- Hypoglycemia precautions discussed. Instructed on precautions before driving.    -- Call for Bg repeatedly < 90 or > 180.   -- Close adherence to lifestyle changes recommended.   -- Periodic follow ups for eye evaluations, foot care and dental care suggested.    S/P kidney transplant  -- Continue following with Transplant.     Secondary hyperparathyroidism  -- Secondary to renal.              No follow-ups on file.      I spent 20 minutes face-to-face with the patient, over half of the visit was spent on counseling and/or coordinating the care of the patient.    Counseling includes:  Diagnostic results, impressions, recommendations   Prognosis   Risk and benefits of management/treatment options   Instructions for management treatment and or follow-up   Importance of compliance with management   Risk factor reduction   Patient education

## 2024-01-17 ENCOUNTER — OFFICE VISIT (OUTPATIENT)
Dept: ENDOCRINOLOGY | Facility: CLINIC | Age: 39
End: 2024-01-17
Payer: MEDICARE

## 2024-01-17 ENCOUNTER — PATIENT MESSAGE (OUTPATIENT)
Dept: ENDOCRINOLOGY | Facility: CLINIC | Age: 39
End: 2024-01-17

## 2024-01-17 ENCOUNTER — PATIENT MESSAGE (OUTPATIENT)
Dept: TRANSPLANT | Facility: CLINIC | Age: 39
End: 2024-01-17
Payer: MEDICARE

## 2024-01-17 DIAGNOSIS — E11.21 TYPE 2 DIABETES MELLITUS WITH DIABETIC NEPHROPATHY, WITH LONG-TERM CURRENT USE OF INSULIN: Primary | ICD-10-CM

## 2024-01-17 DIAGNOSIS — Z94.0 S/P KIDNEY TRANSPLANT: ICD-10-CM

## 2024-01-17 DIAGNOSIS — N25.81 SECONDARY HYPERPARATHYROIDISM: ICD-10-CM

## 2024-01-17 DIAGNOSIS — Z79.4 TYPE 2 DIABETES MELLITUS WITH DIABETIC NEPHROPATHY, WITH LONG-TERM CURRENT USE OF INSULIN: Primary | ICD-10-CM

## 2024-01-17 PROCEDURE — 1159F MED LIST DOCD IN RCRD: CPT | Mod: CPTII,95,, | Performed by: NURSE PRACTITIONER

## 2024-01-17 PROCEDURE — 99214 OFFICE O/P EST MOD 30 MIN: CPT | Mod: 25,95,, | Performed by: NURSE PRACTITIONER

## 2024-01-17 PROCEDURE — 1160F RVW MEDS BY RX/DR IN RCRD: CPT | Mod: CPTII,95,, | Performed by: NURSE PRACTITIONER

## 2024-01-17 PROCEDURE — 95251 CONT GLUC MNTR ANALYSIS I&R: CPT | Mod: NDTC,S$GLB,, | Performed by: NURSE PRACTITIONER

## 2024-01-17 NOTE — ASSESSMENT & PLAN NOTE
-- Sugar clinic in 4/8 weeks.   -- A1c goal <7%.  -- Medications discussed:  MFM - CKD  GLP1-DPP4 - did not tolerate Rybelsus  SETH   SGLT2 - did not discuss today  Insulin   -- Reviewed logs/CGM:  Glucose variable.  Reach out to me sooner for any glucose <70 or consistently >200.  -- Instructed to keep a 3 day food/drink/insulin log.  -- Medication Changes:   Tradjenta 5mg daily  Tresiba 12 units daily  Novolog 3 units before meals only    -- Reviewed goals of therapy are to get the best control we can without hypoglycemia.  -- Reviewed patient's current insulin regimen. Clarified proper insulin dose and timing in relation to meals, etc. Insulin injection sites and proper rotation instructed.    -- Advised frequent self blood glucose monitoring.  Patient encouraged to document glucose results and bring them to every clinic visit.  -- Hypoglycemia precautions discussed. Instructed on precautions before driving.    -- Call for Bg repeatedly < 90 or > 180.   -- Close adherence to lifestyle changes recommended.   -- Periodic follow ups for eye evaluations, foot care and dental care suggested.

## 2024-01-19 ENCOUNTER — DOCUMENTATION ONLY (OUTPATIENT)
Dept: TRANSPLANT | Facility: CLINIC | Age: 39
End: 2024-01-19
Payer: MEDICARE

## 2024-01-19 LAB — EXT CMV DNA QUANT. BY PCR: NOT DETECTED

## 2024-01-22 ENCOUNTER — DOCUMENTATION ONLY (OUTPATIENT)
Dept: TRANSPLANT | Facility: CLINIC | Age: 39
End: 2024-01-22
Payer: MEDICARE

## 2024-01-22 LAB — EXT TACROLIMUS LVL: 9.5

## 2024-01-23 NOTE — PROGRESS NOTES
The patient location is: Home  The chief complaint leading to consultation is: reassess kidney function, immunosuppression and related issues post kidney transplant    Visit type: audiovisual    Face to Face time with patient: 15 minutes of total time spent on the encounter, which includes face to face time and non-face to face time preparing to see the patient (eg, review of tests), Obtaining and/or reviewing separately obtained history, Documenting clinical information in the electronic or other health record, Independently interpreting results (not separately reported) and communicating results to the patient/family/caregiver, or Care coordination (not separately reported).     Each patient to whom he or she provides medical services by telemedicine is:  (1) informed of the relationship between the physician and patient and the respective role of any other health care provider with respect to management of the patient; and (2) notified that he or she may decline to receive medical services by telemedicine and may withdraw from such care at any time.    Notes:   Post-Transplant Assessment    Referring Physician: Pipe Martinez  Current Nephrologist: Pipe Martinez    ORGAN: LEFT KIDNEY  Donor Type: donation after circulatory death   PHS Increased Risk: no  Cold Ischemia: 1,264 mins  Induction Medications: thymoglobulin    Subjective:     CC:  Reassessment of renal allograft function and management of chronic immunosuppression.    HPI:  Mr. Reddy is a 38 y.o. year old Black or  male who received a donation after circulatory death  kidney transplant on 4/9/23.  He has CKD stage 3 - GFR 30-59 and his baseline creatinine is between 1.2-1.6. He takes mycophenolate mofetil, prednisone, and tacrolimus for maintenance immunosuppression. He is additionally on ketoconazole to augment tacro levels.    Pertinent Nephrology/Transplant History:   ESRD from DM II  DCD DDKT 4/9/23, KDPI 38%, CIT>21hrs;  "Thymo induction  CMV D+,R-  6/29/23 change to envarsus for HA  9/8/23 biopsy Diffuse ATI  MMF stopped 11/13/23(N/V/D d/t CMV)  CMV+ 11/15/23: 104K started GCV & stopped MMF    PCP ppx: bactrim STOP 10/6/23  CMV ppx: valcyte STOP 10/6/23    05/30/2023 Andrey feels well and denies nay concerns. He notes no issues with his IS and has no concerns.    He is monitoring his sugars which run 150-170s most days. No low sugars noted. He has endo follow up in future already scheduled.  BPs reported to be in 130-140/70s-88 range  Reports his incision is well healed. Urinating w/o problems, "like a racehorse."  Denies heartburn off Pepcid  Uses pill box with help of Mom.    6/27/23  Andrey notes occ HA every few days. He believes the HA have been  present since txp.  With review of systems questioning, he also notes occasional dyspnea on exertion.  He also had that during dialysis, and states it was attributed to volume overload.  He reports no dyspnea at rest or lying down, and notes no peripheral edema.  He denies voiding difficulties.  He reports blood pressures are usually 140-150 over 80s-90s, as high as diastolic of 98.     10/17/23 Andrey is now over 6 months post kidney transplant.  His biggest concern is ongoing pain and that due to a change in insurance he lost his pain management physician.  He is also worried about poor blood sugar control, but has been in touch with Endocrine.  From the transplant perspective, he believes the tremors are better overall since he converted to Envarsus (tacrolimus XR), but starting to come back.  His last tacrolimus level is pending.  He noted high blood pressures at home running 140-160/80-90.  I verified he is taking nifedipine, but not amlodipine.  He reports no voiding concerns.    12/04/2023  Admitted 11/14/23 with N/V/D and found CMV +; stopped MMF and started on IV GCV. Viral load dropped from 104,210 IU/ML [11/15] to 57,700 [11/22]. His GI issues have resolved.  He had " "two wisdom teeth removed last week, and is recovering well. No dental pain noted today.  He denies any kidney-related complaints, such as pain over allograft, flank pain, dysuria, frequency, or other LUTS.     1/26/24  Andrey is 292 days post transplant. Is working with endocrinology on his blood sugars and had adjustment last week. Had appointment today and his blood pressure today was 137/80. He is feeling well and denies any concerns. He notes no issues with his IS and has no concerns.      Current Outpatient Medications   Medication Sig    acetaminophen (TYLENOL) 500 MG tablet Take 1 tablet (500 mg total) by mouth every 6 (six) hours as needed for Pain.    amoxicillin (AMOXIL) 500 MG capsule Use as directed by dental provider    atorvastatin (LIPITOR) 10 MG tablet Take 1 tablet (10 mg total) by mouth every evening.    BD ULTRA-FINE SILVIA PEN NEEDLE 32 gauge x 5/32" Ndle Use with insulin 4 times daily.    cyclobenzaprine (FLEXERIL) 10 MG tablet Take 10 mg by mouth daily as needed.    DULoxetine (CYMBALTA) 30 MG capsule Take 1 capsule (30 mg total) by mouth once daily.    flash glucose scanning reader (FREESTYLE SELMA 2 READER) [facility] [place] as directed.    flash glucose sensor (FREESTYLE SELMA 2 SENSOR) Kit Change every 14 day.    k phos di & mono-sod phos mono (K-PHOS-NEUTRAL) 250 mg Tab Take 1 tablet by mouth 2 (two) times a day.    linaGLIPtin (TRADJENTA) 5 mg Tab tablet Take 1 tablet (5 mg total) by mouth once daily.    magnesium oxide (MAG-OX) 400 mg (241.3 mg magnesium) tablet Take 1 tablet (400 mg total) by mouth 2 (two) times daily.    NIFEdipine (PROCARDIA-XL) 60 MG (OSM) 24 hr tablet Take 1 tablet (60 mg total) by mouth 2 (two) times a day.    NIFEdipine (PROCARDIA-XL) 60 MG (OSM) 24 hr tablet Take 1 tablet (60 mg total) by mouth 2 (two) times a day.    NOVOLOG FLEXPEN U-100 INSULIN 100 unit/mL (3 mL) InPn pen Inject Novolog (per Sliding Scale) into the skin before meals if BG >150 (Max TDD 15u)    " oxyCODONE-acetaminophen (PERCOCET) 5-325 mg per tablet Take 1 tablet by mouth every 6 (six) hours as needed for Pain.    predniSONE (DELTASONE) 5 MG tablet Take 1 tablet (5 mg total) by mouth once daily.    tacrolimus XR, ENVARSUS, (ENVARSUS XR) 1 mg Tb24 Take 9 tablets (9 mg total) by mouth every morning.    TRESIBA FLEXTOUCH U-100 100 unit/mL (3 mL) insulin pen Inject 12 Units into the skin once daily. HOLD UNLESS BLOOD GLUCOSE >120. DISCARD PEN AFTER 56 DAYS ONCE USED.    valGANciclovir (VALCYTE) 450 mg Tab Take 2 tablets (900 mg total) by mouth 2 (two) times daily.     No current facility-administered medications for this visit.       Review of Systems   Constitutional: Negative.    HENT: Negative.     Respiratory: Negative.     Cardiovascular:  Negative for chest pain and leg swelling.   Gastrointestinal:  Negative for abdominal pain, diarrhea, nausea and vomiting.   Genitourinary:  Negative for difficulty urinating.   Allergic/Immunologic: Positive for immunocompromised state.     Objective:     There were no vitals taken for this visit.body mass index is unknown because there is no height or weight on file.    Physical Exam Pleasant NAD, speech normal. Resp unlabored.    Labs:  Recent Labs   Lab 10/13/21  0806 10/13/21  0807 10/13/21  1136 10/28/22  1205 04/09/23  0320 04/09/23  0857 05/04/23  0724 05/04/23  0742 08/31/23  0927 09/08/23  0651 11/14/23  2129 11/14/23  2333 11/16/23  0625 11/17/23  0611 11/18/23  0648   WBC 4.75  --   --   --  5.72   < >  --    < > 5.09  --  4.01   < > 3.08 L 2.94 L 3.10 L   Hemoglobin 12.7 L  --   --   --  9.8 L   < >  --    < > 12.7 L  --  14.3   < > 12.8 L 12.3 L 12.1 L   POC Hematocrit  --   --   --   --   --    < >  --   --   --   --   --   --   --   --   --    Hematocrit 37.6 L  --   --   --  29.8 L   < >  --    < > 38.4 L  --  41.8   < > 38.4 L 36.6 L 37.2 L   Sodium 139  --   --   --  140   < >  --    < > 142 141 137   < > 137 139 142   Potassium 4.6  --   --   --  3.9    < >  --    < > 5.1 3.6 4.7   < > 3.6 3.6 4.0   Chloride 104  --   --   --  103   < >  --    < > 103 106 103   < > 105 107 112 H   CO2 21 L  --   --   --  25   < >  --    < > 30 H 24 21 L   < > 24 24 20 L   BUN 30 H  --   --   --  25 H   < >  --    < > 25 H 22 H 48 H   < > 27 H 14 11   Creatinine 4.9 H  --   --   --  3.7 H   < >  --    < > 2.2 H 1.9 H 2.7 H   < > 1.3 1.2 1.2   eGFR if non  14.0 A  --   --   --   --   --   --   --   --   --   --   --   --   --   --    Calcium 10.0  --   --   --  9.2   < >  --    < > 10.0 9.6 9.0   < > 8.3 L 8.0 L 8.2 L   Phosphorus 4.0  --   --   --  4.0   < >  --    < >  --   --   --    < > 2.1 L 2.4 L 1.8 L   Magnesium  --   --   --   --   --    < >  --    < >  --   --  2.3   < > 1.9 1.7 1.6   Albumin 4.5  --   --   --  3.9   < >  --    < > 4.4 4.1 4.1   < > 3.2 L 3.0 L 3.0 L   AST 15  --   --   --  12  --   --    < > 15 23 33  --   --   --   --    ALT 18  --   --   --  13  --   --    < > 14 26 36  --   --   --   --    Prot/Creat Ratio, Urine  --   --  1.35 H  --   --   --  0.12  --   --   --   --   --   --   --   --    PTH, Intact  --  210.3 H  --  370.8 H 354.1 H  --   --   --   --   --   --   --   --   --   --    Tacrolimus Lvl  --   --   --   --   --    < >  --    < > 14.2  --   --    < > 7.3 12.2 5.9  6.1    < > = values in this interval not displayed.         Recent Labs   Lab 05/10/23  0846 05/17/23  0000 07/06/23  0848 08/08/23  0000 11/09/23  0905 11/22/23  0900 12/06/23  0000 01/02/24  0828   EXT ANC  --    < > 3.57  --  1.51 A 1.91  --  2.32   EXT WBC 5.9   < > 4.7 L   < > 2.7 L 3.6 A 3.5 A 3.5 L   EXT SEGS% 77.5   < > 75.8 H  --  55.4  --   --  65.5   EXT Platelets 227   < > 177   < > 101.0 L 192 212 191   EXT Hemoglobin 11.6 L   < > 13.1 L   < > 13.7 L 12.5 A 12.9 A 14.2   EXT Hematocrit 33.7 L   < > 38.7 L   < > 40.4 L 37.5 A 38.9 A 42.7   EXT Creatinine 1.3   < > 1.50 H   < > 1.70 H 1.40 A 1.3 1.30   EXT CREATININE UA 72.5  --  62.6 L  --  133.5  --   83.5 A 97.8 L   EXT Sodium 140   < > 140   < > 142 141 140 142   EXT Potassium 4.4   < > 4.1   < > 3.3 L 4.1 4 4.2   EXT BUN 19 H   < > 27 H   < > 23 H 13 21 A 17   EXT CO2 29   < > 28   < > 33 H 32 28 29   EXT Calcium 8.9   < > 9.2   < > 9.3 9.6 9.4 9.2   EXT Phosphorus 3.4   < > 4.2   < > 4.1 4.3 3.4 3.7   EXT Glucose 146 H   < > 292 H   < > 141 H 101  --  152 H   EXT Albumin 4.1   < > 4.5   < > 4.2 3.5 3.5 3.7   EXT AST <8 L  --  25  --   --   --   --   --    EXT ALT 15 L  --  70 H  --   --   --   --   --    EXT BilirubiN Total 0.6  --  0.5  --   --   --   --   --     < > = values in this interval not displayed.       Recent Labs   Lab 10/11/23  0903 11/09/23  0905 11/22/23  0900 12/06/23  0000 01/02/24  0828 01/18/24  0903   EXT Tacrolimus Lvl 4.1  --    < > 6.4 4.8 9.5   EXT PROT/CREAT Ratio UR  --  0.256 A  --  0.2 201 mg/g  --    EXT Protein UA 1+ A  --   --  Negative trace  --    EXT WBC UA 3-5  --   --  3-5 0-2  --    EXT RBC UA 0-2  --   --  0-2 0-2  --     < > = values in this interval not displayed.       Labs were reviewed with the patient.    Assessment:     1. S/P kidney transplant    2. Renovascular hypertension    3. Type 2 diabetes mellitus with diabetic nephropathy, with long-term current use of insulin    4. Long-term use of immunosuppressant medication           Plan:   New nursing orders:  -  continue routine care  - monitor CMV PCRs weekly while on valcyte treatment -- needs another CMV PCR last was negative before transitioning to prophylaxis dosing, continue to hold mmf; stopped KPN   -as per Dr. Troy needs repeat Tac level and patient to notify us if continue tremors. At times with lower blood sugars.       CKD II-IIIa post DCD kidney transplant 4/9/23, KDPI 38%, CIT>21hr  Recent Labs   Lab 10/13/21  1136 04/09/23  0320 05/04/23  0724 05/04/23  0742 11/17/23  0611 11/18/23  0648   Creatinine  --    < >  --    < > 1.2 1.2   eGFR  --    < >  --    < > >60.0 >60.0   Prot/Creat Ratio, Urine  1.35 H  --  0.12  --   --   --     < > = values in this interval not displayed.      -creat 1.2-1.6, most recent noted on lower end of his range  Screening for proteinuria  - Obtain urine p/c    Immunosuppression Management for pt on prophylactic IS against rejection  - On tacro XR (changed from IS d/t tremors), MMF [held d/t CMV] and prednisone. Anticipate restarting MMF once CMV resolved.  - Target tacro 7-9  - Continue monitoring for toxicities and SE.       At risk for opportunistic infections given immunosuppressed state  -Prophylaxis against opportunistic infections  -Bactrim and Valcyte stopped as directed  -Screening for BK infection to prevent allograft dysfunction  -Oct 11 BK PCR not det  -Continue blood PCR screening as per guidelines to prevent BK viremia and nephropathy leading to allograft dysfunction and potential graft failure  -previously + CMV; 1 not detected awaiting second    Hypertension, renal  - continue nifedipine 60 mg q.a.m. and add 30 mg q.p.m.    Hypomagnesemia  -improving    Hypophosphatemia  -lower KPN to 250 mg bid    Follow-up:   Clinic: return to transplant clinic weekly for the first month after transplant; every 2 weeks during months 2-3; then at 6-, 9-, 12-, 18-, 24-, and 36- months post-transplant to reassess for complications from immunosuppression toxicity and monitor for rejection.  Annually thereafter.    Labs: since patient remains at high risk for rejection and drug-related complications that warrant close monitoring, labs will be ordered as follows: continue twice weekly CBC, renal panel, and drug level for first month; then same labs once weekly through 3rd month post-transplant.  Urine for UA and protein/creatinine ratio monthly.  Serum BK - PCR at 1-, 3-, 6-, 9-, 12-, 18-, 24-, 36- 48-, and 60 months post-transplant.  Hepatic panel at 1-, 2-, 3-, 6-, 9-, 12-, 18-, 24-, and 36- months post-transplant.    Farnaz Santos NP       [facility] Patient Status  Functional Status:  80% - Normal activity with effort: some symptoms of disease  Physical Capacity: No Limitations

## 2024-01-26 ENCOUNTER — OFFICE VISIT (OUTPATIENT)
Dept: TRANSPLANT | Facility: CLINIC | Age: 39
End: 2024-01-26
Payer: MEDICARE

## 2024-01-26 DIAGNOSIS — Z79.4 TYPE 2 DIABETES MELLITUS WITH DIABETIC NEPHROPATHY, WITH LONG-TERM CURRENT USE OF INSULIN: ICD-10-CM

## 2024-01-26 DIAGNOSIS — I15.0 RENOVASCULAR HYPERTENSION: ICD-10-CM

## 2024-01-26 DIAGNOSIS — Z79.60 LONG-TERM USE OF IMMUNOSUPPRESSANT MEDICATION: ICD-10-CM

## 2024-01-26 DIAGNOSIS — Z94.0 S/P KIDNEY TRANSPLANT: Primary | ICD-10-CM

## 2024-01-26 DIAGNOSIS — E11.21 TYPE 2 DIABETES MELLITUS WITH DIABETIC NEPHROPATHY, WITH LONG-TERM CURRENT USE OF INSULIN: ICD-10-CM

## 2024-01-26 PROCEDURE — 99214 OFFICE O/P EST MOD 30 MIN: CPT | Mod: 95,,, | Performed by: NURSE PRACTITIONER

## 2024-01-26 NOTE — LETTER
January 26, 2024        Pipe Martinez  [address] [facility] [facility] [address]  Phone: [phone]  Fax: [phone]             [facility] 1st Fl  [address]  [facility] [address]  Phone: [phone]   Patient: Andrey Reddy   MR Number: [id number]   YOB: 1985   Date of Visit: 1/26/2024       Dear Dr. Pipe Martinez    Thank you for referring Andrey Reddy to me for evaluation. Attached you will find relevant portions of my assessment and plan of care.    If you have questions, please do not hesitate to call me. I look forward to following Andrey Reddy along with you.    Sincerely,    Farnaz Santos, NP    Enclosure    If you would like to receive this communication electronically, please contact [email or web address] or [phone] to request [facility] Link access.    [facility] Link is a tool which provides read-only access to select patient information with whom you have a relationship. Its easy to use and provides real time access to review your patients record including encounter summaries, notes, results, and demographic information.    If you feel you have received this communication in error or would no longer like to receive these types of communications, please e-mail [email or web address]

## 2024-01-30 ENCOUNTER — PATIENT MESSAGE (OUTPATIENT)
Dept: DIABETES | Facility: CLINIC | Age: 39
End: 2024-01-30
Payer: MEDICARE

## 2024-02-05 ENCOUNTER — PATIENT MESSAGE (OUTPATIENT)
Dept: TRANSPLANT | Facility: CLINIC | Age: 39
End: 2024-02-05
Payer: MEDICARE

## 2024-02-05 ENCOUNTER — DOCUMENTATION ONLY (OUTPATIENT)
Dept: TRANSPLANT | Facility: CLINIC | Age: 39
End: 2024-02-05

## 2024-02-05 LAB
EXT ALBUMIN: 4.2 (ref 3.4–5)
EXT BUN: 20 (ref 7–18)
EXT CALCIUM: 10.1 (ref 8.5–10.1)
EXT CHLORIDE: 107 (ref 98–107)
EXT CMV DNA QUANT. BY PCR: ABNORMAL
EXT CO2: 28 (ref 21–32)
EXT CREATININE: 1.2 MG/DL (ref 0.7–1.3)
EXT EGFR NO RACE VARIABLE: 86.68
EXT EOSINOPHIL%: 1.7 (ref 0–7)
EXT GLUCOSE: 214 (ref 74–106)
EXT HEMATOCRIT: 42.5 (ref 42–54)
EXT HEMOGLOBIN: 14.6 (ref 14–18)
EXT LYMPH%: 25.2 (ref 20.5–51.1)
EXT MAGNESIUM: 1.6 (ref 1.8–2.4)
EXT MONOCYTES%: 4.5 (ref 1.7–9)
EXT PHOSPHORUS: 3.4 (ref 2.6–4.7)
EXT PLATELETS: 167 (ref 140–440)
EXT POTASSIUM: 4.3 (ref 3.5–5.1)
EXT SEGS%: 68.1 (ref 42.2–75.2)
EXT SODIUM: 143 MMOL/L (ref 136–145)
EXT TACROLIMUS LVL: 11.5
EXT WBC: 4.2 (ref 5–10)

## 2024-02-05 NOTE — PROGRESS NOTES
Subjective:      Patient ID: Andrey Reddy is a 38 y.o. male.    Chief Complaint:  No chief complaint on file.    History of Present Illness  Andrey Reddy is here for follow up of DM.  Previously seen by me 1/2024.  This is a MyChart video visit.    The patient location is: [place]  The chief complaint leading to consultation is: DM  Visit type: Virtual visit with synchronous audio and video  Total time spent with patient: see below  Each patient to whom he or she provides medical services by telemedicine is:  (1) informed of the relationship between the physician and patient and the respective role of any other health care provider with respect to management of the patient; and (2) notified that he or she may decline to receive medical services by telemedicine and may withdraw from such care at any time.    S/p kidney transplant on 4/9/2023  Prior to transplant Home Diabetes Medications:  Not on medications  Prednisone: 5mg daily     With regards to Diabetes:    Diagnosed: ~7-8 years ago   DE 11/2023  FH: mother  Known complications:  DKA Denies  RN Denies  Eye Exam: within the last year   PN Denies  Podiatry: Denies  Nephropathy +  CAD Denies  Denies history of pancreatitis & personal/family history of medullary thyroid cancer.     Diet/Exercise:  Eats 2 meals a day.   Snacks : occasionally   Drinks : water, Pedialyte, juice.   Exercise - tries to stay active   Recent illness, injury, steroids; Denies     Current Regimen:  Tradjenta 5mg daily  Tresiba 12 units daily  Novolog 3 units before meals only     Reports compliance.     Other medications tried:  Metformin - CKD  Trulicity - denies issues but stopped due to DM control   Rybelsus - GI side effects     Glucose Monitor: Bethany  6 times a day testing  Log reviewed: sensor downloaded and reviewed    Hypoglycemia:  Denies  Knows how to correct with 15 grams of carbs- juice, coke, or a peppermint.     Diabetes Management Status    Hemoglobin A1C   Date Value Ref  "Range Status   11/16/2023 6.7 (H) 4.0 - 5.6 % Final     Comment:     ADA Screening Guidelines:  5.7-6.4%  Consistent with prediabetes  >or=6.5%  Consistent with diabetes    High levels of fetal hemoglobin interfere with the HbA1C  assay. Heterozygous hemoglobin variants (HbS, HgC, etc)do  not significantly interfere with this assay.   However, presence of multiple variants may affect accuracy.     04/09/2023 4.5 4.0 - 5.6 % Final     Comment:     ADA Screening Guidelines:  5.7-6.4%  Consistent with prediabetes  >or=6.5%  Consistent with diabetes    High levels of fetal hemoglobin interfere with the HbA1C  assay. Heterozygous hemoglobin variants (HbS, HgC, etc)do  not significantly interfere with this assay.   However, presence of multiple variants may affect accuracy.     10/13/2021 4.7 4.0 - 5.6 % Final     Comment:     ADA Screening Guidelines:  5.7-6.4%  Consistent with prediabetes  >or=6.5%  Consistent with diabetes    High levels of fetal hemoglobin interfere with the HbA1C  assay. Heterozygous hemoglobin variants (HbS, HgC, etc)do  not significantly interfere with this assay.   However, presence of multiple variants may affect accuracy.         Statin: Taking  ACE/ARB: Not taking  Screening or Prevention Patient's value Goal Complete/Controlled?   HgA1C Testing and Control   Lab Results   Component Value Date    HGBA1C 6.7 (H) 11/16/2023      Annually/Less than 8% Yes   Lipid profile : 04/09/2023 Annually Yes   LDL control Lab Results   Component Value Date    LDLCALC 104.4 04/09/2023    Annually/Less than 100 mg/dl  No   Nephropathy screening No results found for: "LABMICR"  Lab Results   Component Value Date    PROTEINUA Trace (A) 11/14/2023    Annually Yes   Blood pressure BP Readings from Last 1 Encounters:   11/28/23 123/73    Less than 140/90 No   Dilated retinal exam : 06/28/2017 Annually Yes   Foot exam   Most Recent Foot Exam Date: Not Found Annually No       Review of Systems  As above      There " were no vitals taken for this visit.      There is no height or weight on file to calculate BMI.    Lab Review:   Lab Results   Component Value Date    HGBA1C 6.7 (H) 11/16/2023    HGBA1C 4.5 04/09/2023    HGBA1C 4.7 10/13/2021       Lab Results   Component Value Date    CHOL 169 04/09/2023    HDL 58 04/09/2023    LDLCALC 104.4 04/09/2023    TRIG 33 04/09/2023    CHOLHDL 34.3 04/09/2023     Lab Results   Component Value Date     11/18/2023    K 4.0 11/18/2023     (H) 11/18/2023    CO2 20 (L) 11/18/2023    GLU 84 11/18/2023    BUN 11 11/18/2023    CREATININE 1.2 11/18/2023    CALCIUM 8.2 (L) 11/18/2023    PROT 7.2 11/14/2023    ALBUMIN 3.0 (L) 11/18/2023    BILITOT 1.1 (H) 11/14/2023    ALKPHOS 66 11/14/2023    AST 33 11/14/2023    ALT 36 11/14/2023    ANIONGAP 10 11/18/2023    ESTGFRAFRICA 16.2 (A) 10/13/2021    EGFRNONAA 14.0 (A) 10/13/2021     Vit D, 25-Hydroxy   Date Value Ref Range Status   04/09/2023 36 30 - 96 ng/mL Final     Comment:     Vitamin D deficiency.........<10 ng/mL                              Vitamin D insufficiency......10-29 ng/mL       Vitamin D sufficiency........> or equal to 30 ng/mL  Vitamin D toxicity............>100 ng/mL       Assessment and Plan     1. Type 2 diabetes mellitus with diabetic nephropathy, with long-term current use of insulin  Hemoglobin A1C    Basic Metabolic Panel      2. Controlled type 2 diabetes mellitus with chronic kidney disease on chronic dialysis, without long-term current use of insulin  NOVOLOG FLEXPEN U-100 INSULIN 100 unit/mL (3 mL) InPn pen          Type 2 diabetes mellitus, with long-term current use of insulin  -- Labs prior to follow up in 4 weeks.  -- A1c goal <7%.  -- Medications discussed:  MFM - CKD  GLP1-DPP4 - did not tolerate Rybelsus  SETH   SGLT2 - did not discuss today  Insulin   -- Reviewed logs/CGM:  Glucose variable. There are some days where the glucose is well controlled. I suspect variability is due to diet.   Reach out to me  sooner for any glucose <70 or consistently >200.  -- Instructed to keep a 3 day food/drink/insulin log.  -- Medication Changes:   Tradjenta 5mg daily  Tresiba 12 units daily  Novolog 3 units plus correction scale before meals only  Add correction scale as needed.  Blood sugar 150 to 200 add 1 units  Blood sugar 201 to 250 add 2 units  Blood sugar greater than 251 add 3 units      -- Reviewed goals of therapy are to get the best control we can without hypoglycemia.  -- Reviewed patient's current insulin regimen. Clarified proper insulin dose and timing in relation to meals, etc. Insulin injection sites and proper rotation instructed.    -- Advised frequent self blood glucose monitoring.  Patient encouraged to document glucose results and bring them to every clinic visit.  -- Hypoglycemia precautions discussed. Instructed on precautions before driving.    -- Call for Bg repeatedly < 90 or > 180.   -- Close adherence to lifestyle changes recommended.   -- Periodic follow ups for eye evaluations, foot care and dental care suggested.                No follow-ups on file.      I spent 20 minutes face-to-face with the patient, over half of the visit was spent on counseling and/or coordinating the care of the patient.    Counseling includes:  Diagnostic results, impressions, recommendations   Prognosis   Risk and benefits of management/treatment options   Instructions for management treatment and or follow-up   Importance of compliance with management   Risk factor reduction   Patient education

## 2024-02-06 DIAGNOSIS — Z94.0 S/P KIDNEY TRANSPLANT: ICD-10-CM

## 2024-02-06 NOTE — TELEPHONE ENCOUNTER
----- Message from Tesha Guaman RN sent at 2/5/2024  4:38 PM CST -----    ----- Message -----  From: Monica Handley MD  Sent: 2/5/2024   4:31 PM CST  To: [facility]ney Transplant Clinical    Hi tacro x 2 separate occassions: lower envarsus to 8 mg daily  Repeat level in 2 weeks

## 2024-02-09 ENCOUNTER — DOCUMENTATION ONLY (OUTPATIENT)
Dept: TRANSPLANT | Facility: CLINIC | Age: 39
End: 2024-02-09
Payer: MEDICARE

## 2024-02-09 LAB — EXT CMV DNA QUANT. BY PCR: NOT DETECTED

## 2024-02-12 ENCOUNTER — CLINICAL SUPPORT (OUTPATIENT)
Dept: ENDOCRINOLOGY | Facility: CLINIC | Age: 39
End: 2024-02-12
Payer: MEDICARE

## 2024-02-12 ENCOUNTER — PATIENT MESSAGE (OUTPATIENT)
Dept: ENDOCRINOLOGY | Facility: CLINIC | Age: 39
End: 2024-02-12

## 2024-02-12 DIAGNOSIS — N18.6 CONTROLLED TYPE 2 DIABETES MELLITUS WITH CHRONIC KIDNEY DISEASE ON CHRONIC DIALYSIS, WITHOUT LONG-TERM CURRENT USE OF INSULIN: ICD-10-CM

## 2024-02-12 DIAGNOSIS — E11.21 TYPE 2 DIABETES MELLITUS WITH DIABETIC NEPHROPATHY, WITH LONG-TERM CURRENT USE OF INSULIN: Primary | ICD-10-CM

## 2024-02-12 DIAGNOSIS — Z79.4 TYPE 2 DIABETES MELLITUS WITH DIABETIC NEPHROPATHY, WITH LONG-TERM CURRENT USE OF INSULIN: Primary | ICD-10-CM

## 2024-02-12 DIAGNOSIS — Z99.2 CONTROLLED TYPE 2 DIABETES MELLITUS WITH CHRONIC KIDNEY DISEASE ON CHRONIC DIALYSIS, WITHOUT LONG-TERM CURRENT USE OF INSULIN: ICD-10-CM

## 2024-02-12 DIAGNOSIS — E11.22 CONTROLLED TYPE 2 DIABETES MELLITUS WITH CHRONIC KIDNEY DISEASE ON CHRONIC DIALYSIS, WITHOUT LONG-TERM CURRENT USE OF INSULIN: ICD-10-CM

## 2024-02-12 PROCEDURE — 99214 OFFICE O/P EST MOD 30 MIN: CPT | Mod: 95,,, | Performed by: INTERNAL MEDICINE

## 2024-02-12 PROCEDURE — 95251 CONT GLUC MNTR ANALYSIS I&R: CPT | Mod: NDTC,S$GLB,, | Performed by: NURSE PRACTITIONER

## 2024-02-12 RX ORDER — INSULIN ASPART 100 [IU]/ML
3 INJECTION, SOLUTION INTRAVENOUS; SUBCUTANEOUS
Qty: 10 ML | Refills: 3 | Status: SHIPPED | OUTPATIENT
Start: 2024-02-12

## 2024-02-12 NOTE — Clinical Note
Upload Bethany Schedule labs prior to his follow up with me next month Orders Placed This Encounter     Hemoglobin A1C     Basic Metabolic Panel

## 2024-02-12 NOTE — ASSESSMENT & PLAN NOTE
-- Labs prior to follow up in 4 weeks.  -- A1c goal <7%.  -- Medications discussed:  MFM - CKD  GLP1-DPP4 - did not tolerate Rybelsus  SETH   SGLT2 - did not discuss today  Insulin   -- Reviewed logs/CGM:  Glucose variable. There are some days where the glucose is well controlled. I suspect variability is due to diet.   Reach out to me sooner for any glucose <70 or consistently >200.  -- Instructed to keep a 3 day food/drink/insulin log.  -- Medication Changes:   Tradjenta 5mg daily  Tresiba 12 units daily  Novolog 3 units plus correction scale before meals only  Add correction scale as needed.  Blood sugar 150 to 200 add 1 units  Blood sugar 201 to 250 add 2 units  Blood sugar greater than 251 add 3 units      -- Reviewed goals of therapy are to get the best control we can without hypoglycemia.  -- Reviewed patient's current insulin regimen. Clarified proper insulin dose and timing in relation to meals, etc. Insulin injection sites and proper rotation instructed.    -- Advised frequent self blood glucose monitoring.  Patient encouraged to document glucose results and bring them to every clinic visit.  -- Hypoglycemia precautions discussed. Instructed on precautions before driving.    -- Call for Bg repeatedly < 90 or > 180.   -- Close adherence to lifestyle changes recommended.   -- Periodic follow ups for eye evaluations, foot care and dental care suggested.

## 2024-02-19 ENCOUNTER — DOCUMENTATION ONLY (OUTPATIENT)
Dept: TRANSPLANT | Facility: CLINIC | Age: 39
End: 2024-02-19
Payer: MEDICARE

## 2024-02-19 PROBLEM — N17.9 AKI (ACUTE KIDNEY INJURY): Status: RESOLVED | Noted: 2023-11-15 | Resolved: 2024-02-19

## 2024-02-19 LAB
EXT CMV DNA QUANT. BY PCR: NORMAL
EXT TACROLIMUS LVL: 9.7

## 2024-02-20 DIAGNOSIS — Z94.0 KIDNEY REPLACED BY TRANSPLANT: Primary | ICD-10-CM

## 2024-02-20 RX ORDER — MYCOPHENOLATE MOFETIL 250 MG/1
500 CAPSULE ORAL 2 TIMES DAILY
Qty: 120 CAPSULE | Refills: 11 | Status: SHIPPED | OUTPATIENT
Start: 2024-02-20

## 2024-02-20 NOTE — TELEPHONE ENCOUNTER
Received written order from Dr. Troy.  Spoke to patient and instructed to restart Cellcept 500 mg twice a day, stop Valcyte and repeat labs on 2/29/24.  Patient stated understanding of all instructions.       ----- Message from Tesha Guaman RN sent at 2/19/2024  5:43 PM CST -----    ----- Message -----  From: Monica Handley MD  Sent: 2/19/2024   5:31 PM CST  To: [facility]    Restart  mg bid  Stop valcyte  Repeat CMV in 1-2 weeks

## 2024-02-27 ENCOUNTER — PATIENT MESSAGE (OUTPATIENT)
Dept: TRANSPLANT | Facility: CLINIC | Age: 39
End: 2024-02-27
Payer: MEDICARE

## 2024-03-04 ENCOUNTER — DOCUMENTATION ONLY (OUTPATIENT)
Dept: TRANSPLANT | Facility: CLINIC | Age: 39
End: 2024-03-04
Payer: MEDICARE

## 2024-03-04 LAB
EXT ALBUMIN: 4.2 (ref 3.4–5)
EXT BUN: 19 (ref 7–18)
EXT CALCIUM: 9.2 (ref 8.5–10.1)
EXT CHLORIDE: 106 (ref 98–107)
EXT CMV DNA QUANT. BY PCR: ABNORMAL
EXT CO2: 29 (ref 21–32)
EXT CREATININE: 1.2 MG/DL (ref 0.7–1.3)
EXT EGFR NO RACE VARIABLE: 86.68
EXT EOSINOPHIL%: 0.9 (ref 0–7)
EXT GLUCOSE: 193 (ref 74–106)
EXT HEMATOCRIT: 41.8 (ref 42–54)
EXT HEMOGLOBIN: 14.3 (ref 14–18)
EXT LYMPH%: 19.1 (ref 20.5–51.1)
EXT MAGNESIUM: 1.8 (ref 1.8–2.4)
EXT MONOCYTES%: 22.3 (ref 1.7–9)
EXT PHOSPHORUS: 3.1 (ref 2.6–4.7)
EXT PLATELETS: 180 (ref 140–440)
EXT POTASSIUM: 33.7 (ref 3.5–5.1)
EXT SEGS%: 56.8 (ref 42.2–75.2)
EXT SODIUM: 141 MMOL/L (ref 136–145)
EXT TACROLIMUS LVL: 5.1
EXT WBC: 6.9 (ref 5–10)

## 2024-03-04 NOTE — PROGRESS NOTES
Subjective:      Patient ID: Andrey Reddy is a 38 y.o. male.    Chief Complaint:  No chief complaint on file.    History of Present Illness  Andrey Reddy is here for follow up of DM.  Previously seen by me 2/2024.  This is a MyChart video visit.    The patient location is: [place]  The chief complaint leading to consultation is: DM  Visit type: Virtual visit with synchronous audio and video  Total time spent with patient: see below  Each patient to whom he or she provides medical services by telemedicine is:  (1) informed of the relationship between the physician and patient and the respective role of any other health care provider with respect to management of the patient; and (2) notified that he or she may decline to receive medical services by telemedicine and may withdraw from such care at any time.    S/p kidney transplant on 4/9/2023  Prior to transplant Home Diabetes Medications:  Not on medications  Prednisone: 5mg daily     With regards to Diabetes:    Diagnosed: ~7-8 years ago   DE 11/2023  FH: mother  Known complications:  DKA Denies  RN Denies  Eye Exam: within the last year , per patient  PN Denies  Podiatry: Denies  Nephropathy +  CAD Denies  Denies history of pancreatitis & personal/family history of medullary thyroid cancer.     Diet/Exercise:  Eats 2 meals a day.   Snacks : occasionally   Drinks : water, Pedialyte, juice.   Exercise - tries to stay active   Recent illness, injury, steroids; Denies     Current Regimen:  Tradjenta 5mg daily  Tresiba 12 units daily  Novolog 3 units plus correction scale before meals only  Add correction scale as needed.  Blood sugar 150 to 200 add 1 units  Blood sugar 201 to 250 add 2 units  Blood sugar greater than 251 add 3 units     Skips Novolog if glucose < 120.     Other medications tried:  Metformin - CKD  Trulicity - denies issues but stopped due to DM control   Rybelsus - GI side effects     Glucose Monitor: [facility]  6 times a day testing  Log reviewed:  "sensor downloaded and reviewed    Hypoglycemia:  Rare   Knows how to correct with 15 grams of carbs- juice, coke, or a peppermint.     Diabetes Management Status    Hemoglobin A1C   Date Value Ref Range Status   11/16/2023 6.7 (H) 4.0 - 5.6 % Final     Comment:     ADA Screening Guidelines:  5.7-6.4%  Consistent with prediabetes  >or=6.5%  Consistent with diabetes    High levels of fetal hemoglobin interfere with the HbA1C  assay. Heterozygous hemoglobin variants (HbS, HgC, etc)do  not significantly interfere with this assay.   However, presence of multiple variants may affect accuracy.     04/09/2023 4.5 4.0 - 5.6 % Final     Comment:     ADA Screening Guidelines:  5.7-6.4%  Consistent with prediabetes  >or=6.5%  Consistent with diabetes    High levels of fetal hemoglobin interfere with the HbA1C  assay. Heterozygous hemoglobin variants (HbS, HgC, etc)do  not significantly interfere with this assay.   However, presence of multiple variants may affect accuracy.     10/13/2021 4.7 4.0 - 5.6 % Final     Comment:     ADA Screening Guidelines:  5.7-6.4%  Consistent with prediabetes  >or=6.5%  Consistent with diabetes    High levels of fetal hemoglobin interfere with the HbA1C  assay. Heterozygous hemoglobin variants (HbS, HgC, etc)do  not significantly interfere with this assay.   However, presence of multiple variants may affect accuracy.         Statin: Taking  ACE/ARB: Not taking  Screening or Prevention Patient's value Goal Complete/Controlled?   HgA1C Testing and Control   Lab Results   Component Value Date    HGBA1C 6.7 (H) 11/16/2023      Annually/Less than 8% Yes   Lipid profile : 04/09/2023 Annually Yes   LDL control Lab Results   Component Value Date    LDLCALC 104.4 04/09/2023    Annually/Less than 100 mg/dl  No   Nephropathy screening No results found for: "LABMICR"  Lab Results   Component Value Date    PROTEINUA Trace (A) 11/14/2023    Annually Yes   Blood pressure BP Readings from Last 1 Encounters: "   11/28/23 123/73    Less than 140/90 No   Dilated retinal exam : 06/28/2017 Annually Yes   Foot exam   Most Recent Foot Exam Date: Not Found Annually No       Review of Systems  As above      There were no vitals taken for this visit.      There is no height or weight on file to calculate BMI.    Lab Review:   Lab Results   Component Value Date    HGBA1C 6.7 (H) 11/16/2023    HGBA1C 4.5 04/09/2023    HGBA1C 4.7 10/13/2021       Lab Results   Component Value Date    CHOL 169 04/09/2023    HDL 58 04/09/2023    LDLCALC 104.4 04/09/2023    TRIG 33 04/09/2023    CHOLHDL 34.3 04/09/2023     Lab Results   Component Value Date     11/18/2023    K 4.0 11/18/2023     (H) 11/18/2023    CO2 20 (L) 11/18/2023    GLU 84 11/18/2023    BUN 11 11/18/2023    CREATININE 1.2 11/18/2023    CALCIUM 8.2 (L) 11/18/2023    PROT 7.2 11/14/2023    ALBUMIN 3.0 (L) 11/18/2023    BILITOT 1.1 (H) 11/14/2023    ALKPHOS 66 11/14/2023    AST 33 11/14/2023    ALT 36 11/14/2023    ANIONGAP 10 11/18/2023    ESTGFRAFRICA 16.2 (A) 10/13/2021    EGFRNONAA 14.0 (A) 10/13/2021     Vit D, 25-Hydroxy   Date Value Ref Range Status   04/09/2023 36 30 - 96 ng/mL Final     Comment:     Vitamin D deficiency.........<10 ng/mL                              Vitamin D insufficiency......10-29 ng/mL       Vitamin D sufficiency........> or equal to 30 ng/mL  Vitamin D toxicity............>100 ng/mL       Assessment and Plan     1. Type 2 diabetes mellitus with diabetic nephropathy, with long-term current use of insulin        2. S/P kidney transplant            Type 2 diabetes mellitus, with long-term current use of insulin  -- Never received outside labs?  -- A1c goal <7%.  -- Medications discussed:  MFM - CKD  GLP1-DPP4 - did not tolerate Rybelsus  SETH   SGLT2 - did not discuss today  Insulin   -- Reviewed logs/CGM:  Glucose variable. There are some days where the glucose is well controlled. I suspect variability is due to diet and/or noncompliance.  Reach  out to me sooner for any glucose <70 or consistently >200.  -- LOV (2/2024) requested patient keep a 3 day food/drink/insulin log - did not. Encouraged to try and keep a 2-3 day log to identify trend.  -- Medication Changes:   Tradjenta 5mg daily  Tresiba 12 units daily  Novolog 3 units plus correction scale before meals only (okay to skip if glucose < 100)  Add correction scale as needed.  Blood sugar 150 to 200 add 1 units  Blood sugar 201 to 250 add 2 units  Blood sugar greater than 251 add 3 units      -- Reviewed goals of therapy are to get the best control we can without hypoglycemia.  -- Reviewed patient's current insulin regimen. Clarified proper insulin dose and timing in relation to meals, etc. Insulin injection sites and proper rotation instructed.    -- Advised frequent self blood glucose monitoring.  Patient encouraged to document glucose results and bring them to every clinic visit.  -- Hypoglycemia precautions discussed. Instructed on precautions before driving.    -- Call for Bg repeatedly < 90 or > 180.   -- Close adherence to lifestyle changes recommended.   -- Periodic follow ups for eye evaluations, foot care and dental care suggested.    S/P kidney transplant  -- Continue following with Transplant.         Follow up in about 3 months (around 6/12/2024).      I spent 20 minutes face-to-face with the patient, over half of the visit was spent on counseling and/or coordinating the care of the patient.    Counseling includes:  Diagnostic results, impressions, recommendations   Prognosis   Risk and benefits of management/treatment options   Instructions for management treatment and or follow-up   Importance of compliance with management   Risk factor reduction   Patient education

## 2024-03-05 ENCOUNTER — PATIENT MESSAGE (OUTPATIENT)
Dept: TRANSPLANT | Facility: CLINIC | Age: 39
End: 2024-03-05
Payer: MEDICARE

## 2024-03-05 ENCOUNTER — TELEPHONE (OUTPATIENT)
Dept: TRANSPLANT | Facility: CLINIC | Age: 39
End: 2024-03-05
Payer: MEDICARE

## 2024-03-07 ENCOUNTER — DOCUMENTATION ONLY (OUTPATIENT)
Dept: TRANSPLANT | Facility: CLINIC | Age: 39
End: 2024-03-07
Payer: MEDICARE

## 2024-03-07 LAB
EXT ALBUMIN: NORMAL
EXT ALKALINE PHOSPHATASE: NORMAL
EXT ALLOSURE: NORMAL
EXT ALT: NORMAL
EXT AMYLASE: NORMAL
EXT ANC: NORMAL
EXT AST: NORMAL
EXT BACTERIA UA: NORMAL
EXT BANDS%: NORMAL
EXT BILIRUBIN DIRECT: NORMAL
EXT BILIRUBIN TOTAL: NORMAL
EXT BK VIRUS DNA QN PCR: NORMAL
EXT BUN: NORMAL
EXT C PEPTIDE: NORMAL
EXT CALCIUM: NORMAL
EXT CHLORIDE: NORMAL
EXT CHOLESTEROL: NORMAL
EXT CMV DNA QUANT. BY PCR: NOT DETECTED
EXT CO2: NORMAL
EXT CREATININE UA: NORMAL
EXT CREATININE: NORMAL
EXT CYCLOSPORINE LVL: NORMAL
EXT EBV DNA BY PCR: NORMAL
EXT EBV IGG: NORMAL
EXT EGFR NO RACE VARIABLE: NORMAL
EXT EOSINOPHIL%: NORMAL
EXT FERRITIN: NORMAL
EXT GFR MDRD AF AMER: NORMAL
EXT GFR MDRD NON AF AMER: NORMAL
EXT GLUCOSE UA: NORMAL
EXT GLUCOSE: NORMAL
EXT HBV DNA QUANT PCR: NORMAL
EXT HCV QUANT: NORMAL
EXT HDL: NORMAL
EXT HEMATOCRIT: NORMAL
EXT HEMOGLOBIN A1C: NORMAL
EXT HEMOGLOBIN: NORMAL
EXT HIV RNA QUANT PCR: NORMAL
EXT IMMUNKNOW (STIMULATED): NORMAL
EXT IRON SATURATION: NORMAL
EXT LDH, TOTAL: NORMAL
EXT LDL CHOLESTEROL: NORMAL
EXT LEFLUNOMIDE METABOLITE: NORMAL
EXT LIPASE: NORMAL
EXT LYMPH%: NORMAL
EXT MAGNESIUM: NORMAL
EXT MONOCYTES%: NORMAL
EXT NITRITES UA: NORMAL
EXT PHOSPHORUS: NORMAL
EXT PLATELETS: NORMAL
EXT POTASSIUM: NORMAL
EXT PROT/CREAT RATIO UR: NORMAL
EXT PROTEIN TOTAL: NORMAL
EXT PROTEIN UA: NORMAL
EXT PTH, INTACT: NORMAL
EXT RBC UA: NORMAL
EXT SEGS%: NORMAL
EXT SERUM IRON: NORMAL
EXT SIROLIMUS LVL: NORMAL
EXT SODIUM: NORMAL
EXT TACROLIMUS LVL: NORMAL
EXT TIBC: NORMAL
EXT TRIGLYCERIDES: NORMAL
EXT URIC ACID: NORMAL
EXT URINE CULTURE: NORMAL
EXT URINE PROTEIN: NORMAL
EXT VIT D 25 HYDROXY: NORMAL
EXT WBC UA: NORMAL
EXT WBC: NORMAL
PARVOVIRUS B19 DNA BY PCR: NORMAL
QUANTIFERON GOLD TB: NORMAL

## 2024-03-12 ENCOUNTER — OFFICE VISIT (OUTPATIENT)
Dept: ENDOCRINOLOGY | Facility: CLINIC | Age: 39
End: 2024-03-12
Payer: MEDICARE

## 2024-03-12 DIAGNOSIS — Z94.0 S/P KIDNEY TRANSPLANT: ICD-10-CM

## 2024-03-12 DIAGNOSIS — N18.6 CONTROLLED TYPE 2 DIABETES MELLITUS WITH CHRONIC KIDNEY DISEASE ON CHRONIC DIALYSIS, WITHOUT LONG-TERM CURRENT USE OF INSULIN: ICD-10-CM

## 2024-03-12 DIAGNOSIS — E11.22 CONTROLLED TYPE 2 DIABETES MELLITUS WITH CHRONIC KIDNEY DISEASE ON CHRONIC DIALYSIS, WITHOUT LONG-TERM CURRENT USE OF INSULIN: ICD-10-CM

## 2024-03-12 DIAGNOSIS — Z79.4 TYPE 2 DIABETES MELLITUS WITH DIABETIC NEPHROPATHY, WITH LONG-TERM CURRENT USE OF INSULIN: Primary | ICD-10-CM

## 2024-03-12 DIAGNOSIS — E11.21 TYPE 2 DIABETES MELLITUS WITH DIABETIC NEPHROPATHY, WITH LONG-TERM CURRENT USE OF INSULIN: Primary | ICD-10-CM

## 2024-03-12 DIAGNOSIS — Z99.2 CONTROLLED TYPE 2 DIABETES MELLITUS WITH CHRONIC KIDNEY DISEASE ON CHRONIC DIALYSIS, WITHOUT LONG-TERM CURRENT USE OF INSULIN: ICD-10-CM

## 2024-03-12 PROCEDURE — 95251 CONT GLUC MNTR ANALYSIS I&R: CPT | Mod: NDTC,S$GLB,, | Performed by: NURSE PRACTITIONER

## 2024-03-12 PROCEDURE — 99214 OFFICE O/P EST MOD 30 MIN: CPT | Mod: 25,95,, | Performed by: NURSE PRACTITIONER

## 2024-03-12 PROCEDURE — 1160F RVW MEDS BY RX/DR IN RCRD: CPT | Mod: CPTII,95,, | Performed by: NURSE PRACTITIONER

## 2024-03-12 PROCEDURE — 1159F MED LIST DOCD IN RCRD: CPT | Mod: CPTII,95,, | Performed by: NURSE PRACTITIONER

## 2024-03-12 RX ORDER — PEN NEEDLE, DIABETIC 31 GX5/16"
1 NEEDLE, DISPOSABLE MISCELLANEOUS 4 TIMES DAILY
Qty: 100 EACH | Refills: 3 | Status: SHIPPED | OUTPATIENT
Start: 2024-03-12

## 2024-03-12 NOTE — ASSESSMENT & PLAN NOTE
-- Never received outside labs?  -- A1c goal <7%.  -- Medications discussed:  MFM - CKD  GLP1-DPP4 - did not tolerate Rybelsus  SETH   SGLT2 - did not discuss today  Insulin   -- Reviewed logs/CGM:  Glucose variable. There are some days where the glucose is well controlled. I suspect variability is due to diet and/or noncompliance.  Reach out to me sooner for any glucose <70 or consistently >200.  -- LOV (2/2024) requested patient keep a 3 day food/drink/insulin log - did not. Encouraged to try and keep a 2-3 day log to identify trend.  -- Medication Changes:   Tradjenta 5mg daily  Tresiba 12 units daily  Novolog 3 units plus correction scale before meals only (okay to skip if glucose < 100)  Add correction scale as needed.  Blood sugar 150 to 200 add 1 units  Blood sugar 201 to 250 add 2 units  Blood sugar greater than 251 add 3 units      -- Reviewed goals of therapy are to get the best control we can without hypoglycemia.  -- Reviewed patient's current insulin regimen. Clarified proper insulin dose and timing in relation to meals, etc. Insulin injection sites and proper rotation instructed.    -- Advised frequent self blood glucose monitoring.  Patient encouraged to document glucose results and bring them to every clinic visit.  -- Hypoglycemia precautions discussed. Instructed on precautions before driving.    -- Call for Bg repeatedly < 90 or > 180.   -- Close adherence to lifestyle changes recommended.   -- Periodic follow ups for eye evaluations, foot care and dental care suggested.

## 2024-03-19 ENCOUNTER — PATIENT MESSAGE (OUTPATIENT)
Dept: ENDOCRINOLOGY | Facility: CLINIC | Age: 39
End: 2024-03-19
Payer: MEDICARE

## 2024-03-19 DIAGNOSIS — E11.21 TYPE 2 DIABETES MELLITUS WITH DIABETIC NEPHROPATHY, WITH LONG-TERM CURRENT USE OF INSULIN: Primary | ICD-10-CM

## 2024-03-19 DIAGNOSIS — Z79.4 TYPE 2 DIABETES MELLITUS WITH DIABETIC NEPHROPATHY, WITH LONG-TERM CURRENT USE OF INSULIN: Primary | ICD-10-CM

## 2024-03-19 RX ORDER — LANCETS 26 GAUGE
EACH MISCELLANEOUS
Qty: 100 EACH | Refills: 3 | Status: SHIPPED | OUTPATIENT
Start: 2024-03-19

## 2024-03-21 ENCOUNTER — DOCUMENTATION ONLY (OUTPATIENT)
Dept: TRANSPLANT | Facility: CLINIC | Age: 39
End: 2024-03-21
Payer: MEDICARE

## 2024-03-21 ENCOUNTER — PATIENT MESSAGE (OUTPATIENT)
Dept: TRANSPLANT | Facility: CLINIC | Age: 39
End: 2024-03-21
Payer: MEDICARE

## 2024-03-28 LAB
C3: 103 (ref 90–180)
C4: 10 (ref 10–40)
CREATINE KINASE, SERUM: 373 (ref 39–308)
CRP QUANTITATIVE: <4 (ref 0–10.1)
DSDNA IGG SERPL IA-ACNC: NEGATIVE [IU]/ML
ERYTHROCYTE [SEDIMENTATION RATE] IN BLOOD: 7 MM/HR (ref 0–9)
EXT ANA: NOT DETECTED
EXT TACROLIMUS LVL: 9.7
RHEUMATOID FACT SERPL-ACNC: <10 IU/ML (ref 0–14)

## 2024-04-18 ENCOUNTER — DOCUMENTATION ONLY (OUTPATIENT)
Dept: TRANSPLANT | Facility: CLINIC | Age: 39
End: 2024-04-18
Payer: MEDICARE

## 2024-04-18 LAB
EXT ALBUMIN: 4.1 (ref 3.4–5)
EXT ANC: 1.97 (ref 1.78–5.38)
EXT BK VIRUS DNA QN PCR: NOT DETECTED
EXT BUN: 20 (ref 7–18)
EXT CALCIUM: 9.2
EXT CHLORIDE: 105 (ref 98–107)
EXT CO2: 27 (ref 21–32)
EXT CREATININE UA: 111.7 (ref 100–180)
EXT CREATININE: 1.3 MG/DL (ref 0.7–1.3)
EXT EGFR NO RACE VARIABLE: 79.04
EXT EOSINOPHIL%: 2 (ref 0–7)
EXT GLUCOSE UA: NEGATIVE
EXT GLUCOSE: 117 (ref 74–106)
EXT HEMATOCRIT: 42.5 (ref 42–54)
EXT HEMOGLOBIN: 14.3 (ref 14–18)
EXT LYMPH%: 32.8 (ref 20.5–51.1)
EXT MAGNESIUM: 1.9 (ref 1.8–2.4)
EXT MONOCYTES%: 21.6 (ref 1.7–9)
EXT NITRITES UA: NEGATIVE
EXT PHOSPHORUS: 3.8 (ref 2.6–4.7)
EXT PLATELETS: 164 (ref 140–440)
EXT POTASSIUM: 4.5 (ref 3.5–5.1)
EXT PROT/CREAT RATIO UR: ABNORMAL
EXT PROTEIN UA: NEGATIVE
EXT SEGS%: 43.4 (ref 42.2–75.2)
EXT SODIUM: 143 MMOL/L (ref 136–145)
EXT TACROLIMUS LVL: 4.9
EXT URINE PROTEIN: 19.4 (ref 1–14)
EXT WBC: 4.5 (ref 5–10)

## 2024-04-24 ENCOUNTER — OFFICE VISIT (OUTPATIENT)
Dept: TRANSPLANT | Facility: CLINIC | Age: 39
End: 2024-04-24
Payer: MEDICARE

## 2024-04-24 VITALS
BODY MASS INDEX: 22.32 KG/M2 | HEIGHT: 73 IN | OXYGEN SATURATION: 100 % | HEART RATE: 56 BPM | WEIGHT: 168.44 LBS | DIASTOLIC BLOOD PRESSURE: 80 MMHG | RESPIRATION RATE: 16 BRPM | SYSTOLIC BLOOD PRESSURE: 170 MMHG | TEMPERATURE: 97 F

## 2024-04-24 DIAGNOSIS — Z79.4 TYPE 2 DIABETES MELLITUS WITH DIABETIC NEPHROPATHY, WITH LONG-TERM CURRENT USE OF INSULIN: ICD-10-CM

## 2024-04-24 DIAGNOSIS — E11.21 TYPE 2 DIABETES MELLITUS WITH DIABETIC NEPHROPATHY, WITH LONG-TERM CURRENT USE OF INSULIN: ICD-10-CM

## 2024-04-24 DIAGNOSIS — Z79.60 LONG-TERM USE OF IMMUNOSUPPRESSANT MEDICATION: ICD-10-CM

## 2024-04-24 DIAGNOSIS — Z94.0 S/P KIDNEY TRANSPLANT: Primary | ICD-10-CM

## 2024-04-24 DIAGNOSIS — I15.0 RENOVASCULAR HYPERTENSION: ICD-10-CM

## 2024-04-24 PROCEDURE — 3077F SYST BP >= 140 MM HG: CPT | Mod: CPTII,S$GLB,, | Performed by: NURSE PRACTITIONER

## 2024-04-24 PROCEDURE — 1159F MED LIST DOCD IN RCRD: CPT | Mod: CPTII,S$GLB,, | Performed by: NURSE PRACTITIONER

## 2024-04-24 PROCEDURE — 99215 OFFICE O/P EST HI 40 MIN: CPT | Mod: S$GLB,,, | Performed by: NURSE PRACTITIONER

## 2024-04-24 PROCEDURE — 3079F DIAST BP 80-89 MM HG: CPT | Mod: CPTII,S$GLB,, | Performed by: NURSE PRACTITIONER

## 2024-04-24 PROCEDURE — 99999 PR PBB SHADOW E&M-EST. PATIENT-LVL IV: CPT | Mod: PBBFAC,,, | Performed by: NURSE PRACTITIONER

## 2024-04-24 PROCEDURE — 3008F BODY MASS INDEX DOCD: CPT | Mod: CPTII,S$GLB,, | Performed by: NURSE PRACTITIONER

## 2024-04-24 RX ORDER — PREDNISONE 5 MG/1
5 TABLET ORAL DAILY
Qty: 91 TABLET | Refills: 3 | Status: SHIPPED | OUTPATIENT
Start: 2024-04-24

## 2024-04-24 NOTE — LETTER
April 24, 2024        [facility]ipe Martinez  [address] [facility] [address] [address]  Phone: [phone]  Fax: [phone]             [facility] 1st Fl  [address]  [facility] [address]  Phone: [phone]   Patient: Andrey Reddy   MR Number: [id number]   YOB: 1985   Date of Visit: 4/24/2024       Dear Dr. Pipe Martinez    Thank you for referring Andrey Reddy to me for evaluation. Attached you will find relevant portions of my assessment and plan of care.    If you have questions, please do not hesitate to call me. I look forward to following Andrey Reddy along with you.    Sincerely,    Farnaz Santos, NP    Enclosure    If you would like to receive this communication electronically, please contact [email or web address] or [phone] to request [employer] access.    [employer] is a tool which provides read-only access to select patient information with whom you have a relationship. Its easy to use and provides real time access to review your patients record including encounter summaries, notes, results, and demographic information.    If you feel you have received this communication in error or would no longer like to receive these types of communications, please e-mail [email or web address]

## 2024-04-24 NOTE — LETTER
April 24, 2024      [facility]- Trans[facility] 1st Fl  [address]  [facility] [address]  Phone: [phone]       Patient: Andrey Reddy   YOB: 1985  Date of Visit: 04/24/2024    To Whom It May Concern:    Kelly Reddy  was at [facility] on 04/24/2024. The patient may return to work/school on 4/24/2024 with no restrictions. If you have any questions or concerns, or if I can be of further assistance, please do not hesitate to contact me.    Sincerely,          Farnaz Santos, NP

## 2024-04-24 NOTE — PROGRESS NOTES
"      Post-Transplant Assessment    Referring Physician: Pipe Martinez  Current Nephrologist: Pipe Martinez    ORGAN: LEFT KIDNEY  Donor Type: donation after circulatory death   PHS Increased Risk: no  Cold Ischemia: 1,264 mins  Induction Medications: thymoglobulin    Subjective:     CC:  Reassessment of renal allograft function and management of chronic immunosuppression.    HPI:  Mr. Reddy is a 38 y.o. year old Black or  male who received a donation after circulatory death  kidney transplant on 4/9/23.  He has CKD stage 3 - GFR 30-59 and his baseline creatinine is between 1.2-1.6. He takes mycophenolate mofetil, prednisone, and tacrolimus for maintenance immunosuppression. He is additionally on ketoconazole to augment tacro levels.    Pertinent Nephrology/Transplant History:   ESRD from DM II  DCD DDKT 4/9/23, KDPI 38%, CIT>21hrs; Thymo induction  CMV D+,R-  6/29/23 change to envarsus for HA  9/8/23 biopsy Diffuse ATI  MMF stopped 11/13/23(N/V/D d/t CMV)  CMV+ 11/15/23: 104K started GCV & stopped MMF    PCP ppx: bactrim STOP 10/6/23  CMV ppx: valcyte STOP 10/6/23    05/30/2023 Andrey feels well and denies nay concerns. He notes no issues with his IS and has no concerns.    He is monitoring his sugars which run 150-170s most days. No low sugars noted. He has endo follow up in future already scheduled.  BPs reported to be in 130-140/70s-88 range  Reports his incision is well healed. Urinating w/o problems, "like a racehorse."  Denies heartburn off Pepcid  Uses pill box with help of Mom.    6/27/23  Andrey notes occ HA every few days. He believes the HA have been  present since txp.  With review of systems questioning, he also notes occasional dyspnea on exertion.  He also had that during dialysis, and states it was attributed to volume overload.  He reports no dyspnea at rest or lying down, and notes no peripheral edema.  He denies voiding difficulties.  He reports blood pressures are " "usually 140-150 over 80s-90s, as high as diastolic of 98.     10/17/23 Andrey is now over 6 months post kidney transplant.  His biggest concern is ongoing pain and that due to a change in insurance he lost his pain management physician.  He is also worried about poor blood sugar control, but has been in touch with Endocrine.  From the transplant perspective, he believes the tremors are better overall since he converted to Envarsus (tacrolimus XR), but starting to come back.  His last tacrolimus level is pending.  He noted high blood pressures at home running 140-160/80-90.  I verified he is taking nifedipine, but not amlodipine.  He reports no voiding concerns.    12/04/2023  Admitted 11/14/23 with N/V/D and found CMV +; stopped MMF and started on IV GCV. Viral load dropped from 104,210 IU/ML [11/15] to 57,700 [11/22]. His GI issues have resolved.  He had two wisdom teeth removed last week, and is recovering well. No dental pain noted today.  He denies any kidney-related complaints, such as pain over allograft, flank pain, dysuria, frequency, or other LUTS.     1/26/24  Andrey is 292 days post transplant. Is working with endocrinology on his blood sugars and had adjustment last week. Had appointment today and his blood pressure today was 137/80. He is feeling well and denies any concerns. He notes no issues with his IS and has no concerns.      04/24/2024  Post transplant 1 year. Doing well. Does complaint of vage muscle and joint pain. Is working with PCP on getting referral for pain management and Rheumatology.  He ran out of his BP medication for the last few days and picking up more today. He reports he is working a part time job.     Current Outpatient Medications   Medication Sig Dispense Refill    acetaminophen (TYLENOL) 500 MG tablet Take 1 tablet (500 mg total) by mouth every 6 (six) hours as needed for Pain. 30 tablet 0    BD ULTRA-FINE SILVIA PEN NEEDLE 32 gauge x 5/32" Ndle Use with insulin 4 times " daily. 100 each 3    cyclobenzaprine (FLEXERIL) 10 MG tablet Take 10 mg by mouth daily as needed.      DULoxetine (CYMBALTA) 30 MG capsule Take 1 capsule (30 mg total) by mouth once daily.      flash glucose scanning reader (FREESTYLE SELMA 2 READER) [facility] Use as directed. 1 each 0    flash glucose sensor (FREESTYLE SELMA 2 SENSOR) Kit Change every 14 day. 6 kit 3    lancing device with lancets Kit Use to test glucose 4 times a day. 100 each 3    linaGLIPtin (TRADJENTA) 5 mg Tab tablet Take 1 tablet (5 mg total) by mouth once daily. 30 tablet 11    magnesium oxide (MAG-OX) 400 mg (241.3 mg magnesium) tablet Take 1 tablet (400 mg total) by mouth 2 (two) times daily. 60 tablet 11    mycophenolate (CELLCEPT) 250 mg Cap Take 2 capsules (500 mg total) by mouth 2 (two) times daily. 120 capsule 11    NIFEdipine (PROCARDIA-XL) 60 MG (OSM) 24 hr tablet Take 1 tablet (60 mg total) by mouth 2 (two) times a day. 60 tablet 11    NOVOLOG FLEXPEN U-100 INSULIN 100 unit/mL (3 mL) InPn pen Inject 3 Units into the skin 3 (three) times daily with meals. Plus correction scale. Max TDD 30 units. 10 mL 3    tacrolimus XR, ENVARSUS, (ENVARSUS XR) 1 mg Tb24 Take 8 tablets (8 mg total) by mouth every morning. 240 tablet 11    TRESIBA FLEXTOUCH U-100 100 unit/mL (3 mL) insulin pen Inject 12 Units into the skin once daily. HOLD UNLESS BLOOD GLUCOSE >120. DISCARD PEN AFTER 56 DAYS ONCE USED. 12 mL 3    atorvastatin (LIPITOR) 10 MG tablet Take 1 tablet (10 mg total) by mouth every evening. (Patient not taking: Reported on 4/24/2024) 90 tablet 3    predniSONE (DELTASONE) 5 MG tablet Take 1 tablet (5 mg total) by mouth once daily. 91 tablet 3     No current facility-administered medications for this visit.       Review of Systems   Constitutional: Negative.    HENT: Negative.     Respiratory: Negative.     Cardiovascular:  Negative for chest pain and leg swelling.   Gastrointestinal:  Negative for abdominal pain, diarrhea, nausea and vomiting.  "  Genitourinary:  Negative for difficulty urinating.   Allergic/Immunologic: Positive for immunocompromised state.     Objective:     Blood pressure (!) 170/80, pulse (!) 56, temperature 97.2 °F (36.2 °C), temperature source Temporal, resp. rate 16, height 6' 0.99" (1.854 m), weight 76.4 kg (168 lb 6.9 oz), SpO2 100%.body mass index is 22.23 kg/m².    Physical Exam  Constitutional:       General: He is not in acute distress.     Appearance: He is well-developed. He is not diaphoretic.   Cardiovascular:      Rate and Rhythm: Normal rate and regular rhythm.      Heart sounds: Normal heart sounds.   Pulmonary:      Effort: Pulmonary effort is normal.      Breath sounds: Normal breath sounds.   Abdominal:      General: Bowel sounds are normal.      Palpations: Abdomen is soft.   Musculoskeletal:         General: No tenderness. Normal range of motion.   Skin:     General: Skin is warm and dry.      Findings: No rash.      Nails: There is no clubbing.   Neurological:      Mental Status: He is alert and oriented to person, place, and time.   Psychiatric:         Behavior: Behavior normal.          Labs:  Recent Labs   Lab 10/13/21  0806 10/13/21  0807 10/13/21  1136 10/28/22  1205 04/09/23  0320 04/09/23  0857 05/04/23  0724 05/04/23  0742 08/31/23  0927 09/08/23  0651 11/14/23  2129 11/14/23  2333 11/16/23  0625 11/17/23  0611 11/18/23  0648   WBC 4.75  --   --   --  5.72   < >  --    < > 5.09  --  4.01   < > 3.08 L 2.94 L 3.10 L   Hemoglobin 12.7 L  --   --   --  9.8 L   < >  --    < > 12.7 L  --  14.3   < > 12.8 L 12.3 L 12.1 L   POC Hematocrit  --   --   --   --   --    < >  --   --   --   --   --   --   --   --   --    Hematocrit 37.6 L  --   --   --  29.8 L   < >  --    < > 38.4 L  --  41.8   < > 38.4 L 36.6 L 37.2 L   Sodium 139  --   --   --  140   < >  --    < > 142 141 137   < > 137 139 142   Potassium 4.6  --   --   --  3.9   < >  --    < > 5.1 3.6 4.7   < > 3.6 3.6 4.0   Chloride 104  --   --   --  103   < >  " --    < > 103 106 103   < > 105 107 112 H   CO2 21 L  --   --   --  25   < >  --    < > 30 H 24 21 L   < > 24 24 20 L   BUN 30 H  --   --   --  25 H   < >  --    < > 25 H 22 H 48 H   < > 27 H 14 11   Creatinine 4.9 H  --   --   --  3.7 H   < >  --    < > 2.2 H 1.9 H 2.7 H   < > 1.3 1.2 1.2   eGFR if non  14.0 A  --   --   --   --   --   --   --   --   --   --   --   --   --   --    Calcium 10.0  --   --   --  9.2   < >  --    < > 10.0 9.6 9.0   < > 8.3 L 8.0 L 8.2 L   Phosphorus 4.0  --   --   --  4.0   < >  --    < >  --   --   --    < > 2.1 L 2.4 L 1.8 L   Magnesium  --   --   --   --   --    < >  --    < >  --   --  2.3   < > 1.9 1.7 1.6   Albumin 4.5  --   --   --  3.9   < >  --    < > 4.4 4.1 4.1   < > 3.2 L 3.0 L 3.0 L   AST 15  --   --   --  12  --   --    < > 15 23 33  --   --   --   --    ALT 18  --   --   --  13  --   --    < > 14 26 36  --   --   --   --    Prot/Creat Ratio, Urine  --   --  1.35 H  --   --   --  0.12  --   --   --   --   --   --   --   --    PTH, Intact  --  210.3 H  --  370.8 H 354.1 H  --   --   --   --   --   --   --   --   --   --    Tacrolimus Lvl  --   --   --   --   --    < >  --    < > 14.2  --   --    < > 7.3 12.2 5.9  6.1    < > = values in this interval not displayed.       Recent Labs   Lab 05/10/23  0846 05/17/23  0000 07/06/23  0848 08/08/23  0000 11/22/23  0900 12/06/23  0000 01/02/24  0828 01/31/24  0924 03/01/24  1010 04/18/24  0853   EXT ANC  --    < > 3.57   < > 1.91  --  2.32  --   --  1.97   EXT WBC 5.9   < > 4.7 L   < > 3.6 A   < > 3.5 L 4.2 L 6.9 4.5 L   EXT SEGS% 77.5   < > 75.8 H   < >  --   --  65.5 68.1 56.8 43.4   EXT Platelets 227   < > 177   < > 192   < > 191 167 180.0 164.0   EXT Hemoglobin 11.6 L   < > 13.1 L   < > 12.5 A   < > 14.2 14.6 14.3 14.3   EXT Hematocrit 33.7 L   < > 38.7 L   < > 37.5 A   < > 42.7 42.5 41.8 L 42.5   EXT Creatinine 1.3   < > 1.50 H   < > 1.40 A   < > 1.30 1.20 1.20 1.30   EXT CREATININE UA 72.5  --  62.6 L   < >   --    < > 97.8 L  --   --  111.7   EXT Sodium 140   < > 140   < > 141   < > 142 143 141 143   EXT Potassium 4.4   < > 4.1   < > 4.1   < > 4.2 4.3 33.7 4.5   EXT BUN 19 H   < > 27 H   < > 13   < > 17 20 H 19 H 20 H   EXT CO2 29   < > 28   < > 32   < > 29 28 29 27   EXT Calcium 8.9   < > 9.2   < > 9.6   < > 9.2 10.1 9.2 9.2   EXT Phosphorus 3.4   < > 4.2   < > 4.3   < > 3.7 3.4 3.1 3.8   EXT Glucose 146 H   < > 292 H   < > 101  --  152 H 214 H 193 H 117 H   EXT Albumin 4.1   < > 4.5   < > 3.5   < > 3.7 4.2 4.2 4.1   EXT AST <8 L  --  25  --   --   --   --   --   --   --    EXT ALT 15 L  --  70 H  --   --   --   --   --   --   --    EXT BilirubiN Total 0.6  --  0.5  --   --   --   --   --   --   --     < > = values in this interval not displayed.     Recent Labs   Lab 10/11/23  0903 11/09/23  0905 12/06/23  0000 01/02/24  0828 01/18/24  0903 03/01/24  1010 03/06/24  0000 04/18/24  0853   EXT Tacrolimus Lvl 4.1   < > 6.4 4.8   < > 5.1 9.7 4.9   EXT PROT/CREAT Ratio UR  --    < > 0.2 201 mg/g  --   --   --  0.2 g/day   EXT Protein UA 1+ A  --  Negative trace  --   --   --  negative   EXT WBC UA 3-5  --  3-5 0-2  --   --   --   --    EXT RBC UA 0-2  --  0-2 0-2  --   --   --   --     < > = values in this interval not displayed.     Labs were reviewed with the patient.    Assessment:     1. S/P kidney transplant    2. Type 2 diabetes mellitus with diabetic nephropathy, with long-term current use of insulin    3. Long-term use of immunosuppressant medication    4. Renovascular hypertension           Plan:   New nursing orders:  -  continue routine care  - encouraged to work with PCP and get into pain management and/or rheumatology       CKD II-IIIa post DCD kidney transplant 4/9/23, KDPI 38%, CIT>21hr  Recent Labs   Lab 10/13/21  1136 04/09/23  0320 05/04/23  0724 05/04/23  0742 11/17/23  0611 11/18/23  0648   Creatinine  --    < >  --    < > 1.2 1.2   eGFR  --    < >  --    < > >60.0 >60.0   Prot/Creat Ratio, Urine 1.35 H   --  0.12  --   --   --     < > = values in this interval not displayed.    -creat 1.2-1.6, most recent noted on lower end of his range  Screening for proteinuria  - Obtain urine p/c    Immunosuppression Management for pt on prophylactic IS against rejection  - On tacro XR (changed from IS d/t tremors), MMF [resumed 2/20/24] and prednisone. Hx of CMV  - Target tacro 5-7  - Continue monitoring for toxicities and SE.       At risk for opportunistic infections given immunosuppressed state  -Prophylaxis against opportunistic infections  -Bactrim and Valcyte stopped as directed  -Screening for BK infection to prevent allograft dysfunction  -4/18/24  BK PCR not det  -Continue blood PCR screening as per guidelines to prevent BK viremia and nephropathy leading to allograft dysfunction and potential graft failure  -CMV not detected     Hypertension, renal  - continue nifedipine 60 mg q.a.m. and add 30 mg q.p.m.    Hypomagnesemia  -improving    Hypophosphatemia  -lower KPN to 250 mg bid    Follow-up:   Clinic: return to transplant clinic weekly for the first month after transplant; every 2 weeks during months 2-3; then at 6-, 9-, 12-, 18-, 24-, and 36- months post-transplant to reassess for complications from immunosuppression toxicity and monitor for rejection.  Annually thereafter.    Labs: since patient remains at high risk for rejection and drug-related complications that warrant close monitoring, labs will be ordered as follows: continue twice weekly CBC, renal panel, and drug level for first month; then same labs once weekly through 3rd month post-transplant.  Urine for UA and protein/creatinine ratio monthly.  Serum BK - PCR at 1-, 3-, 6-, 9-, 12-, 18-, 24-, 36- 48-, and 60 months post-transplant.  Hepatic panel at 1-, 2-, 3-, 6-, 9-, 12-, 18-, 24-, and 36- months post-transplant.    Farnaz Santos NP       OS Patient Status  Functional Status: 80% - Normal activity with effort: some symptoms of disease  Physical  Capacity: No Limitations

## 2024-04-29 ENCOUNTER — PATIENT MESSAGE (OUTPATIENT)
Dept: TRANSPLANT | Facility: CLINIC | Age: 39
End: 2024-04-29
Payer: MEDICARE

## 2024-04-29 DIAGNOSIS — I10 BENIGN ESSENTIAL HTN: ICD-10-CM

## 2024-04-29 RX ORDER — NIFEDIPINE 60 MG/1
60 TABLET, EXTENDED RELEASE ORAL 2 TIMES DAILY
Qty: 60 TABLET | Refills: 5 | Status: SHIPPED | OUTPATIENT
Start: 2024-04-29 | End: 2025-04-29

## 2024-05-20 ENCOUNTER — PATIENT MESSAGE (OUTPATIENT)
Dept: TRANSPLANT | Facility: CLINIC | Age: 39
End: 2024-05-20
Payer: MEDICARE

## 2024-06-04 ENCOUNTER — PATIENT MESSAGE (OUTPATIENT)
Dept: TRANSPLANT | Facility: CLINIC | Age: 39
End: 2024-06-04
Payer: MEDICARE

## 2024-06-04 DIAGNOSIS — Z94.0 KIDNEY REPLACED BY TRANSPLANT: Primary | ICD-10-CM

## 2024-06-06 ENCOUNTER — PATIENT MESSAGE (OUTPATIENT)
Dept: TRANSPLANT | Facility: CLINIC | Age: 39
End: 2024-06-06
Payer: MEDICARE

## 2024-06-20 ENCOUNTER — PATIENT OUTREACH (OUTPATIENT)
Dept: ADMINISTRATIVE | Facility: HOSPITAL | Age: 39
End: 2024-06-20
Payer: MEDICARE

## 2024-06-20 DIAGNOSIS — E11.21 TYPE 2 DIABETES MELLITUS WITH DIABETIC NEPHROPATHY, WITH LONG-TERM CURRENT USE OF INSULIN: ICD-10-CM

## 2024-06-20 DIAGNOSIS — Z29.89 PROPHYLACTIC IMMUNOTHERAPY: Primary | ICD-10-CM

## 2024-06-20 DIAGNOSIS — Z79.4 TYPE 2 DIABETES MELLITUS WITH DIABETIC NEPHROPATHY, WITH LONG-TERM CURRENT USE OF INSULIN: ICD-10-CM

## 2024-06-21 ENCOUNTER — PATIENT MESSAGE (OUTPATIENT)
Dept: TRANSPLANT | Facility: CLINIC | Age: 39
End: 2024-06-21
Payer: MEDICARE

## 2024-07-04 PROBLEM — N18.2 CKD (CHRONIC KIDNEY DISEASE) STAGE 2, GFR 60-89 ML/MIN: Status: ACTIVE | Noted: 2024-07-04

## 2024-07-04 PROBLEM — I10 PRIMARY HYPERTENSION: Status: ACTIVE | Noted: 2024-07-04

## 2024-07-04 PROBLEM — Z51.81 THERAPEUTIC DRUG MONITORING: Status: ACTIVE | Noted: 2024-07-04

## 2024-07-04 PROBLEM — E83.42 HYPOMAGNESEMIA: Status: ACTIVE | Noted: 2024-07-04

## 2024-07-04 PROBLEM — E21.2 TERTIARY HYPERPARATHYROIDISM: Status: ACTIVE | Noted: 2024-07-04

## 2024-07-06 DIAGNOSIS — E83.42 HYPOMAGNESEMIA: ICD-10-CM

## 2024-07-06 DIAGNOSIS — I10 BENIGN ESSENTIAL HTN: ICD-10-CM

## 2024-07-19 RX ORDER — LANOLIN ALCOHOL/MO/W.PET/CERES
400 CREAM (GRAM) TOPICAL 2 TIMES DAILY
Qty: 60 TABLET | Refills: 11 | Status: SHIPPED | OUTPATIENT
Start: 2024-07-19 | End: 2025-07-19

## 2024-07-19 RX ORDER — NIFEDIPINE 60 MG/1
60 TABLET, EXTENDED RELEASE ORAL 2 TIMES DAILY
Qty: 60 TABLET | Refills: 5 | Status: SHIPPED | OUTPATIENT
Start: 2024-07-19 | End: 2025-07-19

## 2024-07-31 PROBLEM — Z79.899 IMMUNOCOMPROMISED STATE DUE TO DRUG THERAPY: Status: ACTIVE | Noted: 2024-07-31

## 2024-07-31 PROBLEM — D84.821 IMMUNOCOMPROMISED STATE DUE TO DRUG THERAPY: Status: ACTIVE | Noted: 2024-07-31

## 2024-08-16 ENCOUNTER — PATIENT MESSAGE (OUTPATIENT)
Dept: ENDOCRINOLOGY | Facility: CLINIC | Age: 39
End: 2024-08-16
Payer: MEDICARE

## 2024-08-28 ENCOUNTER — PATIENT MESSAGE (OUTPATIENT)
Dept: ENDOCRINOLOGY | Facility: CLINIC | Age: 39
End: 2024-08-28
Payer: MEDICARE

## 2024-08-28 DIAGNOSIS — Z79.4 TYPE 2 DIABETES MELLITUS WITH DIABETIC NEPHROPATHY, WITH LONG-TERM CURRENT USE OF INSULIN: ICD-10-CM

## 2024-08-28 DIAGNOSIS — E11.21 TYPE 2 DIABETES MELLITUS WITH DIABETIC NEPHROPATHY, WITH LONG-TERM CURRENT USE OF INSULIN: ICD-10-CM

## 2024-08-28 RX ORDER — LINAGLIPTIN 5 MG/1
5 TABLET, FILM COATED ORAL DAILY
Qty: 30 TABLET | Refills: 6 | Status: SHIPPED | OUTPATIENT
Start: 2024-08-28

## 2024-09-03 DIAGNOSIS — Z94.0 S/P KIDNEY TRANSPLANT: ICD-10-CM

## 2024-09-03 NOTE — TELEPHONE ENCOUNTER
Received message from Venus Cassidy PharmD to confirm patient's Envarsus dose.  Patient reports Envarsus dose being lowered recently to 7 mg daily. Based on note from 7/8/24, Dr. White recommended to Dr. Rios to lower patient's Envarsus to 7 mg daily. Will update Erx.

## 2024-10-01 ENCOUNTER — OFFICE VISIT (OUTPATIENT)
Dept: TRANSPLANT | Facility: CLINIC | Age: 39
End: 2024-10-01
Payer: MEDICARE

## 2024-10-01 ENCOUNTER — DOCUMENTATION ONLY (OUTPATIENT)
Dept: TRANSPLANT | Facility: CLINIC | Age: 39
End: 2024-10-01

## 2024-10-01 DIAGNOSIS — Z79.899 IMMUNOCOMPROMISED STATE DUE TO DRUG THERAPY: ICD-10-CM

## 2024-10-01 DIAGNOSIS — Z91.89 AT RISK FOR OPPORTUNISTIC INFECTIONS: ICD-10-CM

## 2024-10-01 DIAGNOSIS — I12.9 RENAL HYPERTENSION: ICD-10-CM

## 2024-10-01 DIAGNOSIS — Z29.89 PROPHYLACTIC IMMUNOTHERAPY: ICD-10-CM

## 2024-10-01 DIAGNOSIS — Z94.0 KIDNEY TRANSPLANT STATUS: ICD-10-CM

## 2024-10-01 DIAGNOSIS — D84.821 IMMUNOCOMPROMISED STATE DUE TO DRUG THERAPY: ICD-10-CM

## 2024-10-01 DIAGNOSIS — N18.2 CHRONIC KIDNEY DISEASE (CKD), STAGE II (MILD): Primary | ICD-10-CM

## 2024-10-01 LAB
EXT ALBUMIN: 4 (ref 3.4–5)
EXT BACTERIA UA: ABNORMAL
EXT BK VIRUS DNA QN PCR: NOT DETECTED
EXT BUN: 24 (ref 7–18)
EXT CALCIUM: 9.6 (ref 8.5–10.1)
EXT CHLORIDE: 106 (ref 98–107)
EXT CO2: 32 (ref 21–32)
EXT CREATININE UA: 77.9 (ref 100–180)
EXT CREATININE: 1.3 MG/DL (ref 0.7–1.3)
EXT EGFR NO RACE VARIABLE: 78.63
EXT EOSINOPHIL%: 2.7 (ref 0.8–7)
EXT GLUCOSE UA: NEGATIVE
EXT GLUCOSE: 160 (ref 74–106)
EXT HEMATOCRIT: 43.8 (ref 40–51)
EXT HEMOGLOBIN: 14.4 (ref 13.7–17.5)
EXT LYMPH%: 34.3 (ref 21.8–53.1)
EXT MAGNESIUM: 1.6 (ref 1.8–2.4)
EXT MONOCYTES%: 14.5 (ref 5.3–12.2)
EXT NITRITES UA: NEGATIVE
EXT PHOSPHORUS: 3.9 (ref 2.6–4.7)
EXT PLATELETS: 183 (ref 163–337)
EXT POTASSIUM: 4.3 (ref 3.5–5.1)
EXT PROTEIN UA: NEGATIVE
EXT RBC UA: ABNORMAL
EXT SEGS%: 47.7 (ref 34–67.9)
EXT SODIUM: 141 MMOL/L (ref 136–145)
EXT TACROLIMUS LVL: 5.8
EXT URINE PROTEIN: 21.3 (ref 1–14)
EXT WBC UA: ABNORMAL
EXT WBC: 5.2 (ref 4.2–9.1)

## 2024-10-01 PROCEDURE — 99214 OFFICE O/P EST MOD 30 MIN: CPT | Mod: 95,,, | Performed by: INTERNAL MEDICINE

## 2024-10-01 PROCEDURE — 1160F RVW MEDS BY RX/DR IN RCRD: CPT | Mod: CPTII,95,, | Performed by: INTERNAL MEDICINE

## 2024-10-01 PROCEDURE — 3066F NEPHROPATHY DOC TX: CPT | Mod: CPTII,95,, | Performed by: INTERNAL MEDICINE

## 2024-10-01 PROCEDURE — 1159F MED LIST DOCD IN RCRD: CPT | Mod: CPTII,95,, | Performed by: INTERNAL MEDICINE

## 2024-10-01 PROCEDURE — 3044F HG A1C LEVEL LT 7.0%: CPT | Mod: CPTII,95,, | Performed by: INTERNAL MEDICINE

## 2024-10-01 NOTE — Clinical Note
He reports having has labs last Fri 9/27 at [facility]. Could we please track those down? Thanks!

## 2024-10-01 NOTE — LETTER
October 1, 2024        Pipe Martinez  [address]  [facility] [address]  Phone: [phone]  Fax: [phone]             [facility] 1st Fl  [address]  [facility] [address]  Phone: [phone]     Patient: Andrey Reddy   MR Number: [id number]   YOB: 1985   Date of Visit: 10/1/2024       Dear Dr. Pipe Martinez    Thank you for referring Andrey Reddy to me for evaluation. Attached you will find relevant portions of my assessment and plan of care.    If you have questions, please do not hesitate to call me. I look forward to following Andrey Reddy along with you.    Sincerely,    Monica Handley MD    Enclosure    If you would like to receive this communication electronically, please contact [email or web address] or [phone] to request [employer] Link access.    [employer] Link is a tool which provides read-only access to select patient information with whom you have a relationship. Its easy to use and provides real time access to review your patients record including encounter summaries, notes, results, and demographic information.    If you feel you have received this communication in error or would no longer like to receive these types of communications, please e-mail [email or web address]

## 2024-10-01 NOTE — PROGRESS NOTES
The patient location is: Home  The chief complaint leading to consultation is: reassess kidney function, immunosuppression and related issues post kidney transplant    Visit type: audiovisual    Face to Face time with patient:16 min  30 minutes of total time spent on the encounter, which includes face to face time and non-face to face time preparing to see the patient (eg, review of tests), Obtaining and/or reviewing separately obtained history, Documenting clinical information in the electronic or other health record, Independently interpreting results (not separately reported) and communicating results to the patient/family/caregiver, or Care coordination (not separately reported).     Each patient to whom he or she provides medical services by telemedicine is:  (1) informed of the relationship between the physician and patient and the respective role of any other health care provider with respect to management of the patient; and (2) notified that he or she may decline to receive medical services by telemedicine and may withdraw from such care at any time.    Notes:   Post-Transplant Assessment    Referring Physician: Pipe Martinez  Current Nephrologist: Pipe Martinez    ORGAN: LEFT KIDNEY  Donor Type: donation after circulatory death   PHS Increased Risk: no  Cold Ischemia: 1,264 mins  Induction Medications: thymoglobulin    Subjective:     CC:  Reassessment of renal allograft function and management of chronic immunosuppression.    HPI:  Mr. Reddy is a 39 y.o. year old Black or  male who received a donation after circulatory death  kidney transplant on 4/9/23.  He has CKD stage 3 - GFR 30-59 and his baseline creatinine is between 1.2-1.6. He takes mycophenolate mofetil, prednisone, and tacrolimus for maintenance immunosuppression.      Pertinent Nephrology/Transplant History:   ESRD from DM II  DCD DDKT 4/9/23, KDPI 38%, CIT>21hrs; Thymo induction  CMV D+,R-  6/29/23 change to  "envarsus for HA  9/8/23 biopsy Diffuse ATI  MMF stopped 11/13/23(N/V/D d/t CMV)  CMV+ 11/15/23: 104K started GCV & stopped MMF    PCP ppx: bactrim STOP 10/6/23  CMV ppx: valcyte STOP 10/6/23    05/30/2023 Andrey feels well and denies nay concerns. He notes no issues with his IS and has no concerns.    He is monitoring his sugars which run 150-170s most days. No low sugars noted. He has endo follow up in future already scheduled.  BPs reported to be in 130-140/70s-88 range  Reports his incision is well healed. Urinating w/o problems, "like a racehorse."  Denies heartburn off Pepcid  Uses pill box with help of Mom.    6/27/23  Anrdey notes occ HA every few days. He believes the HA have been  present since txp.  With review of systems questioning, he also notes occasional dyspnea on exertion.  He also had that during dialysis, and states it was attributed to volume overload.  He reports no dyspnea at rest or lying down, and notes no peripheral edema.  He denies voiding difficulties.  He reports blood pressures are usually 140-150 over 80s-90s, as high as diastolic of 98.     10/17/23 Andrey is now over 6 months post kidney transplant.  His biggest concern is ongoing pain and that due to a change in insurance he lost his pain management physician.  He is also worried about poor blood sugar control, but has been in touch with Endocrine.  From the transplant perspective, he believes the tremors are better overall since he converted to Envarsus (tacrolimus XR), but starting to come back.  His last tacrolimus level is pending.  He noted high blood pressures at home running 140-160/80-90.  I verified he is taking nifedipine, but not amlodipine.  He reports no voiding concerns.    12/04/2023  Admitted 11/14/23 with N/V/D and found CMV +; stopped MMF and started on IV GCV. Viral load dropped from 104,210 IU/ML [11/15] to 57,700 [11/22]. His GI issues have resolved.  He had two wisdom teeth removed last week, and is " "recovering well. No dental pain noted today.  He denies any kidney-related complaints, such as pain over allograft, flank pain, dysuria, frequency, or other LUTS.     10/01/2024  He reports no kidney complaints.  Urinating without difficulty.  His main concern at the present time is back pain, which has been going on for a while.  He is awaiting an MRI.  He has been monitoring his blood pressure, and reports readings in the 120s/70s.  He denies any voiding concerns.    Current Outpatient Medications   Medication Sig    acetaminophen (TYLENOL) 500 MG tablet Take 1 tablet (500 mg total) by mouth every 6 (six) hours as needed for Pain.    atorvastatin (LIPITOR) 10 MG tablet Take 1 tablet (10 mg total) by mouth every evening.    BD ULTRA-FINE SILVIA PEN NEEDLE 32 gauge x 5/32" Ndle Use with insulin 4 times daily.    DULoxetine (CYMBALTA) 30 MG capsule Take 1 capsule (30 mg total) by mouth once daily.    flash glucose scanning reader (FREESTYLE SELMA 2 READER) [facility] Use as directed.    flash glucose sensor (FREESTYLE SELMA 2 SENSOR) Kit Change every 14 day.    lancing device with lancets Kit Use to test glucose 4 times a day.    linaGLIPtin (TRADJENTA) 5 mg Tab tablet Take 1 tablet (5 mg total) by mouth once daily.    magnesium oxide (MAG-OX) 400 mg (241.3 mg magnesium) tablet Take 1 tablet (400 mg total) by mouth 2 (two) times daily.    mycophenolate (CELLCEPT) 250 mg Cap Take 2 capsules (500 mg total) by mouth 2 (two) times daily.    NIFEdipine (PROCARDIA-XL) 60 MG (OSM) 24 hr tablet Take 1 tablet (60 mg total) by mouth 2 (two) times a day.    NOVOLOG FLEXPEN U-100 INSULIN 100 unit/mL (3 mL) InPn pen Inject 3 Units into the skin 3 (three) times daily with meals. Plus correction scale. Max TDD 30 units.    predniSONE (DELTASONE) 5 MG tablet Take 1 tablet (5 mg total) by mouth once daily.    tacrolimus XR, ENVARSUS, (ENVARSUS XR) 1 mg Tb24 Take 7 tablets (7 mg total) by mouth every morning.    TRESIBA FLEXTOUCH U-100 100 " unit/mL (3 mL) insulin pen Inject 12 Units into the skin once daily. HOLD UNLESS BLOOD GLUCOSE >120. DISCARD PEN AFTER 56 DAYS ONCE USED.     No current facility-administered medications for this visit.       Review of Systems   Constitutional: Negative.  Negative for fever.   Respiratory:  Negative for shortness of breath.    Cardiovascular:  Negative for chest pain and leg swelling.   Gastrointestinal:  Negative for abdominal pain, diarrhea, nausea and vomiting.   Genitourinary: Negative.    Musculoskeletal:  Positive for back pain.   Allergic/Immunologic: Positive for immunocompromised state.     Objective:     There were no vitals taken for this visit.body mass index is unknown because there is no height or weight on file.    Physical Exam appears uncomfortable on video.  Speech normal.  Respirations unlabored.    Labs:  Recent Labs   Lab 10/13/21  0806 10/13/21  0807 10/13/21  1136 10/28/22  1205 04/09/23  0320 04/09/23  0857 05/04/23  0724 05/04/23  0742 09/08/23  0651 11/14/23  2129 11/14/23  2333 11/16/23  0625 11/17/23  0611 11/18/23  0648 06/20/24  1128 07/03/24  0830 07/03/24  0906 07/31/24  1047   WBC 4.75  --   --   --  5.72   < >  --    < >  --  4.01   < > 3.08 L 2.94 L 3.10 L 5.35  --  5.34  --    Hemoglobin 12.7 L  --   --   --  9.8 L   < >  --    < >  --  14.3   < > 12.8 L 12.3 L 12.1 L  --   --   --   --    HGB  --   --   --   --   --   --   --   --   --   --   --   --   --   --  14.4  --  14.7  --    POC Hematocrit  --   --   --   --   --    < >  --   --   --   --   --   --   --   --   --  46  --   --    Hematocrit 37.6 L  --   --   --  29.8 L   < >  --    < >  --  41.8   < > 38.4 L 36.6 L 37.2 L  --   --   --   --    HCT  --   --   --   --   --   --   --   --   --   --   --   --   --   --  43.9  --  44.0  --    Sodium 139  --   --   --  140   < >  --    < > 141 137   < > 137 139 142 138  --  141  --    Potassium 4.6  --   --   --  3.9   < >  --    < > 3.6 4.7   < > 3.6 3.6 4.0 4.0  --  4.5  --     Chloride 104  --   --   --  103   < >  --    < > 106 103   < > 105 107 112 H 105  --  104  --    CO2 21 L  --   --   --  25   < >  --    < > 24 21 L   < > 24 24 20 L 24  --  25  --    BUN 30 H  --   --   --  25 H   < >  --    < > 22 H 48 H   < > 27 H 14 11 15  --  19  --    Creatinine 4.9 H  --   --   --  3.7 H   < >  --    < > 1.9 H 2.7 H   < > 1.3 1.2 1.2 1.2  --  1.3  --    eGFR if non  14.0 A  --   --   --   --   --   --   --   --   --   --   --   --   --   --   --   --   --    Glucose  --   --   --   --   --   --   --   --   --   --   --   --   --   --  151 H  --  59 L  --    Calcium 10.0  --   --   --  9.2   < >  --    < > 9.6 9.0   < > 8.3 L 8.0 L 8.2 L 10.3  --  9.9  --    Phosphorus Level  --   --   --   --   --   --   --   --   --   --   --   --   --   --   --   --  2.7  --    Phosphorus 4.0  --   --   --  4.0   < >  --    < >  --   --    < > 2.1 L 2.4 L 1.8 L  --   --   --   --    Magnesium  --   --   --   --   --    < >  --    < >  --  2.3   < > 1.9 1.7 1.6  --   --   --   --    Albumin 4.5  --   --   --  3.9   < >  --    < > 4.1 4.1   < > 3.2 L 3.0 L 3.0 L 4.2  --  4.5  --    AST 15  --   --   --  12  --   --    < > 23 33  --   --   --   --  20  --   --   --    ALT 18  --   --   --  13  --   --    < > 26 36  --   --   --   --  18  --   --   --    Urine Protein/Creatinine Ratio  --   --   --   --   --   --   --   --   --   --   --   --   --   --   --   --  0.11  --    Prot/Creat Ratio, Urine  --   --  1.35 H  --   --   --  0.12  --   --   --   --   --   --   --   --   --   --   --    PTH, Intact  --    < >  --  370.8 H 354.1 H  --   --   --   --   --   --   --   --   --   --   --   --   --    PTH Intact  --   --   --   --   --   --   --   --   --   --   --   --   --   --   --   --  97.3 H  --    Tacrolimus  --   --   --   --   --   --   --   --   --   --   --   --   --   --   --   --  9.8 8.4   Tacrolimus Lvl  --   --   --   --   --    < >  --    < >  --   --    < > 7.3 12.2 5.9  6.1   --   --   --   --     < > = values in this interval not displayed.       Recent Labs   Lab 05/10/23  0846 05/17/23  0000 07/06/23  0848 08/08/23  0000 11/22/23  0900 12/06/23  0000 01/02/24  0828 01/31/24  0924 03/01/24  1010 04/18/24  0853   EXT ANC  --    < > 3.57   < > 1.91  --  2.32  --   --  1.97   EXT WBC 5.9   < > 4.7 L   < > 3.6 A   < > 3.5 L 4.2 L 6.9 4.5 L   EXT SEGS% 77.5   < > 75.8 H   < >  --   --  65.5 68.1 56.8 43.4   EXT Platelets 227   < > 177   < > 192   < > 191 167 180.0 164.0   EXT Hemoglobin 11.6 L   < > 13.1 L   < > 12.5 A   < > 14.2 14.6 14.3 14.3   EXT Hematocrit 33.7 L   < > 38.7 L   < > 37.5 A   < > 42.7 42.5 41.8 L 42.5   EXT Creatinine 1.3   < > 1.50 H   < > 1.40 A   < > 1.30 1.20 1.20 1.30   EXT CREATININE UA 72.5  --  62.6 L   < >  --    < > 97.8 L  --   --  111.7   EXT Sodium 140   < > 140   < > 141   < > 142 143 141 143   EXT Potassium 4.4   < > 4.1   < > 4.1   < > 4.2 4.3 33.7 4.5   EXT BUN 19 H   < > 27 H   < > 13   < > 17 20 H 19 H 20 H   EXT CO2 29   < > 28   < > 32   < > 29 28 29 27   EXT Calcium 8.9   < > 9.2   < > 9.6   < > 9.2 10.1 9.2 9.2   EXT Phosphorus 3.4   < > 4.2   < > 4.3   < > 3.7 3.4 3.1 3.8   EXT Glucose 146 H   < > 292 H   < > 101  --  152 H 214 H 193 H 117 H   EXT Albumin 4.1   < > 4.5   < > 3.5   < > 3.7 4.2 4.2 4.1   EXT AST <8 L  --  25  --   --   --   --   --   --   --    EXT ALT 15 L  --  70 H  --   --   --   --   --   --   --    EXT BilirubiN Total 0.6  --  0.5  --   --   --   --   --   --   --     < > = values in this interval not displayed.     Recent Labs   Lab 10/11/23  0903 11/09/23  0905 12/06/23  0000 01/02/24  0828 01/18/24  0903 03/01/24  1010 03/06/24  0000 04/18/24  0853   EXT Tacrolimus Lvl 4.1   < > 6.4 4.8   < > 5.1 9.7 4.9   EXT PROT/CREAT Ratio UR  --    < > 0.2 201 mg/g  --   --   --  0.2 g/day   EXT Protein UA 1+ A  --  Negative trace  --   --   --  negative   EXT WBC UA 3-5  --  3-5 0-2  --   --   --   --    EXT RBC UA 0-2  --  0-2 0-2  --    --   --   --     < > = values in this interval not displayed.     Labs were reviewed with the patient.    Assessment:     1. Chronic kidney disease (CKD), stage II (mild)    2. Kidney transplant status    3. Immunocompromised state due to drug therapy    4. Prophylactic immunotherapy    5. Renal hypertension       Plan:   New nursing orders:  -  continue weekly CMV PCR, CBC, renal panel & collect urine p/c with next lab    CKD II-IIIa post DCD kidney transplant 4/9/23, KDPI 38%, CIT>21hr  Recent Labs   Lab 05/04/23  0724 05/04/23  0742 06/20/24  1128 07/03/24  0906   Creatinine  --    < > 1.2 1.3   eGFR  --    < > >60 >60   Urine Protein/Creatinine Ratio  --   --   --  0.11   Prot/Creat Ratio, Urine 0.12  --   --   --     < > = values in this interval not displayed.   - Creat 1.2-1.6, continues with excellent renal function  Screening for proteinuria  - No significant proteinuria    Immunosuppression Management for pt on prophylactic IS against rejection  - On tacro XR (changed from IS d/t tremors), MMF [held d/t CMV] and prednisone. Anticipate restarting MMF once CMV resolved.  - Target tacro  now 5-7  - Continue monitoring for toxicities and SE.       At risk for opportunistic infections given immunosuppressed state  -Prophylaxis against opportunistic infections  -Bactrim and Valcyte stopped as directed  -Screening for BK infection to prevent allograft dysfunction  - 4/18/24 BK not det  - Continue blood PCR screening as per guidelines to prevent BK viremia and nephropathy leading to allograft dysfunction and potential graft failure    Hypertension, renal  - I reviewed with him that the target goal for his hypertension management given CKD with comorbidities should be less than 130/80. Praised on his great control.    Follow-up:   P.r.n. and as per written guidelines.  Reminded to follow-up with pain management at end of week if he has not heard about having MRI scheduled.    Monica Handley MD        [facility] Patient Status  Functional Status: 80% - Normal activity with effort: some symptoms of disease  Physical Capacity: No Limitations

## 2024-10-09 ENCOUNTER — PATIENT MESSAGE (OUTPATIENT)
Dept: ENDOCRINOLOGY | Facility: CLINIC | Age: 39
End: 2024-10-09
Payer: MEDICARE

## 2024-10-09 DIAGNOSIS — Z99.2 CONTROLLED TYPE 2 DIABETES MELLITUS WITH CHRONIC KIDNEY DISEASE ON CHRONIC DIALYSIS, WITHOUT LONG-TERM CURRENT USE OF INSULIN: ICD-10-CM

## 2024-10-09 DIAGNOSIS — E11.22 CONTROLLED TYPE 2 DIABETES MELLITUS WITH CHRONIC KIDNEY DISEASE ON CHRONIC DIALYSIS, WITHOUT LONG-TERM CURRENT USE OF INSULIN: ICD-10-CM

## 2024-10-09 DIAGNOSIS — N18.6 CONTROLLED TYPE 2 DIABETES MELLITUS WITH CHRONIC KIDNEY DISEASE ON CHRONIC DIALYSIS, WITHOUT LONG-TERM CURRENT USE OF INSULIN: ICD-10-CM

## 2024-10-09 RX ORDER — INSULIN PUMP SYRINGE, 3 ML
EACH MISCELLANEOUS
Qty: 1 EACH | Refills: 0 | Status: SHIPPED | OUTPATIENT
Start: 2024-10-09

## 2024-10-09 RX ORDER — LANCETS
EACH MISCELLANEOUS
Qty: 200 EACH | Refills: 11 | Status: SHIPPED | OUTPATIENT
Start: 2024-10-09

## 2024-10-09 RX ORDER — FLASH GLUCOSE SENSOR
KIT MISCELLANEOUS
Qty: 6 KIT | Refills: 3 | Status: SHIPPED | OUTPATIENT
Start: 2024-10-09

## 2024-10-23 ENCOUNTER — PATIENT MESSAGE (OUTPATIENT)
Dept: ENDOCRINOLOGY | Facility: CLINIC | Age: 39
End: 2024-10-23

## 2024-11-12 ENCOUNTER — PATIENT MESSAGE (OUTPATIENT)
Dept: ENDOCRINOLOGY | Facility: CLINIC | Age: 39
End: 2024-11-12

## 2024-11-25 ENCOUNTER — PATIENT MESSAGE (OUTPATIENT)
Dept: ENDOCRINOLOGY | Facility: CLINIC | Age: 39
End: 2024-11-25

## 2024-11-25 DIAGNOSIS — E11.22 CONTROLLED TYPE 2 DIABETES MELLITUS WITH CHRONIC KIDNEY DISEASE ON CHRONIC DIALYSIS, WITHOUT LONG-TERM CURRENT USE OF INSULIN: ICD-10-CM

## 2024-11-25 DIAGNOSIS — N18.6 CONTROLLED TYPE 2 DIABETES MELLITUS WITH CHRONIC KIDNEY DISEASE ON CHRONIC DIALYSIS, WITHOUT LONG-TERM CURRENT USE OF INSULIN: ICD-10-CM

## 2024-11-25 DIAGNOSIS — Z99.2 CONTROLLED TYPE 2 DIABETES MELLITUS WITH CHRONIC KIDNEY DISEASE ON CHRONIC DIALYSIS, WITHOUT LONG-TERM CURRENT USE OF INSULIN: ICD-10-CM

## 2024-11-25 RX ORDER — FLASH GLUCOSE SENSOR
KIT MISCELLANEOUS
Qty: 6 KIT | Refills: 3 | Status: SHIPPED | OUTPATIENT
Start: 2024-11-25

## 2024-12-04 ENCOUNTER — PATIENT MESSAGE (OUTPATIENT)
Dept: TRANSPLANT | Facility: CLINIC | Age: 39
End: 2024-12-04

## 2024-12-04 DIAGNOSIS — E83.42 HYPOMAGNESEMIA: ICD-10-CM

## 2024-12-04 RX ORDER — LANOLIN ALCOHOL/MO/W.PET/CERES
400 CREAM (GRAM) TOPICAL 2 TIMES DAILY
Qty: 60 TABLET | Refills: 11 | Status: SHIPPED | OUTPATIENT
Start: 2024-12-04 | End: 2025-12-04

## 2024-12-10 ENCOUNTER — PATIENT MESSAGE (OUTPATIENT)
Dept: ENDOCRINOLOGY | Facility: CLINIC | Age: 39
End: 2024-12-10

## 2024-12-10 DIAGNOSIS — N18.6 CONTROLLED TYPE 2 DIABETES MELLITUS WITH CHRONIC KIDNEY DISEASE ON CHRONIC DIALYSIS, WITHOUT LONG-TERM CURRENT USE OF INSULIN: ICD-10-CM

## 2024-12-10 DIAGNOSIS — E11.22 CONTROLLED TYPE 2 DIABETES MELLITUS WITH CHRONIC KIDNEY DISEASE ON CHRONIC DIALYSIS, WITHOUT LONG-TERM CURRENT USE OF INSULIN: ICD-10-CM

## 2024-12-10 DIAGNOSIS — Z99.2 CONTROLLED TYPE 2 DIABETES MELLITUS WITH CHRONIC KIDNEY DISEASE ON CHRONIC DIALYSIS, WITHOUT LONG-TERM CURRENT USE OF INSULIN: ICD-10-CM

## 2024-12-10 RX ORDER — FLASH GLUCOSE SENSOR
KIT MISCELLANEOUS
Qty: 6 KIT | Refills: 3 | Status: SHIPPED | OUTPATIENT
Start: 2024-12-10

## 2024-12-19 DIAGNOSIS — Z99.2 CONTROLLED TYPE 2 DIABETES MELLITUS WITH CHRONIC KIDNEY DISEASE ON CHRONIC DIALYSIS, WITHOUT LONG-TERM CURRENT USE OF INSULIN: ICD-10-CM

## 2024-12-19 DIAGNOSIS — N18.6 CONTROLLED TYPE 2 DIABETES MELLITUS WITH CHRONIC KIDNEY DISEASE ON CHRONIC DIALYSIS, WITHOUT LONG-TERM CURRENT USE OF INSULIN: ICD-10-CM

## 2024-12-19 DIAGNOSIS — E11.22 CONTROLLED TYPE 2 DIABETES MELLITUS WITH CHRONIC KIDNEY DISEASE ON CHRONIC DIALYSIS, WITHOUT LONG-TERM CURRENT USE OF INSULIN: ICD-10-CM

## 2024-12-19 RX ORDER — ATORVASTATIN CALCIUM 10 MG/1
10 TABLET, FILM COATED ORAL NIGHTLY
Qty: 90 TABLET | Refills: 3 | Status: CANCELLED | OUTPATIENT
Start: 2024-12-19 | End: 2025-12-19

## 2024-12-19 RX ORDER — FLASH GLUCOSE SENSOR
KIT MISCELLANEOUS
Qty: 6 KIT | Refills: 3 | Status: SHIPPED | OUTPATIENT
Start: 2024-12-19

## 2024-12-19 RX ORDER — FLASH GLUCOSE SCANNING READER
EACH MISCELLANEOUS
Qty: 1 EACH | Refills: 0 | Status: CANCELLED | OUTPATIENT
Start: 2024-12-19

## 2024-12-19 NOTE — DISCHARGE SUMMARY
[facility] - Transplant Stepdown  Kidney Transplant  Discharge Summary    Patient Name: Andrey Reddy  MRN: [id number]  Admission Date: 11/14/2023  Hospital Length of Stay: 3 days  Discharge Date and Time:  11/18/2023 12:27 PM  Attending Physician: Mannie Gonzalez Jr.*   Discharging Provider: VIANCA Jackson  Primary Care Provider: Letty Rdedy NP    HPI:   Mr. Reddy is a 37 yo male s/p DCD Ktxp d/t DM on 4/9/23 (Thymo, KDPI 40%, CIT 21 hrs). He now has CKD stage 3 - GFR 30-59 and his baseline creatinine is between 1.2-1.6. He takes mycophenolate mofetil, prednisone, and envarsus for maintenance immunosuppression. He now presents to the ED with complaints of N/V/D and weakness. Patient reports N/V/D started approximately 5 days ago on Friday. He reports bilious emesis, not able to hold food down. He does report being able to take his medications. Diarrhea is watery, non-bloody. He reports starting Rybelsus approx 3 weeks ago and believes this could be contributing to his symptoms. Patient also reports left sided tooth pain one day after N/V/D started. He was seen by his dentist and pt states he was told he has 2 teeth which need to be pulled but no active infection. He was prescribed prophylactic amoxicillin. Tooth pain also limiting PO intake. Pt reports his overall weakness has worsened past 24-48 hours prompting ER visit. He reports a fall earlier today d/t weakness, denies hitting his head or LOC. Labs in ED notable for NETO, Cr 2.7. Low grade 99.9 temp in ED. Pt denies chills, fevers, SOB, chest pain, dysuria. Plan discussed with Dr. Gonzalez. Plan discussed with Dr. Gonzalez, suspect NETO from dehydration from decreased PO intake, ongoing diarrhea. Plan for infectious work-up including blood/urine cx, CXR, stool studies and CMV. Obtain Kidney US. IVF hydration.     * No surgery found *     Hospital Course:    Patient admitted from ED with N/V/D, weakness, and dental pain. Kidney US obtained and  Your recent lab results are normal. showed possible renal artery stenosis but good waveforms. Repeat continues to show possible stenosis. Will repeat US outpatient. Creatinine improved with IVF. CMV PCR 11/15/23 104K, IV GCV started. N/V/D improving, pt transitioned to valcyte. Will repeat CMV PCR 11/20. Pt with multiple dental caries. CT max/fax to assess for abscess unremarkable. Pt started on unasyn while admitted. Pt to complete 14 day course of Amoxicillin until he able to see his dentist for treatment at the end of the month.    Pt is stable and ready for discharge.  PharmD and received education. Pt will follow up with labs and weekly CMV. Pt expressed understanding of discharge instructions and importance of follow up.      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] 11/15/2023 Yes    Hypophosphatemia [E83.39] 11/17/2023 Unknown    NETO (acute kidney injury) [N17.9] 11/15/2023 Yes    Malnutrition of moderate degree [E44.0] 11/15/2023 Yes    Dental caries [K02.9] 11/15/2023 Yes    At risk for opportunistic infections [Z91.89] 04/10/2023 Yes    Long-term use of immunosuppressant medication [Z79.60] 04/10/2023 Not Applicable    Prophylactic immunotherapy [Z29.89] 04/10/2023 Not Applicable    S/P kidney transplant [Z94.0] 04/09/2023 Not Applicable    Hyperlipidemia [E78.5] 10/13/2021 Yes    Renovascular hypertension [I15.0] 10/13/2021 Yes    Type 2 diabetes mellitus, with long-term current use of insulin [E11.9, Z79.4] 10/13/2021 Not Applicable      Problems Resolved During this Admission:    Diagnosis Date Noted Date Resolved POA    Anemia of chronic disease [D63.8] 04/10/2023 11/15/2023 Yes     Consults (From admission, onward)          Status Ordering Provider     Inpatient consult to Infectious Diseases  Once        Provider:  (Not yet assigned)    Completed ALISSON PERALTA     Inpatient consult to  Endocrinology  Once        Provider:  (Not yet assigned)    Completed PERALTA, ALISSON GA     Inpatient consult to Kidney Transplant Surgery  Once        Provider:  (Not yet assigned)    Acknowledged MAAME BENZ            Pending Diagnostic Studies:       Procedure Component Value Units Date/Time    Gastrointestinal Pathogens Panel, PCR [[id number]] Collected: 11/15/23 1305    Order Status: Sent Lab Status: In process Updated: 11/15/23 2028    Specimen: Stool     Stool Exam-Ova,Cysts,Parasites [[id number]] Collected: 11/15/23 1305    Order Status: Sent Lab Status: In process Updated: 11/15/23 2030    Specimen: Stool           Significant Diagnostic Studies: Labs: BMP:   Recent Labs   Lab 11/17/23  0611 11/18/23  0648   GLU 92 84    142   K 3.6 4.0    112*   CO2 24 20*   BUN 14 11   CREATININE 1.2 1.2   CALCIUM 8.0* 8.2*   MG 1.7 1.6   , CMP   Recent Labs   Lab 11/17/23  0611 11/18/23  0648    142   K 3.6 4.0    112*   CO2 24 20*   GLU 92 84   BUN 14 11   CREATININE 1.2 1.2   CALCIUM 8.0* 8.2*   ALBUMIN 3.0* 3.0*   ANIONGAP 8 10   , CBC   Recent Labs   Lab 11/17/23  0611 11/18/23  0648   WBC 2.94* 3.10*   HGB 12.3* 12.1*   HCT 36.6* 37.2*   * 142*   , and All labs within the past 24 hours have been reviewed    Discharged Condition: stable    Disposition:     Follow Up:    Patient Instructions:   No discharge procedures on file.  Medications:  Reconciled Home Medications:      Medication List        START taking these medications      k phos di & mono-sod phos mono 250 mg Tab  Commonly known as: K-PHOS-NEUTRAL  Take 2 tablets by mouth 2 (two) times a day.     TRADJENTA 5 mg Tab tablet  Generic drug: linaGLIPtin  Take 1 tablet (5 mg total) by mouth once daily.     valGANciclovir 450 mg Tab  Commonly known as: VALCYTE  Take 2 tablets (900 mg total) by mouth 2 (two) times daily.            CHANGE how you take these medications      atorvastatin 10 MG tablet  Commonly known as:  "LIPITOR  Take 1 tablet (10 mg total) by mouth every evening.  What changed: additional instructions     DULoxetine 30 MG capsule  Commonly known as: CYMBALTA  Take 1 capsule (30 mg total) by mouth once daily.  What changed: when to take this     NovoLOG Flexpen U-100 Insulin 100 unit/mL (3 mL) Inpn pen  Generic drug: insulin aspart U-100  Utilize Novolog SSI before meals if BG >150 (Max TDD 15u)  What changed:   how much to take  how to take this  when to take this  additional instructions     oxyCODONE-acetaminophen 5-325 mg per tablet  Commonly known as: PERCOCET  Take 1 tablet by mouth every 6 (six) hours as needed for Pain.  What changed:   when to take this  reasons to take this     tacrolimus XR (ENVARSUS) 1 mg Tb24  Commonly known as: ENVARSUS XR  Take 6 tablets (6 mg total) by mouth every morning.  What changed: how much to take     TRESIBA FLEXTOUCH U-100 100 unit/mL (3 mL) insulin pen  Generic drug: insulin degludec  Inject 12 Units into the skin once daily. HOLD UNLESS BLOOD GLUCOSE >120  What changed: additional instructions            CONTINUE taking these medications      acetaminophen-codeine 300-30mg 300-30 mg Tab  Commonly known as: TYLENOL #3  Take 1 tablet by mouth every 4 (four) hours as needed.     amoxicillin 500 MG capsule  Commonly known as: AMOXIL  Use as directed by dental provider     BD ULTRA-FINE SILVIA PEN NEEDLE 32 gauge x 5/32" Ndle  Generic drug: pen needle, diabetic  Use with insulin 4 times daily.     cyclobenzaprine 10 MG tablet  Commonly known as: FLEXERIL  Take 10 mg by mouth daily as needed.     FREESTYLE SELMA 2 READER Misc  Generic drug: flash glucose scanning reader  Use as directed.     FREESTYLE SELMA 2 SENSOR Kit  Generic drug: flash glucose sensor  Change every 14 day.     * NIFEdipine 60 MG (OSM) 24 hr tablet  Commonly known as: PROCARDIA-XL  Take 1 tablet (60 mg total) by mouth once daily.     * NIFEdipine 30 MG (OSM) 24 hr tablet  Commonly known as: PROCARDIA-XL  Take 1 " tablet (30 mg total) by mouth nightly.     predniSONE 5 MG tablet  Commonly known as: DELTASONE  Take 1 tablet (5 mg total) by mouth once daily.           * This list has 2 medication(s) that are the same as other medications prescribed for you. Read the directions carefully, and ask your doctor or other care provider to review them with you.                STOP taking these medications      ACCU-CHEK GUIDE ME GLUCOSE MTR Misc  Generic drug: blood-glucose meter     blood sugar diagnostic Strp     hydroCHLOROthiazide 25 MG tablet  Commonly known as: HYDRODIURIL     lancets Misc     mycophenolate 250 mg Cap  Commonly known as: CELLCEPT     prednisoLONE 5 mg Tab  Commonly known as: MILLIPRED     pregabalin 75 MG capsule  Commonly known as: LYRICA     RYBELSUS 3 mg tablet  Generic drug: semaglutide     traMADoL 50 mg tablet  Commonly known as: ULTRAM            Time spent caring for patient (Greater than 1/2 spent in direct face-to-face contact): > 30 minutes    VIANCA Jackson  Kidney Transplant  [facility]

## 2025-01-10 DIAGNOSIS — E11.22 CONTROLLED TYPE 2 DIABETES MELLITUS WITH CHRONIC KIDNEY DISEASE ON CHRONIC DIALYSIS, WITHOUT LONG-TERM CURRENT USE OF INSULIN: ICD-10-CM

## 2025-01-10 DIAGNOSIS — N18.6 CONTROLLED TYPE 2 DIABETES MELLITUS WITH CHRONIC KIDNEY DISEASE ON CHRONIC DIALYSIS, WITHOUT LONG-TERM CURRENT USE OF INSULIN: ICD-10-CM

## 2025-01-10 DIAGNOSIS — Z99.2 CONTROLLED TYPE 2 DIABETES MELLITUS WITH CHRONIC KIDNEY DISEASE ON CHRONIC DIALYSIS, WITHOUT LONG-TERM CURRENT USE OF INSULIN: ICD-10-CM

## 2025-01-10 RX ORDER — PEN NEEDLE, DIABETIC 31 GX5/16"
1 NEEDLE, DISPOSABLE MISCELLANEOUS 4 TIMES DAILY
Qty: 100 EACH | Refills: 3 | Status: CANCELLED | OUTPATIENT
Start: 2025-01-10

## 2025-01-16 PROBLEM — G89.29 CHRONIC BILATERAL LOW BACK PAIN WITHOUT SCIATICA: Status: ACTIVE | Noted: 2025-01-16

## 2025-01-16 PROBLEM — M54.50 CHRONIC BILATERAL LOW BACK PAIN WITHOUT SCIATICA: Status: ACTIVE | Noted: 2025-01-16

## 2025-01-16 PROBLEM — M62.81 MUSCLE WEAKNESS (GENERALIZED): Status: ACTIVE | Noted: 2025-01-16

## 2025-01-16 PROBLEM — R26.2 DIFFICULTY WALKING: Status: ACTIVE | Noted: 2025-01-16

## 2025-01-17 DIAGNOSIS — Z99.2 CONTROLLED TYPE 2 DIABETES MELLITUS WITH CHRONIC KIDNEY DISEASE ON CHRONIC DIALYSIS, WITHOUT LONG-TERM CURRENT USE OF INSULIN: ICD-10-CM

## 2025-01-17 DIAGNOSIS — I10 BENIGN ESSENTIAL HTN: ICD-10-CM

## 2025-01-17 DIAGNOSIS — N18.6 CONTROLLED TYPE 2 DIABETES MELLITUS WITH CHRONIC KIDNEY DISEASE ON CHRONIC DIALYSIS, WITHOUT LONG-TERM CURRENT USE OF INSULIN: ICD-10-CM

## 2025-01-17 DIAGNOSIS — E11.22 CONTROLLED TYPE 2 DIABETES MELLITUS WITH CHRONIC KIDNEY DISEASE ON CHRONIC DIALYSIS, WITHOUT LONG-TERM CURRENT USE OF INSULIN: ICD-10-CM

## 2025-01-17 RX ORDER — NIFEDIPINE 60 MG/1
60 TABLET, EXTENDED RELEASE ORAL 2 TIMES DAILY
Qty: 60 TABLET | Refills: 5 | Status: CANCELLED | OUTPATIENT
Start: 2025-01-17 | End: 2026-01-17

## 2025-01-19 RX ORDER — INSULIN DEGLUDEC 100 U/ML
12 INJECTION, SOLUTION SUBCUTANEOUS DAILY
Qty: 15 ML | Refills: 0 | Status: SHIPPED | OUTPATIENT
Start: 2025-01-19 | End: 2026-01-19

## 2025-01-20 DIAGNOSIS — Z99.2 CONTROLLED TYPE 2 DIABETES MELLITUS WITH CHRONIC KIDNEY DISEASE ON CHRONIC DIALYSIS, WITHOUT LONG-TERM CURRENT USE OF INSULIN: ICD-10-CM

## 2025-01-20 DIAGNOSIS — N18.6 CONTROLLED TYPE 2 DIABETES MELLITUS WITH CHRONIC KIDNEY DISEASE ON CHRONIC DIALYSIS, WITHOUT LONG-TERM CURRENT USE OF INSULIN: ICD-10-CM

## 2025-01-20 DIAGNOSIS — E11.22 CONTROLLED TYPE 2 DIABETES MELLITUS WITH CHRONIC KIDNEY DISEASE ON CHRONIC DIALYSIS, WITHOUT LONG-TERM CURRENT USE OF INSULIN: ICD-10-CM

## 2025-01-21 RX ORDER — PEN NEEDLE, DIABETIC 31 GX5/16"
1 NEEDLE, DISPOSABLE MISCELLANEOUS 4 TIMES DAILY
Qty: 100 EACH | Refills: 3 | Status: SHIPPED | OUTPATIENT
Start: 2025-01-21

## 2025-01-27 ENCOUNTER — PATIENT MESSAGE (OUTPATIENT)
Dept: TRANSPLANT | Facility: CLINIC | Age: 40
End: 2025-01-27

## 2025-01-28 ENCOUNTER — PATIENT MESSAGE (OUTPATIENT)
Dept: TRANSPLANT | Facility: CLINIC | Age: 40
End: 2025-01-28

## 2025-01-28 DIAGNOSIS — I10 BENIGN ESSENTIAL HTN: ICD-10-CM

## 2025-01-28 RX ORDER — NIFEDIPINE 60 MG/1
60 TABLET, EXTENDED RELEASE ORAL 2 TIMES DAILY
Qty: 60 TABLET | Refills: 1 | Status: SHIPPED | OUTPATIENT
Start: 2025-01-28 | End: 2026-01-28

## 2025-01-31 ENCOUNTER — PATIENT MESSAGE (OUTPATIENT)
Dept: TRANSPLANT | Facility: CLINIC | Age: 40
End: 2025-01-31

## 2025-02-19 DIAGNOSIS — I10 BENIGN ESSENTIAL HTN: ICD-10-CM

## 2025-02-19 RX ORDER — NIFEDIPINE 60 MG/1
60 TABLET, EXTENDED RELEASE ORAL 2 TIMES DAILY
Qty: 60 TABLET | Refills: 5 | Status: CANCELLED | OUTPATIENT
Start: 2025-02-19 | End: 2026-02-19

## 2025-02-20 DIAGNOSIS — Z94.0 KIDNEY REPLACED BY TRANSPLANT: ICD-10-CM

## 2025-02-20 RX ORDER — MYCOPHENOLATE MOFETIL 250 MG/1
500 CAPSULE ORAL 2 TIMES DAILY
Qty: 120 CAPSULE | Refills: 11 | Status: SHIPPED | OUTPATIENT
Start: 2025-02-20

## 2025-02-24 ENCOUNTER — PATIENT MESSAGE (OUTPATIENT)
Dept: ENDOCRINOLOGY | Facility: CLINIC | Age: 40
End: 2025-02-24

## 2025-02-24 DIAGNOSIS — Z99.2 CONTROLLED TYPE 2 DIABETES MELLITUS WITH CHRONIC KIDNEY DISEASE ON CHRONIC DIALYSIS, WITHOUT LONG-TERM CURRENT USE OF INSULIN: ICD-10-CM

## 2025-02-24 DIAGNOSIS — N18.6 CONTROLLED TYPE 2 DIABETES MELLITUS WITH CHRONIC KIDNEY DISEASE ON CHRONIC DIALYSIS, WITHOUT LONG-TERM CURRENT USE OF INSULIN: ICD-10-CM

## 2025-02-24 DIAGNOSIS — E11.22 CONTROLLED TYPE 2 DIABETES MELLITUS WITH CHRONIC KIDNEY DISEASE ON CHRONIC DIALYSIS, WITHOUT LONG-TERM CURRENT USE OF INSULIN: ICD-10-CM

## 2025-02-25 RX ORDER — FLASH GLUCOSE SENSOR
KIT MISCELLANEOUS
Qty: 6 KIT | Refills: 0 | Status: SHIPPED | OUTPATIENT
Start: 2025-02-25

## 2025-03-18 DIAGNOSIS — Z79.4 TYPE 2 DIABETES MELLITUS WITH DIABETIC NEPHROPATHY, WITH LONG-TERM CURRENT USE OF INSULIN: ICD-10-CM

## 2025-03-18 DIAGNOSIS — E11.21 TYPE 2 DIABETES MELLITUS WITH DIABETIC NEPHROPATHY, WITH LONG-TERM CURRENT USE OF INSULIN: ICD-10-CM

## 2025-03-18 RX ORDER — LINAGLIPTIN 5 MG/1
5 TABLET, FILM COATED ORAL DAILY
Qty: 30 TABLET | Refills: 6 | Status: SHIPPED | OUTPATIENT
Start: 2025-03-18

## 2025-03-25 PROBLEM — M47.816 LUMBAR FACET ARTHROPATHY: Status: ACTIVE | Noted: 2025-03-25

## 2025-04-01 PROBLEM — I12.9 BENIGN HYPERTENSION WITH CHRONIC KIDNEY DISEASE, STAGE II: Status: ACTIVE | Noted: 2024-07-04

## 2025-04-01 PROBLEM — N18.2 BENIGN HYPERTENSION WITH CHRONIC KIDNEY DISEASE, STAGE II: Status: ACTIVE | Noted: 2024-07-04

## 2025-04-01 PROBLEM — N18.2 CONTROLLED TYPE 2 DIABETES MELLITUS WITH STAGE 2 CHRONIC KIDNEY DISEASE, WITHOUT LONG-TERM CURRENT USE OF INSULIN: Status: ACTIVE | Noted: 2021-10-13

## 2025-04-01 NOTE — PROGRESS NOTES
Post-Transplant Assessment    Referring Physician: Pipe Martinez  Current Nephrologist: Pipe Martinez    ORGAN: LEFT KIDNEY  Donor Type: donation after circulatory death   PHS Increased Risk: no  Cold Ischemia: 1,264 mins  Induction Medications: thymoglobulin    Subjective:   The patient location is: home  The chief complaint leading to consultation is: post kidney transplant follow-up and immunosuppression management    Visit type: audiovisual    Face to Face time with patient: 27  minutes of total time spent on the encounter, which includes face to face time and non-face to face time preparing to see the patient (eg, review of tests), Obtaining and/or reviewing separately obtained history, Documenting clinical information in the electronic or other health record, Independently interpreting results (not separately reported) and communicating results to the patient/family/caregiver, or Care coordination (not separately reported).     Each patient to whom he or she provides medical services by telemedicine is:  (1) informed of the relationship between the physician and patient and the respective role of any other health care provider with respect to management of the patient; and (2) notified that he or she may decline to receive medical services by telemedicine and may withdraw from such care at any time.    CC:  Reassessment of renal allograft function and management of chronic immunosuppression.    HPI:  Mr. Reddy is a 39 y.o. year old Black or  male who received a donation after circulatory death  kidney transplant on 4/9/23.  He has CKD stage 3 - GFR 30-59 and his baseline creatinine is between 1.2-1.6. He takes mycophenolate mofetil, prednisone, and tacrolimus for maintenance immunosuppression. He is additionally on ketoconazole to augment tacro levels.    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 He notes no issues with his IS and has no concerns.    He is monitoring his sugars which run 170s--200s most days. Follows with endocrine  Last seen PCP in January and follow-up in August scheduled.  Seen podiatry recently for ingrown toe nail. Wants to try to see a podiatrist with [facility]---referral placed in system.   Does have lower back pain that he reports he is going to get a steroid injection for.  Denies new supplements or foods.        Current Outpatient Medications   Medication Sig    acetaminophen (TYLENOL) 500 MG tablet Take 1 tablet (500 mg total) by mouth every 6 (six) hours as needed for Pain.    atorvastatin (LIPITOR) 10 MG tablet Take 1 tablet (10 mg total) by mouth every evening.    BD ULTRA-FINE SILVIA PEN NEEDLE 32 gauge x 5/32" Ndle Use with insulin 4 times daily.    blood sugar diagnostic Strp To check BG 4 times daily, to use with insurance preferred meter    blood-glucose meter kit To check BG 4 times daily, to use with insurance preferred meter    diclofenac sodium (VOLTAREN ARTHRITIS PAIN) 1 % Gel Apply 2 g topically 4 (four) times daily.    flash glucose scanning reader (FREESTYLE SELMA 2 READER) [facility] [place] as directed.    flash glucose sensor (FREESTYLE SELMA 2 SENSOR) Kit Change sensor every 14 day.    lancets Misc To check BG 4 times daily, to use with insurance preferred meter    lancing device with lancets Kit Use to test glucose 4 times a day.    LIDOcaine (LIDODERM) 5 % Place 1 patch onto the skin once daily. Remove & Discard patch within 12 hours or as directed by MD    linaGLIPtin (TRADJENTA) 5 mg Tab tablet Take 1 tablet (5 mg total) by mouth once daily.    magnesium oxide (MAG-OX) 400 mg (241.3 mg magnesium) tablet Take 1 " tablet (400 mg total) by mouth 2 (two) times daily.    mycophenolate (CELLCEPT) 250 mg Cap Take 2 capsules (500 mg total) by mouth 2 (two) times daily.    NIFEdipine (PROCARDIA-XL) 60 MG (OSM) 24 hr tablet Take 1 tablet (60 mg total) by mouth 2 (two) times a day.    NIFEdipine (PROCARDIA-XL) 60 MG (OSM) 24 hr tablet Take 1 tablet (60 mg total) by mouth 2 (two) times a day.    NOVOLOG FLEXPEN U-100 INSULIN 100 unit/mL (3 mL) InPn pen Inject 3 Units into the skin 3 (three) times daily with meals. Plus correction scale. Max TDD 30 units.    predniSONE (DELTASONE) 5 MG tablet Take 1 tablet (5 mg total) by mouth once daily.    tacrolimus XR, ENVARSUS, (ENVARSUS XR) 1 mg Tb24 Take 7 tablets (7 mg total) by mouth every morning.    TRESIBA FLEXTOUCH U-100 100 unit/mL (3 mL) insulin pen Inject 12 Units into the skin once daily. HOLD UNLESS BLOOD GLUCOSE >120. DISCARD PEN AFTER 56 DAYS ONCE USED.     No current facility-administered medications for this visit.       Review of Systems   Constitutional: Negative.    HENT: Negative.     Respiratory: Negative.     Cardiovascular:  Negative for chest pain and leg swelling.   Gastrointestinal:  Negative for abdominal pain, diarrhea, nausea and vomiting.   Genitourinary:  Negative for difficulty urinating.   Allergic/Immunologic: Positive for immunocompromised state.     Objective:     There were no vitals taken for this visit.body mass index is unknown because there is no height or weight on file.    Physical Exam     Labs:  Recent Labs   Lab 10/28/22  1205 04/09/23  0320 04/09/23  0857 05/04/23  0724 05/04/23  0742 09/08/23  0651 11/14/23  2129 11/14/23  2333 11/16/23  0625 11/17/23  0611 11/18/23  0648 06/20/24  1128 07/03/24  0830 07/03/24  0906 07/31/24  1047 10/08/24  1322   WBC  --  5.72   < >  --    < >  --  4.01   < > 3.08 L 2.94 L 3.10 L 5.35  --  5.34  --   --    Hemoglobin  --  9.8 L   < >  --    < >  --  14.3   < > 12.8 L 12.3 L 12.1 L  --   --   --   --   --    HGB  --    --   --   --   --   --   --   --   --   --   --  14.4  --  14.7  --   --    POC Hematocrit  --   --    < >  --   --   --   --   --   --   --   --   --  46  --   --  46   Hematocrit  --  29.8 L   < >  --    < >  --  41.8   < > 38.4 L 36.6 L 37.2 L  --   --   --   --   --    HCT  --   --   --   --   --   --   --   --   --   --   --  43.9  --  44.0  --   --    Sodium  --  140   < >  --    < > 141 137   < > 137 139 142 138  --  141  --   --    Potassium  --  3.9   < >  --    < > 3.6 4.7   < > 3.6 3.6 4.0 4.0  --  4.5  --   --    Chloride  --  103   < >  --    < > 106 103   < > 105 107 112 H 105  --  104  --   --    CO2  --  25   < >  --    < > 24 21 L   < > 24 24 20 L 24  --  25  --   --    BUN  --  25 H   < >  --    < > 22 H 48 H   < > 27 H 14 11 15  --  19  --   --    Creatinine  --  3.7 H   < >  --    < > 1.9 H 2.7 H   < > 1.3 1.2 1.2 1.2  --  1.3  --   --    Glucose  --   --   --   --   --   --   --   --   --   --   --  151 H  --  59 L  --   --    Calcium  --  9.2   < >  --    < > 9.6 9.0   < > 8.3 L 8.0 L 8.2 L 10.3  --  9.9  --   --    Phosphorus Level  --   --   --   --   --   --   --   --   --   --   --   --   --  2.7  --   --    Phosphorus  --  4.0   < >  --    < >  --   --    < > 2.1 L 2.4 L 1.8 L  --   --   --   --   --    Magnesium  --   --    < >  --    < >  --  2.3   < > 1.9 1.7 1.6  --   --   --   --   --    Albumin  --  3.9   < >  --    < > 4.1 4.1   < > 3.2 L 3.0 L 3.0 L 4.2  --  4.5  --   --    AST  --  12  --   --    < > 23 33  --   --   --   --  20  --   --   --   --    ALT  --  13  --   --    < > 26 36  --   --   --   --  18  --   --   --   --    Urine Protein/Creatinine Ratio  --   --   --   --   --   --   --   --   --   --   --   --   --  0.11  --   --    Prot/Creat Ratio, Urine  --   --   --  0.12  --   --   --   --   --   --   --   --   --   --   --   --    PTH, Intact 370.8 H 354.1 H  --   --   --   --   --   --   --   --   --   --   --   --   --   --    PTH Intact  --   --   --   --   --    --   --   --   --   --   --   --   --  97.3 H  --   --    Tacrolimus  --   --   --   --   --   --   --   --   --   --   --   --   --  9.8 8.4  --    Tacrolimus Lvl  --   --    < >  --    < >  --   --    < > 7.3 12.2 5.9  6.1  --   --   --   --   --     < > = values in this interval not displayed.       Recent Labs   Lab 05/10/23  0846 05/17/23  0000 07/06/23  0848 08/08/23  0000 11/22/23  0900 12/06/23  0000 01/02/24  0828 01/31/24  0924 04/18/24  0853 09/27/24  0823 04/04/25  1006   EXT ANC  --    < > 3.57   < > 1.91  --  2.32  --  1.97  --   --    EXT WBC 5.9   < > 4.7 L   < > 3.6 A   < > 3.5 L   < > 4.5 L 5.2 5.1   EXT SEGS% 77.5   < > 75.8 H   < >  --   --  65.5   < > 43.4 47.7 51.0   EXT Platelets 227   < > 177   < > 192   < > 191   < > 164.0 183 213   EXT Hemoglobin 11.6 L   < > 13.1 L   < > 12.5 A   < > 14.2   < > 14.3 14.4 14.4   EXT Hematocrit 33.7 L   < > 38.7 L   < > 37.5 A   < > 42.7   < > 42.5 43.8 42.1   EXT Creatinine 1.3   < > 1.50 H   < > 1.40 A   < > 1.30   < > 1.30 1.30 1.30   EXT CREATININE UA 72.5  --  62.6 L   < >  --    < > 97.8 L  --  111.7 77.9 L 162.6   EXT Sodium 140   < > 140   < > 141   < > 142   < > 143 141 139   EXT Potassium 4.4   < > 4.1   < > 4.1   < > 4.2   < > 4.5 4.3 4.4   EXT BUN 19 H   < > 27 H   < > 13   < > 17   < > 20 H 24 H 23 H   EXT CO2 29   < > 28   < > 32   < > 29   < > 27 32 29   EXT Calcium 8.9   < > 9.2   < > 9.6   < > 9.2   < > 9.2 9.6 9.4   EXT Phosphorus 3.4   < > 4.2   < > 4.3   < > 3.7   < > 3.8 3.9 3.6   EXT Glucose 146 H   < > 292 H   < > 101  --  152 H   < > 117 H 160 H 167 H   EXT Albumin 4.1   < > 4.5   < > 3.5   < > 3.7   < > 4.1 4.0 4.4   EXT AST <8 L  --  25  --   --   --   --   --   --   --   --    EXT ALT 15 L  --  70 H  --   --   --   --   --   --   --   --    EXT BilirubiN Total 0.6  --  0.5  --   --   --   --   --   --   --   --     < > = values in this interval not displayed.     Recent Labs   Lab 12/06/23  0000 01/02/24  0828 01/18/24  0903  04/18/24  0853 09/27/24  0823 04/04/25  1006   EXT Tacrolimus Lvl 6.4 4.8   < > 4.9 5.8 12.9   EXT PROT/CREAT Ratio UR 0.2 201 mg/g  --  0.2 g/day  --   --    EXT Protein UA Negative trace  --  negative negative Negative   EXT WBC UA 3-5 0-2  --   --  0-2 Few 6-10 A   EXT RBC UA 0-2 0-2  --   --  none Rare 1-3    < > = values in this interval not displayed.     Labs were reviewed with the patient.    Assessment:     1. S/P kidney transplant    2. CKD (chronic kidney disease) stage 2, GFR 60-89 ml/min    3. Immunocompromised state due to drug therapy           Plan:   New nursing orders:  -  continue routine care  - placed referral for podiatrist as requested  - continue to follow-up with nephrologist for CKD care  - continue to follow with PCP for routine medical care and cancer screens  - continue to f/u with endocrine for diabetes management  - needs repeat Tac level next week      CKD II-IIIa post DCD kidney transplant 4/9/23, KDPI 38%, CIT>21hr   Latest Reference Range & Units 1yr 11mo,  Kidney-Post 2 Year  04/04/25 10:06   EXT Creatinine 0.70 - 1.60 mg/dL 1.30 (E)   EXT eGFR NO RACE VARIABLE  71.22 (E)   EXT Glucose 74 - 106  167 (H) (E)     Recent Labs   Lab 05/04/23  0724 05/04/23  0742 06/20/24  1128 07/03/24  0906   Creatinine  --    < > 1.2 1.3   eGFR  --    < > >60 >60   Urine Protein/Creatinine Ratio  --   --   --  0.11   Prot/Creat Ratio, Urine 0.12  --   --   --     < > = values in this interval not displayed.    -creat 1.2-1.6, most recent noted on lower end of his range  Screening for proteinuria  - Obtain urine p/c    Immunosuppression Management for pt on prophylactic IS against rejection  - On tacro XR (changed from IS d/t tremors--improved), MMF [resumed 2/20/24] and prednisone. Hx of CMV  - Target tacro 5-7  - Continue monitoring for toxicities and SE.       At risk for opportunistic infections given immunosuppressed state  -Prophylaxis against opportunistic infections  -Bactrim and Valcyte  stopped as directed  -Screening for BK infection to prevent allograft dysfunction  -4/4/25  BK PCR not det  -Continue blood PCR screening as per guidelines to prevent BK viremia and nephropathy leading to allograft dysfunction and potential graft failure  -CMV not detected     Hypertension, renal  - continue nifedipine 60 mg bid    Hypomagnesemia  -improving    Hypophosphatemia  -KPN to 250 mg bid    Follow-up:   Clinic: return to transplant clinic weekly for the first month after transplant; every 2 weeks during months 2-3; then at 6-, 9-, 12-, 18-, 24-, and 36- months post-transplant to reassess for complications from immunosuppression toxicity and monitor for rejection.  Annually thereafter.    Labs: since patient remains at high risk for rejection and drug-related complications that warrant close monitoring, labs will be ordered as follows: continue twice weekly CBC, renal panel, and drug level for first month; then same labs once weekly through 3rd month post-transplant.  Urine for UA and protein/creatinine ratio monthly.  Serum BK - PCR at 1-, 3-, 6-, 9-, 12-, 18-, 24-, 36- 48-, and 60 months post-transplant.  Hepatic panel at 1-, 2-, 3-, 6-, 9-, 12-, 18-, 24-, and 36- months post-transplant.    Farnaz Santos NP       UNOS Patient Status  Functional Status: 80% - Normal activity with effort: some symptoms of disease  Physical Capacity: No Limitations

## 2025-04-04 ENCOUNTER — DOCUMENTATION ONLY (OUTPATIENT)
Dept: TRANSPLANT | Facility: CLINIC | Age: 40
End: 2025-04-04
Payer: MEDICAID

## 2025-04-04 LAB
EXT ALBUMIN: 4.4 (ref 3.4–5)
EXT ALKALINE PHOSPHATASE: ABNORMAL
EXT ALLOSURE: ABNORMAL
EXT ALT: ABNORMAL
EXT AMYLASE: ABNORMAL
EXT ANC: ABNORMAL
EXT AST: ABNORMAL
EXT BACTERIA UA: ABNORMAL
EXT BANDS%: ABNORMAL
EXT BILIRUBIN DIRECT: ABNORMAL
EXT BILIRUBIN TOTAL: ABNORMAL
EXT BK VIRUS DNA QN PCR: ABNORMAL
EXT BUN: 23 (ref 7–18)
EXT C PEPTIDE: ABNORMAL
EXT CALCIUM: 9.4 (ref 8.5–10.1)
EXT CHLORIDE: 101 (ref 98–107)
EXT CHOLESTEROL: ABNORMAL
EXT CMV DNA QUANT. BY PCR: ABNORMAL
EXT CO2: 29 (ref 21–32)
EXT CREATININE UA: ABNORMAL
EXT CREATININE: 1.3 MG/DL (ref 0.7–1.6)
EXT CYCLOSPORINE LVL: ABNORMAL
EXT EBV DNA BY PCR: ABNORMAL
EXT EBV IGG: ABNORMAL
EXT EGFR NO RACE VARIABLE: 71.22
EXT EOSINOPHIL%: 2 (ref 0–8)
EXT FERRITIN: ABNORMAL
EXT GFR MDRD AF AMER: ABNORMAL
EXT GFR MDRD NON AF AMER: ABNORMAL
EXT GLUCOSE UA: NEGATIVE
EXT GLUCOSE: 167 (ref 74–106)
EXT HBV DNA QUANT PCR: ABNORMAL
EXT HCV QUANT: ABNORMAL
EXT HDL: ABNORMAL
EXT HEMATOCRIT: 42.1 (ref 40.1–51)
EXT HEMOGLOBIN A1C: ABNORMAL
EXT HEMOGLOBIN: 14.4 (ref 13.7–17.5)
EXT HIV RNA QUANT PCR: ABNORMAL
EXT IMMUNKNOW (STIMULATED): ABNORMAL
EXT IRON SATURATION: ABNORMAL
EXT LDH, TOTAL: ABNORMAL
EXT LDL CHOLESTEROL: ABNORMAL
EXT LEFLUNOMIDE METABOLITE: ABNORMAL
EXT LIPASE: ABNORMAL
EXT LYMPH%: 31.1 (ref 21.8–51.7)
EXT MAGNESIUM: 1.8 (ref 1.8–2.4)
EXT MONOCYTES%: 14.9 (ref 2–12)
EXT NITRITES UA: NEGATIVE
EXT PHOSPHORUS: 3.6 (ref 2.6–4.7)
EXT PLATELETS: 213 (ref 140–450)
EXT POTASSIUM: 4.4 (ref 3.5–5.1)
EXT PROT/CREAT RATIO UR: ABNORMAL
EXT PROTEIN TOTAL: ABNORMAL
EXT PROTEIN UA: NEGATIVE
EXT PTH, INTACT: ABNORMAL
EXT RBC UA: ABNORMAL (ref 0–3)
EXT SEGS%: 51 (ref 34–71)
EXT SERUM IRON: ABNORMAL
EXT SIROLIMUS LVL: ABNORMAL
EXT SODIUM: 139 MMOL/L (ref 136–145)
EXT TACROLIMUS LVL: ABNORMAL
EXT TIBC: ABNORMAL
EXT TRIGLYCERIDES: ABNORMAL
EXT URIC ACID: ABNORMAL
EXT URINE CULTURE: ABNORMAL
EXT URINE PROTEIN: ABNORMAL
EXT VIT D 25 HYDROXY: ABNORMAL
EXT WBC UA: ABNORMAL (ref 0–5)
EXT WBC: 5.1 (ref 4–10)
PARVOVIRUS B19 DNA BY PCR: ABNORMAL
QUANTIFERON GOLD TB: ABNORMAL

## 2025-04-08 ENCOUNTER — PATIENT MESSAGE (OUTPATIENT)
Dept: TRANSPLANT | Facility: CLINIC | Age: 40
End: 2025-04-08
Payer: MEDICAID

## 2025-04-09 ENCOUNTER — RESULTS FOLLOW-UP (OUTPATIENT)
Dept: TRANSPLANT | Facility: CLINIC | Age: 40
End: 2025-04-09

## 2025-04-10 ENCOUNTER — PATIENT MESSAGE (OUTPATIENT)
Dept: TRANSPLANT | Facility: CLINIC | Age: 40
End: 2025-04-10
Payer: MEDICAID

## 2025-04-11 ENCOUNTER — OFFICE VISIT (OUTPATIENT)
Dept: TRANSPLANT | Facility: CLINIC | Age: 40
End: 2025-04-11
Payer: MEDICAID

## 2025-04-11 ENCOUNTER — PATIENT MESSAGE (OUTPATIENT)
Dept: TRANSPLANT | Facility: CLINIC | Age: 40
End: 2025-04-11
Payer: MEDICAID

## 2025-04-11 DIAGNOSIS — N18.2 CONTROLLED TYPE 2 DIABETES MELLITUS WITH STAGE 2 CHRONIC KIDNEY DISEASE, WITHOUT LONG-TERM CURRENT USE OF INSULIN: ICD-10-CM

## 2025-04-11 DIAGNOSIS — D84.821 IMMUNOCOMPROMISED STATE DUE TO DRUG THERAPY: ICD-10-CM

## 2025-04-11 DIAGNOSIS — N18.2 CKD (CHRONIC KIDNEY DISEASE) STAGE 2, GFR 60-89 ML/MIN: ICD-10-CM

## 2025-04-11 DIAGNOSIS — E11.22 CONTROLLED TYPE 2 DIABETES MELLITUS WITH STAGE 2 CHRONIC KIDNEY DISEASE, WITHOUT LONG-TERM CURRENT USE OF INSULIN: ICD-10-CM

## 2025-04-11 DIAGNOSIS — Z79.899 IMMUNOCOMPROMISED STATE DUE TO DRUG THERAPY: ICD-10-CM

## 2025-04-11 DIAGNOSIS — Z94.0 S/P KIDNEY TRANSPLANT: Primary | ICD-10-CM

## 2025-04-11 NOTE — LETTER
April 11, 2025        Pipe Martinez  [address] [facility] [address]  Phone: [phone]  Fax: [phone]             [facility] 1st Fl  [address]  [facility] [address]  Phone: [phone]   Patient: Andrey Reddy   MR Number: [id number]   YOB: 1985   Date of Visit: 4/11/2025       Dear Dr. Pipe Martinez    Thank you for referring Andrey Reddy to me for evaluation. Attached you will find relevant portions of my assessment and plan of care.    If you have questions, please do not hesitate to call me. I look forward to following Andrey Reddy along with you.    Sincerely,    Farnaz Santos, NP    Enclosure    If you would like to receive this communication electronically, please contact [email or web address] or [phone] to request [employer] access.    [employer] is a tool which provides read-only access to select patient information with whom you have a relationship. Its easy to use and provides real time access to review your patients record including encounter summaries, notes, results, and demographic information.    If you feel you have received this communication in error or would no longer like to receive these types of communications, please e-mail [email or web address]

## 2025-04-17 ENCOUNTER — DOCUMENTATION ONLY (OUTPATIENT)
Dept: TRANSPLANT | Facility: CLINIC | Age: 40
End: 2025-04-17
Payer: MEDICAID

## 2025-04-17 LAB — EXT TACROLIMUS LVL: 6.9

## 2025-04-19 ENCOUNTER — RESULTS FOLLOW-UP (OUTPATIENT)
Dept: TRANSPLANT | Facility: HOSPITAL | Age: 40
End: 2025-04-19

## 2025-04-29 DIAGNOSIS — Z99.2 CONTROLLED TYPE 2 DIABETES MELLITUS WITH CHRONIC KIDNEY DISEASE ON CHRONIC DIALYSIS, WITHOUT LONG-TERM CURRENT USE OF INSULIN: ICD-10-CM

## 2025-04-29 DIAGNOSIS — N18.6 CONTROLLED TYPE 2 DIABETES MELLITUS WITH CHRONIC KIDNEY DISEASE ON CHRONIC DIALYSIS, WITHOUT LONG-TERM CURRENT USE OF INSULIN: ICD-10-CM

## 2025-04-29 DIAGNOSIS — E11.22 CONTROLLED TYPE 2 DIABETES MELLITUS WITH CHRONIC KIDNEY DISEASE ON CHRONIC DIALYSIS, WITHOUT LONG-TERM CURRENT USE OF INSULIN: ICD-10-CM

## 2025-04-30 RX ORDER — FLASH GLUCOSE SENSOR
KIT MISCELLANEOUS
Qty: 6 KIT | Refills: 1 | Status: SHIPPED | OUTPATIENT
Start: 2025-04-30

## 2025-05-05 ENCOUNTER — PATIENT MESSAGE (OUTPATIENT)
Dept: TRANSPLANT | Facility: CLINIC | Age: 40
End: 2025-05-05
Payer: MEDICAID

## 2025-05-05 DIAGNOSIS — E11.22 CONTROLLED TYPE 2 DIABETES MELLITUS WITH CHRONIC KIDNEY DISEASE ON CHRONIC DIALYSIS, WITHOUT LONG-TERM CURRENT USE OF INSULIN: ICD-10-CM

## 2025-05-05 DIAGNOSIS — Z99.2 CONTROLLED TYPE 2 DIABETES MELLITUS WITH CHRONIC KIDNEY DISEASE ON CHRONIC DIALYSIS, WITHOUT LONG-TERM CURRENT USE OF INSULIN: ICD-10-CM

## 2025-05-05 DIAGNOSIS — N18.6 CONTROLLED TYPE 2 DIABETES MELLITUS WITH CHRONIC KIDNEY DISEASE ON CHRONIC DIALYSIS, WITHOUT LONG-TERM CURRENT USE OF INSULIN: ICD-10-CM

## 2025-05-05 RX ORDER — FLASH GLUCOSE SENSOR
KIT MISCELLANEOUS
Qty: 6 KIT | Refills: 1 | Status: SHIPPED | OUTPATIENT
Start: 2025-05-05

## 2025-05-19 DIAGNOSIS — N18.6 CONTROLLED TYPE 2 DIABETES MELLITUS WITH CHRONIC KIDNEY DISEASE ON CHRONIC DIALYSIS, WITHOUT LONG-TERM CURRENT USE OF INSULIN: ICD-10-CM

## 2025-05-19 DIAGNOSIS — Z99.2 CONTROLLED TYPE 2 DIABETES MELLITUS WITH CHRONIC KIDNEY DISEASE ON CHRONIC DIALYSIS, WITHOUT LONG-TERM CURRENT USE OF INSULIN: ICD-10-CM

## 2025-05-19 DIAGNOSIS — E11.22 CONTROLLED TYPE 2 DIABETES MELLITUS WITH CHRONIC KIDNEY DISEASE ON CHRONIC DIALYSIS, WITHOUT LONG-TERM CURRENT USE OF INSULIN: ICD-10-CM

## 2025-05-20 RX ORDER — PEN NEEDLE, DIABETIC 30 GX3/16"
1 NEEDLE, DISPOSABLE MISCELLANEOUS 4 TIMES DAILY
Qty: 100 EACH | Refills: 0 | Status: SHIPPED | OUTPATIENT
Start: 2025-05-20

## 2025-06-16 NOTE — PROGRESS NOTES
Subjective:      Patient ID: Andrey Reddy is a 40 y.o. male.    Chief Complaint:  Diabetes    History of Present Illness  Andrey Reddy is here for follow up of DM.  Previously seen by me 3/2024.  This is a MyChart video visit.    The patient location is: [place]  The chief complaint leading to consultation is: DM  Visit type: Virtual visit with synchronous audio and video  Total time spent with patient: see below  Each patient to whom he or she provides medical services by telemedicine is:  (1) informed of the relationship between the physician and patient and the respective role of any other health care provider with respect to management of the patient; and (2) notified that he or she may decline to receive medical services by telemedicine and may withdraw from such care at any time.      With regards to Diabetes:    Diagnosed: ~2016 DE 11/2023  FH: mother  Known complications:  DKA Denies  RN Denies  Eye Exam: within the last year , per patient  PN Denies  Podiatry: Denies  Nephropathy +  S/p kidney transplant on 4/9/2023  CAD Denies  Denies history of pancreatitis & personal/family history of medullary thyroid cancer.     Diet/Exercise: reports to be snacking more often lately - sweets   Eats 2 meals a day.   Snacks : occasionally   Drinks : water, Pedialyte, juice.   Exercise - tries to stay active   Recent illness, injury, steroids; Denies     Current Regimen:  Tradjenta 5mg daily  Tresiba 12 units daily  Novolog 3 units plus correction scale before meals only (okay to skip if glucose < 100)  Add correction scale as needed.  Blood sugar 150 to 200 add 1 units  Blood sugar 201 to 250 add 2 units  Blood sugar greater than 251 add 3 units    Occasionally skipping Novolog.    Other medications tried:  Metformin - CKD  Trulicity - denies issues but stopped due to DM control   Rybelsus - GI side effects     Glucose Monitor: Bethany  6 times a day testing  Log reviewed: sensor downloaded and  reviewed    Hypoglycemia:  Rare   Knows how to correct with 15 grams of carbs- juice, coke, or a peppermint.     Diabetes Management Status    Hemoglobin A1C   Date Value Ref Range Status   11/16/2023 6.7 (H) 4.0 - 5.6 % Final     Comment:     ADA Screening Guidelines:  5.7-6.4%  Consistent with prediabetes  >or=6.5%  Consistent with diabetes    High levels of fetal hemoglobin interfere with the HbA1C  assay. Heterozygous hemoglobin variants (HbS, HgC, etc)do  not significantly interfere with this assay.   However, presence of multiple variants may affect accuracy.     04/09/2023 4.5 4.0 - 5.6 % Final     Comment:     ADA Screening Guidelines:  5.7-6.4%  Consistent with prediabetes  >or=6.5%  Consistent with diabetes    High levels of fetal hemoglobin interfere with the HbA1C  assay. Heterozygous hemoglobin variants (HbS, HgC, etc)do  not significantly interfere with this assay.   However, presence of multiple variants may affect accuracy.     02/08/2023 4.4 3.8 - 5.6 % Final     Comment:     Diabetes: HgbA1c of 6.5% or greater (should be confirmed with a follow-up   Test)  There is not a standard for using A1c to diagnose diabetes in children  Pre-Diabetes: HgbA1c 5.7%-6.4%  A1c Goal: 7.0% or less (your goal may be different based on how long you   have had diabetes, your age, or other medical conditions)    Note: This test does not detect hemoglobin variants, hemolytic disease, or   significant blood loss, which may interfere with the accuracy of the HbA1c   test, especially when homozygous variants are present. If clinically   indicated, alternative HbA1c methods such as HPLC, electrophoresis, or   fructosamine are available.    10/13/2021 4.7 4.0 - 5.6 % Final     Comment:     ADA Screening Guidelines:  5.7-6.4%  Consistent with prediabetes  >or=6.5%  Consistent with diabetes    High levels of fetal hemoglobin interfere with the HbA1C  assay. Heterozygous hemoglobin variants (HbS, HgC, etc)do  not significantly  "interfere with this assay.   However, presence of multiple variants may affect accuracy.       Hemoglobin A1c   Date Value Ref Range Status   01/16/2025 6.5 (H) 4.0 - 5.6 % Final     Comment:     ADA Screening Guidelines:  5.7-6.4%  Consistent with prediabetes  >=6.5%  Consistent with diabetes    High levels of fetal hemoglobin interfere with the HbA1C  assay. Heterozygous hemoglobin variants (HbS, HgC, etc)do  not significantly interfere with this assay.   However, presence of multiple variants may affect accuracy.   06/20/2024 6.0 (H) 4.0 - 5.6 % Final     Comment:     ADA Screening Guidelines:  5.7-6.4%  Consistent with prediabetes  >=6.5%  Consistent with diabetes    High levels of fetal hemoglobin interfere with the HbA1C  assay. Heterozygous hemoglobin variants (HbS, HgC, etc)do  not significantly interfere with this assay.   However, presence of multiple variants may affect accuracy.       Statin: Taking  ACE/ARB: Not taking  Screening or Prevention Patient's value Goal Complete/Controlled?   HgA1C Testing and Control   Lab Results   Component Value Date    HGBA1C 6.5 (H) 01/16/2025      Annually/Less than 8% Yes   Lipid profile : 06/20/2024 Annually Yes   LDL control Lab Results   Component Value Date    LDLCALC 104.4 04/09/2023    Annually/Less than 100 mg/dl  No   Nephropathy screening No results found for: "LABMICR"  Lab Results   Component Value Date    PROTEINUA Negative 07/03/2024    Annually Yes   Blood pressure BP Readings from Last 1 Encounters:   02/17/25 134/88    Less than 140/90 No   Dilated retinal exam : 07/22/2024 Annually Yes   Foot exam   : 01/16/2025 Annually No       Review of Systems  As above      There were no vitals taken for this visit.      There is no height or weight on file to calculate BMI.    Lab Review:   Lab Results   Component Value Date    HGBA1C 6.5 (H) 01/16/2025    HGBA1C 6.0 (H) 06/20/2024    HGBA1C 6.7 (H) 11/16/2023       Lab Results   Component Value Date    CHOL 214 " (H) 06/20/2024    HDL 61 06/20/2024    LDLCALC 104.4 04/09/2023    TRIG 156 (H) 06/20/2024    CHOLHDL 28.5 06/20/2024     Lab Results   Component Value Date     07/03/2024    K 4.5 07/03/2024     07/03/2024    CO2 25 07/03/2024    GLU 59 (L) 07/03/2024    BUN 19 07/03/2024    CREATININE 1.3 07/03/2024    CALCIUM 9.9 07/03/2024    PROT 8.1 06/20/2024    ALBUMIN 4.5 07/03/2024    BILITOT 0.8 06/20/2024    ALKPHOS 55 06/20/2024    AST 20 06/20/2024    ALT 18 06/20/2024    ANIONGAP 12 07/03/2024    ESTGFRAFRICA 16.2 (A) 10/13/2021    EGFRNONAA 14.0 (A) 10/13/2021     Vit D, 25-Hydroxy   Date Value Ref Range Status   04/09/2023 36 30 - 96 ng/mL Final     Comment:     Vitamin D deficiency.........<10 ng/mL                              Vitamin D insufficiency......10-29 ng/mL       Vitamin D sufficiency........> or equal to 30 ng/mL  Vitamin D toxicity............>100 ng/mL       Assessment and Plan     1. Controlled type 2 diabetes mellitus with stage 2 chronic kidney disease, without long-term current use of insulin  NOVOLOG FLEXPEN U-100 INSULIN 100 unit/mL (3 mL) InPn pen    TRESIBA FLEXTOUCH U-100 100 unit/mL (3 mL) insulin pen    dulaglutide (TRULICITY) 0.75 mg/0.5 mL pen injector    Basic Metabolic Panel    Hemoglobin A1C    TSH    Fructosamine      2. Controlled type 2 diabetes mellitus with chronic kidney disease on chronic dialysis, without long-term current use of insulin        3. S/P kidney transplant            Controlled type 2 diabetes mellitus with stage 2 chronic kidney disease, without long-term current use of insulin  -- Labs this month.   -- A1c goal <7%.  -- Medications discussed:  MFM - CKD  GLP1-DPP4 - did not tolerate Rybelsus  SETH   SGLT2   Insulin   -- Reviewed logs/CGM:  Glucose variable. There are some days where the glucose is well controlled. I suspect variability is due to diet and/or noncompliance.  Reach out to me sooner for any glucose <70 or consistently >200.  -- Willing to  retry Trulicity since he did well in the past.   -- Medication Changes:   Trulicity 0.75mg weekly  Tresiba 14 units daily  Novolog 3 units plus correction scale before meals only (okay to skip if glucose < 100)  Add correction scale as needed.  Blood sugar 200 to 250 add 1 units  Blood sugar greater than 251 add 2 units      -- Reviewed goals of therapy are to get the best control we can without hypoglycemia.  -- Reviewed patient's current insulin regimen. Clarified proper insulin dose and timing in relation to meals, etc. Insulin injection sites and proper rotation instructed.    -- Advised frequent self blood glucose monitoring.  Patient encouraged to document glucose results and bring them to every clinic visit.  -- Hypoglycemia precautions discussed. Instructed on precautions before driving.    -- Call for Bg repeatedly < 90 or > 180.   -- Close adherence to lifestyle changes recommended.   -- Periodic follow ups for eye evaluations, foot care and dental care suggested.    S/P kidney transplant  -- Continue following with Transplant.           Follow up in about 4 weeks (around 7/17/2025).      I spent 20 minutes face-to-face with the patient, over half of the visit was spent on counseling and/or coordinating the care of the patient.    Counseling includes:  Diagnostic results, impressions, recommendations   Prognosis   Risk and benefits of management/treatment options   Instructions for management treatment and or follow-up   Importance of compliance with management   Risk factor reduction   Patient education    Visit today included increased complexity associated with the care of the problems addressed and managing the longitudinal care of the patient due to the serious and/or complex managed problems.

## 2025-06-18 DIAGNOSIS — E11.22 CONTROLLED TYPE 2 DIABETES MELLITUS WITH CHRONIC KIDNEY DISEASE ON CHRONIC DIALYSIS, WITHOUT LONG-TERM CURRENT USE OF INSULIN: ICD-10-CM

## 2025-06-18 DIAGNOSIS — Z94.0 S/P KIDNEY TRANSPLANT: ICD-10-CM

## 2025-06-18 DIAGNOSIS — Z99.2 CONTROLLED TYPE 2 DIABETES MELLITUS WITH CHRONIC KIDNEY DISEASE ON CHRONIC DIALYSIS, WITHOUT LONG-TERM CURRENT USE OF INSULIN: ICD-10-CM

## 2025-06-18 DIAGNOSIS — N18.6 CONTROLLED TYPE 2 DIABETES MELLITUS WITH CHRONIC KIDNEY DISEASE ON CHRONIC DIALYSIS, WITHOUT LONG-TERM CURRENT USE OF INSULIN: ICD-10-CM

## 2025-06-18 RX ORDER — PEN NEEDLE, DIABETIC 30 GX3/16"
1 NEEDLE, DISPOSABLE MISCELLANEOUS 4 TIMES DAILY
Qty: 100 EACH | Refills: 0 | Status: CANCELLED | OUTPATIENT
Start: 2025-06-18

## 2025-06-18 RX ORDER — PREDNISONE 5 MG/1
5 TABLET ORAL DAILY
Qty: 91 TABLET | Refills: 3 | Status: CANCELLED | OUTPATIENT
Start: 2025-06-18

## 2025-06-19 ENCOUNTER — PATIENT MESSAGE (OUTPATIENT)
Dept: ENDOCRINOLOGY | Facility: CLINIC | Age: 40
End: 2025-06-19

## 2025-06-19 ENCOUNTER — OFFICE VISIT (OUTPATIENT)
Dept: ENDOCRINOLOGY | Facility: CLINIC | Age: 40
End: 2025-06-19
Payer: MEDICARE

## 2025-06-19 DIAGNOSIS — Z94.0 S/P KIDNEY TRANSPLANT: ICD-10-CM

## 2025-06-19 DIAGNOSIS — N18.6 CONTROLLED TYPE 2 DIABETES MELLITUS WITH CHRONIC KIDNEY DISEASE ON CHRONIC DIALYSIS, WITHOUT LONG-TERM CURRENT USE OF INSULIN: ICD-10-CM

## 2025-06-19 DIAGNOSIS — E11.22 CONTROLLED TYPE 2 DIABETES MELLITUS WITH CHRONIC KIDNEY DISEASE ON CHRONIC DIALYSIS, WITHOUT LONG-TERM CURRENT USE OF INSULIN: ICD-10-CM

## 2025-06-19 DIAGNOSIS — Z99.2 CONTROLLED TYPE 2 DIABETES MELLITUS WITH CHRONIC KIDNEY DISEASE ON CHRONIC DIALYSIS, WITHOUT LONG-TERM CURRENT USE OF INSULIN: ICD-10-CM

## 2025-06-19 DIAGNOSIS — E11.22 CONTROLLED TYPE 2 DIABETES MELLITUS WITH STAGE 2 CHRONIC KIDNEY DISEASE, WITHOUT LONG-TERM CURRENT USE OF INSULIN: Primary | ICD-10-CM

## 2025-06-19 DIAGNOSIS — N18.2 CONTROLLED TYPE 2 DIABETES MELLITUS WITH STAGE 2 CHRONIC KIDNEY DISEASE, WITHOUT LONG-TERM CURRENT USE OF INSULIN: Primary | ICD-10-CM

## 2025-06-19 RX ORDER — INSULIN DEGLUDEC 100 U/ML
14 INJECTION, SOLUTION SUBCUTANEOUS DAILY
Qty: 15 ML | Refills: 3 | Status: SHIPPED | OUTPATIENT
Start: 2025-06-19 | End: 2026-06-19

## 2025-06-19 RX ORDER — INSULIN ASPART 100 [IU]/ML
3 INJECTION, SOLUTION INTRAVENOUS; SUBCUTANEOUS
Qty: 9 ML | Refills: 3 | Status: SHIPPED | OUTPATIENT
Start: 2025-06-19

## 2025-06-19 RX ORDER — DULAGLUTIDE 0.75 MG/.5ML
0.75 INJECTION, SOLUTION SUBCUTANEOUS
Qty: 4 PEN | Refills: 1 | Status: SHIPPED | OUTPATIENT
Start: 2025-06-19 | End: 2026-06-19

## 2025-06-19 RX ORDER — PEN NEEDLE, DIABETIC 30 GX3/16"
1 NEEDLE, DISPOSABLE MISCELLANEOUS 4 TIMES DAILY
Qty: 100 EACH | Refills: 11 | Status: SHIPPED | OUTPATIENT
Start: 2025-06-19

## 2025-06-19 RX ORDER — PREDNISONE 5 MG/1
5 TABLET ORAL DAILY
Qty: 91 TABLET | Refills: 3 | Status: SHIPPED | OUTPATIENT
Start: 2025-06-19

## 2025-06-19 NOTE — ASSESSMENT & PLAN NOTE
-- Labs this month.   -- A1c goal <7%.  -- Medications discussed:  MFM - CKD  GLP1-DPP4 - did not tolerate Rybelsus  SETH   SGLT2   Insulin   -- Reviewed logs/CGM:  Glucose variable. There are some days where the glucose is well controlled. I suspect variability is due to diet and/or noncompliance.  Reach out to me sooner for any glucose <70 or consistently >200.  -- Willing to retry Trulicity since he did well in the past.   -- Medication Changes:   Trulicity 0.75mg weekly  Tresiba 14 units daily  Novolog 3 units plus correction scale before meals only (okay to skip if glucose < 100)  Add correction scale as needed.  Blood sugar 200 to 250 add 1 units  Blood sugar greater than 251 add 2 units      -- Reviewed goals of therapy are to get the best control we can without hypoglycemia.  -- Reviewed patient's current insulin regimen. Clarified proper insulin dose and timing in relation to meals, etc. Insulin injection sites and proper rotation instructed.    -- Advised frequent self blood glucose monitoring.  Patient encouraged to document glucose results and bring them to every clinic visit.  -- Hypoglycemia precautions discussed. Instructed on precautions before driving.    -- Call for Bg repeatedly < 90 or > 180.   -- Close adherence to lifestyle changes recommended.   -- Periodic follow ups for eye evaluations, foot care and dental care suggested.

## 2025-06-19 NOTE — Clinical Note
Roberta Sims Has an appointment on the 24th - schedule my labs that same day/ location [facility] in 4-6 weeks VV in 12 weeks  Orders Placed This Encounter     Basic Metabolic Panel     Hemoglobin A1C     TSH     Fructosamine

## 2025-06-26 ENCOUNTER — RESULTS FOLLOW-UP (OUTPATIENT)
Dept: ENDOCRINOLOGY | Facility: CLINIC | Age: 40
End: 2025-06-26

## 2025-07-14 PROBLEM — I10 PRIMARY HYPERTENSION: Status: ACTIVE | Noted: 2025-07-14

## 2025-07-16 ENCOUNTER — PATIENT MESSAGE (OUTPATIENT)
Dept: ENDOCRINOLOGY | Facility: CLINIC | Age: 40
End: 2025-07-16
Payer: MEDICARE

## 2025-07-16 DIAGNOSIS — N18.6 CONTROLLED TYPE 2 DIABETES MELLITUS WITH CHRONIC KIDNEY DISEASE ON CHRONIC DIALYSIS, WITHOUT LONG-TERM CURRENT USE OF INSULIN: Primary | ICD-10-CM

## 2025-07-16 DIAGNOSIS — Z99.2 CONTROLLED TYPE 2 DIABETES MELLITUS WITH CHRONIC KIDNEY DISEASE ON CHRONIC DIALYSIS, WITHOUT LONG-TERM CURRENT USE OF INSULIN: Primary | ICD-10-CM

## 2025-07-16 DIAGNOSIS — E11.22 CONTROLLED TYPE 2 DIABETES MELLITUS WITH CHRONIC KIDNEY DISEASE ON CHRONIC DIALYSIS, WITHOUT LONG-TERM CURRENT USE OF INSULIN: Primary | ICD-10-CM

## 2025-07-17 RX ORDER — BLOOD-GLUCOSE SENSOR
EACH MISCELLANEOUS
Qty: 6 EACH | Refills: 3 | Status: SHIPPED | OUTPATIENT
Start: 2025-07-17

## 2025-08-07 ENCOUNTER — PATIENT MESSAGE (OUTPATIENT)
Dept: TRANSPLANT | Facility: HOSPITAL | Age: 40
End: 2025-08-07
Payer: MEDICARE

## 2025-08-13 ENCOUNTER — PATIENT OUTREACH (OUTPATIENT)
Dept: ADMINISTRATIVE | Facility: HOSPITAL | Age: 40
End: 2025-08-13
Payer: MEDICARE

## 2025-08-13 DIAGNOSIS — E11.21 TYPE 2 DIABETES MELLITUS WITH DIABETIC NEPHROPATHY, WITH LONG-TERM CURRENT USE OF INSULIN: Primary | ICD-10-CM

## 2025-08-13 DIAGNOSIS — Z79.4 TYPE 2 DIABETES MELLITUS WITH DIABETIC NEPHROPATHY, WITH LONG-TERM CURRENT USE OF INSULIN: Primary | ICD-10-CM

## 2025-08-14 ENCOUNTER — PATIENT MESSAGE (OUTPATIENT)
Dept: TRANSPLANT | Facility: CLINIC | Age: 40
End: 2025-08-14
Payer: MEDICARE

## 2025-08-14 PROBLEM — G89.29 CHRONIC LOW BACK PAIN: Status: ACTIVE | Noted: 2025-08-14

## 2025-08-14 PROBLEM — M54.50 CHRONIC LOW BACK PAIN: Status: ACTIVE | Noted: 2025-08-14

## 2025-08-14 PROBLEM — M19.90 ARTHRITIS: Status: ACTIVE | Noted: 2025-08-14

## 2025-08-16 DIAGNOSIS — N18.2 CONTROLLED TYPE 2 DIABETES MELLITUS WITH STAGE 2 CHRONIC KIDNEY DISEASE, WITHOUT LONG-TERM CURRENT USE OF INSULIN: ICD-10-CM

## 2025-08-16 DIAGNOSIS — E11.22 CONTROLLED TYPE 2 DIABETES MELLITUS WITH STAGE 2 CHRONIC KIDNEY DISEASE, WITHOUT LONG-TERM CURRENT USE OF INSULIN: ICD-10-CM

## 2025-08-16 PROBLEM — E11.3393 MODERATE NONPROLIFERATIVE DIABETIC RETINOPATHY OF BOTH EYES WITHOUT MACULAR EDEMA ASSOCIATED WITH TYPE 2 DIABETES MELLITUS: Status: ACTIVE | Noted: 2025-08-16

## 2025-08-16 RX ORDER — DULAGLUTIDE 0.75 MG/.5ML
0.75 INJECTION, SOLUTION SUBCUTANEOUS
Qty: 4 PEN | Refills: 1 | Status: CANCELLED | OUTPATIENT
Start: 2025-08-16 | End: 2026-08-16

## 2025-08-17 DIAGNOSIS — E11.22 CONTROLLED TYPE 2 DIABETES MELLITUS WITH STAGE 2 CHRONIC KIDNEY DISEASE, WITHOUT LONG-TERM CURRENT USE OF INSULIN: ICD-10-CM

## 2025-08-17 DIAGNOSIS — N18.2 CONTROLLED TYPE 2 DIABETES MELLITUS WITH STAGE 2 CHRONIC KIDNEY DISEASE, WITHOUT LONG-TERM CURRENT USE OF INSULIN: ICD-10-CM

## 2025-08-18 RX ORDER — DULAGLUTIDE 0.75 MG/.5ML
0.75 INJECTION, SOLUTION SUBCUTANEOUS
Qty: 4 PEN | Refills: 1 | Status: SHIPPED | OUTPATIENT
Start: 2025-08-18 | End: 2026-08-18

## 2025-08-19 RX ORDER — NIFEDIPINE 60 MG/1
60 TABLET, EXTENDED RELEASE ORAL 2 TIMES DAILY
Qty: 60 TABLET | Refills: 5 | Status: CANCELLED | OUTPATIENT
Start: 2025-08-17

## 2025-08-20 ENCOUNTER — TELEPHONE (OUTPATIENT)
Dept: ENDOCRINOLOGY | Facility: CLINIC | Age: 40
End: 2025-08-20
Payer: MEDICARE

## 2025-08-21 RX ORDER — NIFEDIPINE 60 MG/1
60 TABLET, EXTENDED RELEASE ORAL 2 TIMES DAILY
Qty: 60 TABLET | Refills: 5 | Status: CANCELLED | OUTPATIENT
Start: 2025-08-17

## 2025-08-26 RX ORDER — NIFEDIPINE 60 MG/1
60 TABLET, EXTENDED RELEASE ORAL 2 TIMES DAILY
Qty: 60 TABLET | Refills: 5 | Status: CANCELLED | OUTPATIENT
Start: 2025-08-17

## 2025-08-28 ENCOUNTER — PATIENT MESSAGE (OUTPATIENT)
Dept: TRANSPLANT | Facility: CLINIC | Age: 40
End: 2025-08-28
Payer: MEDICARE

## 2025-08-28 RX ORDER — NIFEDIPINE 60 MG/1
60 TABLET, EXTENDED RELEASE ORAL 2 TIMES DAILY
Qty: 60 TABLET | Refills: 5 | OUTPATIENT
Start: 2025-08-28

## (undated) DEVICE — SUT SILK 2-0 STRANDS 30IN

## (undated) DEVICE — SUT 2-0 12-18IN SILK

## (undated) DEVICE — TUBE PENROSE DRAIN 12IN X 5/8I

## (undated) DEVICE — SOL NS 1000CC

## (undated) DEVICE — CLIPPER BLADE MOD 4406 (CAREF)

## (undated) DEVICE — SUT SILK 3-0 STRANDS 30IN

## (undated) DEVICE — SET IRR URLGY 2LINE UNIV SPIKE

## (undated) DEVICE — PUNCH AORTIC 4.0MM 6/CASE

## (undated) DEVICE — STOCKINETTE 2INX36

## (undated) DEVICE — SUT 4-0 12-18IN SILK BLACK

## (undated) DEVICE — SET DECANTER MEDICHOICE

## (undated) DEVICE — SUT 1 36IN PDS II VIO MONO

## (undated) DEVICE — SUT 1 48IN PDS II VIO MONO

## (undated) DEVICE — HANDSET ARGON PLUS

## (undated) DEVICE — SUT PROLENE 5-0 36IN C-1

## (undated) DEVICE — PACK KIDNEY TRANSPLANT CUSTOM

## (undated) DEVICE — TIP YANKAUERS BULB NO VENT

## (undated) DEVICE — SUT SILK 3-0 SH 18IN BLACK

## (undated) DEVICE — SYR ONLY LUER LOCK 20CC

## (undated) DEVICE — ELECTRODE REM PLYHSV RETURN 9

## (undated) DEVICE — DRESSING ABSRBNT ISLAND 3.6X8

## (undated) DEVICE — ADHESIVE DERMABOND ADVANCED

## (undated) DEVICE — PLUG CATHETER STERILE FOLEY

## (undated) DEVICE — HEMOSTAT SURGICEL PWD 3G

## (undated) DEVICE — SUT ETHILON 3-0 PS2 18 BLK

## (undated) DEVICE — SYR IRRIGATION BULB STER 60ML

## (undated) DEVICE — Device

## (undated) DEVICE — SUT PROLENE 6-0 BV-1 30IN

## (undated) DEVICE — STAPLER SKIN PROXIMATE WIDE

## (undated) DEVICE — DRAPE SLUSH WARMER WITH DISC

## (undated) DEVICE — DRAIN PENROSE SIL 0.5X18IN

## (undated) DEVICE — SUT PDS BV 6-0

## (undated) DEVICE — PUNCH AORTIC 4.8MM

## (undated) DEVICE — TRAY CATH FOL SIL URIMTR 16FR

## (undated) DEVICE — HEMOSTAT SURGICEL NU-KNIT 6X9

## (undated) DEVICE — FOLEY BLLN 20FR 3WAY 5CC

## (undated) DEVICE — SUT 3-0 12-18IN SILK

## (undated) DEVICE — TOWEL OR XRAY WHITE 17X26IN